# Patient Record
Sex: FEMALE | Race: WHITE | Employment: OTHER | URBAN - METROPOLITAN AREA
[De-identification: names, ages, dates, MRNs, and addresses within clinical notes are randomized per-mention and may not be internally consistent; named-entity substitution may affect disease eponyms.]

---

## 2018-09-19 ENCOUNTER — APPOINTMENT (OUTPATIENT)
Dept: CT IMAGING | Facility: CLINIC | Age: 83
DRG: 392 | End: 2018-09-19
Attending: EMERGENCY MEDICINE
Payer: MEDICARE

## 2018-09-19 ENCOUNTER — HOSPITAL ENCOUNTER (INPATIENT)
Facility: CLINIC | Age: 83
LOS: 4 days | Discharge: HOME | DRG: 392 | End: 2018-09-23
Attending: EMERGENCY MEDICINE | Admitting: INTERNAL MEDICINE
Payer: MEDICARE

## 2018-09-19 DIAGNOSIS — K57.32 DIVERTICULITIS OF SIGMOID COLON: ICD-10-CM

## 2018-09-19 DIAGNOSIS — N39.0 URINARY TRACT INFECTION WITH HEMATURIA, SITE UNSPECIFIED: ICD-10-CM

## 2018-09-19 DIAGNOSIS — K65.1 INTRA-ABDOMINAL ABSCESS (H): Primary | ICD-10-CM

## 2018-09-19 DIAGNOSIS — R31.9 URINARY TRACT INFECTION WITH HEMATURIA, SITE UNSPECIFIED: ICD-10-CM

## 2018-09-19 LAB
ALBUMIN UR-MCNC: >499 MG/DL
ANION GAP SERPL CALCULATED.3IONS-SCNC: 7 MMOL/L (ref 3–14)
APPEARANCE UR: ABNORMAL
BACTERIA #/AREA URNS HPF: ABNORMAL /HPF
BASOPHILS # BLD AUTO: 0.1 10E9/L (ref 0–0.2)
BASOPHILS NFR BLD AUTO: 0.6 %
BILIRUB UR QL STRIP: NEGATIVE
BUN SERPL-MCNC: 14 MG/DL (ref 7–30)
CALCIUM SERPL-MCNC: 8.3 MG/DL (ref 8.5–10.1)
CHLORIDE SERPL-SCNC: 101 MMOL/L (ref 94–109)
CO2 SERPL-SCNC: 26 MMOL/L (ref 20–32)
COLOR UR AUTO: YELLOW
CREAT BLD-MCNC: 0.7 MG/DL (ref 0.52–1.04)
CREAT SERPL-MCNC: 0.77 MG/DL (ref 0.52–1.04)
DIFFERENTIAL METHOD BLD: ABNORMAL
EOSINOPHIL # BLD AUTO: 0.2 10E9/L (ref 0–0.7)
EOSINOPHIL NFR BLD AUTO: 1.9 %
ERYTHROCYTE [DISTWIDTH] IN BLOOD BY AUTOMATED COUNT: 13.2 % (ref 10–15)
GFR SERPL CREATININE-BSD FRML MDRD: 72 ML/MIN/1.7M2
GFR SERPL CREATININE-BSD FRML MDRD: 81 ML/MIN/1.7M2
GLUCOSE SERPL-MCNC: 101 MG/DL (ref 70–99)
GLUCOSE UR STRIP-MCNC: NEGATIVE MG/DL
HCT VFR BLD AUTO: 37.1 % (ref 35–47)
HGB BLD-MCNC: 12.4 G/DL (ref 11.7–15.7)
HGB UR QL STRIP: ABNORMAL
IMM GRANULOCYTES # BLD: 0 10E9/L (ref 0–0.4)
IMM GRANULOCYTES NFR BLD: 0.5 %
KETONES UR STRIP-MCNC: NEGATIVE MG/DL
LACTATE BLD-SCNC: 1.1 MMOL/L (ref 0.7–2)
LEUKOCYTE ESTERASE UR QL STRIP: ABNORMAL
LYMPHOCYTES # BLD AUTO: 1 10E9/L (ref 0.8–5.3)
LYMPHOCYTES NFR BLD AUTO: 11.9 %
MCH RBC QN AUTO: 33.6 PG (ref 26.5–33)
MCHC RBC AUTO-ENTMCNC: 33.4 G/DL (ref 31.5–36.5)
MCV RBC AUTO: 101 FL (ref 78–100)
MONOCYTES # BLD AUTO: 1 10E9/L (ref 0–1.3)
MONOCYTES NFR BLD AUTO: 12.6 %
MUCOUS THREADS #/AREA URNS LPF: PRESENT /LPF
NEUTROPHILS # BLD AUTO: 5.8 10E9/L (ref 1.6–8.3)
NEUTROPHILS NFR BLD AUTO: 72.5 %
NITRATE UR QL: NEGATIVE
NRBC # BLD AUTO: 0 10*3/UL
NRBC BLD AUTO-RTO: 0 /100
PH UR STRIP: 5 PH (ref 5–7)
PLATELET # BLD AUTO: 334 10E9/L (ref 150–450)
POTASSIUM SERPL-SCNC: 4.2 MMOL/L (ref 3.4–5.3)
RBC # BLD AUTO: 3.69 10E12/L (ref 3.8–5.2)
RBC #/AREA URNS AUTO: >182 /HPF (ref 0–2)
SODIUM SERPL-SCNC: 134 MMOL/L (ref 133–144)
SOURCE: ABNORMAL
SP GR UR STRIP: 1.02 (ref 1–1.03)
SQUAMOUS #/AREA URNS AUTO: 4 /HPF (ref 0–1)
UROBILINOGEN UR STRIP-MCNC: 4 MG/DL (ref 0–2)
WBC # BLD AUTO: 8 10E9/L (ref 4–11)
WBC #/AREA URNS AUTO: >182 /HPF (ref 0–5)
WBC CLUMPS #/AREA URNS HPF: PRESENT /HPF

## 2018-09-19 PROCEDURE — 36415 COLL VENOUS BLD VENIPUNCTURE: CPT | Performed by: EMERGENCY MEDICINE

## 2018-09-19 PROCEDURE — 83605 ASSAY OF LACTIC ACID: CPT | Performed by: EMERGENCY MEDICINE

## 2018-09-19 PROCEDURE — 99207 ZZC CDG-MDM COMPONENT: MEETS MODERATE - UP CODED: CPT | Performed by: INTERNAL MEDICINE

## 2018-09-19 PROCEDURE — 74177 CT ABD & PELVIS W/CONTRAST: CPT

## 2018-09-19 PROCEDURE — 99285 EMERGENCY DEPT VISIT HI MDM: CPT | Mod: 25

## 2018-09-19 PROCEDURE — A9270 NON-COVERED ITEM OR SERVICE: HCPCS | Mod: GY | Performed by: PHYSICIAN ASSISTANT

## 2018-09-19 PROCEDURE — 82565 ASSAY OF CREATININE: CPT

## 2018-09-19 PROCEDURE — 96375 TX/PRO/DX INJ NEW DRUG ADDON: CPT

## 2018-09-19 PROCEDURE — 12000000 ZZH R&B MED SURG/OB

## 2018-09-19 PROCEDURE — 99223 1ST HOSP IP/OBS HIGH 75: CPT | Mod: AI | Performed by: INTERNAL MEDICINE

## 2018-09-19 PROCEDURE — 85025 COMPLETE CBC W/AUTO DIFF WBC: CPT | Performed by: EMERGENCY MEDICINE

## 2018-09-19 PROCEDURE — 96365 THER/PROPH/DIAG IV INF INIT: CPT | Mod: 59

## 2018-09-19 PROCEDURE — 80048 BASIC METABOLIC PNL TOTAL CA: CPT | Performed by: EMERGENCY MEDICINE

## 2018-09-19 PROCEDURE — 25000128 H RX IP 250 OP 636: Performed by: EMERGENCY MEDICINE

## 2018-09-19 PROCEDURE — 87040 BLOOD CULTURE FOR BACTERIA: CPT | Performed by: EMERGENCY MEDICINE

## 2018-09-19 PROCEDURE — 87086 URINE CULTURE/COLONY COUNT: CPT | Performed by: EMERGENCY MEDICINE

## 2018-09-19 PROCEDURE — 25000132 ZZH RX MED GY IP 250 OP 250 PS 637: Mod: GY | Performed by: PHYSICIAN ASSISTANT

## 2018-09-19 PROCEDURE — 99207 ZZC APP CREDIT; MD BILLING SHARED VISIT: CPT | Performed by: PHYSICIAN ASSISTANT

## 2018-09-19 PROCEDURE — 25000128 H RX IP 250 OP 636: Performed by: PHYSICIAN ASSISTANT

## 2018-09-19 PROCEDURE — 87186 SC STD MICRODIL/AGAR DIL: CPT | Performed by: EMERGENCY MEDICINE

## 2018-09-19 PROCEDURE — 87088 URINE BACTERIA CULTURE: CPT | Performed by: EMERGENCY MEDICINE

## 2018-09-19 PROCEDURE — 81001 URINALYSIS AUTO W/SCOPE: CPT | Performed by: EMERGENCY MEDICINE

## 2018-09-19 RX ORDER — CIPROFLOXACIN 2 MG/ML
400 INJECTION, SOLUTION INTRAVENOUS EVERY 12 HOURS
Status: DISCONTINUED | OUTPATIENT
Start: 2018-09-20 | End: 2018-09-22

## 2018-09-19 RX ORDER — OXYCODONE HYDROCHLORIDE 5 MG/1
5-10 TABLET ORAL
Status: DISCONTINUED | OUTPATIENT
Start: 2018-09-19 | End: 2018-09-23 | Stop reason: HOSPADM

## 2018-09-19 RX ORDER — SODIUM CHLORIDE 9 MG/ML
INJECTION, SOLUTION INTRAVENOUS CONTINUOUS
Status: DISCONTINUED | OUTPATIENT
Start: 2018-09-19 | End: 2018-09-20

## 2018-09-19 RX ORDER — ONDANSETRON 2 MG/ML
4 INJECTION INTRAMUSCULAR; INTRAVENOUS EVERY 6 HOURS PRN
Status: DISCONTINUED | OUTPATIENT
Start: 2018-09-19 | End: 2018-09-23 | Stop reason: HOSPADM

## 2018-09-19 RX ORDER — NALOXONE HYDROCHLORIDE 0.4 MG/ML
.1-.4 INJECTION, SOLUTION INTRAMUSCULAR; INTRAVENOUS; SUBCUTANEOUS
Status: DISCONTINUED | OUTPATIENT
Start: 2018-09-19 | End: 2018-09-23 | Stop reason: HOSPADM

## 2018-09-19 RX ORDER — METOPROLOL TARTRATE 25 MG/1
25 TABLET, FILM COATED ORAL 2 TIMES DAILY
Status: DISCONTINUED | OUTPATIENT
Start: 2018-09-19 | End: 2018-09-23 | Stop reason: HOSPADM

## 2018-09-19 RX ORDER — ONDANSETRON 4 MG/1
4 TABLET, ORALLY DISINTEGRATING ORAL EVERY 6 HOURS PRN
Status: DISCONTINUED | OUTPATIENT
Start: 2018-09-19 | End: 2018-09-23 | Stop reason: HOSPADM

## 2018-09-19 RX ORDER — ACETAMINOPHEN 325 MG/1
650 TABLET ORAL EVERY 4 HOURS PRN
Status: DISCONTINUED | OUTPATIENT
Start: 2018-09-19 | End: 2018-09-23 | Stop reason: HOSPADM

## 2018-09-19 RX ORDER — FOLIC ACID 1 MG/1
1 TABLET ORAL DAILY
COMMUNITY

## 2018-09-19 RX ORDER — LIDOCAINE 40 MG/G
CREAM TOPICAL
Status: DISCONTINUED | OUTPATIENT
Start: 2018-09-19 | End: 2018-09-23 | Stop reason: HOSPADM

## 2018-09-19 RX ORDER — FAMOTIDINE 20 MG
1000 TABLET ORAL DAILY
COMMUNITY

## 2018-09-19 RX ORDER — ASPIRIN 81 MG/1
81 TABLET ORAL AT BEDTIME
COMMUNITY
End: 2022-09-12

## 2018-09-19 RX ORDER — CIPROFLOXACIN 2 MG/ML
400 INJECTION, SOLUTION INTRAVENOUS ONCE
Status: COMPLETED | OUTPATIENT
Start: 2018-09-19 | End: 2018-09-19

## 2018-09-19 RX ORDER — HYDROMORPHONE HYDROCHLORIDE 1 MG/ML
.3-.5 INJECTION, SOLUTION INTRAMUSCULAR; INTRAVENOUS; SUBCUTANEOUS
Status: DISCONTINUED | OUTPATIENT
Start: 2018-09-19 | End: 2018-09-23 | Stop reason: HOSPADM

## 2018-09-19 RX ORDER — LOSARTAN POTASSIUM 100 MG/1
100 TABLET ORAL DAILY
Status: DISCONTINUED | OUTPATIENT
Start: 2018-09-19 | End: 2018-09-23 | Stop reason: HOSPADM

## 2018-09-19 RX ORDER — IOPAMIDOL 755 MG/ML
500 INJECTION, SOLUTION INTRAVASCULAR ONCE
Status: COMPLETED | OUTPATIENT
Start: 2018-09-19 | End: 2018-09-19

## 2018-09-19 RX ADMIN — SODIUM CHLORIDE: 9 INJECTION, SOLUTION INTRAVENOUS at 16:06

## 2018-09-19 RX ADMIN — SODIUM CHLORIDE 54 ML: 9 INJECTION, SOLUTION INTRAVENOUS at 10:28

## 2018-09-19 RX ADMIN — METRONIDAZOLE 500 MG: 500 INJECTION, SOLUTION INTRAVENOUS at 14:18

## 2018-09-19 RX ADMIN — LOSARTAN POTASSIUM 100 MG: 100 TABLET ORAL at 16:03

## 2018-09-19 RX ADMIN — IOPAMIDOL 57 ML: 755 INJECTION, SOLUTION INTRAVENOUS at 10:28

## 2018-09-19 RX ADMIN — CIPROFLOXACIN 400 MG: 2 INJECTION, SOLUTION INTRAVENOUS at 13:07

## 2018-09-19 RX ADMIN — METOPROLOL TARTRATE 25 MG: 25 TABLET ORAL at 20:22

## 2018-09-19 RX ADMIN — METRONIDAZOLE 500 MG: 500 INJECTION, SOLUTION INTRAVENOUS at 19:50

## 2018-09-19 ASSESSMENT — ACTIVITIES OF DAILY LIVING (ADL)
AMBULATION: 0-->INDEPENDENT
COGNITION: 0 - NO COGNITION ISSUES REPORTED
SWALLOWING: 0-->SWALLOWS FOODS/LIQUIDS WITHOUT DIFFICULTY
TOILETING: 0-->INDEPENDENT
DRESS: 0-->INDEPENDENT
RETIRED_COMMUNICATION: 0-->UNDERSTANDS/COMMUNICATES WITHOUT DIFFICULTY
ADLS_ACUITY_SCORE: 11
BATHING: 0-->INDEPENDENT
RETIRED_EATING: 0-->INDEPENDENT
ADLS_ACUITY_SCORE: 9
TRANSFERRING: 0-->INDEPENDENT
FALL_HISTORY_WITHIN_LAST_SIX_MONTHS: NO

## 2018-09-19 ASSESSMENT — ENCOUNTER SYMPTOMS
BLOOD IN STOOL: 1
APPETITE CHANGE: 1
FEVER: 0
NAUSEA: 0
SHORTNESS OF BREATH: 0
FREQUENCY: 1
FLANK PAIN: 0
ABDOMINAL PAIN: 1
CHILLS: 0
DIARRHEA: 1
DYSURIA: 1
BACK PAIN: 0

## 2018-09-19 NOTE — IP AVS SNAPSHOT
[place] [address] [facility]    [address]    [facility] [address]    Phone:  [phone]    Fax:  [phone]                                       After Visit Summary   9/19/2018    Brea Kerr    MRN: [id number]           After Visit Summary Signature Page     I have received my discharge instructions, and my questions have been answered. I have discussed any challenges I see with this plan with the nurse or doctor.    ..........................................................................................................................................  Patient/Patient Representative Signature      ..........................................................................................................................................  Patient Representative Print Name and Relationship to Patient    ..................................................               ................................................  Date                                   Time    ..........................................................................................................................................  Reviewed by Signature/Title    ...................................................              ..............................................  Date                                               Time          22EPIC Rev 08/18

## 2018-09-19 NOTE — H&P
History and Physical     Brea Kerr MRN# [id number]   YOB: 1935 Age: 82 year old      Date of Admission:  9/19/2018    Primary care provider: [facility], [place]          Assessment and Plan:   Brea Kerr is a 82 year old female with a PMH significant for HTN, pulmonary HTN, inflammatory polyarthropathy, immunosuppressed on methotrexate, and HLP, who presents with dysuria and abdominal pain.    1. Acute sigmoid diverticulitis with possible abscess - 4 days of abd pain and dysuria, 1 week of decreased appetite. Noted blood in stool yesterday, which is not unusual for her due to rectal polyp. Hx of diverticulitis in 2015 in [place] with abscess, requiring drainage. ED work up is remarkable for grossly abnormal UA and CT abd/pelvis showing acute sigmoid diverticulitis and findings concerning for possible abscess vs fistula. ED spoke with IR, did not recommend drainage at this time but repeat CT in 24-48 hrs with PO contrast. Started on Cipro/Flagyl in ED, will continue. Pain control and supportive cares. NPO and surgery consult.  2. UTI - UA grossly abnormal, complains of dysuria. CT concerning for abscess vs fistula. Cipro started for #1, will cover urine. Surgery consult  3. HTN - BPs stable. Resume home meds with parameters  4. Inflammatory polyarthropathy - on immunosuppression with methotrexate, hold  5. HLP - not on a statin    Prophylaxis - mehcanical  Code status - Full Code  Dispo - admit to inpatient. Anticipate >2 days in the hospital                Chief Complaint:   Abdominal pain, dysuria         History of Present Illness:   Brea Kerr is a 82 year old female with a PMH significant for HTN, pulmonary HTN, inflammatory polyarthropathy, and HLP, who presents with dysuria and abdominal pain. Pt reports onset of LLQ abdominal pain about 4 days ago with associated dysuria and urgency. She also notes diarrhea and noted blood in her stool yesterday. She does have a known rectal polyp  that will bleed after bowel movements and yesterday bled a bit more than normal. She denies fever, chills, chest pain, SOB, nausea, vomiting or hematuria. She notes that she has a hx of diverticulitis back in 2015 in [place]. She had an associated abscess that did require placement of a drain for 2 weeks, then reoccurred 4 days later requiring drainage again. She denies change in diet or medications.     In the ED, VSS. BMP and CBC are fairly unremarkable. Lactic acid is normal at 1.1. UA is grossly abnormal with >499 protein, 4.0 urobilinogen, negative nitrite, large blood, moderate LE, >182 WBCs, >182 RBCs, and many bacteria in clumps. CT of the abd/pelvis was obtained which showed sigmoid diverticulitis with possible abscess. There is also a small amount of air in the bladder raising concern for possible colovesical fistula. Urine and blood cultures are pending. She was given IV Cipro and Flagyl in the ED.     Hx obtained by speaking with ED physician, chart review and pt interview.               Past Medical History:     Past Medical History:   Diagnosis Date     Diverticula, colon      Hypertension                Past Surgical History:     Past Surgical History:   Procedure Laterality Date     SPLENECTOMY                 Social History:     Social History     Social History     Marital status:      Spouse name: N/A     Number of children: N/A     Years of education: N/A     Occupational History     Not on file.     Social History Main Topics     Smoking status: Not on file     Smokeless tobacco: Not on file     Alcohol use Not on file     Drug use: Not on file     Sexual activity: Not on file     Other Topics Concern     Not on file     Social History Narrative     No narrative on file     Denies tobacco use  Endorses 1-2 glasses of wine 2-3 days per week          Family History:   Father - CAD, HTN, CVA  Mother - CAD, HTN, CVA  Brother - DM         Allergies:      Allergies   Allergen Reactions     Sulfa  Drugs      Nausea                 Medications:     Prior to Admission medications    Medication Sig Last Dose Taking? Auth Provider   aspirin 81 MG EC tablet Take 81 mg by mouth At Bedtime  9/18/2018 at Unknown time Yes Unknown, Entered By History   folic acid (FOLVITE) 1 MG tablet Take 1 mg by mouth daily 9/18/2018 at Unknown time Yes Unknown, Entered By History   LOSARTAN POTASSIUM PO Take 100 mg by mouth daily  9/18/2018 at Unknown time Yes Reported, Patient   methotrexate 2.5 MG tablet CHEMO Take 7.5 mg by mouth every 7 days Tuesdays 9/18/2018 Yes Unknown, Entered By History   METOPROLOL TARTRATE PO Take 25 mg by mouth 2 times daily  9/18/2018 at 2000 Yes Reported, Patient   Vitamin D, Cholecalciferol, 1000 units CAPS Take 1,000 Units by mouth daily 9/18/2018 at Unknown time Yes Unknown, Entered By History              Review of Systems:   A Comprehensive greater than 10 system review of systems was carried out.  Pertinent positives and negatives are noted above.  Otherwise negative for contributory information.     Review Of Systems  Skin: negative  Eyes: negative  Ears/Nose/Throat: negative  Respiratory: No shortness of breath, dyspnea on exertion, cough, or hemoptysis  Cardiovascular: negative  Gastrointestinal: negative  Genitourinary: negative  Musculoskeletal: negative  Neurologic: negative  Psychiatric: negative  Hematologic/Lymphatic/Immunologic: negative  Endocrine: negative             Physical Exam:   Blood pressure 132/72, pulse 69, temperature 98.4  F (36.9  C), temperature source Oral, weight 50.8 kg (112 lb), SpO2 96 %.  Exam:  GENERAL:  Comfortable.  PSYCH: pleasant, oriented, No acute distress.  HEENT:  Atraumatic, normocephalic. Normal conjunctiva, normal hearing, and oropharynx is normal.  NECK:  Supple, no neck vein distention  HEART:  Normal S1, S2 with no murmur, no pericardial rub, gallops or S3 or S4.  LUNGS:  Clear to auscultation, normal Respiratory effort. No wheezing, rales or  loli.  GI:  Soft, normal bowel sounds. LLQ tenderness, non distended.   EXTREMITIES:  No pedal edema, +2 pulses bilateral and equal.  SKIN:  Dry to touch, No rash, wound or ulcerations.  NEUROLOGIC:  CN 2-12 intact, BL 5/5 symmetric upper and lower extremity strength, sensation is intact with no focal deficits.               Data:       Recent Labs  Lab 09/19/18  0956   WBC 8.0   HGB 12.4   HCT 37.1   *          Recent Labs  Lab 09/19/18  1005 09/19/18  0956   NA  --  134   POTASSIUM  --  4.2   CHLORIDE  --  101   CO2  --  26   ANIONGAP  --  7   GLC  --  101*   BUN  --  14   CR  --  0.77   GFRESTIMATED 81 72   GFRESTBLACK >90 87   PAULINA  --  8.3*     Lactic acid - 1.1    Recent Labs  Lab 09/19/18  0956   COLOR Yellow   APPEARANCE Cloudy   URINEGLC Negative   URINEBILI Negative   URINEKETONE Negative   SG 1.016   UBLD Large*   URINEPH 5.0   PROTEIN >499*   NITRITE Negative   LEUKEST Moderate*   RBCU >182*   WBCU >182*       Recent Results (from the past 48 hour(s))   CT Abdomen Pelvis w Contrast    Narrative    CT ABDOMEN/PELVIS WITH CONTRAST September 19, 2018 10:32 AM     HISTORY: Left lower quadrant tenderness, diarrhea.    TECHNIQUE: 57mL Isovue-370. Radiation dose for this scan was reduced  using automated exposure control, adjustment of the mA and/or kV  according to patient size, or iterative reconstruction technique.    COMPARISON: None.    FINDINGS: Evaluation is slightly limited by motion artifact. There is  atelectasis in the right lung base. There are calcified stones in the  gallbladder. No acute abnormality is seen in the liver, spleen,  pancreas, adrenal glands, or kidneys. There is no evidence of small  bowel obstruction. Appendix is normal.    There is moderate wall thickening involving the sigmoid colon. There  are colonic diverticula in this region. There is a collection of gas  and stool just superior to the bladder. This measures approximately 4  cm in diameter. It is difficult to  determine whether this is  contiguous with the colon. This may represent an unusual appearance of  the diverticulum but an intramural abscess could also have this  appearance. There are a few foci of air within the bladder lumen.  There is no free intraperitoneal air.    There is multilevel degenerative change and scoliosis in the spine.  There are degenerative changes in both hips.      Impression    IMPRESSION:   1. Findings consistent with acute sigmoid diverticulitis. There is a  gas and stool collection in the pelvis adjacent to the sigmoid colon.  This could represent a diverticulum or intramural abscess.  2. Small amount of air in the bladder lumen. This could be related to  catheterization. However, given the adjacent inflammatory process in  the pelvis a colovesical fistula cannot be excluded. Clinical  correlation requested.  3. Cholelithiasis.    MD Kacy CABRERA PA-C    This patient was seen and discussed with Dr. Rutledge who agrees with the current plans as outlined above.

## 2018-09-19 NOTE — CONSULTS
[facility]  Colon and Rectal Surgery Consult Note  Name: Brea Kerr    MRN: [id number]  YOB: 1935    Age: 82 year old  Date of admission: 9/19/2018  Primary care provider: [facility], [place]     Requesting Physician:  Doyle Mooney  Reason for consult:  Likely colovesical fistula           History of Present Illness:   Brea Kerr is a 82 year old female, seen at the request of Cresencio, presents with 2 weeks of frequent urination and a urinary tract infection.     Per the patient she has a prior history of diverticulitis treated in [facility].  She is had an episode in 2015 when she was in [place] and was in the hospital for about a week and required a percutaneous drain.  She had a colonoscopy at some point after that and is unclear whether she had polyps.  We are working to obtain these records.    She has been doing well in between having a bowel movement about every third day.  About 2 weeks ago she began having increasing frequency in urination and over the past 24 hours has been passing fecal material.  She denies pneumaturia.  She denies fever or chills no nausea or vomiting her weight is stable.  She does have a poor appetite in general but this has not changed.  She denies abdominal pain.  She does note slight fullness in the suprapubic region.    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    She lives with her  in [place] in a town home.  They do their own shopping and cooking.  She is able to take several flights of stairs with some discomfort in the knee otherwise no shortness of breath chest pain or other concerns.    Prior exlap for bleeding with a splenectomy in 1973    Colonoscopy History:  2015 in [facility] after her diverticultiis            Past Medical History:     Past Medical History:   Diagnosis Date     Diverticula, colon      Hypertension              Past Surgical  History:     Past Surgical History:   Procedure Laterality Date     SPLENECTOMY     open exlap in 1973         Social History:     Social History   Substance Use Topics     Smoking status: Not on file     Smokeless tobacco: Not on file     Alcohol use Not on file             Family History:   No family history on file.          Allergies:     Allergies   Allergen Reactions     Sulfa Drugs      Nausea               Medications:       [START ON 9/20/2018] ciprofloxacin  400 mg Intravenous Q12H     losartan (COZAAR) tablet 100 mg  100 mg Oral Daily     metoprolol tartrate (LOPRESSOR) tablet 25 mg  25 mg Oral BID     metroNIDAZOLE  500 mg Intravenous Q6H     sodium chloride (PF)  3 mL Intracatheter Q8H             Review of Systems:   A comprehensive greater than 10 system review of systems was carried out.  Pertinent positives and negatives are noted above.  Otherwise negative for contributory info.            Physical Exam:     Blood pressure 125/68, pulse 69, temperature 97.1  F (36.2  C), temperature source Oral, resp. rate 14, height 1.524 m (5'), weight 50.7 kg (111 lb 12.4 oz), SpO2 95 %.  No intake or output data in the 24 hours ending 09/19/18 1604  EXAM:  GEN: Awake alert and oriented, appears her stated age  PULM: Non-labored breathing with normal respiratory effort  CVS: reg rate and rhythm, no peripheral edema  ABD: Soft, non- tender, non- distended. no rebound or guarding there is full ness in the left lower quadrant and suprapubic region  RECTAL: Rectal exam was normal other than small non-inflamed mixed hemorrhoids, no stenosis or masses. No tenderness  NEURO: CN II-XII grossly intact  MSK: extremeties with no clubbing, cyanosis or edema; able to ambulate without difficulty per the patient  PSYCH: responsive, alert, cooperative; oriented x3; appropriate mood and affect  EXT/SKIN: inspection reveals no rashes, lesions or ulcers, normal coloring         Data Reviewed:     Recent Results (from the past 24  hour(s))   CT Abdomen Pelvis w Contrast    Narrative    CT ABDOMEN/PELVIS WITH CONTRAST September 19, 2018 10:32 AM     HISTORY: Left lower quadrant tenderness, diarrhea.    TECHNIQUE: 57mL Isovue-370. Radiation dose for this scan was reduced  using automated exposure control, adjustment of the mA and/or kV  according to patient size, or iterative reconstruction technique.    COMPARISON: None.    FINDINGS: Evaluation is slightly limited by motion artifact. There is  atelectasis in the right lung base. There are calcified stones in the  gallbladder. No acute abnormality is seen in the liver, spleen,  pancreas, adrenal glands, or kidneys. There is no evidence of small  bowel obstruction. Appendix is normal.    There is moderate wall thickening involving the sigmoid colon. There  are colonic diverticula in this region. There is a collection of gas  and stool just superior to the bladder. This measures approximately 4  cm in diameter. It is difficult to determine whether this is  contiguous with the colon. This may represent an unusual appearance of  the diverticulum but an intramural abscess could also have this  appearance. There are a few foci of air within the bladder lumen.  There is no free intraperitoneal air.    There is multilevel degenerative change and scoliosis in the spine.  There are degenerative changes in both hips.      Impression    IMPRESSION:   1. Findings consistent with acute sigmoid diverticulitis. There is a  gas and stool collection in the pelvis adjacent to the sigmoid colon.  This could represent a diverticulum or intramural abscess.  2. Small amount of air in the bladder lumen. This could be related to  catheterization. However, given the adjacent inflammatory process in  the pelvis a colovesical fistula cannot be excluded. Clinical  correlation requested.  3. Cholelithiasis.    HOUSTON HUTCHISON MD         Recent Labs  Lab 09/19/18  0956   WBC 8.0   HGB 12.4   HCT 37.1   *              Lab Results   Component Value Date     09/19/2018    Lab Results   Component Value Date    CHLORIDE 101 09/19/2018    Lab Results   Component Value Date    BUN 14 09/19/2018      Lab Results   Component Value Date    POTASSIUM 4.2 09/19/2018    Lab Results   Component Value Date    CO2 26 09/19/2018    Lab Results   Component Value Date    CR 0.77 09/19/2018        No results for input(s): INR in the last 168 hours.    Recent Labs  Lab 09/19/18  1225   LACT 1.1         Assessment and Plan:   Brea is an 81yo woman with an abscess between the sigmoid colon and bladder with small air in the bladder and symptoms of a colovesical fistula. She is having symptoms of a UTI but no pain or fevers. She has had what sounds like an abscess in the past about 3 years ago.    Plan:  1. Admit to hospitalist  2. Surgery: no plan for urgent surgery but likely will need elective sigmoid resection and take down of colovesical fistula. [facility] wishes to avoid surgery if possible. Will plan of IR drainage tomorrow and possible sinogram several days later. This can often be done as an outpatient. If there is a fistula will plan for surgery electively  3. Diet: clears for now, NPO at 8 am for IR drainage.   4. IV Fluids: NS at 100 per hospitalist team  5. Antibiotics:  Cipro/flagyl  6. Medications:  Okay for home meds.   7. I&O s:  strict I&O s  8. Labs:   - Reviewed: by me  9. Imaging:   - I have personally viewed: CT abd/pelvis with the radiologist and interventional radiology. Will   - Ordered:  CT guided drainage tomorrow mid day.  10. Activity:as tolerated  11. DVT prophylaxis: SCD s     12. Obtain outside records from [facility] re: her admission in [facility] in 2015.     Patient specific identified risk factors considered as part of today s evaluation include: prior exlaparotomy and splenectomy, colovesical fistula    Additional history obtained from chart and patient.  Time spent on consultation: 65 min    MD Sedrick Cordero  [facility]  Office Phone # [phone]

## 2018-09-19 NOTE — IP AVS SNAPSHOT
MRN:[id number]                      After Visit Summary   9/19/2018    Brea Kerr    MRN: [id number]           Thank you!     Thank you for choosing [facility] for your care. Our goal is always to provide you with excellent care. Hearing back from our patients is one way we can continue to improve our services. Please take a few minutes to complete the written survey that you may receive in the mail after you visit. If you would like to speak to someone directly about your visit please contact Patient Relations at [phone]. Thank you!          Patient Information     Date Of Birth          1935        Designated Caregiver       Most Recent Value    Caregiver    Will someone help with your care after discharge? no      About your hospital stay     You were admitted on:  September 19, 2018 You last received care in the:  [place] [address] [facility]    You were discharged on:  September 23, 2018        Reason for your hospital stay       You were hospitalized for diverticulitis and abscess formation.  Your drain will remain in place until follow up with Dr. Reddy.  You will be on antibiotics until that appointment as well.                  Who to Call     For medical emergencies, please call 911.  For non-urgent questions about your medical care, please call your primary care provider or clinic, [phone]          Attending Provider     Provider Specialty    Jesica, Nicholas Mi MD Emergency Medicine    AamirAdalberto MD Internal Medicine       Primary Care Provider Office Phone # Fax #    [facility] [phone]      After Care Instructions     Activity       Your activity upon discharge: activity as tolerated            Diet       Follow this diet upon discharge: Orders Placed This Encounter     Low Fiber            Tubes and drains       You are going home with the following tubes or drains: NARAYAN.  Tube cares per hospital or home  "care instructions.  Flush with 5 mL normal saline every 8 hours                  Follow-up Appointments     Follow-up and recommended labs and tests        Follow up with Dr. Reddy in the clinic to discuss surgery (tentatively held for 10/23) on Wednesday 10/3 at 11:30am in the [facility]. Please arrive 20 minutes early for paperwork. The office is located at [address] [facility]. [address] [address]. Call the office if you have questions. [phone]            Follow-up and recommended labs and tests        Follow up with Dr. Reddy on 10/3    Follow up with your arthritis physician regarding your methotrexate is on hold for your infection.                  Pending Results     Date and Time Order Name Status Description    9/20/2018 1227 Abscess Culture Aerobic Bacterial Preliminary     9/20/2018 1227 Anaerobic bacterial culture Preliminary     9/19/2018 1223 Blood culture Preliminary     9/19/2018 1203 Blood culture Preliminary             Statement of Approval     Ordered          09/23/18 0906  I have reviewed and agree with all the recommendations and orders detailed in this document.  EFFECTIVE NOW     Approved and electronically signed by:  Jose M Carrero MD             Admission Information     Date & Time Provider Department Dept. Phone    9/19/2018 Adalberto Rutledge MD [place] [address] [facility] [phone]      Your Vitals Were     Blood Pressure Pulse Temperature Respirations Height Weight    130/64 (BP Location: Left arm) 84 97.7  F (36.5  C) (Oral) 16 1.524 m (5') 50.7 kg (111 lb 12.4 oz)    Pulse Oximetry BMI (Body Mass Index)                94% 21.83 kg/m2          MyCSvbtle Information     Dr. TATTOFF lets you send messages to your doctor, view your test results, renew your prescriptions, schedule appointments and more. To sign up, go to [email or web address] . Click on \"Log in\" on the left side of the screen, which will take you to the Welcome page. Then click on \"Sign " "up Now\" on the right side of the page.     You will be asked to enter the access code listed below, as well as some personal information. Please follow the directions to create your username and password.     Your access code is: [email or web address]  Expires: 2018 10:36 PM     Your access code will  in 90 days. If you need help or a new code, please call your [place] clinic or [phone].        Care EveryWhere ID     This is your Care EveryWhere ID. This could be used by other organizations to access your [place] medical records  [id number]        Equal Access to Services     [facility]: Yoko Eid, rene hall, deena omalley, tabitha lancaster . So [facility] [phone].    ATENCIÓN: Si habla español, tiene a penaloza disposición servicios gratuitos de asistencia lingüística. Llame al [phone].    We comply with applicable federal civil rights laws and [place] laws. We do not discriminate on the basis of race, color, national origin, age, disability, sex, sexual orientation, or gender identity.               Review of your medicines      START taking        Dose / Directions    ciprofloxacin 500 MG tablet   Commonly known as:  CIPRO   Indication:  Infection Within the Abdomen        Dose:  500 mg   Take 1 tablet (500 mg) by mouth every 12 hours for 10 days   Quantity:  20 tablet   Refills:  0       metroNIDAZOLE 500 MG tablet   Commonly known as:  FLAGYL   Indication:  Infection Within the Abdomen        Dose:  500 mg   Take 1 tablet (500 mg) by mouth every 8 hours for 10 days   Quantity:  30 tablet   Refills:  0       sodium chloride (PF) 0.9% PF flush   Used for:  Intra-abdominal abscess (H)        Dose:  5 mL   5 mLs by Intracatheter route every 8 hours   Quantity:  450 mL   Refills:  0         CONTINUE these medicines which have NOT CHANGED        Dose / Directions    aspirin 81 MG EC tablet        Dose:  81 mg   Take 81 mg by mouth At Bedtime "   Refills:  0       folic acid 1 MG tablet   Commonly known as:  FOLVITE        Dose:  1 mg   Take 1 mg by mouth daily   Refills:  0       LOSARTAN POTASSIUM PO        Dose:  100 mg   Take 100 mg by mouth daily   Refills:  0       METOPROLOL TARTRATE PO        Dose:  25 mg   Take 25 mg by mouth 2 times daily   Refills:  0       Vitamin D (Cholecalciferol) 1000 units Caps        Dose:  1000 Units   Take 1,000 Units by mouth daily   Refills:  0         STOP taking     methotrexate 2.5 MG tablet CHEMO                Where to get your medicines      These medications were sent to [facility] [address] [facility]  [address] [facility] [address]     Phone:  [phone]     ciprofloxacin 500 MG tablet    metroNIDAZOLE 500 MG tablet    sodium chloride (PF) 0.9% PF flush                Protect others around you: Learn how to safely use, store and throw away your medicines at www.disposemymeds.org.        ANTIBIOTIC INSTRUCTION     You've Been Prescribed an Antibiotic - Now What?  Your healthcare team thinks that you or your loved one might have an infection. Some infections can be treated with antibiotics, which are powerful, life-saving drugs. Like all medications, antibiotics have side effects and should only be used when necessary. There are some important things you should know about your antibiotic treatment.      Your healthcare team may run tests before you start taking an antibiotic.    Your team may take samples (e.g., from your blood, urine or other areas) to run tests to look for bacteria. These test can be important to determine if you need an antibiotic at all and, if you do, which antibiotic will work best.      Within a few days, your healthcare team might change or even stop your antibiotic.    Your team may start you on an antibiotic while they are working to find out what is making you sick.    Your team might change your antibiotic because test results  show that a different antibiotic would be better to treat your infection.    In some cases, once your team has more information, they learn that you do not need an antibiotic at all. They may find out that you don't have an infection, or that the antibiotic you're taking won't work against your infection. For example, an infection caused by a virus can't be treated with antibiotics. Staying on an antibiotic when you don't need it is more likely to be harmful than helpful.      You may experience side effects from your antibiotic.    Like all medications, antibiotics have side effects. Some of these can be serious.    Let you healthcare team know if you have any known allergies when you are admitted to the hospital.    One significant side effect of nearly all antibiotics is the risk of severe and sometimes deadly diarrhea caused by Clostridium difficile (C. Difficile). This occurs when a person takes antibiotics because some good germs are destroyed. Antibiotic use allows C. diificile to take over, putting patients at high risk for this serious infection.    As a patient or caregiver, it is important to understand your or your loved one's antibiotic treatment. It is especially important for caregivers to speak up when patients can't speak for themselves. Here are some important questions to ask your healthcare team.    What infection is this antibiotic treating and how do you know I have that infection?    What side effects might occur from this antibiotic?    How long will I need to take this antibiotic?    Is it safe to take this antibiotic with other medications or supplements (e.g., vitamins) that I am taking?     Are there any special directions I need to know about taking this antibiotic? For example, should I take it with food?    How will I be monitored to know whether my infection is responding to the antibiotic?    What tests may help to make sure the right antibiotic is prescribed for me?      Information  provided by:  www.cdc.gov/getsmart  U.S. Department of Health and Human Services  Centers for disease Control and Prevention  National Center for Emerging and Zoonotic Infectious Diseases  Division of Healthcare Quality Promotion             Medication List: This is a list of all your medications and when to take them. Check marks below indicate your daily home schedule. Keep this list as a reference.      Medications           Morning Afternoon Evening Bedtime As Needed    aspirin 81 MG EC tablet   Take 81 mg by mouth At Bedtime   Next Dose Due:  Not given during this hospitalization.                                ciprofloxacin 500 MG tablet   Commonly known as:  CIPRO   Take 1 tablet (500 mg) by mouth every 12 hours for 10 days   Last time this was given:  500 mg on 9/23/2018  7:59 AM   Next Dose Due:  This evening at 8:00pm                                       folic acid 1 MG tablet   Commonly known as:  FOLVITE   Take 1 mg by mouth daily   Next Dose Due:  Not given during this hospitalization.                                LOSARTAN POTASSIUM PO   Take 100 mg by mouth daily   Last time this was given:  100 mg on 9/23/2018  7:59 AM   Next Dose Due:  September 24, 2018 Monday, in the AM                                      METOPROLOL TARTRATE PO   Take 25 mg by mouth 2 times daily   Last time this was given:  25 mg on 9/23/2018  7:59 AM   Next Dose Due:  This evening at 9:00pm                                 metroNIDAZOLE 500 MG tablet   Commonly known as:  FLAGYL   Take 1 tablet (500 mg) by mouth every 8 hours for 10 days   Last time this was given:  500 mg on 9/23/2018  1:07 PM   Next Dose Due:  Next dose at 10:00pm tonight                                          sodium chloride (PF) 0.9% PF flush   5 mLs by Intracatheter route every 8 hours   Last time this was given:  3 mLs on 9/23/2018  8:01 AM   Next Dose Due:  Today at 4:00pm                                             Vitamin D (Cholecalciferol) 1000  "units Caps   Take 1,000 Units by mouth daily   Next Dose Due:  Not given during this hospitalization.                                          More Information        Caring for a Closed Suction Drainage Tube  A drainage tube removes fluid from around an incision. This helps prevent infection and promotes healing. The collection bulb at the end of the tube is squeezed and plugged to create suction. The bulb should be emptied and reset when half full to maintain adequate suction. You need to empty the bulb and clean the skin around the drain as often as your healthcare provider tells you to. Follow the steps below.     What you ll need  Have the following items ready:    Disposable gloves    Measuring cup    Record sheet    Gauze or paper towel    Sterile cotton swabs or 4\" x 4\" gauze pads    Sterile saline or soap and water       Step 1. Empty the bulb    Wash your hands and put on a new pair of disposable gloves.    Point the top of the bulb away from you and remove the stopper.    Turn the bulb upside down over a measuring cup. Squeeze the fluid into the cup. Make sure the bulb is totally empty.    Put the cup to one side. You can record the volume of liquid in the cup after you clean and reconnect the bulb in step 2.    Step 2. Clean and reconnect the bulb    Clean the top of the bulb with clean gauze or a paper towel, if needed.    Squeeze the bulb tight, and put the stopper back on the top.    Record the amount of fluid in the cup. Then, empty the cup as directed.    Step 3. Clean the site    Remove your disposable gloves and wash your hands before cleaning the site.    Put on a new pair of disposable gloves.    Wet a sterile cotton swab or 4\" x 4\" gauze pad with sterile saline or soap and water.    Gently clean the skin around the drain. Always wipe away from the incision.    Apply an antibacterial ointment if directed.   When to call your healthcare provider  Call your healthcare provider if you notice any of " "these changes:    The amount of fluid increases or decreases suddenly    Large amount of blood or a clot in drainage    Color, odor, or thickness of the fluid changes    Tube falls out or the incision opens    Skin around the drain is red, swollen, painful, or seeping pus    You have a fever of 100.4 F (38 C) or higher, or as directed by your healthcare provider     If the tube isn't draining  Here are tips to drain the tube:    Uncurl any kinks in the tube.    With one hand, firmly hold the base of the tube between your thumb and index finger. Do not touch the incision.    Put the thumb and index finger of your other hand on the tube, next to the first hand. Pinch your fingers together. Then pull them along the tube toward the bag. This will help push any clogged fluid through the tube. This is called \"stripping the tube.\" You may find it helpful to hold an alcohol swab between your fingers and the tube to lubricate the tubing.    If the tube still does not drain, call your healthcare provider.   Date Last Reviewed: 12/1/2016 2000-2017 [employer]. [address] [place] [address]. All rights reserved. This information is not intended as a substitute for professional medical care. Always follow your healthcare professional's instructions.                Discharge Instructions: Caring for Your Nigel-Chua Drainage Tube  Your doctor discharges you with a Nigel-Chua drainage tube. Doctors commonly leave this drain within the abdominal cavity after surgery. It helps drain and collect blood and body fluid after surgery. This can prevent swelling and reduces the risk for infection. The tube is held in place by a few stitches. It is covered with a bandage. Your doctor will remove the drain when he or she determines you no longer need it.  Home care    Don t sleep on the same side as the tube.    Secure the tube and bag inside your clothing with a safety pin. This helps keep the tube from " being pulled out.    Empty your drain at least twice a day. Empty it more often if the drain is full. Wash  and dry your hands before emptying the drain.  ? Lift the opening on the drain.  ? Drain the fluid into a measuring cup.  ? Record the amount of fluid each time you empty the drain. Include the date and time it was emptied. Share this information with your doctor on your next visit.  ? Squeeze the bulb with your hands until you hear air coming out of the bulb if your doctor has instructed you to do so (sometimes the bulb is used as a reservoir without suction). Check with your doctor about specific drain instructions.  ? Close the opening.    Change the dressing around the tube every day.  ? Wash your hands.  ? Remove the old bandage.  ? Wash your hands again.  ? Wet a cotton swab and clean the skin around the incision and tube site. Use normal saline solution (salt and water). Or, you can use warm, soapy water.  ? Put a new bandage on the incision and tube site. Make the bandage large enough to cover the whole incision area.  ? Tape the bandage in place.    Keep the bandage and tube site dry when you shower. Ask your healthcare provider about the best way to do this.     Stripping  the tube helps keep blood clots from blocking the tube. Ask your nurse how often you should strip the tube. Stripping may not be needed, depending on where and why your doctor placed the tube. It may even be dangerous in some cases.   ? Hold the tubing where it leaves the skin, with one hand. This keeps it from pulling on the skin.  ? Pinch the tubing with the thumb and first finger of your other hand.  ? Slowly and firmly pull your thumb and first finger down the tubing. You may find it helpful to hold an alcohol swab between your fingers and the tube to lubricate the tubing.  ? If the pulling hurts or feels like it s coming out of the skin, stop. Begin again more gently.  Follow-up care  Make a follow-up appointment as directed  "by our staff.     When to seek medical care  Call your healthcare provider right away if you have any of the following:    New or increased pain around the tube    Redness, swelling, or warmth around the incision or tube    Drainage that is foul-smelling    Vomiting    Fever of 100.4 F (38 C)    Fluid leaking around the tube    Incision seems not to be healing    Stitches become loose    Tube falls out or breaks    Drainage that changes from light pink to dark red    Blood clots in the drainage bulb    A sudden increase or decrease in the amount of drainage (over 30 mL)     Date Last Reviewed: 2/1/2017 2000-2017 [employer]. [address] [place], [place] [address]. All rights reserved. This information is not intended as a substitute for professional medical care. Always follow your healthcare professional's instructions.                Flushing Your Drain with Saline  Please flush with ___5_____ ml normal saline.   Do this every  _____8_____ hours.   Getting Ready    Clean your work area with a household  and water (or cleaning alcohol) and a paper towel.    Wash your hands with soap and water for 15 seconds. Then, gather these items:  ? Syringe (pre-filled with .09% saline)  ? Alcohol pads  Prepare the saline.   1. Remove the syringe from its package.  2. Check the saline's expiration date (sometimes called \"use by\" date). If the date has passed, throw it away. Call your clinic or home care nurse if you have any questions.  3. To break the air seal on the syringe: Keep your finger on the cap and push the plunger forward slightly.  4. Remove the cap from the syringe.  5. Place the cap face-up on your work surface. Do not touch the tip of the syringe or the inside of the cap.  6. Hold the syringe so the tip points upward. Tap the side of the syringe to move any air bubbles to the top. (It is okay to have tiny bubbles inside the syringe.)  7. Gently push the plunger until the air comes out " "and you have the right amount of saline.  8. Replace the syringe cap until ready for use.  Flush the drain.  1. Make sure the \"off\" arm of the stopcock is pointing toward the flush port, as shown below.     2. Wipe the end cap on the port with an alcohol wipe for 15 seconds.  3. Twist the tip of the syringe into the end cap.  4. Turn the \"off\" arm of the stopcock toward the drainage bag.     5. Slowly push in the plunger to inject the saline. If you feel pain or resistance (if it is hard to inject the saline), stop and call your care team.  6. Turn the \"off\" arm of the stopcock back toward the flush port.     7. Hold onto the end cap as you remove the syringe. Make sure the end cap is tight. Throw the syringe away. Wash your hands.  8. Put a new end cap on your flush port each week.    For informational purposes only. Not to replace the advice of your health care provider. Copyright   2008 [facility]. All rights reserved. [employer] [id number] - REV 01/17.         "

## 2018-09-19 NOTE — PHARMACY-ADMISSION MEDICATION HISTORY
Admission medication history interview status for this patient is complete. See [facility] admission navigator for allergy information, prior to admission medications and immunization status.     Medication history interview source(s):Patient  Medication history resources (including written lists, pill bottles, clinic record):None  [facility]    Changes made to PTA medication list:  Added: Folic acid, Vitamin D, Methotrexate  Deleted: none  Changed: freq and doses to Aspirin, Losartan, Metoprolol    Actions taken by pharmacist (provider contacted, etc):None     Additional medication history information:[facility]ed [facility] to verify Losartan, metoprolol, Folic acid, Methotrexate    Medication reconciliation/reorder completed by provider prior to medication history? No    Do you take OTC medications (eg tylenol, ibuprofen, fish oil, eye/ear drops, etc)? See list    For patients on insulin therapy: No    Prior to Admission medications    Medication Sig Last Dose Taking? Auth Provider   aspirin 81 MG EC tablet Take 81 mg by mouth At Bedtime  9/18/2018 at Unknown time Yes Unknown, Entered By History   folic acid (FOLVITE) 1 MG tablet Take 1 mg by mouth daily 9/18/2018 at Unknown time Yes Unknown, Entered By History   LOSARTAN POTASSIUM PO Take 100 mg by mouth daily  9/18/2018 at Unknown time Yes Reported, Patient   methotrexate 2.5 MG tablet CHEMO Take 7.5 mg by mouth every 7 days Tuesdays 9/18/2018 Yes Unknown, Entered By History   METOPROLOL TARTRATE PO Take 25 mg by mouth 2 times daily  9/18/2018 at 2000 Yes Reported, Patient   Vitamin D, Cholecalciferol, 1000 units CAPS Take 1,000 Units by mouth daily 9/18/2018 at Unknown time Yes Unknown, Entered By History

## 2018-09-19 NOTE — ED TRIAGE NOTES
Low abd pressure and urinary frequency and dysuria.  ABCs intact.  Patient is alert and oriented x3.

## 2018-09-19 NOTE — ED NOTES
.  [facility]  ED Nurse Handoff Report    Brea Kerr is a 82 year old female   ED Chief complaint: Abdominal Pain and Urinary Problem  . ED Diagnosis:   Final diagnoses:   Diverticulitis of sigmoid colon   Urinary tract infection with hematuria, site unspecified     Allergies:   Allergies   Allergen Reactions     Sulfa Drugs      Nausea         Code Status: Full Code  Activity level - Baseline/Home:  Independent. Activity Level - Current:   Stand with Assist. Lift room needed: No. Bariatric: No   Needed: No   Isolation: No. Infection: Not Applicable.     Vital Signs:   Vitals:    09/19/18 1059 09/19/18 1120 09/19/18 1140 09/19/18 1248   BP: 128/74 120/73 111/71 125/71   Pulse: 82  70 69   Temp:   98.4  F (36.9  C) 98.4  F (36.9  C)   TempSrc:   Oral Oral   SpO2: 91% 93% 94% 95%   Weight:           Cardiac Rhythm:  ,      Pain level: 0-10 Pain Scale: 8  Patient confused: No. Patient Falls Risk: Yes.   Elimination Status: Has voided -- frequent trips to the restroom to void  Patient Report - Initial Complaint: Urinary frequency/abd pain. Focused Assessment: Low abd pain and urinary frequency for 5 days. No fever.  History of abdominal abscess in the past.   Tests Performed: CT, UA, labs. Abnormal Results:   Labs Ordered and Resulted from Time of ED Arrival Up to the Time of Departure from the ED   CBC WITH PLATELETS DIFFERENTIAL - Abnormal; Notable for the following:        Result Value    RBC Count 3.69 (*)      (*)     MCH 33.6 (*)     All other components within normal limits   BASIC METABOLIC PANEL - Abnormal; Notable for the following:     Glucose 101 (*)     Calcium 8.3 (*)     All other components within normal limits   UA MACROSCOPIC WITH REFLEX TO MICRO AND CULTURE - Abnormal; Notable for the following:     Blood Urine Large (*)     Protein Albumin Urine >499 (*)     Urobilinogen mg/dL 4.0 (*)     Leukocyte Esterase Urine Moderate (*)     RBC Urine >182 (*)     WBC Urine  >182 (*)     WBC Clumps Present (*)     Bacteria Urine Many (*)     Squamous Epithelial /HPF Urine 4 (*)     Mucous Urine Present (*)     All other components within normal limits   CREATININE POCT   LACTIC ACID WHOLE BLOOD   VITAL SIGNS   URINE CULTURE AEROBIC BACTERIAL   BLOOD CULTURE   BLOOD CULTURE     CT Abdomen Pelvis w Contrast   Final Result   IMPRESSION:    1. Findings consistent with acute sigmoid diverticulitis. There is a   gas and stool collection in the pelvis adjacent to the sigmoid colon.   This could represent a diverticulum or intramural abscess.   2. Small amount of air in the bladder lumen. This could be related to   catheterization. However, given the adjacent inflammatory process in   the pelvis a colovesical fistula cannot be excluded. Clinical   correlation requested.   3. Cholelithiasis.      HOUSTON HUTCHISON MD        .   Treatments provided: Cipro and Flagyl ordered in the ER.  Cipro is infusing now.  Family Comments:  has been here, might go home.  OBS brochure/video discussed/provided to patient:  N/A  ED Medications:   Medications   ciprofloxacin (CIPRO) infusion 400 mg (400 mg Intravenous New Bag 9/19/18 1307)   metroNIDAZOLE (FLAGYL) infusion 500 mg (not administered)   0.9% sodium chloride BOLUS (0 mLs Intravenous Stopped 9/19/18 1031)   iopamidol (ISOVUE-370) solution 500 mL (57 mLs Intravenous Given 9/19/18 1028)     Drips infusing:  Yes-- antibiotic  For the majority of the shift, the patient's behavior Green. Interventions performed were rounding and restroom.     Severe Sepsis OR Septic Shock Diagnosis Present: No      ED Nurse Name/Phone Number: Marietta Livingston,   1:23 PM    RECEIVING UNIT ED HANDOFF REVIEW    Above ED Nurse Handoff Report was reviewed: Yes  Reviewed by: Tricia Drake on September 19, 2018 at 1:49 PM

## 2018-09-20 ENCOUNTER — APPOINTMENT (OUTPATIENT)
Dept: CT IMAGING | Facility: CLINIC | Age: 83
DRG: 392 | End: 2018-09-20
Attending: COLON & RECTAL SURGERY
Payer: MEDICARE

## 2018-09-20 LAB
ALBUMIN SERPL-MCNC: 2.5 G/DL (ref 3.4–5)
ALP SERPL-CCNC: 74 U/L (ref 40–150)
ALT SERPL W P-5'-P-CCNC: 12 U/L (ref 0–50)
ANION GAP SERPL CALCULATED.3IONS-SCNC: 7 MMOL/L (ref 3–14)
AST SERPL W P-5'-P-CCNC: 14 U/L (ref 0–45)
BASOPHILS # BLD AUTO: 0.1 10E9/L (ref 0–0.2)
BASOPHILS NFR BLD AUTO: 1.4 %
BILIRUB SERPL-MCNC: 0.6 MG/DL (ref 0.2–1.3)
BUN SERPL-MCNC: 7 MG/DL (ref 7–30)
CALCIUM SERPL-MCNC: 8 MG/DL (ref 8.5–10.1)
CHLORIDE SERPL-SCNC: 105 MMOL/L (ref 94–109)
CO2 SERPL-SCNC: 26 MMOL/L (ref 20–32)
CREAT SERPL-MCNC: 0.67 MG/DL (ref 0.52–1.04)
DIFFERENTIAL METHOD BLD: ABNORMAL
EOSINOPHIL # BLD AUTO: 0.8 10E9/L (ref 0–0.7)
EOSINOPHIL NFR BLD AUTO: 13.2 %
ERYTHROCYTE [DISTWIDTH] IN BLOOD BY AUTOMATED COUNT: 13.4 % (ref 10–15)
GFR SERPL CREATININE-BSD FRML MDRD: 84 ML/MIN/1.7M2
GLUCOSE SERPL-MCNC: 92 MG/DL (ref 70–99)
GRAM STN SPEC: ABNORMAL
HCT VFR BLD AUTO: 35.1 % (ref 35–47)
HGB BLD-MCNC: 11.6 G/DL (ref 11.7–15.7)
IMM GRANULOCYTES # BLD: 0 10E9/L (ref 0–0.4)
IMM GRANULOCYTES NFR BLD: 0.3 %
INR PPP: 1.09 (ref 0.86–1.14)
LYMPHOCYTES # BLD AUTO: 1.6 10E9/L (ref 0.8–5.3)
LYMPHOCYTES NFR BLD AUTO: 28.5 %
MCH RBC QN AUTO: 33.9 PG (ref 26.5–33)
MCHC RBC AUTO-ENTMCNC: 33 G/DL (ref 31.5–36.5)
MCV RBC AUTO: 103 FL (ref 78–100)
MONOCYTES # BLD AUTO: 0.7 10E9/L (ref 0–1.3)
MONOCYTES NFR BLD AUTO: 12.8 %
NEUTROPHILS # BLD AUTO: 2.5 10E9/L (ref 1.6–8.3)
NEUTROPHILS NFR BLD AUTO: 43.8 %
NRBC # BLD AUTO: 0 10*3/UL
NRBC BLD AUTO-RTO: 0 /100
PLATELET # BLD AUTO: 336 10E9/L (ref 150–450)
POTASSIUM SERPL-SCNC: 3.9 MMOL/L (ref 3.4–5.3)
PROT SERPL-MCNC: 6.4 G/DL (ref 6.8–8.8)
RBC # BLD AUTO: 3.42 10E12/L (ref 3.8–5.2)
SODIUM SERPL-SCNC: 138 MMOL/L (ref 133–144)
SPECIMEN SOURCE: ABNORMAL
WBC # BLD AUTO: 5.8 10E9/L (ref 4–11)

## 2018-09-20 PROCEDURE — 25000128 H RX IP 250 OP 636: Performed by: INTERNAL MEDICINE

## 2018-09-20 PROCEDURE — 85610 PROTHROMBIN TIME: CPT | Performed by: PHYSICIAN ASSISTANT

## 2018-09-20 PROCEDURE — 87076 CULTURE ANAEROBE IDENT EACH: CPT | Performed by: RADIOLOGY

## 2018-09-20 PROCEDURE — A9270 NON-COVERED ITEM OR SERVICE: HCPCS | Mod: GY | Performed by: PHYSICIAN ASSISTANT

## 2018-09-20 PROCEDURE — 25000125 ZZHC RX 250: Performed by: PHYSICIAN ASSISTANT

## 2018-09-20 PROCEDURE — 25000128 H RX IP 250 OP 636

## 2018-09-20 PROCEDURE — 0W9G30Z DRAINAGE OF PERITONEAL CAVITY WITH DRAINAGE DEVICE, PERCUTANEOUS APPROACH: ICD-10-PCS | Performed by: RADIOLOGY

## 2018-09-20 PROCEDURE — 87077 CULTURE AEROBIC IDENTIFY: CPT | Performed by: RADIOLOGY

## 2018-09-20 PROCEDURE — 87075 CULTR BACTERIA EXCEPT BLOOD: CPT | Performed by: RADIOLOGY

## 2018-09-20 PROCEDURE — 25000132 ZZH RX MED GY IP 250 OP 250 PS 637: Mod: GY | Performed by: PHYSICIAN ASSISTANT

## 2018-09-20 PROCEDURE — 25000125 ZZHC RX 250

## 2018-09-20 PROCEDURE — 77012 CT SCAN FOR NEEDLE BIOPSY: CPT

## 2018-09-20 PROCEDURE — 87186 SC STD MICRODIL/AGAR DIL: CPT | Performed by: RADIOLOGY

## 2018-09-20 PROCEDURE — 12000000 ZZH R&B MED SURG/OB

## 2018-09-20 PROCEDURE — 85025 COMPLETE CBC W/AUTO DIFF WBC: CPT | Performed by: PHYSICIAN ASSISTANT

## 2018-09-20 PROCEDURE — 36415 COLL VENOUS BLD VENIPUNCTURE: CPT | Performed by: PHYSICIAN ASSISTANT

## 2018-09-20 PROCEDURE — 80053 COMPREHEN METABOLIC PANEL: CPT | Performed by: PHYSICIAN ASSISTANT

## 2018-09-20 PROCEDURE — 87070 CULTURE OTHR SPECIMN AEROBIC: CPT | Performed by: RADIOLOGY

## 2018-09-20 PROCEDURE — 25000128 H RX IP 250 OP 636: Performed by: RADIOLOGY

## 2018-09-20 PROCEDURE — 25000128 H RX IP 250 OP 636: Performed by: PHYSICIAN ASSISTANT

## 2018-09-20 PROCEDURE — 25500064 ZZH RX 255 OP 636: Performed by: INTERNAL MEDICINE

## 2018-09-20 PROCEDURE — 99232 SBSQ HOSP IP/OBS MODERATE 35: CPT | Performed by: INTERNAL MEDICINE

## 2018-09-20 PROCEDURE — C9113 INJ PANTOPRAZOLE SODIUM, VIA: HCPCS | Performed by: INTERNAL MEDICINE

## 2018-09-20 PROCEDURE — 87205 SMEAR GRAM STAIN: CPT | Performed by: RADIOLOGY

## 2018-09-20 RX ORDER — FENTANYL CITRATE 50 UG/ML
25-50 INJECTION, SOLUTION INTRAMUSCULAR; INTRAVENOUS EVERY 5 MIN PRN
Status: DISCONTINUED | OUTPATIENT
Start: 2018-09-20 | End: 2018-09-23 | Stop reason: HOSPADM

## 2018-09-20 RX ORDER — IOPAMIDOL 612 MG/ML
4 INJECTION, SOLUTION INTRAVASCULAR ONCE
Status: COMPLETED | OUTPATIENT
Start: 2018-09-20 | End: 2018-09-20

## 2018-09-20 RX ORDER — NICOTINE POLACRILEX 4 MG
15-30 LOZENGE BUCCAL
Status: DISCONTINUED | OUTPATIENT
Start: 2018-09-20 | End: 2018-09-23 | Stop reason: HOSPADM

## 2018-09-20 RX ORDER — FENTANYL CITRATE 50 UG/ML
INJECTION, SOLUTION INTRAMUSCULAR; INTRAVENOUS
Status: COMPLETED
Start: 2018-09-20 | End: 2018-09-20

## 2018-09-20 RX ORDER — DEXTROSE MONOHYDRATE 25 G/50ML
25-50 INJECTION, SOLUTION INTRAVENOUS
Status: DISCONTINUED | OUTPATIENT
Start: 2018-09-20 | End: 2018-09-20

## 2018-09-20 RX ORDER — NALOXONE HYDROCHLORIDE 0.4 MG/ML
.1-.4 INJECTION, SOLUTION INTRAMUSCULAR; INTRAVENOUS; SUBCUTANEOUS
Status: DISCONTINUED | OUTPATIENT
Start: 2018-09-20 | End: 2018-09-23 | Stop reason: HOSPADM

## 2018-09-20 RX ORDER — LIDOCAINE HYDROCHLORIDE 10 MG/ML
INJECTION, SOLUTION INFILTRATION; PERINEURAL
Status: COMPLETED
Start: 2018-09-20 | End: 2018-09-20

## 2018-09-20 RX ORDER — DEXTROSE, SODIUM CHLORIDE, SODIUM LACTATE, POTASSIUM CHLORIDE, AND CALCIUM CHLORIDE 5; .6; .31; .03; .02 G/100ML; G/100ML; G/100ML; G/100ML; G/100ML
INJECTION, SOLUTION INTRAVENOUS CONTINUOUS
Status: DISCONTINUED | OUTPATIENT
Start: 2018-09-20 | End: 2018-09-22

## 2018-09-20 RX ORDER — LIDOCAINE HYDROCHLORIDE 10 MG/ML
1-30 INJECTION, SOLUTION EPIDURAL; INFILTRATION; INTRACAUDAL; PERINEURAL
Status: COMPLETED | OUTPATIENT
Start: 2018-09-20 | End: 2018-09-20

## 2018-09-20 RX ORDER — PROCHLORPERAZINE MALEATE 5 MG
5 TABLET ORAL EVERY 6 HOURS PRN
Status: DISCONTINUED | OUTPATIENT
Start: 2018-09-20 | End: 2018-09-23 | Stop reason: HOSPADM

## 2018-09-20 RX ORDER — DEXTROSE MONOHYDRATE 25 G/50ML
25-50 INJECTION, SOLUTION INTRAVENOUS
Status: DISCONTINUED | OUTPATIENT
Start: 2018-09-20 | End: 2018-09-23 | Stop reason: HOSPADM

## 2018-09-20 RX ORDER — PROCHLORPERAZINE 25 MG
12.5 SUPPOSITORY, RECTAL RECTAL EVERY 12 HOURS PRN
Status: DISCONTINUED | OUTPATIENT
Start: 2018-09-20 | End: 2018-09-23 | Stop reason: HOSPADM

## 2018-09-20 RX ORDER — NICOTINE POLACRILEX 4 MG
15-30 LOZENGE BUCCAL
Status: DISCONTINUED | OUTPATIENT
Start: 2018-09-20 | End: 2018-09-20

## 2018-09-20 RX ORDER — FLUMAZENIL 0.1 MG/ML
0.2 INJECTION, SOLUTION INTRAVENOUS
Status: DISCONTINUED | OUTPATIENT
Start: 2018-09-20 | End: 2018-09-23 | Stop reason: HOSPADM

## 2018-09-20 RX ADMIN — MIDAZOLAM 1 MG: 1 INJECTION INTRAMUSCULAR; INTRAVENOUS at 11:39

## 2018-09-20 RX ADMIN — METRONIDAZOLE 500 MG: 500 INJECTION, SOLUTION INTRAVENOUS at 02:04

## 2018-09-20 RX ADMIN — LIDOCAINE HYDROCHLORIDE 20 ML: 10 INJECTION, SOLUTION INFILTRATION; PERINEURAL at 11:38

## 2018-09-20 RX ADMIN — FENTANYL CITRATE 50 MCG: 50 INJECTION INTRAMUSCULAR; INTRAVENOUS at 11:38

## 2018-09-20 RX ADMIN — SODIUM CHLORIDE: 9 INJECTION, SOLUTION INTRAVENOUS at 02:06

## 2018-09-20 RX ADMIN — METRONIDAZOLE 500 MG: 500 INJECTION, SOLUTION INTRAVENOUS at 20:01

## 2018-09-20 RX ADMIN — CIPROFLOXACIN 400 MG: 2 INJECTION, SOLUTION INTRAVENOUS at 01:00

## 2018-09-20 RX ADMIN — IOPAMIDOL 4 ML: 612 INJECTION, SOLUTION INTRAVENOUS at 12:35

## 2018-09-20 RX ADMIN — Medication 0.3 MG: at 15:03

## 2018-09-20 RX ADMIN — SODIUM CHLORIDE, SODIUM LACTATE, POTASSIUM CHLORIDE, CALCIUM CHLORIDE AND DEXTROSE MONOHYDRATE: 5; 600; 310; 30; 20 INJECTION, SOLUTION INTRAVENOUS at 12:32

## 2018-09-20 RX ADMIN — METRONIDAZOLE 500 MG: 500 INJECTION, SOLUTION INTRAVENOUS at 13:46

## 2018-09-20 RX ADMIN — PROCHLORPERAZINE EDISYLATE 5 MG: 5 INJECTION INTRAMUSCULAR; INTRAVENOUS at 21:18

## 2018-09-20 RX ADMIN — METRONIDAZOLE 500 MG: 500 INJECTION, SOLUTION INTRAVENOUS at 07:38

## 2018-09-20 RX ADMIN — LOSARTAN POTASSIUM 100 MG: 100 TABLET ORAL at 07:36

## 2018-09-20 RX ADMIN — PANTOPRAZOLE SODIUM 40 MG: 40 INJECTION, POWDER, FOR SOLUTION INTRAVENOUS at 09:20

## 2018-09-20 RX ADMIN — METOPROLOL TARTRATE 25 MG: 25 TABLET ORAL at 07:36

## 2018-09-20 RX ADMIN — CIPROFLOXACIN 400 MG: 2 INJECTION, SOLUTION INTRAVENOUS at 12:32

## 2018-09-20 RX ADMIN — LIDOCAINE HYDROCHLORIDE 20 ML: 10 INJECTION, SOLUTION EPIDURAL; INFILTRATION; INTRACAUDAL; PERINEURAL at 11:38

## 2018-09-20 RX ADMIN — METOPROLOL TARTRATE 25 MG: 25 TABLET ORAL at 20:00

## 2018-09-20 RX ADMIN — FENTANYL CITRATE 50 MCG: 50 INJECTION INTRAMUSCULAR; INTRAVENOUS at 11:58

## 2018-09-20 RX ADMIN — ONDANSETRON 4 MG: 4 TABLET, ORALLY DISINTEGRATING ORAL at 15:42

## 2018-09-20 RX ADMIN — Medication 0.3 MG: at 20:00

## 2018-09-20 ASSESSMENT — ACTIVITIES OF DAILY LIVING (ADL)
ADLS_ACUITY_SCORE: 9

## 2018-09-20 NOTE — PROGRESS NOTES
LLQ abscess drain placed per Dr. Hernandez without difficulty, patient tolerated well. Fentanyl 100 mcg and versed 2 mg total given for procedure. 10 ml pus aspirated and sent to lab for diagnostics. See also post procedure orders placed for flushing protocol, report called to bedside RN.

## 2018-09-20 NOTE — PLAN OF CARE
Patient SBA with transfers and ambulation.  Denies pain at this time.  Denies nausea.  On clear liquid diet, tolerating.  CRS following. Plan to have drain placed tomorrow.  Last BM yesterday, BS active. Nursing will continue to monitor.  Vital signs:  Temp: 97.1  F (36.2  C) Temp src: Oral BP: 122/61 Pulse: 69 Heart Rate: 92 Resp: 14 SpO2: 95 % O2 Device: None (Room air)

## 2018-09-20 NOTE — PLAN OF CARE
Problem: Patient Care Overview  Goal: Plan of Care/Patient Progress Review  Outcome: No Change  RN - VSS/Afebrile. Pt denying pain. Made NPO after midnight for IR drain placement - Pt arrived to floor post placement with NARAYAN drain in LLQ abdomen - Drain to be irrigaged every shift with 5ml of saline - last done at 1200. Denies SOB/Nausea. Continues to pass cloudy urine and urethral flatulence when voiding. Pt up with SBA. IVF infusing. Expressing no appetite - awaiting hospitalist rounding for diet advancement.

## 2018-09-20 NOTE — PROGRESS NOTES
COLON & RECTAL SURGERY  PROGRESS NOTE  September 20, 2018    SUBJECTIVE:  No overnight issues. Not complaining of pain, but irritation with voiding. No nausea.    OBJECTIVE:  Temp:  [97.1  F (36.2  C)-98.4  F (36.9  C)] 98.1  F (36.7  C)  Pulse:  [69-95] 79  Heart Rate:  [64-95] 94  Resp:  [14-16] 16  BP: (108-150)/(53-79) 131/56  SpO2:  [91 %-98 %] 95 %    Intake/Output Summary (Last 24 hours) at 09/20/18 0859  Last data filed at 09/20/18 0539   Gross per 24 hour   Intake             1145 ml   Output              450 ml   Net              695 ml       GENERAL:  Awake, alert, no acute distress  HEAD: Normocephalic atraumatic  SCLERA: Anicteric  EXTREMITIES: Warm and well perfused  ABDOMEN:  Soft, non tender, non-distended. No guarding, rigidity, or peritoneal signs.    LABS:  Lab Results   Component Value Date    WBC 8.0 09/19/2018     Lab Results   Component Value Date    HGB 12.4 09/19/2018     Lab Results   Component Value Date    HCT 37.1 09/19/2018     Lab Results   Component Value Date     09/19/2018     Last Basic Metabolic Panel:  Lab Results   Component Value Date     09/19/2018      Lab Results   Component Value Date    POTASSIUM 4.2 09/19/2018     Lab Results   Component Value Date    CHLORIDE 101 09/19/2018     Lab Results   Component Value Date    PAULINA 8.3 09/19/2018     Lab Results   Component Value Date    CO2 26 09/19/2018     Lab Results   Component Value Date    BUN 14 09/19/2018     Lab Results   Component Value Date    CR 0.77 09/19/2018     Lab Results   Component Value Date     09/19/2018       ASSESSMENT/PLAN: 82F with diverticulitis with pelvic abscess, 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    - IR drain today if able  - Cont abx  - Diet as tolerated after drain  - Will need further workup for fistula after acute diverticulitis resolves, can be done as outpatient    For questions/paging, please contact the CRS office at [phone].    Scout Reed MD  Colorectal Surgery  Fellow  [facility]  Phone: [phone]      CRS Staff  Patient down in IR when I came by to round.  Discussed with Dr. Reed.  Reviewed imaging.  Abscess successfully drained.  Will follow.    Robert Lama MD  Colorectal Surgery  [phone] (office)  [phone] (pager)  [email or web address]

## 2018-09-20 NOTE — PROGRESS NOTES
SPIRITUAL HEALTH SERVICES  SPIRITUAL ASSESSMENT Progress Note  [facility]    Visited pt per request on admission.  Bera indicated that she just appreciates speaking with a person of johnnie.  She briefly reviewed the successful careers of each of her children.      She identified [employer] in [place] as her home [place].  Reviewed the schedule of [facility] ministers. Introduced the availability of [facility] on request.    Per her anticipation of discharge within a day or two, no further visits are planned but are available on request.    Nash Allred M.Div.  Staff   Pager [phone]

## 2018-09-20 NOTE — PROGRESS NOTES
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    1) Acute sigmoid diverticulitis:  - c/b clinical and radiographic evidence of CV fistula  - History of diverticular abscess requiring drainage in 2015  - Clinical scenario confounded by immunosuppression   - Continue IV cipro/flagyl for now, NPO/IVF  - IR abscess drainage ordered by surgery  - Plan for non-urgent sinogram per CRS, t/c cystoscopy    2) UTI: Abnormal UA noted in setting of possible CV fistula    3) HTN: Stable, continue losartan/metoprolol    4) Inflammatory polyarthropathy: Holding MTX    5) Gastritis?: Start IV PPI since NPO      DVT Prophylaxis: Pneumatic Compression Devices  Code Status: Full Code  Disposition Plan   Expected discharge: 2 - 3 days, recommended to prior living arrangement once clinically improved.     Entered: Adalberto Rutledge MD 09/20/2018, 11:48 AM   Information in the above section will display in the discharge planner report.      Adalberto Rutledge MD    Text Page (7am to 6pm, M-F)    Interval History   Patient has minimal abdominal pain, has not had any BM since yesterday afternoon, denies any hematochezia since admission, reports passing air during urination and stomach burning from being NPO.    -Data reviewed today: I reviewed all new labs and imaging results over the last 24 hours. I personally reviewed the abdominal CT image(s) showing diverticulitis.    Physical Exam   Temp: 98.1  F (36.7  C) Temp src: Oral BP: 111/62 Pulse: 64 Heart Rate: 94 Resp: 16 SpO2: 96 % O2 Device: Nasal cannula    Vitals:    09/19/18 0937 09/19/18 1441   Weight: 50.8 kg (112 lb) 50.7 kg (111 lb 12.4 oz)     Vital Signs with Ranges  Temp:  [97.1  F  (36.2  C)-98.4  F (36.9  C)] 98.1  F (36.7  C)  Pulse:  [64-79] 64  Heart Rate:  [64-94] 94  Resp:  [14-16] 16  BP: (108-133)/(53-77) 111/62  SpO2:  [93 %-98 %] 96 %  I/O last 3 completed shifts:  In: 1145 [P.O.:120; I.V.:1025]  Out: 450 [Urine:450]    Constitutional: NAD, AAox3  Respiratory: CTABL,  Non-labored, no wheezing  Cardiovascular: Regular, S1S2 appreciated, no MRG  GI: Soft, mild TTP upon deep palpation of lower quads, no guarding  Skin/Integumen: normal  Neuro: CN grossly intact    Medications     sodium chloride 100 mL/hr at 09/20/18 0730       ciprofloxacin  400 mg Intravenous Q12H     [START ON 9/24/2018] influenza Vac Split High-Dose  0.5 mL Intramuscular Prior to discharge     losartan (COZAAR) tablet 100 mg  100 mg Oral Daily     metoprolol tartrate (LOPRESSOR) tablet 25 mg  25 mg Oral BID     metroNIDAZOLE  500 mg Intravenous Q6H     pantoprazole (PROTONIX) IV  40 mg Intravenous Daily with breakfast     sodium chloride (PF)  3 mL Intracatheter Q8H       Data     Recent Labs  Lab 09/20/18  0825 09/19/18  0956   WBC 5.8 8.0   HGB 11.6* 12.4   * 101*    334   INR 1.09  --     134   POTASSIUM 3.9 4.2   CHLORIDE 105 101   CO2 26 26   BUN 7 14   CR 0.67 0.77   ANIONGAP 7 7   PAULINA 8.0* 8.3*   GLC 92 101*   ALBUMIN 2.5*  --    PROTTOTAL 6.4*  --    BILITOTAL 0.6  --    ALKPHOS 74  --    ALT 12  --    AST 14  --        No results found for this or any previous visit (from the past 24 hour(s)).

## 2018-09-21 LAB
ANION GAP SERPL CALCULATED.3IONS-SCNC: 6 MMOL/L (ref 3–14)
BASOPHILS # BLD AUTO: 0 10E9/L (ref 0–0.2)
BASOPHILS NFR BLD AUTO: 0.5 %
BUN SERPL-MCNC: 5 MG/DL (ref 7–30)
CALCIUM SERPL-MCNC: 8 MG/DL (ref 8.5–10.1)
CHLORIDE SERPL-SCNC: 103 MMOL/L (ref 94–109)
CO2 SERPL-SCNC: 27 MMOL/L (ref 20–32)
CREAT SERPL-MCNC: 0.61 MG/DL (ref 0.52–1.04)
DIFFERENTIAL METHOD BLD: ABNORMAL
EOSINOPHIL # BLD AUTO: 0.6 10E9/L (ref 0–0.7)
EOSINOPHIL NFR BLD AUTO: 6.5 %
ERYTHROCYTE [DISTWIDTH] IN BLOOD BY AUTOMATED COUNT: 13.2 % (ref 10–15)
GFR SERPL CREATININE-BSD FRML MDRD: >90 ML/MIN/1.7M2
GLUCOSE SERPL-MCNC: 119 MG/DL (ref 70–99)
HCT VFR BLD AUTO: 33.8 % (ref 35–47)
HGB BLD-MCNC: 11.1 G/DL (ref 11.7–15.7)
IMM GRANULOCYTES # BLD: 0 10E9/L (ref 0–0.4)
IMM GRANULOCYTES NFR BLD: 0.3 %
LYMPHOCYTES # BLD AUTO: 1.7 10E9/L (ref 0.8–5.3)
LYMPHOCYTES NFR BLD AUTO: 19.3 %
MAGNESIUM SERPL-MCNC: 1.8 MG/DL (ref 1.6–2.3)
MCH RBC QN AUTO: 33.3 PG (ref 26.5–33)
MCHC RBC AUTO-ENTMCNC: 32.8 G/DL (ref 31.5–36.5)
MCV RBC AUTO: 102 FL (ref 78–100)
MONOCYTES # BLD AUTO: 1 10E9/L (ref 0–1.3)
MONOCYTES NFR BLD AUTO: 11 %
NEUTROPHILS # BLD AUTO: 5.4 10E9/L (ref 1.6–8.3)
NEUTROPHILS NFR BLD AUTO: 62.4 %
NRBC # BLD AUTO: 0 10*3/UL
NRBC BLD AUTO-RTO: 0 /100
PLATELET # BLD AUTO: 311 10E9/L (ref 150–450)
POTASSIUM SERPL-SCNC: 4 MMOL/L (ref 3.4–5.3)
RBC # BLD AUTO: 3.33 10E12/L (ref 3.8–5.2)
SODIUM SERPL-SCNC: 136 MMOL/L (ref 133–144)
WBC # BLD AUTO: 8.6 10E9/L (ref 4–11)

## 2018-09-21 PROCEDURE — 25000125 ZZHC RX 250: Performed by: PHYSICIAN ASSISTANT

## 2018-09-21 PROCEDURE — 25000128 H RX IP 250 OP 636: Performed by: PHYSICIAN ASSISTANT

## 2018-09-21 PROCEDURE — A9270 NON-COVERED ITEM OR SERVICE: HCPCS | Mod: GY | Performed by: PHYSICIAN ASSISTANT

## 2018-09-21 PROCEDURE — 36415 COLL VENOUS BLD VENIPUNCTURE: CPT | Performed by: INTERNAL MEDICINE

## 2018-09-21 PROCEDURE — 80048 BASIC METABOLIC PNL TOTAL CA: CPT | Performed by: INTERNAL MEDICINE

## 2018-09-21 PROCEDURE — 85025 COMPLETE CBC W/AUTO DIFF WBC: CPT | Performed by: INTERNAL MEDICINE

## 2018-09-21 PROCEDURE — C9113 INJ PANTOPRAZOLE SODIUM, VIA: HCPCS | Performed by: INTERNAL MEDICINE

## 2018-09-21 PROCEDURE — 99232 SBSQ HOSP IP/OBS MODERATE 35: CPT | Performed by: INTERNAL MEDICINE

## 2018-09-21 PROCEDURE — 25000132 ZZH RX MED GY IP 250 OP 250 PS 637: Mod: GY | Performed by: PHYSICIAN ASSISTANT

## 2018-09-21 PROCEDURE — 83735 ASSAY OF MAGNESIUM: CPT | Performed by: INTERNAL MEDICINE

## 2018-09-21 PROCEDURE — 12000000 ZZH R&B MED SURG/OB

## 2018-09-21 PROCEDURE — 25000132 ZZH RX MED GY IP 250 OP 250 PS 637: Mod: GY | Performed by: INTERNAL MEDICINE

## 2018-09-21 PROCEDURE — 25000128 H RX IP 250 OP 636: Performed by: INTERNAL MEDICINE

## 2018-09-21 RX ADMIN — METRONIDAZOLE 500 MG: 500 INJECTION, SOLUTION INTRAVENOUS at 14:16

## 2018-09-21 RX ADMIN — ONDANSETRON 4 MG: 2 INJECTION INTRAMUSCULAR; INTRAVENOUS at 09:26

## 2018-09-21 RX ADMIN — METRONIDAZOLE 500 MG: 500 INJECTION, SOLUTION INTRAVENOUS at 19:37

## 2018-09-21 RX ADMIN — PROCHLORPERAZINE MALEATE 5 MG: 5 TABLET, FILM COATED ORAL at 11:50

## 2018-09-21 RX ADMIN — Medication 0.5 MG: at 01:34

## 2018-09-21 RX ADMIN — ENOXAPARIN SODIUM 30 MG: 30 INJECTION SUBCUTANEOUS at 08:15

## 2018-09-21 RX ADMIN — ONDANSETRON 4 MG: 4 TABLET, ORALLY DISINTEGRATING ORAL at 16:09

## 2018-09-21 RX ADMIN — SODIUM CHLORIDE, SODIUM LACTATE, POTASSIUM CHLORIDE, CALCIUM CHLORIDE AND DEXTROSE MONOHYDRATE: 5; 600; 310; 30; 20 INJECTION, SOLUTION INTRAVENOUS at 22:23

## 2018-09-21 RX ADMIN — LOSARTAN POTASSIUM 100 MG: 100 TABLET ORAL at 08:14

## 2018-09-21 RX ADMIN — ONDANSETRON 4 MG: 4 TABLET, ORALLY DISINTEGRATING ORAL at 01:34

## 2018-09-21 RX ADMIN — METOPROLOL TARTRATE 25 MG: 25 TABLET ORAL at 08:14

## 2018-09-21 RX ADMIN — METRONIDAZOLE 500 MG: 500 INJECTION, SOLUTION INTRAVENOUS at 02:26

## 2018-09-21 RX ADMIN — PANTOPRAZOLE SODIUM 40 MG: 40 INJECTION, POWDER, FOR SOLUTION INTRAVENOUS at 08:14

## 2018-09-21 RX ADMIN — CIPROFLOXACIN 400 MG: 2 INJECTION, SOLUTION INTRAVENOUS at 12:29

## 2018-09-21 RX ADMIN — CIPROFLOXACIN 400 MG: 2 INJECTION, SOLUTION INTRAVENOUS at 01:21

## 2018-09-21 RX ADMIN — SODIUM CHLORIDE, SODIUM LACTATE, POTASSIUM CHLORIDE, CALCIUM CHLORIDE AND DEXTROSE MONOHYDRATE: 5; 600; 310; 30; 20 INJECTION, SOLUTION INTRAVENOUS at 06:55

## 2018-09-21 RX ADMIN — METRONIDAZOLE 500 MG: 500 INJECTION, SOLUTION INTRAVENOUS at 08:15

## 2018-09-21 ASSESSMENT — ACTIVITIES OF DAILY LIVING (ADL)
ADLS_ACUITY_SCORE: 9

## 2018-09-21 ASSESSMENT — PAIN DESCRIPTION - DESCRIPTORS: DESCRIPTORS: SORE

## 2018-09-21 NOTE — PLAN OF CARE
Problem: Patient Care Overview  Goal: Plan of Care/Patient Progress Review  Outcome: No Change  Patient SBA with ambulation.  C/o pain at NARAYAN site, gave 0.3mg IV Dilaudid, helpful. Did make pt nauseated.  Received Zofran x 1 and Compazine x 1.  LS clear. On clear liquid diet, poor appetite.  BS active.  Last BM 9/18/18. NARAYAN had 10ml of dark red drainage this shift.  Receiving Flagyl and Cipro IV.  Has D5 LR runnng at 75ml/hr.  CRD following.  Discharge TBD.  Nursing will continue to monitor.  Vital signs:  Temp: 97.9  F (36.6  C) Temp src: Oral BP: 137/64 Pulse: 78 Heart Rate: 80 Resp: 18 SpO2: 96 % O2 Device: None (Room air)

## 2018-09-21 NOTE — PLAN OF CARE
Problem: Patient Care Overview  Goal: Plan of Care/Patient Progress Review  Outcome: No Change  RN - VSS. Pt with minimal c/o pain. Abdominal NARAYAN drain remains in place. Irrigated as ordered. Pt diet adv to full liquids - Pt expressing poor appetite - tolerating jello and water but endorsing mild lingering nausea throughout the day. Pt receiving zofran and compazine with minimal relief. Attempting aroma interventions now. IV abx continue.  IVF continue to infuse. Pt up with SBA. Voiding well. Passing flatus. No bowel movement since prior to admission. Plan for pt to attempt increasing PO intake and further outpatient plans to evaluate drain and setup surgery.

## 2018-09-21 NOTE — PROGRESS NOTES
[facility]  Hospitalist Progress Note  Jose M Carrero MD 09/21/18    Reason for Stay (Diagnosis): Diverticulitis with colovesicular fistula         Assessment and Plan:      Summary of Stay: Brea Kerr is a 82 year old female with history of HTN, DVT/PE, inflammatory polyarthropathy on chronic immunosuppression, and diverticular disease who was admitted on 9/19/2018 with abdominal pain and found to have acute diverticulitis with concern for abscess and colovesicular fistula on CT scan.  She was started on IV ciprofloxacin and Flagyl.  Colorectal surgery was consulted.  IR placed a NARAYAN drain on 9/20 after abscess drainage.  Abscess culture growing GNR's.  Attempting to advance diet, so far slow going.    Problem List/Assessment and Plan:   Acute sigmoid diverticulitis with intra-abdominal abscess and colovesicular fistula: Presenting with abdominal pain.  CT with evidence for sigmoid diverticulitis and abscess.  Also concern for colovesicular fistula with air in the bladder.  S/P NARAYAN drain placement in the intra-abdominal abscess by IR on 9/20.  Culture growing GNR's.  -Colorectal surgery consulted, appreciate recommendations  -Continue IV ciprofloxacin and Flagyl, can transition to p.o. when able to tolerate pills  -Clear liquid diet as of this morning, can advance to full liquids later today if tolerating  -Continue IV fluids while decreased p.o.  -Ultimately will need outpatient follow-up for laparoscopic sigmoid resection and further evaluation/management of the colovesicular fistula    UTI: Abnormal UA with pyuria which may be secondary to UTI given the likely colovesicular fistula seen on CT.  Currently on ciprofloxacin and Flagyl for her diverticulitis and clinically improving.  Urine culture growing E. coli and another GNR.  -Continue current antibiotics    HTN: Tinea PTA losartan 100 mg daily and metoprolol 25 mg twice daily.    Inflammatory polyarthropathy: Chronically on methotrexate 7.5  mg weekly that it has been on hold given the above infection.  We will continue to hold on discharge and have her follow-up with this provider.    History DVT/PE: On prophylactic Lovenox while here.    DVT Prophylaxis: Enoxaparin (Lovenox) SQ  Code Status: Full Code  FEN: Clear liquid diet.  If tolerates can move to full liquid diet.  D5-LR at 75 ml/hr  Discharge Dispo: Home  Estimated Disch Date / # of Days until Disch: Pending ability to advance diet and colorectal surgery recommendations. 1-2 days?        Interval History (Subjective):      Assumed care today.  Patient a NARAYAN drain placed yesterday with not much output overnight.  Flushing every 8 hours.  No fevers chills overnight.  Has had some intermittent nausea here and has not been able to advance past clear liquid diet.  Mild suprapubic pain intermittently.                  Physical Exam:      Last Vital Signs:  /68  Pulse 78  Temp 96.7  F (35.9  C) (Oral)  Resp 16  Ht 1.524 m (5')  Wt 50.7 kg (111 lb 12.4 oz)  SpO2 95%  BMI 21.83 kg/m2      Intake/Output Summary (Last 24 hours) at 09/21/18 1130  Last data filed at 09/21/18 0451   Gross per 24 hour   Intake             1741 ml   Output              410 ml   Net             1331 ml       Constitutional: Awake, NAD  Eyes: sclera white   HEENT:   MMM  Respiratory:   lungs cta bilaterally, no crackles or wheeze  Cardiovascular: RRR.  No murmur   GI: Mild suprapubic tenderness to palpation, bowel sounds present, NARAYAN drain in place with small amount red drainage  Skin: no rash    Musculoskeletal/extremities:  No edema  Neurologic: A&O   Psychiatric: calm, cooperative, normal affect         Medications:      All current medications were reviewed with changes reflected in problem list.         Data:      All new lab and imaging data was reviewed.   Labs:    Recent Labs  Lab 09/20/18  1159 09/19/18  1227 09/19/18  1224 09/19/18  0956   CULT Culture negative monitoring continues  Moderate growthLactose  fermenting gram negative rods*  Light growthStrain 2Lactose fermenting gram negative rods*  Culture in progress No growth after 2 days No growth after 2 days >100,000 colonies/mLEscherichia coliSusceptibility testing in progress*  50,000 to 100,000 colonies/mLLactose fermenting gram negative rodsSusceptibility testing in progress*  10,000 to 50,000 colonies/mLurogenital jes       Recent Labs  Lab 09/21/18  0825 09/20/18  0825 09/19/18  1005 09/19/18  0956    138  --  134   POTASSIUM 4.0 3.9  --  4.2   CHLORIDE 103 105  --  101   CO2 27 26  --  26   ANIONGAP 6 7  --  7   * 92  --  101*   BUN 5* 7  --  14   CR 0.61 0.67  --  0.77   GFRESTIMATED >90 84 81 72   GFRESTBLACK >90 >90 >90 87   PAULINA 8.0* 8.0*  --  8.3*       Recent Labs  Lab 09/21/18  0825 09/20/18  0825 09/19/18  0956   WBC 8.6 5.8 8.0   HGB 11.1* 11.6* 12.4   HCT 33.8* 35.1 37.1   * 103* 101*    336 334      Imaging:   None today      Jose M Carrero MD

## 2018-09-21 NOTE — PLAN OF CARE
Problem: Patient Care Overview  Goal: Plan of Care/Patient Progress Review  Outcome: No Change  Pt VSS, reports pain 6/10, dilaudid given x1. Reports feeling nauseated, Zofran given. NARAYAN patent, flushed, dark red 50 mls drainage, dressing c/d/i. Up w/SBA. Adequate UOP.  IVF @ 758/hr. Tx: Cipro & Flagyl. Consults: CRS.

## 2018-09-21 NOTE — PROGRESS NOTES
Colon & Rectal Surgery Progress Note             Interval History:   HD #3  Pain at the drain site, poor appetite. No nausea              Medications:   I have reviewed this patient's current medications               Physical Exam:   Blood pressure 117/53, pulse 78, temperature 98.7  F (37.1  C), temperature source Oral, resp. rate 12, height 1.524 m (5'), weight 50.7 kg (111 lb 12.4 oz), SpO2 91 %.    Intake/Output Summary (Last 24 hours) at 09/21/18 0809  Last data filed at 09/21/18 0451   Gross per 24 hour   Intake             1741 ml   Output              560 ml   Net             1181 ml     GEN:  alert  ABD:  Soft, drain with bloody pus         Data:        Lab Results   Component Value Date     09/20/2018    Lab Results   Component Value Date    CHLORIDE 105 09/20/2018    Lab Results   Component Value Date    BUN 7 09/20/2018      Lab Results   Component Value Date    POTASSIUM 3.9 09/20/2018    Lab Results   Component Value Date    CO2 26 09/20/2018    Lab Results   Component Value Date    CR 0.67 09/20/2018    CR 0.77 09/19/2018        Lab Results   Component Value Date    HGB 11.6 (L) 09/20/2018    HGB 12.4 09/19/2018     Lab Results   Component Value Date     09/20/2018     09/19/2018     Lab Results   Component Value Date    WBC 5.8 09/20/2018    WBC 8.0 09/19/2018            Assessment and Plan:   Drain placed yesterday within a diverticular abscess, likely with a colovesical fistula.  Will likely need elective surgery to correct this.  Keep IR drain for now.   Transition to oral abx and advance to full then to low residue diet as tolerated.  We will set her up with an outpatient visit to discuss laparoscopic sigmoid resection, take down of colovesical fistula with cysto and stents.     Emma Reddy MD  [facility]  Office Phone # [phone]

## 2018-09-22 LAB
ANION GAP SERPL CALCULATED.3IONS-SCNC: 4 MMOL/L (ref 3–14)
BACTERIA SPEC CULT: ABNORMAL
BUN SERPL-MCNC: 2 MG/DL (ref 7–30)
CALCIUM SERPL-MCNC: 7.8 MG/DL (ref 8.5–10.1)
CHLORIDE SERPL-SCNC: 104 MMOL/L (ref 94–109)
CO2 SERPL-SCNC: 29 MMOL/L (ref 20–32)
CREAT SERPL-MCNC: 0.65 MG/DL (ref 0.52–1.04)
ERYTHROCYTE [DISTWIDTH] IN BLOOD BY AUTOMATED COUNT: 13.2 % (ref 10–15)
GFR SERPL CREATININE-BSD FRML MDRD: 87 ML/MIN/1.7M2
GLUCOSE SERPL-MCNC: 111 MG/DL (ref 70–99)
HCT VFR BLD AUTO: 34.3 % (ref 35–47)
HGB BLD-MCNC: 11.5 G/DL (ref 11.7–15.7)
Lab: ABNORMAL
MCH RBC QN AUTO: 33.8 PG (ref 26.5–33)
MCHC RBC AUTO-ENTMCNC: 33.5 G/DL (ref 31.5–36.5)
MCV RBC AUTO: 101 FL (ref 78–100)
PLATELET # BLD AUTO: 307 10E9/L (ref 150–450)
POTASSIUM SERPL-SCNC: 3.6 MMOL/L (ref 3.4–5.3)
RBC # BLD AUTO: 3.4 10E12/L (ref 3.8–5.2)
SODIUM SERPL-SCNC: 137 MMOL/L (ref 133–144)
SPECIMEN SOURCE: ABNORMAL
WBC # BLD AUTO: 8.8 10E9/L (ref 4–11)

## 2018-09-22 PROCEDURE — 25000132 ZZH RX MED GY IP 250 OP 250 PS 637: Mod: GY | Performed by: INTERNAL MEDICINE

## 2018-09-22 PROCEDURE — 99232 SBSQ HOSP IP/OBS MODERATE 35: CPT | Performed by: INTERNAL MEDICINE

## 2018-09-22 PROCEDURE — 80048 BASIC METABOLIC PNL TOTAL CA: CPT | Performed by: INTERNAL MEDICINE

## 2018-09-22 PROCEDURE — A9270 NON-COVERED ITEM OR SERVICE: HCPCS | Mod: GY | Performed by: INTERNAL MEDICINE

## 2018-09-22 PROCEDURE — 25000128 H RX IP 250 OP 636: Performed by: PHYSICIAN ASSISTANT

## 2018-09-22 PROCEDURE — 25000132 ZZH RX MED GY IP 250 OP 250 PS 637: Mod: GY | Performed by: PHYSICIAN ASSISTANT

## 2018-09-22 PROCEDURE — A9270 NON-COVERED ITEM OR SERVICE: HCPCS | Mod: GY | Performed by: PHYSICIAN ASSISTANT

## 2018-09-22 PROCEDURE — 25000128 H RX IP 250 OP 636: Performed by: INTERNAL MEDICINE

## 2018-09-22 PROCEDURE — 85027 COMPLETE CBC AUTOMATED: CPT | Performed by: INTERNAL MEDICINE

## 2018-09-22 PROCEDURE — 36415 COLL VENOUS BLD VENIPUNCTURE: CPT | Performed by: INTERNAL MEDICINE

## 2018-09-22 PROCEDURE — C9113 INJ PANTOPRAZOLE SODIUM, VIA: HCPCS | Performed by: INTERNAL MEDICINE

## 2018-09-22 PROCEDURE — 12000000 ZZH R&B MED SURG/OB

## 2018-09-22 RX ORDER — CIPROFLOXACIN 500 MG/1
500 TABLET, FILM COATED ORAL EVERY 12 HOURS SCHEDULED
Status: DISCONTINUED | OUTPATIENT
Start: 2018-09-22 | End: 2018-09-23 | Stop reason: HOSPADM

## 2018-09-22 RX ORDER — PANTOPRAZOLE SODIUM 40 MG/1
40 TABLET, DELAYED RELEASE ORAL
Status: DISCONTINUED | OUTPATIENT
Start: 2018-09-23 | End: 2018-09-23 | Stop reason: HOSPADM

## 2018-09-22 RX ORDER — METRONIDAZOLE 500 MG/1
500 TABLET ORAL EVERY 8 HOURS SCHEDULED
Status: DISCONTINUED | OUTPATIENT
Start: 2018-09-22 | End: 2018-09-23 | Stop reason: HOSPADM

## 2018-09-22 RX ADMIN — CIPROFLOXACIN 400 MG: 2 INJECTION, SOLUTION INTRAVENOUS at 00:35

## 2018-09-22 RX ADMIN — METRONIDAZOLE 500 MG: 500 TABLET ORAL at 08:30

## 2018-09-22 RX ADMIN — METRONIDAZOLE 500 MG: 500 TABLET ORAL at 13:33

## 2018-09-22 RX ADMIN — PANTOPRAZOLE SODIUM 40 MG: 40 INJECTION, POWDER, FOR SOLUTION INTRAVENOUS at 08:29

## 2018-09-22 RX ADMIN — METOPROLOL TARTRATE 25 MG: 25 TABLET ORAL at 08:29

## 2018-09-22 RX ADMIN — ENOXAPARIN SODIUM 30 MG: 30 INJECTION SUBCUTANEOUS at 08:29

## 2018-09-22 RX ADMIN — CIPROFLOXACIN HYDROCHLORIDE 500 MG: 500 TABLET, FILM COATED ORAL at 18:47

## 2018-09-22 RX ADMIN — METRONIDAZOLE 500 MG: 500 INJECTION, SOLUTION INTRAVENOUS at 02:16

## 2018-09-22 RX ADMIN — METRONIDAZOLE 500 MG: 500 TABLET ORAL at 22:05

## 2018-09-22 ASSESSMENT — ACTIVITIES OF DAILY LIVING (ADL)
ADLS_ACUITY_SCORE: 9
ADLS_ACUITY_SCORE: 13
ADLS_ACUITY_SCORE: 13
ADLS_ACUITY_SCORE: 9
ADLS_ACUITY_SCORE: 9
ADLS_ACUITY_SCORE: 13

## 2018-09-22 ASSESSMENT — PAIN DESCRIPTION - DESCRIPTORS: DESCRIPTORS: SORE

## 2018-09-22 NOTE — PLAN OF CARE
Problem: Patient Care Overview  Goal: Plan of Care/Patient Progress Review  Ambulatory Status:  Pt up ind.  Orientation: a/o  VS:  VSS  Pain:  denies  Resp: LS clear.   GI:  denies nausea.  Papo. Low fiber diet. +BS.  Passing flatus.  Last BM 9/18-(per CRS, no BM ordered).  :  Incontinent at times.  Skin:  NARAYAN dressing intact  Tx:  PO Flagyl and  Cipro  Consults:  CRS  Disposition:  Possible discharge tomorrow.

## 2018-09-22 NOTE — PROGRESS NOTES
Advance to low fiber diet if tolerated.  abx changed to po.  Stop iv fluids.  NARAYAN drain output looks brown, suspect may be stool.  Anticipate will need drain on discharge so RN to teach her how to flush it.  If tolerating po and po abx likely discharge tomorrow with follow up in colorectal surgery clinic.

## 2018-09-22 NOTE — PROGRESS NOTES
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  She was started on IV ciprofloxacin and Flagyl.  Colorectal surgery was consulted.  IR placed a NARAYAN drain on 9/20 after abscess drainage.  Abscess culture growing GNR's.  Attempting to advance diet, so far slow going.  Change IV antibiotics to oral.    Problem List/Assessment and Plan:   Acute sigmoid diverticulitis with intra-abdominal abscess and colovesicular fistula: Presenting with abdominal pain.  CT with evidence for sigmoid diverticulitis and abscess.  Also concern for colovesicular fistula with air in the bladder.  S/P NARAYAN drain placement in the intra-abdominal abscess by IR on 9/20.  Culture growing GNR's.  -Colorectal surgery consulted, appreciate recommendations  -Change to oral ciprofloxacin and Flagyl  -Advanced to low fiber diet as tolerated  -NARAYAN drain has brown output which would be concerning for stool, question if NARAYAN drain to remain in place until follow-up with colorectal surgery.  Will have patient learn how to do flushes every 8 hours in case.  -Ultimately will need outpatient follow-up for laparoscopic sigmoid resection and further evaluation/management of the colovesicular fistula    UTI: Abnormal UA with pyuria which may be secondary to UTI given the likely colovesicular fistula seen on CT.  Currently on ciprofloxacin and Flagyl for her diverticulitis and clinically improving.  Urine culture growing E. coli and Klebsiella both sensitive to Cipro.  -Continue Cipro    HTN: Continue PTA losartan  100 mg daily and metoprolol 25 mg twice daily.    Inflammatory polyarthropathy: Chronically on methotrexate 7.5 mg weekly that it has been on hold given the above infection.  We will continue to hold on discharge and have her follow-up with this provider.    History DVT/PE: On prophylactic Lovenox while here.    DVT Prophylaxis: Enoxaparin (Lovenox) SQ  Code Status: Full Code  FEN: Advance to low fiber diet as tolerated.  Stop IV fluids  Discharge Dispo: Home  Estimated Disch Date / # of Days until Disch: If able to advance diet and tolerate oral antibiotics should be able to discharge tomorrow.  Question if NARAYAN drain to remain in place until follow-up with colorectal surgery?        Interval History (Subjective):      Able to tolerate Jell-O and some cream of wheat this morning without nausea or abdominal pain.  No bowel movement in multiple days.  Minimal pain at NARAYAN insertion site.  Drainage appears to be brown which may be stool.  No fevers or chills.                  Physical Exam:      Last Vital Signs:  /59 (BP Location: Left arm)  Pulse 78  Temp 98.1  F (36.7  C) (Oral)  Resp 16  Ht 1.524 m (5')  Wt 50.7 kg (111 lb 12.4 oz)  SpO2 93%  BMI 21.83 kg/m2      Intake/Output Summary (Last 24 hours) at 09/22/18 1214  Last data filed at 09/22/18 0924   Gross per 24 hour   Intake             2809 ml   Output             1270 ml   Net             1539 ml       Constitutional: Awake, NAD  Eyes: sclera white   HEENT:   MMM  Respiratory:   lungs cta bilaterally, no crackles or wheeze  Cardiovascular: RRR.  No murmur   GI: Mild left lower quadrant tenderness to palpation, bowel sounds present, NARAYAN drain in place with small brown drainage that looks like stool  Skin: no rash    Musculoskeletal/extremities:  No edema  Neurologic: A&O   Psychiatric: calm, cooperative, normal affect         Medications:      All current medications were reviewed with changes reflected in problem list.         Data:      All new lab  and imaging data was reviewed.   Labs:    Recent Labs  Lab 09/20/18  1159 09/19/18  1227 09/19/18  1224 09/19/18  0956   CULT Culture negative monitoring continues  Moderate growthLactose fermenting gram negative rods*  Light growthStrain 2Lactose fermenting gram negative rods*  Culture in progress No growth after 3 days No growth after 3 days >100,000 colonies/mLEscherichia coli*  50,000 to 100,000 colonies/mLKlebsiella pneumoniae*  10,000 to 50,000 colonies/mLurogenital floraSusceptibility testing not routinely done       Recent Labs  Lab 09/22/18  0640 09/21/18  0825 09/20/18  0825    136 138   POTASSIUM 3.6 4.0 3.9   CHLORIDE 104 103 105   CO2 29 27 26   ANIONGAP 4 6 7   * 119* 92   BUN 2* 5* 7   CR 0.65 0.61 0.67   GFRESTIMATED 87 >90 84   GFRESTBLACK >90 >90 >90   PAULINA 7.8* 8.0* 8.0*       Recent Labs  Lab 09/22/18  0640 09/21/18  0825 09/20/18  0825   WBC 8.8 8.6 5.8   HGB 11.5* 11.1* 11.6*   HCT 34.3* 33.8* 35.1   * 102* 103*    311 336      Imaging:   None today      Jose M Carrero MD

## 2018-09-22 NOTE — PROGRESS NOTES
Colon and Rectal Surgery Note         Assessment and Plan:   82 year old female with a diverticular abscess, likely with a colovesical fistula s/p drain placement.  Ok to adv diet as tolerated to low fiber  Po abx  Cont drain - patient will discharge to home with drain.  Will need drain teaching.  Dispo: pending tolerating diet and po abx.  Patient set up to see Dr. Emma Reddy in clinic on 10/3.    Padma Xie MD  [facility]  Phone: [phone]  Fax: [phone]          Interval History:   Doing ok.  No events overnight.  Tolerating full liquids.  Denies n/v/f/c.  Pain at drain site.         Physical Exam:     Temp:  [97.4  F (36.3  C)-98.1  F (36.7  C)] 98.1  F (36.7  C)  Heart Rate:  [78-85] 78  Resp:  [16] 16  BP: (105-148)/(49-77) 110/59  SpO2:  [93 %-96 %] 93 %    General:  Pleasant, alert.  In no acute distress.  Abdomen:  Soft, non distended, tender at drain site. Drain with dark liquid output.           Data:     I/O last 3 completed shifts:  In: 2689 [P.O.:840; I.V.:1849]  Out: 1085 [Urine:1050; Drains:35]    Lab Results   Component Value Date    WBC 8.8 09/22/2018    WBC 8.6 09/21/2018      Lab Results   Component Value Date    HGB 11.5 09/22/2018    HGB 11.1 09/21/2018       Lab Results   Component Value Date     09/22/2018      Lab Results   Component Value Date    POTASSIUM 3.6 09/22/2018     Lab Results   Component Value Date    CHLORIDE 104 09/22/2018     Lab Results   Component Value Date    CO2 29 09/22/2018     Lab Results   Component Value Date     09/22/2018       Lab Results   Component Value Date    BUN 2 09/22/2018     Lab Results   Component Value Date    CR 0.65 09/22/2018     Lab Results   Component Value Date    PAULINA 7.8 09/22/2018          Michelle Xie MD

## 2018-09-22 NOTE — PLAN OF CARE
Problem: Patient Care Overview  Goal: Plan of Care/Patient Progress Review  Ambulatory Status:  Pt up SBA  VS:  vss  Pain:  Declined pain medication this shift  Resp: LS clear  GI:  denies nausea.  Full liquid diet.  BS hypoactive.  Passing flatus.  Last BM 9/18.  Skin:  NARAYAN in place  Tx:  Flagyl and cipro  Consults:  colorectal  Disposition:  tbd

## 2018-09-23 VITALS
WEIGHT: 111.77 LBS | BODY MASS INDEX: 21.94 KG/M2 | HEART RATE: 84 BPM | RESPIRATION RATE: 16 BRPM | HEIGHT: 60 IN | SYSTOLIC BLOOD PRESSURE: 130 MMHG | OXYGEN SATURATION: 94 % | TEMPERATURE: 97.7 F | DIASTOLIC BLOOD PRESSURE: 64 MMHG

## 2018-09-23 PROCEDURE — 25000132 ZZH RX MED GY IP 250 OP 250 PS 637: Mod: GY | Performed by: PHYSICIAN ASSISTANT

## 2018-09-23 PROCEDURE — 25000128 H RX IP 250 OP 636: Performed by: INTERNAL MEDICINE

## 2018-09-23 PROCEDURE — A9270 NON-COVERED ITEM OR SERVICE: HCPCS | Mod: GY | Performed by: INTERNAL MEDICINE

## 2018-09-23 PROCEDURE — 25000132 ZZH RX MED GY IP 250 OP 250 PS 637: Mod: GY | Performed by: INTERNAL MEDICINE

## 2018-09-23 PROCEDURE — 99239 HOSP IP/OBS DSCHRG MGMT >30: CPT | Performed by: INTERNAL MEDICINE

## 2018-09-23 PROCEDURE — 90662 IIV NO PRSV INCREASED AG IM: CPT | Performed by: INTERNAL MEDICINE

## 2018-09-23 PROCEDURE — A9270 NON-COVERED ITEM OR SERVICE: HCPCS | Mod: GY | Performed by: PHYSICIAN ASSISTANT

## 2018-09-23 RX ORDER — CIPROFLOXACIN 500 MG/1
500 TABLET, FILM COATED ORAL EVERY 12 HOURS
Qty: 20 TABLET | Refills: 0 | Status: ON HOLD | OUTPATIENT
Start: 2018-09-23 | End: 2018-10-16

## 2018-09-23 RX ORDER — METRONIDAZOLE 500 MG/1
500 TABLET ORAL EVERY 8 HOURS
Qty: 30 TABLET | Refills: 0 | Status: ON HOLD | OUTPATIENT
Start: 2018-09-23 | End: 2018-10-16

## 2018-09-23 RX ADMIN — ENOXAPARIN SODIUM 30 MG: 30 INJECTION SUBCUTANEOUS at 08:00

## 2018-09-23 RX ADMIN — METOPROLOL TARTRATE 25 MG: 25 TABLET ORAL at 07:59

## 2018-09-23 RX ADMIN — METRONIDAZOLE 500 MG: 500 TABLET ORAL at 13:07

## 2018-09-23 RX ADMIN — LOSARTAN POTASSIUM 100 MG: 100 TABLET ORAL at 07:59

## 2018-09-23 RX ADMIN — CIPROFLOXACIN HYDROCHLORIDE 500 MG: 500 TABLET, FILM COATED ORAL at 07:59

## 2018-09-23 RX ADMIN — INFLUENZA A VIRUS A/MICHIGAN/45/2015 X-275 (H1N1) ANTIGEN (FORMALDEHYDE INACTIVATED), INFLUENZA A VIRUS A/SINGAPORE/INFIMH-16-0019/2016 IVR-186 (H3N2) ANTIGEN (FORMALDEHYDE INACTIVATED), AND INFLUENZA B VIRUS B/MARYLAND/15/2016 BX-69A (A B/COLORADO/6/2017-LIKE VIRUS) ANTIGEN (FORMALDEHYDE INACTIVATED) 0.5 ML: 60; 60; 60 INJECTION, SUSPENSION INTRAMUSCULAR at 13:04

## 2018-09-23 RX ADMIN — PANTOPRAZOLE SODIUM 40 MG: 40 TABLET, DELAYED RELEASE ORAL at 06:33

## 2018-09-23 RX ADMIN — METRONIDAZOLE 500 MG: 500 TABLET ORAL at 06:33

## 2018-09-23 ASSESSMENT — ACTIVITIES OF DAILY LIVING (ADL)
ADLS_ACUITY_SCORE: 9
ADLS_ACUITY_SCORE: 13
ADLS_ACUITY_SCORE: 9
ADLS_ACUITY_SCORE: 9

## 2018-09-23 ASSESSMENT — PAIN DESCRIPTION - DESCRIPTORS: DESCRIPTORS: SORE

## 2018-09-23 NOTE — PROGRESS NOTES
Pt to D/C to home.  Pt provided with d/c instructions, including new medications, when medications were last given, and when to take them again.  Pt also informed to f/u with Dr. Reddy on October 3 and arthritis physician regarding methotrexate, which is on hold for infection. Pt verbalized understanding of all d/c and f/u instructions.  All questions were answered at this time.  Copy of paperwork sent with pt.  Medication (Cipro, Flagyl and Sodium chloride flush) ent with pt.   to provide transport.  All personal belongings sent with pt.     Flu Vaccine: Given

## 2018-09-23 NOTE — PROGRESS NOTES
Colon and Rectal Surgery Note         Assessment and Plan:   82 year old female with a diverticular abscess, likely with a colovesical fistula s/p drain placement.  Doing well with LRD.  + flatus.    Cont drain - patient will discharge to home with drain.  Will need drain teaching.  Dispo: discharge to home today with po ABx.  Drain cares.  Patient set up to see Dr. Emma Reddy in clinic on 10/3.     Padma Xie MD  [facility]  Phone: [phone]  Fax: [phone]        Interval History:   Tolerating LRD, + flatus.  Denies n/v/f/c.  Patient ready to discharge to home.           Physical Exam:     Temp:  [97.7  F (36.5  C)-99.3  F (37.4  C)] 97.7  F (36.5  C)  Pulse:  [80-84] 84  Heart Rate:  [72] 72  Resp:  [16-18] 16  BP: (100-130)/(62-72) 130/64  SpO2:  [93 %-99 %] 94 %    General:  Pleasant, alert.  In no acute distress.  Abdomen:  Soft, non distended, tender at drain insertion site.  Wound clean/dry/intact.          Data:     I/O last 3 completed shifts:  In: 500 [P.O.:500]  Out: 1050 [Urine:1000; Drains:50]    Lab Results   Component Value Date    WBC 8.8 09/22/2018    WBC 8.6 09/21/2018      Lab Results   Component Value Date    HGB 11.5 09/22/2018    HGB 11.1 09/21/2018       Lab Results   Component Value Date     09/22/2018      Lab Results   Component Value Date    POTASSIUM 3.6 09/22/2018     Lab Results   Component Value Date    CHLORIDE 104 09/22/2018     Lab Results   Component Value Date    CO2 29 09/22/2018     Lab Results   Component Value Date     09/22/2018       Lab Results   Component Value Date    BUN 2 09/22/2018     Lab Results   Component Value Date    CR 0.65 09/22/2018     Lab Results   Component Value Date    PAULINA 7.8 09/22/2018          Michelle Xie MD

## 2018-09-23 NOTE — PLAN OF CARE
Problem: Patient Care Overview  Goal: Plan of Care/Patient Progress Review  Ambulatory Status:  Pt up Independently  VS:  vss  Pain:  Declines pain medication this shift  Resp: LS clear  GI:  denies nausea.  Low Fiber diet.  BS hypoactive.  Passing flatus.  Last BM 9/18.  Skin:  NARAYAN in place  Tx:  Cipro and flagyl  Consults:  colorectal  Disposition:  Today

## 2018-09-23 NOTE — PLAN OF CARE
Pt up ind. VSS Held metoprolol, BP soft. LS clear. BS+ No flatus. No BM. Denies urinary sx. Urine brown. Tolerating low  Fiber diet, denies nausea,appetite fair. PIV SL. NARAYAN with minimal brown output, irrigated, gave demonstration on irrigation. Only c/o pain @ NARAYAN site, no meds given.

## 2018-09-23 NOTE — DISCHARGE SUMMARY
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  She was started on IV ciprofloxacin and Flagyl.  Colorectal surgery was consulted.  IR placed a NARAYAN drain on 9/20 after abscess drainage.  Abscess culture growing GNR's.  Change IV antibiotics to oral.  Now tolerating low fiber diet.  Patient taught NARAYAN drain cares and flushes.  Will continue with oral antibiotics on discharge and follow-up with Dr. Reddy in colorectal surgery clinic on 10/3.  Drains remain in place until then.     Problem List/Assessment and Plan:   Acute sigmoid diverticulitis with intra-abdominal abscess and colovesicular fistula: Presenting with abdominal pain.  CT with evidence for sigmoid diverticulitis and abscess.  Also concern for colovesicular fistula with air in the bladder.  S/P NARAYAN drain placement in the intra-abdominal abscess by IR on 9/20.  Culture growing GNR's.  -Colorectal surgery consulted, appreciate recommendations  -Tolerating low fiber diet  -NARAYAN drain has brown output which would be concerning for stool.  -Continue oral Cipro, Flagyl for additional 10 days (approximately 14 day course ) which is when she will be following up with Dr. Reddy on 10/3 and colorectal  surgery clinic  -Continue NARAAYN drain with 3 times daily flushes at home, such as ordered  -Ultimately will need outpatient follow-up for laparoscopic sigmoid resection and further evaluation/management of the colovesicular fistula     UTI: Abnormal UA with pyuria which may be secondary to UTI given the likely colovesicular fistula seen on CT.  Currently on ciprofloxacin and Flagyl for her diverticulitis and clinically improving.  Urine culture growing E. coli and Klebsiella both sensitive to Cipro.  Will be on Cipro on discharge for diverticulitis which will cover her UTI.     HTN: Continue PTA losartan 100 mg daily and metoprolol 25 mg twice daily.     Inflammatory polyarthropathy: Chronically on methotrexate 7.5 mg weekly that it has been on hold given the above infection.  We will continue to hold on discharge and have her follow-up with this provider.     History DVT/PE: Received prophylactic Lovenox while here.     Discharge Disposition:  Discharged to home     Allergies:  Allergies   Allergen Reactions     Sulfa Drugs      Nausea          Discharge Medications:   Current Discharge Medication List      START taking these medications    Details   ciprofloxacin (CIPRO) 500 MG tablet Take 1 tablet (500 mg) by mouth every 12 hours for 10 days  Qty: 20 tablet, Refills: 0    Associated Diagnoses: Diverticulitis of sigmoid colon      metroNIDAZOLE (FLAGYL) 500 MG tablet Take 1 tablet (500 mg) by mouth every 8 hours for 10 days  Qty: 30 tablet, Refills: 0    Associated Diagnoses: Diverticulitis of sigmoid colon      sodium chloride, PF, (NORMAL SALINE FLUSH) 0.9% PF flush 5 mLs by Intracatheter route every 8 hours  Qty: 450 mL, Refills: 0    Associated Diagnoses: Intra-abdominal abscess (H)         CONTINUE these medications which have NOT CHANGED    Details   aspirin 81 MG EC tablet Take 81 mg by mouth At Bedtime       folic acid (FOLVITE) 1 MG tablet Take 1 mg by mouth daily      LOSARTAN POTASSIUM PO Take 100 mg by  mouth daily       METOPROLOL TARTRATE PO Take 25 mg by mouth 2 times daily       Vitamin D, Cholecalciferol, 1000 units CAPS Take 1,000 Units by mouth daily         STOP taking these medications       methotrexate 2.5 MG tablet CHEMO Comments:   Reason for Stopping:                Condition on Discharge:  Discharge condition: Stable   Discharge vitals: Blood pressure 130/64, pulse 84, temperature 97.7  F (36.5  C), temperature source Oral, resp. rate 16, height 1.524 m (5'), weight 50.7 kg (111 lb 12.4 oz), SpO2 94 %.   Code status on discharge: Full Code     History of Illness:  See detailed admission note for full details.    Physical Exam:  Blood pressure 130/64, pulse 84, temperature 97.7  F (36.5  C), temperature source Oral, resp. rate 16, height 1.524 m (5'), weight 50.7 kg (111 lb 12.4 oz), SpO2 94 %.  Wt Readings from Last 1 Encounters:   09/19/18 50.7 kg (111 lb 12.4 oz)     Constitutional: Awake, NAD  Eyes: sclera white   HEENT: atraumatic, MMM  Respiratory: no respiratory distress, lungs cta bilaterally, no crackles or wheeze  Cardiovascular: RRR.  No murmur   GI: Soft, slight left lower quadrant tenderness where her NARAYAN drain is inserted.  Bowel sounds present.  NARAYAN drain with some light brown drainage.     Skin: no rash or lesions, acyanotic  Musculoskeletal/extremities: atraumatic, no major deformities. No edema  Neurologic: A&O, speech clear  Psychiatric: calm, cooperative, normal affect    Procedures other than Imaging:  NARAYAN drain placement for intra-abdominal abscess     Imaging:  Results for orders placed or performed during the hospital encounter of 09/19/18   CT Abdomen Pelvis w Contrast    Narrative    CT ABDOMEN/PELVIS WITH CONTRAST September 19, 2018 10:32 AM     HISTORY: Left lower quadrant tenderness, diarrhea.    TECHNIQUE: 57mL Isovue-370. Radiation dose for this scan was reduced  using automated exposure control, adjustment of the mA and/or kV  according to patient size, or iterative  reconstruction technique.    COMPARISON: None.    FINDINGS: Evaluation is slightly limited by motion artifact. There is  atelectasis in the right lung base. There are calcified stones in the  gallbladder. No acute abnormality is seen in the liver, spleen,  pancreas, adrenal glands, or kidneys. There is no evidence of small  bowel obstruction. Appendix is normal.    There is moderate wall thickening involving the sigmoid colon. There  are colonic diverticula in this region. There is a collection of gas  and stool just superior to the bladder. This measures approximately 4  cm in diameter. It is difficult to determine whether this is  contiguous with the colon. This may represent an unusual appearance of  the diverticulum but an intramural abscess could also have this  appearance. There are a few foci of air within the bladder lumen.  There is no free intraperitoneal air.    There is multilevel degenerative change and scoliosis in the spine.  There are degenerative changes in both hips.      Impression    IMPRESSION:   1. Findings consistent with acute sigmoid diverticulitis. There is a  gas and stool collection in the pelvis adjacent to the sigmoid colon.  This could represent a diverticulum or intramural abscess.  2. Small amount of air in the bladder lumen. This could be related to  catheterization. However, given the adjacent inflammatory process in  the pelvis a colovesical fistula cannot be excluded. Clinical  correlation requested.  3. Cholelithiasis.    HOUSTON HUTCHISON MD   CT Abdomen Peritoneum Abscess Aspiration    Narrative    CT ABDOMEN PERITONEUM ABSCESS ASPIRATION  9/20/2018 12:26 PM     HISTORY: Patient has a presumed diverticular abscess located just  superior to the bladder. There is likely a fistula to the bladder.    COMPARISON: CT scan of the abdomen and pelvis dated 9/19/2018.    DESCRIPTION OF PROCEDURE: After obtaining informed consent, the  patient was placed in a supine position on the  fluoroscopy table. The  left lower abdomen was prepped and draped in the usual sterile manner.  1% lidocaine was injected for local anesthesia. Under CT guidance, a  blunt Carlos needle was passed into a collection of air and fluid  located just superior to the bladder. A wire was curled in the  collection cavity. Over the wire, a 14 [place] citizen pigtail catheter was  placed. Only a small amount of fluid and air could be aspirated out.  This was submitted for Gram stain and culture. Subsequently, a  sinogram was performed to make certain that the pigtail catheter was  adequately positioned in the abscess cavity. This showed filling of  the cavity around the pigtail catheter as well as a fistula to the  sigmoid colon. No definite contrast entered the bladder. There was a  moderate to large amount of air within the bladder presumably from a  fistula.    The catheter was sutured to the patient's skin and hooked to a bag for  external drainage.    I determined this patient to be an appropriate candidate for the  planned sedation and procedure and reassessed the patient immediately  prior to sedation and procedure. The patient tolerated the procedure  well. There were no immediate postprocedure complications. The  patient's vital signs were monitored by radiology nursing staff under  my supervision and remained stable throughout the study.     MEDICATIONS: 2 mg Versed, 100 mcg fentanyl    Sedation time: 30 minutes      Impression    IMPRESSION: CT-guided drain placed into an abscess in the pelvis as  above. The drainage catheter will be flushed with 5 cc normal saline  every 8 hours. Consider follow-up CT scan in four days.    KRYSTAL KELLY MD        Consultations:  Consultation during this admission received from colorectal surgery and IR.       Recent Lab Results:    Recent Labs  Lab 09/22/18  0640 09/21/18  0825 09/20/18  0825   WBC 8.8 8.6 5.8   HGB 11.5* 11.1* 11.6*   HCT 34.3* 33.8* 35.1   * 102* 103*   PLT  307 311 336       Recent Labs  Lab 09/20/18  1159 09/19/18  1227 09/19/18  1224 09/19/18  0956   CULT Moderate growthEscherichia coli*  Light growthKlebsiella pneumoniae*  Heavy growthStreptococcus anginosusSusceptibility testing in progress*  Single colonyPseudomonas aeruginosa*  Culture in progress  Culture negative monitoring continues No growth after 4 days No growth after 4 days >100,000 colonies/mLEscherichia coli*  50,000 to 100,000 colonies/mLKlebsiella pneumoniae*  10,000 to 50,000 colonies/mLurogenital floraSusceptibility testing not routinely done       Recent Labs  Lab 09/22/18  0640 09/21/18  0825 09/20/18  0825    136 138   POTASSIUM 3.6 4.0 3.9   CHLORIDE 104 103 105   CO2 29 27 26   ANIONGAP 4 6 7   * 119* 92   BUN 2* 5* 7   CR 0.65 0.61 0.67   GFRESTIMATED 87 >90 84   GFRESTBLACK >90 >90 >90   PAULINA 7.8* 8.0* 8.0*   MAG  --  1.8  --    PROTTOTAL  --   --  6.4*   ALBUMIN  --   --  2.5*   BILITOTAL  --   --  0.6   ALKPHOS  --   --  74   AST  --   --  14   ALT  --   --  12       Recent Labs  Lab 09/19/18  1225   LACT 1.1       Recent Labs  Lab 09/19/18  0956   COLOR Yellow   APPEARANCE Cloudy   URINEGLC Negative   URINEBILI Negative   URINEKETONE Negative   SG 1.016   UBLD Large*   URINEPH 5.0   PROTEIN >499*   NITRITE Negative   LEUKEST Moderate*   RBCU >182*   WBCU >182*          Pending Results:    Unresulted Labs Ordered in the Past 30 Days of this Admission     Date and Time Order Name Status Description    9/20/2018 1227 Abscess Culture Aerobic Bacterial Preliminary     9/20/2018 1227 Anaerobic bacterial culture Preliminary     9/19/2018 1223 Blood culture Preliminary     9/19/2018 1203 Blood culture Preliminary          These results will be followed up by patient's primary care provider.    Discharge Instructions and Follow-Up:     Discharge Procedure Orders  Follow-up and recommended labs and tests    Order Comments: Follow up with Dr. Reddy in the clinic to discuss surgery  (tentatively held for 10/23) on Wednesday 10/3 at 11:30am in the [facility]. Please arrive 20 minutes early for paperwork. The office is located at [address] [place]. [address], [place] [address]. Call the office if you have questions. [phone]     Reason for your hospital stay   Order Comments: You were hospitalized for diverticulitis and abscess formation.  Your drain will remain in place until follow up with Dr. Reddy.  You will be on antibiotics until that appointment as well.     Follow-up and recommended labs and tests    Order Comments: Follow up with Dr. Reddy on 10/3    Follow up with your arthritis physician regarding your methotrexate is on hold for your infection.     Activity   Order Comments: Your activity upon discharge: activity as tolerated   Order Specific Question Answer Comments   Is discharge order? Yes      Tubes and drains   Order Comments: You are going home with the following tubes or drains: NARAYAN.  Tube cares per hospital or home care instructions.  Flush with 5 mL normal saline every 8 hours     Full Code   Order Specific Question Answer Comments   Code status determined by: Discussion with patient/legal decision maker      Diet   Order Comments: Follow this diet upon discharge: Orders Placed This Encounter    Low Fiber   Order Specific Question Answer Comments   Is discharge order? Yes            Recommendations:  -Continue oral Cipro, Flagyl for additional 10 days which is when she will be following up with Dr. Reddy on 10/3 and colorectal surgery clinic  -Continue NARAYAN drain with 3 times daily flushes that have been ordered  -Methotrexate on hold for now due to infection, follow up with this provider after discharge    IJose M, personally saw the patient today and spent greater than 30 minutes discharging this patient.    Jose M Carrero MD

## 2018-09-24 LAB
BACTERIA SPEC CULT: ABNORMAL
SPECIMEN SOURCE: ABNORMAL

## 2018-09-25 LAB
BACTERIA SPEC CULT: NO GROWTH
BACTERIA SPEC CULT: NO GROWTH
Lab: NORMAL
Lab: NORMAL
SPECIMEN SOURCE: NORMAL
SPECIMEN SOURCE: NORMAL

## 2018-09-27 LAB
BACTERIA SPEC CULT: ABNORMAL
SPECIMEN SOURCE: ABNORMAL

## 2018-09-30 ENCOUNTER — HOSPITAL ENCOUNTER (INPATIENT)
Facility: CLINIC | Age: 83
LOS: 17 days | Discharge: HOME HEALTH | DRG: 853 | End: 2018-10-17
Attending: EMERGENCY MEDICINE | Admitting: INTERNAL MEDICINE
Payer: MEDICARE

## 2018-09-30 ENCOUNTER — APPOINTMENT (OUTPATIENT)
Dept: CT IMAGING | Facility: CLINIC | Age: 83
DRG: 853 | End: 2018-09-30
Attending: EMERGENCY MEDICINE
Payer: MEDICARE

## 2018-09-30 ENCOUNTER — APPOINTMENT (OUTPATIENT)
Dept: GENERAL RADIOLOGY | Facility: CLINIC | Age: 83
DRG: 853 | End: 2018-09-30
Attending: EMERGENCY MEDICINE
Payer: MEDICARE

## 2018-09-30 DIAGNOSIS — F51.01 PRIMARY INSOMNIA: ICD-10-CM

## 2018-09-30 DIAGNOSIS — M62.81 GENERALIZED MUSCLE WEAKNESS: ICD-10-CM

## 2018-09-30 DIAGNOSIS — I48.0 PAROXYSMAL ATRIAL FIBRILLATION WITH RVR (H): Primary | ICD-10-CM

## 2018-09-30 DIAGNOSIS — A41.9 SEPSIS, DUE TO UNSPECIFIED ORGANISM: ICD-10-CM

## 2018-09-30 DIAGNOSIS — Z93.3 S/P COLOSTOMY (H): ICD-10-CM

## 2018-09-30 DIAGNOSIS — M17.10 ARTHRITIS OF KNEE: ICD-10-CM

## 2018-09-30 LAB
ALBUMIN SERPL-MCNC: 2.8 G/DL (ref 3.4–5)
ALP SERPL-CCNC: 65 U/L (ref 40–150)
ALT SERPL W P-5'-P-CCNC: 12 U/L (ref 0–50)
ANION GAP SERPL CALCULATED.3IONS-SCNC: 7 MMOL/L (ref 3–14)
AST SERPL W P-5'-P-CCNC: 25 U/L (ref 0–45)
BASOPHILS # BLD AUTO: 0.1 10E9/L (ref 0–0.2)
BASOPHILS NFR BLD AUTO: 0.4 %
BILIRUB SERPL-MCNC: 0.8 MG/DL (ref 0.2–1.3)
BUN SERPL-MCNC: 10 MG/DL (ref 7–30)
C DIFF TOX B STL QL: NEGATIVE
CALCIUM SERPL-MCNC: 8.2 MG/DL (ref 8.5–10.1)
CHLORIDE SERPL-SCNC: 99 MMOL/L (ref 94–109)
CO2 SERPL-SCNC: 25 MMOL/L (ref 20–32)
CREAT BLD-MCNC: 0.7 MG/DL (ref 0.52–1.04)
CREAT SERPL-MCNC: 0.78 MG/DL (ref 0.52–1.04)
DIFFERENTIAL METHOD BLD: ABNORMAL
EOSINOPHIL # BLD AUTO: 0 10E9/L (ref 0–0.7)
EOSINOPHIL NFR BLD AUTO: 0.2 %
ERYTHROCYTE [DISTWIDTH] IN BLOOD BY AUTOMATED COUNT: 14 % (ref 10–15)
GFR SERPL CREATININE-BSD FRML MDRD: 70 ML/MIN/1.7M2
GFR SERPL CREATININE-BSD FRML MDRD: 81 ML/MIN/1.7M2
GLUCOSE BLDC GLUCOMTR-MCNC: 148 MG/DL (ref 70–99)
GLUCOSE SERPL-MCNC: 125 MG/DL (ref 70–99)
HCT VFR BLD AUTO: 37 % (ref 35–47)
HGB BLD-MCNC: 12.4 G/DL (ref 11.7–15.7)
IMM GRANULOCYTES # BLD: 0.1 10E9/L (ref 0–0.4)
IMM GRANULOCYTES NFR BLD: 0.4 %
INR PPP: 1.21 (ref 0.86–1.14)
LACTATE BLD-SCNC: 0.8 MMOL/L (ref 0.7–2)
LACTATE BLD-SCNC: 1.7 MMOL/L (ref 0.7–2)
LYMPHOCYTES # BLD AUTO: 0.7 10E9/L (ref 0.8–5.3)
LYMPHOCYTES NFR BLD AUTO: 3.7 %
MCH RBC QN AUTO: 33.8 PG (ref 26.5–33)
MCHC RBC AUTO-ENTMCNC: 33.5 G/DL (ref 31.5–36.5)
MCV RBC AUTO: 101 FL (ref 78–100)
MONOCYTES # BLD AUTO: 0.9 10E9/L (ref 0–1.3)
MONOCYTES NFR BLD AUTO: 4.8 %
MRSA DNA SPEC QL NAA+PROBE: NEGATIVE
NEUTROPHILS # BLD AUTO: 16.2 10E9/L (ref 1.6–8.3)
NEUTROPHILS NFR BLD AUTO: 90.5 %
NRBC # BLD AUTO: 0 10*3/UL
NRBC BLD AUTO-RTO: 0 /100
PLATELET # BLD AUTO: 335 10E9/L (ref 150–450)
POTASSIUM SERPL-SCNC: 4.1 MMOL/L (ref 3.4–5.3)
PROT SERPL-MCNC: 6.9 G/DL (ref 6.8–8.8)
RBC # BLD AUTO: 3.67 10E12/L (ref 3.8–5.2)
SODIUM SERPL-SCNC: 131 MMOL/L (ref 133–144)
SPECIMEN SOURCE: NORMAL
SPECIMEN SOURCE: NORMAL
WBC # BLD AUTO: 17.9 10E9/L (ref 4–11)

## 2018-09-30 PROCEDURE — 25000132 ZZH RX MED GY IP 250 OP 250 PS 637: Mod: GY | Performed by: INTERNAL MEDICINE

## 2018-09-30 PROCEDURE — 85025 COMPLETE CBC W/AUTO DIFF WBC: CPT | Performed by: EMERGENCY MEDICINE

## 2018-09-30 PROCEDURE — 25000128 H RX IP 250 OP 636: Performed by: INTERNAL MEDICINE

## 2018-09-30 PROCEDURE — 36415 COLL VENOUS BLD VENIPUNCTURE: CPT | Performed by: EMERGENCY MEDICINE

## 2018-09-30 PROCEDURE — 27210131 ZZH KIT ART LINE INSERTION

## 2018-09-30 PROCEDURE — 87040 BLOOD CULTURE FOR BACTERIA: CPT | Performed by: EMERGENCY MEDICINE

## 2018-09-30 PROCEDURE — 99291 CRITICAL CARE FIRST HOUR: CPT | Performed by: INTERNAL MEDICINE

## 2018-09-30 PROCEDURE — 96375 TX/PRO/DX INJ NEW DRUG ADDON: CPT

## 2018-09-30 PROCEDURE — 82565 ASSAY OF CREATININE: CPT

## 2018-09-30 PROCEDURE — 40000986 XR CHEST PORT 1 VW

## 2018-09-30 PROCEDURE — 96368 THER/DIAG CONCURRENT INF: CPT

## 2018-09-30 PROCEDURE — 20000003 ZZH R&B ICU

## 2018-09-30 PROCEDURE — 00000146 ZZHCL STATISTIC GLUCOSE BY METER IP

## 2018-09-30 PROCEDURE — 80053 COMPREHEN METABOLIC PANEL: CPT | Performed by: EMERGENCY MEDICINE

## 2018-09-30 PROCEDURE — 3E043XZ INTRODUCTION OF VASOPRESSOR INTO CENTRAL VEIN, PERCUTANEOUS APPROACH: ICD-10-PCS | Performed by: EMERGENCY MEDICINE

## 2018-09-30 PROCEDURE — 99285 EMERGENCY DEPT VISIT HI MDM: CPT | Mod: 25

## 2018-09-30 PROCEDURE — 25000125 ZZHC RX 250: Performed by: EMERGENCY MEDICINE

## 2018-09-30 PROCEDURE — 85610 PROTHROMBIN TIME: CPT | Performed by: EMERGENCY MEDICINE

## 2018-09-30 PROCEDURE — 76937 US GUIDE VASCULAR ACCESS: CPT

## 2018-09-30 PROCEDURE — A9270 NON-COVERED ITEM OR SERVICE: HCPCS | Mod: GY | Performed by: INTERNAL MEDICINE

## 2018-09-30 PROCEDURE — 25000128 H RX IP 250 OP 636: Performed by: EMERGENCY MEDICINE

## 2018-09-30 PROCEDURE — 27210179 ZZH KIT MONITOR CVP

## 2018-09-30 PROCEDURE — 93005 ELECTROCARDIOGRAM TRACING: CPT

## 2018-09-30 PROCEDURE — 87493 C DIFF AMPLIFIED PROBE: CPT | Performed by: EMERGENCY MEDICINE

## 2018-09-30 PROCEDURE — 36556 INSERT NON-TUNNEL CV CATH: CPT

## 2018-09-30 PROCEDURE — 87640 STAPH A DNA AMP PROBE: CPT | Performed by: INTERNAL MEDICINE

## 2018-09-30 PROCEDURE — 74177 CT ABD & PELVIS W/CONTRAST: CPT

## 2018-09-30 PROCEDURE — 83605 ASSAY OF LACTIC ACID: CPT | Performed by: EMERGENCY MEDICINE

## 2018-09-30 PROCEDURE — 96361 HYDRATE IV INFUSION ADD-ON: CPT

## 2018-09-30 PROCEDURE — 96365 THER/PROPH/DIAG IV INF INIT: CPT | Mod: 59

## 2018-09-30 PROCEDURE — 87641 MR-STAPH DNA AMP PROBE: CPT | Performed by: INTERNAL MEDICINE

## 2018-09-30 RX ORDER — ACETAMINOPHEN 325 MG/1
650 TABLET ORAL EVERY 4 HOURS PRN
Status: DISCONTINUED | OUTPATIENT
Start: 2018-09-30 | End: 2018-10-02

## 2018-09-30 RX ORDER — ALBUTEROL SULFATE 90 UG/1
2 AEROSOL, METERED RESPIRATORY (INHALATION) EVERY 6 HOURS PRN
COMMUNITY

## 2018-09-30 RX ORDER — ONDANSETRON 4 MG/1
4 TABLET, ORALLY DISINTEGRATING ORAL EVERY 6 HOURS PRN
Status: DISCONTINUED | OUTPATIENT
Start: 2018-09-30 | End: 2018-10-17 | Stop reason: HOSPADM

## 2018-09-30 RX ORDER — NOREPINEPHRINE BITARTRATE/D5W 16MG/250ML
0.03-0.4 PLASTIC BAG, INJECTION (ML) INTRAVENOUS CONTINUOUS
Status: DISCONTINUED | OUTPATIENT
Start: 2018-09-30 | End: 2018-10-04

## 2018-09-30 RX ORDER — METOPROLOL TARTRATE 25 MG/1
25 TABLET, FILM COATED ORAL 2 TIMES DAILY
Status: ON HOLD | COMMUNITY
End: 2018-10-17

## 2018-09-30 RX ORDER — ONDANSETRON 2 MG/ML
4 INJECTION INTRAMUSCULAR; INTRAVENOUS EVERY 6 HOURS PRN
Status: DISCONTINUED | OUTPATIENT
Start: 2018-09-30 | End: 2018-10-17 | Stop reason: HOSPADM

## 2018-09-30 RX ORDER — SODIUM CHLORIDE 9 MG/ML
1000 INJECTION, SOLUTION INTRAVENOUS CONTINUOUS
Status: DISCONTINUED | OUTPATIENT
Start: 2018-09-30 | End: 2018-09-30

## 2018-09-30 RX ORDER — NALOXONE HYDROCHLORIDE 0.4 MG/ML
.1-.4 INJECTION, SOLUTION INTRAMUSCULAR; INTRAVENOUS; SUBCUTANEOUS
Status: DISCONTINUED | OUTPATIENT
Start: 2018-09-30 | End: 2018-10-01

## 2018-09-30 RX ORDER — IOPAMIDOL 755 MG/ML
500 INJECTION, SOLUTION INTRAVASCULAR ONCE
Status: COMPLETED | OUTPATIENT
Start: 2018-09-30 | End: 2018-09-30

## 2018-09-30 RX ORDER — PROCHLORPERAZINE MALEATE 5 MG
5 TABLET ORAL EVERY 6 HOURS PRN
Status: DISCONTINUED | OUTPATIENT
Start: 2018-09-30 | End: 2018-10-17 | Stop reason: HOSPADM

## 2018-09-30 RX ORDER — LORAZEPAM 2 MG/ML
0.5 INJECTION INTRAMUSCULAR ONCE
Status: COMPLETED | OUTPATIENT
Start: 2018-09-30 | End: 2018-09-30

## 2018-09-30 RX ORDER — PROCHLORPERAZINE 25 MG
12.5 SUPPOSITORY, RECTAL RECTAL EVERY 12 HOURS PRN
Status: DISCONTINUED | OUTPATIENT
Start: 2018-09-30 | End: 2018-10-17 | Stop reason: HOSPADM

## 2018-09-30 RX ORDER — PHENAZOPYRIDINE HYDROCHLORIDE 100 MG/1
100 TABLET, FILM COATED ORAL 2 TIMES DAILY PRN
Status: CANCELLED | OUTPATIENT
Start: 2018-09-30

## 2018-09-30 RX ORDER — ZINC OXIDE 20 %
OINTMENT (GRAM) TOPICAL
Status: DISCONTINUED | OUTPATIENT
Start: 2018-09-30 | End: 2018-10-17 | Stop reason: HOSPADM

## 2018-09-30 RX ORDER — NALOXONE HYDROCHLORIDE 0.4 MG/ML
.1-.4 INJECTION, SOLUTION INTRAMUSCULAR; INTRAVENOUS; SUBCUTANEOUS
Status: DISCONTINUED | OUTPATIENT
Start: 2018-09-30 | End: 2018-10-07

## 2018-09-30 RX ORDER — LOSARTAN POTASSIUM 100 MG/1
100 TABLET ORAL DAILY
Status: ON HOLD | COMMUNITY
End: 2018-10-16

## 2018-09-30 RX ADMIN — DEXTROSE AND SODIUM CHLORIDE: 5; 900 INJECTION, SOLUTION INTRAVENOUS at 18:02

## 2018-09-30 RX ADMIN — NOREPINEPHRINE BITARTRATE 0.03 MCG/KG/MIN: 1 INJECTION INTRAVENOUS at 17:10

## 2018-09-30 RX ADMIN — IOPAMIDOL 55 ML: 755 INJECTION, SOLUTION INTRAVENOUS at 12:35

## 2018-09-30 RX ADMIN — AMIODARONE HYDROCHLORIDE 1 MG/MIN: 900 INJECTION, SOLUTION INTRAVENOUS at 17:00

## 2018-09-30 RX ADMIN — TAZOBACTAM SODIUM AND PIPERACILLIN SODIUM 3.38 G: 375; 3 INJECTION, SOLUTION INTRAVENOUS at 12:59

## 2018-09-30 RX ADMIN — Medication 1 MG: at 22:10

## 2018-09-30 RX ADMIN — SODIUM CHLORIDE 1000 ML: 9 INJECTION, SOLUTION INTRAVENOUS at 12:05

## 2018-09-30 RX ADMIN — TAZOBACTAM SODIUM AND PIPERACILLIN SODIUM 3.38 G: 375; 3 INJECTION, SOLUTION INTRAVENOUS at 18:13

## 2018-09-30 RX ADMIN — LORAZEPAM 0.5 MG: 2 INJECTION INTRAMUSCULAR; INTRAVENOUS at 15:42

## 2018-09-30 RX ADMIN — AMIODARONE HYDROCHLORIDE 0.5 MG/MIN: 50 INJECTION, SOLUTION INTRAVENOUS at 22:10

## 2018-09-30 RX ADMIN — SODIUM CHLORIDE 54 ML: 9 INJECTION, SOLUTION INTRAVENOUS at 12:35

## 2018-09-30 ASSESSMENT — ACTIVITIES OF DAILY LIVING (ADL)
DRESS: 0-->INDEPENDENT
RETIRED_COMMUNICATION: 0-->UNDERSTANDS/COMMUNICATES WITHOUT DIFFICULTY
TOILETING: 0-->INDEPENDENT
COGNITION: 0 - NO COGNITION ISSUES REPORTED
BATHING: 0-->INDEPENDENT
RETIRED_EATING: 0-->INDEPENDENT
FALL_HISTORY_WITHIN_LAST_SIX_MONTHS: NO
SWALLOWING: 0-->SWALLOWS FOODS/LIQUIDS WITHOUT DIFFICULTY
AMBULATION: 0-->INDEPENDENT
TRANSFERRING: 0-->INDEPENDENT
ADLS_ACUITY_SCORE: 13

## 2018-09-30 ASSESSMENT — ENCOUNTER SYMPTOMS
ABDOMINAL PAIN: 1
DYSURIA: 1
DIARRHEA: 0
ROS GI COMMENTS: (+) LOOSE STOOLS
FREQUENCY: 1
HEMATURIA: 0

## 2018-09-30 ASSESSMENT — PAIN DESCRIPTION - DESCRIPTORS: DESCRIPTORS: BURNING;TENDER

## 2018-09-30 NOTE — PHARMACY-ADMISSION MEDICATION HISTORY
Admission medication history interview status for this patient is complete. See [facility] admission navigator for allergy information, prior to admission medications and immunization status.     Medication history interview source(s):Patient  Medication history resources (including written lists, pill bottles, clinic record):Discharge summary 9/23/2018 in Chart Review  Primary pharmacy[facility]    Changes made to PTA medication list:  Added: None  Deleted: None  Changed: None    Actions taken by pharmacist (provider contacted, etc):None     Additional medication history information:Patient does not take losartan if her blood pressure is below a certain goal range. Patient has not taken losartan for the past 2 days.    Medication reconciliation/reorder completed by provider prior to medication history? Yes    Do you take OTC medications (eg tylenol, ibuprofen, fish oil, eye/ear drops, etc)? Y(Y/N)    For patients on insulin therapy: N (Y/N)      Prior to Admission medications    Medication Sig Last Dose Taking? Auth Provider   aspirin 81 MG EC tablet Take 81 mg by mouth At Bedtime  9/29/2018 at pm Yes Unknown, Entered By History   ciprofloxacin (CIPRO) 500 MG tablet Take 1 tablet (500 mg) by mouth every 12 hours for 10 days 9/29/2018 at pm Yes Jose M Carrero MD   folic acid (FOLVITE) 1 MG tablet Take 1 mg by mouth daily 9/29/2018 at Unknown time Yes Unknown, Entered By History   losartan (COZAAR) 100 MG tablet Take 100 mg by mouth daily 9/28/2018 at Unknown time Yes Unknown, Entered By History   metoprolol tartrate (LOPRESSOR) 25 MG tablet Take 25 mg by mouth 2 times daily 9/30/2018 at am Yes Unknown, Entered By History   metroNIDAZOLE (FLAGYL) 500 MG tablet Take 1 tablet (500 mg) by mouth every 8 hours for 10 days 9/30/2018 at am Yes Jose M Carrero MD   sodium chloride, PF, (NORMAL SALINE FLUSH) 0.9% PF flush 5 mLs by Intracatheter route every 8 hours 9/29/2018 at Unknown time Yes Jose M Carrero  MD Adelfo   Vitamin D, Cholecalciferol, 1000 units CAPS Take 1,000 Units by mouth daily 9/29/2018 at Unknown time Yes Unknown, Entered By History   albuterol (PROAIR HFA/PROVENTIL HFA/VENTOLIN HFA) 108 (90 Base) MCG/ACT inhaler Inhale 2 puffs into the lungs every 6 hours as needed for shortness of breath / dyspnea or wheezing More than a month at Unknown time  Unknown, Entered By History

## 2018-09-30 NOTE — IP AVS SNAPSHOT
[place] [id number] [facility]    [address]    [facility] [address]    Phone:  [phone]    Fax:  [phone]                                       After Visit Summary   9/30/2018    Brea Kerr    MRN: [id number]           After Visit Summary Signature Page     I have received my discharge instructions, and my questions have been answered. I have discussed any challenges I see with this plan with the nurse or doctor.    ..........................................................................................................................................  Patient/Patient Representative Signature      ..........................................................................................................................................  Patient Representative Print Name and Relationship to Patient    ..................................................               ................................................  Date                                   Time    ..........................................................................................................................................  Reviewed by Signature/Title    ...................................................              ..............................................  Date                                               Time          22EPIC Rev 08/18

## 2018-09-30 NOTE — Clinical Note
Admitting Physician: THIERRY MARTINES [[id number]]   [facility][id number][facility]   Bed Type: Adult Med/Surg [46]   Special needs: Fall Risk [8]   Special needs: Isolation [1]   Bed request comments: C-DIFF. BED REQUESTED @[id number].

## 2018-09-30 NOTE — CONSULTS
Colon and Rectal Surgery Consultation Note  [facility]    Brea Kerr MRN# [id number]   Age: 82 year old YOB: 1935     Date of Admission:  9/30/2018    Reason for consult: Diverticulitis       Requesting physician: Raquel       Level of consult: Consult and follow for daily recommendations           Assessment:   Brea Kerr is an 82 year old woman with a history of perforated diverticulitis who was recently discharged 9/24 with an IR drain and on oral cipro/flagyl and now presents with feculent drain output as well as fecaluria. She was hypotensive in the ED with rapid atrial fibrillation and is now in the ICU, still tachycardic but relatively comfortable and off vasopressors. Her CT scan does not demonstrate undrained collections, however her diverticulitis is not improving and the drain character suggests that her perforation has enlarged. We would recommend ongoing broad spectrum antibiotics and bowel rest and will discuss timing of operative intervention tomorrow.          Recommendations:   - Appreciate primary management by ICU  - Continue zosyn and bowel rest  - Flush IR drain q shift  - Colorectal surgery will follow closely     Discussed with Dr. Willi Garay  Colorectal Surgery Fellow  Pager: [phone]         History of Present Illness:   CC: abdominal pain, change in drain output, fecaluria     This is an 82 year old woman with a history of perforated diverticulitis. The first episode was in 2015 in [place] and associated with intraabdominal abscesses requiring IR drainage. 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 She had a colonoscopy in [place] but we do not have the report. She was then admitted to [facility] 9/19-9/24/18 for fecaluria and a CT scan showed a pelvic abscess into which a drain was placed. Colorectal surgery followed her in the hospital and planned for an elective resection when the abscess was fully managed.    She  initially did well after discharge however the day prior to presentation noted worsening diarrhea and leakage of stool around her drain as well as chills. Her drain had turned clear but changed to feculent material and she came to the ED. While in the ED she became hypotensive and in rapid atrial fibrillation, a central line was placed and she was put on levophed and amiodarone. Her lactate was 0.8 and white count 18. She currently has discomfort around the drain and denies nausea or vomiting. She thinks she may be passing stool from her vagina as well but this is unclear.    She lives at home with her  and is fully independent in her activities of daily living. Her daughter is a NP in a TCU and is here with her as well. She had a prior splenectomy in 1973.          Past Medical History:     Past Medical History:   Diagnosis Date     Diverticula, colon      Hypertension              Past Surgical History:     Past Surgical History:   Procedure Laterality Date     SPLENECTOMY               Social History:     Social History     Social History     Marital status:      Spouse name: N/A     Number of children: N/A     Years of education: N/A     Occupational History     Not on file.     Social History Main Topics     Smoking status: Not on file     Smokeless tobacco: Not on file     Alcohol use Not on file     Drug use: Not on file     Sexual activity: Not on file     Other Topics Concern     Not on file     Social History Narrative             Family History:     Family History   Problem Relation Age of Onset     Coronary Artery Disease Mother      Cerebrovascular Disease Mother      Coronary Artery Disease Father      Cerebrovascular Disease Father      Diabetes Brother              Allergies:      Allergies   Allergen Reactions     Sulfa Drugs      Nausea               Medications:     No current facility-administered medications on file prior to encounter.   Current Outpatient Prescriptions on File  Prior to Encounter:  aspirin 81 MG EC tablet Take 81 mg by mouth At Bedtime    ciprofloxacin (CIPRO) 500 MG tablet Take 1 tablet (500 mg) by mouth every 12 hours for 10 days   folic acid (FOLVITE) 1 MG tablet Take 1 mg by mouth daily   metroNIDAZOLE (FLAGYL) 500 MG tablet Take 1 tablet (500 mg) by mouth every 8 hours for 10 days   sodium chloride, PF, (NORMAL SALINE FLUSH) 0.9% PF flush 5 mLs by Intracatheter route every 8 hours   Vitamin D, Cholecalciferol, 1000 units CAPS Take 1,000 Units by mouth daily             Review of Systems:      All other review of systems negative, except for what is mentioned above        Physical Exam:   /62  Pulse 80  Temp 97.8  F (36.6  C) (Oral)  Resp 8  Ht 1.524 m (5')  Wt 52 kg (114 lb 10.2 oz)  SpO2 97%  BMI 22.39 kg/m2    I/O:  Awake alert no distress  Breathing comfortably  Abdomen soft tender around drain but otherwise relatively non tender minimally distended  IR drain with feculent drainage which is also leaking around drain  Well healed vertical midline incision            Data:   All laboratory data reviewed  BMP  Recent Labs  Lab 09/30/18  1141   *   POTASSIUM 4.1   CHLORIDE 99   CO2 25   BUN 10   CR 0.78   *     CBC  Recent Labs  Lab 09/30/18  1141   WBC 17.9*   HGB 12.4   HCT 37.0        CT 9/30      IMPRESSION:   1. Small amount of ill-defined fluid and air bubbles is noted about the tip of the drainage catheter in the pelvis, suspicious for a small  residual abscess in this location. Fistulous communication of this collection with the sigmoid colon lumen is suspected.  2. Thickening of the sigmoid colon is likely related to diverticulitis.  3. Small amount of nonspecific free fluid in the pelvis.  4. A 2 cm soft tissue nodule adjacent to the thickened sigmoid colon most likely represents an enlarged mesenteric lymph node. This finding could be reactive and related to inflammation, although neoplasm cannot be excluded.

## 2018-09-30 NOTE — ED TRIAGE NOTES
Pt comes in with fever, chills, burning in vaginal area - worse after urination. Pt has a J.P. drain to LLQ. J.P. insertion site appears to have stool draining around it. Pt had drain placed about 1 week ago due to abscess in abd.

## 2018-09-30 NOTE — ED TRIAGE NOTES
ABCs intact. Pt has an abscess on her colon. Pt had surgery scheduled 10/23. Pt c/o continuing diarrhea and chills.

## 2018-09-30 NOTE — ED PROVIDER NOTES
"  History     Chief Complaint:  Abdominal Pain    The history is provided by the patient and the spouse.      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  IR then placed a NARAYAN drain on 9/20 after abscess drainage. Abscess culture grew GNR's. Patient was set to follow-up with Dr. Reddy in colorectal surgery clinic on 10/3, however presents today due to urinary symptoms including frequency, incontinence, and dysuria which began last night. This morning, the patient reports onset of \"shakes\". She also notes that she has had abdominal pain diffusely on top of specifically at the site of her NARAYAN drain placement since the surgery. Patient is currently on Cipro and Flagyl. Patient has not yet taken her temperature but is found to be 100.2 F here in the emergency department. Patient does endorse having had three loose BM's today however denies hematuria, foul smelling urine, diarrhea, or other complaint.     Allergies:  Sulfa Drugs      Medications:    Aspirin 81 mg  Cipro  Folvite  Losartan potassium  Metoprolol  Cholecalciferol     Past Medical History:    Diverticulitis of sigmoid colon  UTI  Hypertension  PE/DVT  Inflammatory polyarthropathy    Past Surgical History:    Splenectomy     Family History:    History reviewed. No pertinent family history.      Social History:  Presents with spouse   Tobacco use: Not on file   Alcohol use: Not on file   PCP: [facility]    Marital Status:       Review of Systems   Gastrointestinal: Positive for abdominal pain. Negative for diarrhea.        (+) loose stools   Genitourinary: Positive for dysuria and frequency. Negative for " hematuria.        (+) incontinence   All other systems reviewed and are negative.    Physical Exam     Patient Vitals for the past 24 hrs:   BP Temp Temp src Pulse Heart Rate Resp SpO2 Height Weight   09/30/18 1600 132/76 - - - 154 - 100 % - -   09/30/18 1545 112/78 - - - 170 - 98 % - -   09/30/18 1530 (!) 124/109 - - - 147 - 100 % - -   09/30/18 1515 115/79 - - - 168 - 99 % - -   09/30/18 1500 97/73 - - - - 12 - - -   09/30/18 1455 103/78 - - - 163 (!) 0 - - -   09/30/18 1449 - - - - 147 21 97 % - -   09/30/18 1448 - - - - - - 97 % - -   09/30/18 1447 - - - - - - 96 % - -   09/30/18 1446 - - - - - - 98 % - -   09/30/18 1445 (!) 69/59 - - - - - 99 % - -   09/30/18 1444 - - - - - - 98 % - -   09/30/18 1443 - - - - - - 97 % - -   09/30/18 1442 - - - - - - 96 % - -   09/30/18 1441 (!) 76/56 - - - - - 94 % - -   09/30/18 1440 (!) 69/57 - - - - - 98 % - -   09/30/18 1439 - - - - - - 97 % - -   09/30/18 1438 - - - - - - 97 % - -   09/30/18 1437 (!) 82/60 - - - - - 98 % - -   09/30/18 1436 - - - - - - 98 % - -   09/30/18 1435 - - - - - - 98 % - -   09/30/18 1434 - - - - - - 98 % - -   09/30/18 1433 - - - - - - 97 % - -   09/30/18 1432 - - - - - - 96 % - -   09/30/18 1431 - - - - - - 99 % - -   09/30/18 1430 (!) 89/58 - - - - - 96 % - -   09/30/18 1429 - - - - - - 96 % - -   09/30/18 1428 - - - - - - 96 % - -   09/30/18 1427 - - - - - - 98 % - -   09/30/18 1424 106/73 - - - - - 98 % - -   09/30/18 1400 108/71 - - - - - 94 % - -   09/30/18 1315 98/54 - - - - - - - -   09/30/18 1215 105/75 - - 80 80 - 92 % - -   09/30/18 1200 113/65 - - 94 94 - 97 % - -   09/30/18 1145 125/84 - - - - - 98 % - -   09/30/18 1121 - - - - - - 93 % - -   09/30/18 1120 111/80 - - - - - 92 % - -   09/30/18 1119 - - - - - - 92 % - -   09/30/18 1118 - - - - - - 93 % - -   09/30/18 1117 - - - - - - 92 % - -   09/30/18 1116 - - - - - - 93 % - -   09/30/18 1115 125/70 - - 102 102 15 - - -   09/30/18 1114 125/70 - - - - - - - -   09/30/18 1110 - 100.2  F  (37.9  C) Oral - - - - 1.524 m (5') 49.9 kg (110 lb)        Physical Exam  General: Patient is alert and interactive when I enter the room  Head:  The scalp, face, and head appear normal  Eyes:  Conjunctivae are normal  ENT:    The nose is normal    Pinnae are normal    External acoustic canals are normal, dry mucous membranes   Neck:  Trachea midline  CV:  Pulses are normal.    Resp:  No respiratory distress   Abdomen:      Soft, diffusely tender, LLQ NARAYAN site with fecal material around drain, drain has cloudy feculent material, no guarding, non-distended  Musc:  Normal muscular tone    No major joint effusions    No asymmetric leg swelling  :  Stool material coming from vaginal area   Skin:  No rash or lesions noted  Neuro:  Speech is normal and fluent. Face is symmetric.     Moving all extremities well.   Psych: Awake. Alert.  Normal affect.  Appropriate interactions.    Emergency Department Course     ECG (14:53:03):  Rate 159 bpm. SC interval *. QRS duration 80. QT/QTc 290/471. P-R-T axes * -10 119. Atrial fibrillation with rapid ventricular response with premature ventricular or aberrantly conducted complexes. Nonspecific ST and T wave abnormalities. Abnormal ECG. Agree with computer interpretation. Interpreted at 1515 by Danita Hyde MD.     Imaging:  Radiographic findings were communicated with the patient and family who voiced understanding of the findings.    CT Abd/pelvis with contrast:  IMPRESSION:   1. Small amount of ill-defined fluid and air bubbles is noted about the tip of the drainage catheter in the pelvis, suspicious for a small residual abscess in this location. Fistulous communication of this collection with the sigmoid colon lumen is suspected.  2. Thickening of the sigmoid colon is likely related to diverticulitis.  3. Small amount of nonspecific free fluid in the pelvis.  4. A 2 cm soft tissue nodule adjacent to the thickened sigmoid colon most likely represents an enlarged mesenteric  lymph node. This finding could be reactive and related to inflammation, although neoplasm cannot be excluded.  5. Cholelithiasis.      Imaging independently reviewed and agree with radiologist interpretation.      Laboratory:  CBC: WBC 17.9 (H) o/w WNL (HGB 12.4, )   CMP: Na 131 (L),  (H), Ca 8.2 (L), Albumin 2.8 (L) o/w WNL (Creatinine 0.78)  Creatinine POCT (Collected 1147): 0.7   Lactic Acid: 1.7  Blood Culture (2x): Pending    UA with micro: Pending    Procedures:  [facility] Procedure Note:     PHYSICIAN: Danita Hyde MD  PROCEDURE:  Central Line Placement with Ultrasound Guidance.    INDICATIONS:  Vascular access,  Central pressure monitoring    CONSENT: Risks (including but not limited to: pneumothorax,  bleeding, infection or artery puncture), benefits and alternatives were discussed.   Verbal consent.     TIMEOUT:   Universal protocol was followed. A TIME OUT  Was conducted just prior to starting procedure confirmed patient identity, site/side, procedure, patient position, and availability of correct equipment.    MEDICATION: Local infiltration of lidocaine    DESCRIPTION OF PROCEDURE:      Right Internal Jugular approach was selected and the right anterior neck was prepped, cleansed and       draped in a sterile fashion.  Mask, gown and gloves were used per sterile  protocol.  Patient was then placed into Trendelenburg position and lidocaine was used for local anesthesia.  Vascular probe with ultrasound was used in a sterile fashion for guidence.  Introducer needle was then used to gain access to the central venous circulation.  Using Seldinger technique the  Triple lumen catheter was placed.  Catheter port(s) were aspirated and flushed.  Central line was sutured in place and sterile dressing applied.  Patient tolerated the procedure   well. There were  no complications.    Appropriate placement was confirmed by x-ray and no pneumothorax was visualized.    Interventions:   1205: NS 1L  IV Bolus  1259: Zosyn 3.375 g IV Infusion     Emergency Department Course:  Past medical records, nursing notes, and vitals reviewed.  1120: I performed an exam of the patient and obtained history, as documented above.    IV inserted and blood drawn. Above interventions provided. Blood was sent to the lab for further testing, results above.   The patient provided a urine sample here in the emergency department. This was sent for laboratory testing, findings above.  The patient was sent for a CT while in the emergency department, findings above.    1235: I rechecked the patient. Explained findings to the patient and spouse.     1335: I discussed the patient with Dr. Garay of colorectal.     Findings and plan explained to the Patient and spouse who consents to admission.     1348: Discussed the patient with Dr. García, who will admit the patient to a [facility] bed for further monitoring, evaluation, and treatment.    1505: I rechecked the patient. Explained findings to the patient. Discussed the risks and benefits of placing a central line due to recurrent hypotension and she and her spouse consented to this procedure.    1525: Central Line Placement with Ultrasound Guidance performed. See above for details.    1621: I discussed the patient with Dr. García once again regarding the condition of the patient.    Impression & Plan      Medical Decision Making:  Brea Kerr is a 82 year old female with a history of DVT and PE and hypertension who presents with dysuria, fevers, abdominal pain. On arrival, she had a low grade temp of 101 and a heart rate in the low 100s. She had some mild abdominal tenderness. Interestingly on her NARAYAN drain, she has stool around the incision and also has stool coming out of her urethra. 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 Given her fever, I am concerned for bacteremia so blood cultures were done. Her white count was elevated at 17. Her lactate was normal. She is already on  Cipro and flagyl so I will increase her to zosyn. She has had diarrhea, which could be c diff so we will send for c diff. CT also revealed a possible new abscess so colorectal was informed of this who agreed with NPO and antibiotics. They will see her in the hospital. Patient admitted to medicine for continued treatment of her sepsis and further workup of this possible new abscess.    While in the emergency department, the patient continued to have persistent hypotension and an intrajugular central line was placed. See note above for details. She then after becoming hypotensive went into atrial fibrillation with RVR. Patient was given a 2L of IVF. She was fluid responsive but then would occasionally drop her BP as well. With this, I feel that the best management for her RVR would be amiodarone. She was started on an amiodarone drip but no bolus as well as levophed to her her MAP of 65. Patient admitted to ICU in stable condition.     Diagnosis:    ICD-10-CM   1. Sepsis, due to unspecified organism (H) A41.9       Disposition:  Admitted to hospitalist service.      Scribe Disclosure:  ALEXUS, Don Weinberg, am serving as a scribe at 11:25 AM on 9/30/2018 to document services personally performed by Danita Hyde MD based on my observations and the provider's statements to me.   9/30/2018   [facility] EMERGENCY DEPARTMENT     Danita Hyde MD  09/30/18 [id number]

## 2018-09-30 NOTE — PLAN OF CARE
Problem: Patient Care Overview  Goal: Plan of Care/Patient Progress Review  Outcome: No Change  ICU End of Shift Summary.  For vital signs and complete assessments, please see documentation flowsheets.     Pertinent assessments: Family supportive.   Major Shift Events: Admit. Afib with NSVT 3-6 beats frequently fisrt 30 minutes.  3 beats NSVT occasionally since. -170.  On amiodarone.  Levophed stopped 2/2 MAP >65.  MIVF started.      Plan (Upcoming Events):   Discharge/Transfer Needs:     Bedside Shift Report Completed :   Bedside Safety Check Completed:

## 2018-09-30 NOTE — IP AVS SNAPSHOT
MRN:[id number]                      After Visit Summary   9/30/2018    Brea Kerr    MRN: [id number]           Thank you!     Thank you for choosing [facility] for your care. Our goal is always to provide you with excellent care. Hearing back from our patients is one way we can continue to improve our services. Please take a few minutes to complete the written survey that you may receive in the mail after you visit. If you would like to speak to someone directly about your visit please contact Patient Relations at [phone]. Thank you!          Patient Information     Date Of Birth          1935        Designated Caregiver       Most Recent Value    Caregiver    Name of designated caregiver Jenn    Phone number of caregiver [phone]      About your hospital stay     You were admitted on:  September 30, 2018 You last received care in the:  [place] [address] [facility]    You were discharged on:  October 17, 2018        Reason for your hospital stay       You had come in to the hospital with complications of your recent bout of diverticulitis. The infected area eroded into the bladder and created an abnormal tract that had to be taken down.                  Who to Call     For medical emergencies, please call 911.  For non-urgent questions about your medical care, please call your primary care provider or clinic, [phone]  For questions related to your surgery, please call your surgery clinic        Attending Provider     Provider Specialty    Danita Hyde MD Emergency Medicine    Concepción García MD Internal Medicine    Nash Paris MD Internal Medicine       Primary Care Provider Office Phone # Fax #    [facility] [phone]      After Care Instructions     Activity       Your activity upon discharge: activity as tolerated            Diet       Follow this diet upon discharge:       Snacks/Supplements Adult: Other; Ok to  offer smoothies with meals; With Meals      Regular Diet Adult            Discharge Instructions       Please call the clinic if:  - fever greater than 101 degrees  - any signs of infection (increasing redness, swelling, tenderness, warmth, change in appearance, increased drainage)  - blood in urine or stool  - severe pain that is not relieved by medicine, rest, or ice    Or as questions or concerns arise. Contact clinic at [phone]    Call 911 if you feel you are having a medical emergency                  Follow-up Appointments     Follow-up and recommended labs and tests        Follow up in the office in 3-4 weeks with Dr. Reddy. Call [phone] to schedule your appointment. Call the office at [phone] if you develop fever, uncontrolled pain, bleeding, nausea, vomiting, or constipation.                  Your next 10 appointments already scheduled     Oct 22, 2018  3:40 PM CDT   Office Visit with Whitley Nichole MD   [facility]    [address] [facility] [id number] [phone]           Bring a current list of meds and any records pertaining to this visit. For Physicals, please bring immunization records and any forms needing to be filled out. Please arrive 10 minutes early to complete paperwork.              Additional Services     Home Care OT Referral for Hospital Discharge       OT to eval and treat    Your provider has ordered home care - occupational therapy. If you have not been contacted within 2 days of your discharge please call the department phone number listed on the top of this document.            Home Care PT Referral for Hospital Discharge       PT to eval and treat    Your provider has ordered home care - physical therapy. If you have not been contacted within 2 days of your discharge please call the department phone number listed on the top of this document.            Home care nursing referral       RN extended hours  "visit. RN to assess hydration, nutrition and bowel status and home safety.  RN to teach application of ostomy bag.  RN to provide ostomy care and management.    Your provider has ordered home care nursing services. If you have not been contacted within 2 days of your discharge please call the inpatient department phone number at [phone] .                  Pending Results     No orders found from 2018 to 10/1/2018.            Statement of Approval     Ordered          10/17/18 0952  I have reviewed and agree with all the recommendations and orders detailed in this document.  EFFECTIVE NOW     Approved and electronically signed by:  Nash Paris MD             Admission Information     Date & Time Provider Department Dept. Phone    2018 Nash Paris MD [place] [address] [facility] [phone]      Your Vitals Were     Blood Pressure Pulse Temperature Respirations Height Weight    135/60 (BP Location: Right arm) 80 97.6  F (36.4  C) (Oral) 18 1.524 m (5') 50 kg (110 lb 3.2 oz)    Pulse Oximetry BMI (Body Mass Index)                93% 21.52 kg/m2          [facility] Information     [facility] lets you send messages to your doctor, view your test results, renew your prescriptions, schedule appointments and more. To sign up, go to [email or web address] . Click on \"Log in\" on the left side of the screen, which will take you to the Welcome page. Then click on \"Sign up Now\" on the right side of the page.     You will be asked to enter the access code listed below, as well as some personal information. Please follow the directions to create your username and password.     Your access code is: [email or web address]  Expires: 2018 10:36 PM     Your access code will  in 90 days. If you need help or a new code, please call your [facility] clinic or [phone].        Care EveryWhere ID     This is your Care EveryWhere ID. This could be used by other organizations to access your [facility] medical " records  [id number]        Equal Access to Services     REX SEGOVIA : Hadii alanis cruz melybibiana Isabellizeth, waaxda luqadaha, qaybta kareinashira omalley, tabitha shoemakerjennifergodfrey ge. So [facility] [phone].    ATENCIÓN: Si habla español, tiene a penaloza disposición servicios gratuitos de asistencia lingüística. Llame al [phone].    We comply with applicable federal civil rights laws and [place] laws. We do not discriminate on the basis of race, color, national origin, age, disability, sex, sexual orientation, or gender identity.               Review of your medicines      START taking        Dose / Directions    diclofenac 1 % Gel topical gel   Commonly known as:  VOLTAREN   Used for:  Arthritis of knee        Dose:  2 g   Place 2 g onto the skin 4 times daily   Quantity:  120 g   Refills:  0       order for DME        Equipment being ordered: Walker Wheels () and Walker () Treatment Diagnosis: impaired gait   Quantity:  1 each   Refills:  0       traZODone 50 MG tablet   Commonly known as:  DESYREL   Used for:  Primary insomnia        Dose:  50 mg   Take 1 tablet (50 mg) by mouth nightly as needed for sleep   Quantity:  60 tablet   Refills:  0         CONTINUE these medicines which may have CHANGED, or have new prescriptions. If we are uncertain of the size of tablets/capsules you have at home, strength may be listed as something that might have changed.        Dose / Directions    metoprolol tartrate 50 MG tablet   Commonly known as:  LOPRESSOR   This may have changed:    - medication strength  - how much to take  - when to take this        Dose:  50 mg   Take 1 tablet (50 mg) by mouth 2 times daily (with meals)   Quantity:  60 tablet   Refills:  0         CONTINUE these medicines which have NOT CHANGED        Dose / Directions    albuterol 108 (90 Base) MCG/ACT inhaler   Commonly known as:  PROAIR HFA/PROVENTIL HFA/VENTOLIN HFA        Dose:  2 puff   Inhale 2 puffs into the lungs every 6 hours  as needed for shortness of breath / dyspnea or wheezing   Refills:  0       aspirin 81 MG EC tablet        Dose:  81 mg   Take 81 mg by mouth At Bedtime   Refills:  0       folic acid 1 MG tablet   Commonly known as:  FOLVITE        Dose:  1 mg   Take 1 mg by mouth daily   Refills:  0       Vitamin D (Cholecalciferol) 1000 units Caps        Dose:  1000 Units   Take 1,000 Units by mouth daily   Refills:  0         STOP taking     ciprofloxacin 500 MG tablet   Commonly known as:  CIPRO           losartan 100 MG tablet   Commonly known as:  COZAAR           metroNIDAZOLE 500 MG tablet   Commonly known as:  FLAGYL           sodium chloride (PF) 0.9% PF flush                Where to get your medicines      These medications were sent to [place], [place] - [address] [facility]  [address] [facility] [address]     Phone:  [phone]     diclofenac 1 % Gel topical gel    metoprolol tartrate 50 MG tablet    traZODone 50 MG tablet         Some of these will need a paper prescription and others can be bought over the counter. Ask your nurse if you have questions.     Bring a paper prescription for each of these medications     order for DME                Protect others around you: Learn how to safely use, store and throw away your medicines at www.disposemymeds.org.             Medication List: This is a list of all your medications and when to take them. Check marks below indicate your daily home schedule. Keep this list as a reference.      Medications           Morning Afternoon Evening Bedtime As Needed    albuterol 108 (90 Base) MCG/ACT inhaler   Commonly known as:  PROAIR HFA/PROVENTIL HFA/VENTOLIN HFA   Inhale 2 puffs into the lungs every 6 hours as needed for shortness of breath / dyspnea or wheezing                                   aspirin 81 MG EC tablet   Take 81 mg by mouth At Bedtime                                   diclofenac 1 % Gel topical gel   Commonly known as:   VOLTAREN   Place 2 g onto the skin 4 times daily   Last time this was given:  2 g on 10/17/2018 10:09 AM                                            folic acid 1 MG tablet   Commonly known as:  FOLVITE   Take 1 mg by mouth daily                                   metoprolol tartrate 50 MG tablet   Commonly known as:  LOPRESSOR   Take 1 tablet (50 mg) by mouth 2 times daily (with meals)   Last time this was given:  50 mg on 10/17/2018  8:41 AM                                      order for DME   Equipment being ordered: Walker Wheels () and Walker () Treatment Diagnosis: impaired gait                                   traZODone 50 MG tablet   Commonly known as:  DESYREL   Take 1 tablet (50 mg) by mouth nightly as needed for sleep   Last time this was given:  50 mg on 10/16/2018 10:10 PM                                   Vitamin D (Cholecalciferol) 1000 units Caps   Take 1,000 Units by mouth daily                                             More Information        Discharge Instructions for Colostomy  You just underwent a procedure that required a colostomy. This is a life-saving procedure that involves removing or disconnecting part of your colon (large intestine). If your large intestine was diseased, your healthcare provider may have removed it. If it was injured, your healthcare provider may have disconnected it for a short time so that it can heal. After it heals, your healthcare provider may reconnect it. During a colostomy formation, your healthcare provider reroutes your colon through your abdominal wall. Stool and mucus can then pass out of your body through this opening, called a stoma. The following are general guidelines for home care following a colostomy. Your healthcare provider will go over any information that is specific to your condition.  Home care  Suggestions for home care include the following:    Take care of your stoma as directed. Your healthcare provider and ostomy nurse discussed  how to do this with you before you left the hospital.    Ask your healthcare provider or ostomy nurse for a patient education sheet about colostomy care before you leave the hospital. This will help remind you how to care for yourself.    If a partner or significant other will be helping you recover, ask the medical team to educate that person on ostomy care as well.     Don t lift anything heavier than 5 pounds until your healthcare provider says it is OK.    Don t drive until after your first healthcare provider s appointment after your surgery.    If you ride in a car for more than short trips, stop often to stretch your legs.    Ask your healthcare provider when you can expect to return to work. Most patients are able to return to work within 4 to 6 weeks after surgery.    Increase your activity gradually. Take short walks on a level surface.    Wash your incision site with soap and water and pat it dry.    Check your incision every day for redness, drainage, swelling, or separation of the skin.    Take your medicines exactly as directed. Don t skip doses.    Don t take any over-the-counter medicine unless your healthcare provider tells you to do so.  Call your healthcare provider  Call your healthcare provider immediately if you have any of the following:    Excessive bleeding from your stoma    Blood in your stool    Stool that is very hard    No gas or stool    Change in the color of your stoma    Bulging skin around your stoma    A stoma that looks like it s getting longer    Fever of 100.4 F (38 C) or higher, or chills, or as advised by your healthcare provider     Redness, swelling, bleeding, or drainage from your incision    Constipation    Diarrhea    Nausea or vomiting    Increased pain in the belly or around the stoma   Date Last Reviewed: 7/1/2016 2000-2017 The [employer]. [address] [place] [address]. All rights reserved. This information is not intended as a substitute for  professional medical care. Always follow your healthcare professional's instructions.                Colostomy: Adjusting to Your Body    Getting used to a colostomy may take time. Learning to care for it and the changes in your body can be tough. Keep in mind that you are still the same person you were before the colostomy. And you can still do many of the activities that you love. This sheet offers tips to help you adjust to having a colostomy.  Accepting yourself  It s normal to feel anxious about how your body has changed. But a health care provider will show you how to care for the colostomy and yourself. This may be a wound, ostomy, and continence (WOC) nurse. A WOC nurse is specially trained to care for ostomy patients. Soon, caring for your colostomy will become part of your daily routine--like bathing or brushing your teeth.  Telling others  It s your choice whether you tell others about your colostomy. No one can tell by looking at you or being near you that you have a colostomy. Your pouch won t bulge or smell if it s put on right. You may worry about how to tell potential partners that you have a colostomy. It s best to wait until you feel at ease with the person. But talk about it before you decide to have sex.  Sex and intimacy  You may have some concerns about how a colostomy will affect your sexuality. This is normal. Talk with your WOC nurse about fears you may have. He or she can offer help and advice. Also, share your feelings with your partner. People with colostomies still have sex. They also date, , and have children. Below are some tips:    Give yourself time. Wait until you feel well and relaxed. Until you are ready, you can express love in other ways, such as hugging, kissing, and caressing.    Empty your pouch before you have sex. You may want to wear a pouch cover or a shirt over the pouch. Or you might tuck the pouch under a soft belt or inside underwear with an open crotch.    Do  not put anything in the stoma during sex.  Get support  Talking with another person who has had a colostomy can help, too. Members of the [employer]OAA are glad to answer questions and talk with you about any concerns you have.    [employer] [place]  [phone]  For loved ones  The person you love has not changed because he or she has a colostomy. But it may take time for your loved one to adjust. He or she may be depressed or withdrawn at first. Do your best to support your loved one as he or she gets used to having a colostomy. Your loved one may also want help caring for the colostomy at first. A [facility] nurse or other healthcare provider can show you what to do. If you have questions or concerns, you can talk with someone from the [employer] [place] ([employer]). The UOAA is a support group for people with ostomies.   Date Last Reviewed: 6/1/2016 2000-2017 [employer]. [address] [place] [address]. All rights reserved. This information is not intended as a substitute for professional medical care. Always follow your healthcare professional's instructions.                Colostomy: Answers to Common Questions  You have been told you need a colostomy. Or you have recently been given one. Below are answers to some questions you are likely to have. Learning as much as you can about your colostomy can help you adjust.  Can I take my regular medicines when I have colostomy?  A colostomy could affect the way they act in the body. Talk to your healthcare provider about any medicines you take.  Where do I buy pouches and skin care products?  Supplies can be bought through medical supply companies, some drugstores, and special catalogs. Be sure you know the maker and product number of the supplies you use. And order new supplies well before you run out.  How can I know whether a product will irritate my skin?  If you have had a skin reaction before, you may want to  patch  test  a product. Apply a small amount (or a small piece of product) on your belly, away from the stoma. Remove it after 48 hours. If the skin isn t red or sore, the product is OK to use. Know that you can develop an allergy to a product over time. If you start having a reaction to a product, stop using it. Then, call your wound, ostomy, and continence (WOC) nurse for advice.  Why do I still sometimes feel as if I m going to have a bowel movement through the rectum?  This is called  phantom rectum.  The feeling is common. It may happen because nerves that were cut during surgery still send messages to the brain. The feeling may go away when you ve healed from the surgery.  Where can I get more information?  Your WOC nurse is there to answer your questions. So are your surgeon and other healthcare providers. Contacting the sources listed below is a good way to learn more:    [employer] [place]  [phone]   www.uoaa.org    American Cancer Society  [phone]   www.cancer.org    Wound, Ostomy, and Continence Nurses Society  www.wocn.org/Patients  Date Last Reviewed: 6/1/2016 2000-2017 The [employer]. [address] [place] [address], [place] [address]. All rights reserved. This information is not intended as a substitute for professional medical care. Always follow your healthcare professional's instructions.                Colostomy: Changing Your Pouch    Your healthcare provider gave you a stoma (new opening for stool to pass from the body) during surgery. Stool starts to pass from the stoma soon after surgery. That means you ll need to learn how to change your pouch before you go home. You usually need to change your drainable pouch every 5 to 7 days, but you will empty your pouch more often. To change your pouch, follow the steps below. Start by gathering what you ll need:    Plastic bags    Soft washcloth    Toilet paper    New pouch    Extra skin protection    Scissors (if  needed)    Clean towel  1. Remove the used pouch  Steps to removing the used pouch are as follows:    If you use a drainable pouch, empty it first. Sit on or next to the toilet. Set the clamp aside.    Start at the upper edge of the skin barrier. Carefully push the skin away from the skin barrier with one hand. Slowly peel back the skin barrier with the other hand.    Peel all the way around the skin barrier until the pouch comes off.    Seal the pouch in a plastic bag. Then put it in a second plastic bag. Throw it away in a trash bin. Some people empty the pouch into the toilet first.   2. Clean around the stoma  Steps to cleaning around the stoma are as follows:      Wipe any stool off the skin around the stoma with toilet paper.    Clean the skin with warm water and a soft washcloth. Wash right up to the edge of the stoma. Pat the skin dry with a clean towel.    If needed, put on extra skin protection, such as moisture barrier paste, cream, or powder.  3. Put on the new pouch  Steps to putting on the new pouch are as follows:    If you don t use a pouch with a precut skin barrier, size and cut the opening (no more than one-eighth inch bigger than the stoma) and peel the backing off the skin barrier. Carefully place it over the stoma.    If you use a 2-piece pouch, snap the pouch onto the barrier. Start at the bottom and work your fingers around the flange.    Press the barrier against your skin with your hand over the barrier and hold it in place for 45 seconds. This molds the barrier to your skin.      If you use a drainable pouch, clamp the tail.    Wash your hands for at least 20 seconds when you are done. (Hum Happy Birthday twice if you need a timer.)   Call your ostomy nurse or other healthcare provider  Call your healthcare provider if:    The skin around the stoma is red, weepy, bleeding, or broken.    The skin around the stoma itches, burns, stings, or has white spots.    The stoma swells, changes color,  or bleeds without stopping    The stoma changes size, becomes even with, or sinks below the skin, or sticks up more than normal.   Date Last Reviewed: 7/1/2016 2000-2017 The [employer]. [address] [place] [address], [place] [address]. All rights reserved. This information is not intended as a substitute for professional medical care. Always follow your healthcare professional's instructions.

## 2018-09-30 NOTE — LETTER
Key Recommendations:    AVS/Discharge Summary is the source of truth; this is a helpful guide for improved communication of patient story

## 2018-09-30 NOTE — H&P
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  Patient decompensated in the ER due to hypotension and developed AF with RVR.    1. Sepsis due to Recent Diverticulitis s/p NARAYAN drain placement with colovesicular Fistula: During last admission had NARAYAN drain placement and had been set up to see CRS in clinic.  NARAYAN drain output had changed to clear but overnight developed feculent urine and now again has brown NARAYAN drainage.  She was taking Cipro and Flagyl as prescribed during her last admission.  CT this admission showed small ill defined fluid and air bubbles at the tip of the drainage catheter suspicious for small abscess in this location as well as fistulous communication of the collection with the sigmoid colon lumen.  The patient has been started on Zosyn.  CRS contacted in the ER and will see the patient, requests she be NPO.  Patient became hypotensive in the ER (SBP 60s).  Line placed in case she needs pressors.  - IV Zosyn  - CRS consulted  - NPO with IVF  - Zinc oxide barrier to help reduce skin irritation    2. History of HTN now with Hypotension: Blood pressure declined in the ER to the 60s systolic.  Line placed.  Receiving IVF.  Hold antihypertensives, continue fluid resuscitation.  Levophed if needed.    3. Atrial Fibrillation with RVR: Developed atrial fibrillation with RVR in the ER.   "No prior history of this.  Likely due to sepsis.  Continue fluid resuscitation.  Started on Amiodarone in the ER, continue this for now as her pressure will not allow for rate controlling medications.  Will also check TTE and thyroid function.    4. Hyponatremia: Sodium 131, likely due to hypovolemia.  Hydrate and repeat sodium in the AM.    5. Inflammatory Polyarthropathy: Had been on Methotrexate but this has been on hold due to recent infection.  Continue to hold.  Tylenol available PRN.  Also have low dose Tramadol if pain worsens (currently well controlled) as she has not tolerated Oxycodone before as she feels \"loopy\".    6. History of DVT/PE: Not on anticoagulation currently.  Will use PCDs now as she may need surgery.  Would recommend Lovenox once surgical procedure is done.    DVT Prophylaxis: Pneumatic Compression Devices  Code Status: Full Code  Discharge Dispo: Admit to inpatient status in the ICU  Estimated Disch Date / # of Days until Disch: At least 2 midnights    Case discussed with Dr. Hoffman Intensivist.    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  History was obtained through patient interview, discussion with patient's daughter, chart review and discussion with Dr. Hyde in the ER.    The patient was hospitalized recently for diverticulitis with drain placement and colovesicular fistula.  She was discharged on Cipro/Flagyl and had been improving.  Her energy was getting better and she was eating fairly well.  She has mild pain surrounding the NARAYAN drain but it was unchanged from when she was in the hospital.  Yesterday her " appetite became very poor and she had to force herself to eat.  She had no nausea or emesis, just did not feel well.  Last night she then developed a change in the NARAYAN output (went from clear to brown) and feculent material when she urinated which was painful.    She had shaking chills this morning and her  decided that she needed to come to the hospital.  The patient's daughter is here (who is a NP) and states she had rigors.  The patient denies CP, SOB, abdominal pain (except for pain surrounding the NARAYAN drain which has been stable since it was placed).  She has had loose stool since yesterday.  She feels slightly lightheaded now but no dizziness.  She feels like she is dehydrated but denies weakness.    Patient declined in the ER.  Developed atrial fibrillation with RVR in addition to hypotension with a blood pressure in the 60s.  Line was placed.     Past Medical History:  Past Medical History:   Diagnosis Date     Diverticula, colon      Hypertension      Past Surgical History:  Past Surgical History:   Procedure Laterality Date     SPLENECTOMY       Social History:  Social History   Substance Use Topics     Smoking status: Not on file     Smokeless tobacco: Not on file     Alcohol use Not on file     Social History     Social History Narrative     No narrative on file   No tobacco or alcohol.    Family History:  Family History   Problem Relation Age of Onset     Coronary Artery Disease Mother      Cerebrovascular Disease Mother      Coronary Artery Disease Father      Cerebrovascular Disease Father      Diabetes Brother      Allergies:  Allergies   Allergen Reactions     Sulfa Drugs      Nausea       Medications:  Current Facility-Administered Medications   Medication     sodium chloride 0.9% infusion     Current Outpatient Prescriptions   Medication     aspirin 81 MG EC tablet     ciprofloxacin (CIPRO) 500 MG tablet     folic acid (FOLVITE) 1 MG tablet     losartan (COZAAR) 100 MG tablet     metoprolol  tartrate (LOPRESSOR) 25 MG tablet     metroNIDAZOLE (FLAGYL) 500 MG tablet     sodium chloride, PF, (NORMAL SALINE FLUSH) 0.9% PF flush     Vitamin D, Cholecalciferol, 1000 units CAPS     albuterol (PROAIR HFA/PROVENTIL HFA/VENTOLIN HFA) 108 (90 Base) MCG/ACT inhaler      Review of Systems:  A Comprehensive greater than 10 system review of systems was carried out.  Pertinent positives and negatives are noted above.  Otherwise negative for contributory information.     Physical Exam:  Blood pressure 98/54, pulse 80, temperature 100.2  F (37.9  C), temperature source Oral, resp. rate 15, height 1.524 m (5'), weight 49.9 kg (110 lb), SpO2 92 %.  Wt Readings from Last 1 Encounters:   09/30/18 49.9 kg (110 lb)     Exam:   General: Alert, awake, no acute distress.  HEENT: NC/AT, eyes anicteric and without injection, EOMI, face symmetric.  Dentition WNL, MMM.  Cardiac: Tachycardic with regular rhythm, normal S1, S2.  No murmurs/g/r.  No LE edema  Pulmonary: Normal chest rise, normal work of breathing.  Lungs CTAB  Abdomen: soft, non-tender, non-distended.  Normoactive BS.  No guarding or rebound tenderness. NARAYAN drain with slightly thick brown fluid  Extremities: no deformities.  Warm, well perfused.  Skin: no rashes or lesions noted.  Warm and Dry.  Neuro: No focal deficits noted.  Speech clear.  Coordination and strength grossly normal.  Psych: Appropriate affect. Alert and oriented x3    Data Reviewed Today:  EKG:  Sinus tachycardia  Imaging:  Results for orders placed or performed during the hospital encounter of 09/30/18   CT Abdomen Pelvis w Contrast    Narrative    CT ABDOMEN/PELVIS WITH CONTRAST September 30, 2018 12:43 PM     HISTORY: Fever. Fecal and drainage from surgical drain.    TECHNIQUE: 55mL Isovue-370 IV were administered. After contrast  administration, volumetric helical sections were acquired from the  lung bases to the ischial tuberosities. Coronal images were also  reconstructed. Radiation dose for  this scan was reduced using  automated exposure control, adjustment of the mA and/or kV according  to patient size, or iterative reconstruction technique.    COMPARISON: None.     FINDINGS: There is a surgical drain noted in the lower abdomen just  left of midline. Colonic diverticulosis. Thickening of the sigmoid  colon suggests diverticulitis. There is a small amount of fluid and  air bubbles noted about the tip of the drain, not well defined. Small  amount of nonspecific free fluid in the pelvis. No bowel obstruction.  A soft tissue density nodule adjacent to the thickened sigmoid colon  (series 2 image 51) is of uncertain etiology, but may represent an  enlarged mesenteric lymph node.    Multiple calcified gallstones are noted within the gallbladder. A  small irregular spleen is noted in the left upper quadrant. The liver,  adrenal glands, pancreas, and kidneys are unremarkable. No  hydronephrosis. Moderate atherosclerotic aortoiliac calcification.   Coronary artery calcification. Mild scarring or atelectasis at the  right lung base posteriorly. Degenerative changes are noted throughout  the thoracolumbar spine and both hips. Thoracolumbar scoliosis.       Impression    IMPRESSION:   1. Small amount of ill-defined fluid and air bubbles is noted about  the tip of the drainage catheter in the pelvis, suspicious for a small  residual abscess in this location. Fistulous communication of this  collection with the sigmoid colon lumen is suspected.  2. Thickening of the sigmoid colon is likely related to  diverticulitis.  3. Small amount of nonspecific free fluid in the pelvis.  4. A 2 cm soft tissue nodule adjacent to the thickened sigmoid colon  most likely represents an enlarged mesenteric lymph node. This finding  could be reactive and related to inflammation, although neoplasm  cannot be excluded.  5. Cholelithiasis.      CHA SLOAN MD     Labs:    Recent Labs  Lab 09/30/18  1141   WBC 17.9*   HGB 12.4   HCT  37.0   *          Recent Labs  Lab 09/30/18  1147 09/30/18  1141   NA  --  131*   POTASSIUM  --  4.1   CHLORIDE  --  99   CO2  --  25   ANIONGAP  --  7   GLC  --  125*   BUN  --  10   CR  --  0.78   GFRESTIMATED 81 70   GFRESTBLACK >90 85   PAULINA  --  8.2*       Concepción García MD  Hospitalist  [facility]    Evaluation and management time exclusive of procedures was 35 minutes critical care time including: urgent examination and evaluation of the patient, discussion of the patient's condition with other physicians and members of the care team, reviewing data and chart related to the patient, discussion of patient's condition with the family and time utilizing the EMR for documentation of this patient's care.

## 2018-09-30 NOTE — ED NOTES
[facility]  ED Nurse Handoff Report    Brea Kerr is a 82 year old female   ED Chief complaint: Abdominal Pain  . ED Diagnosis:   Final diagnoses:   None     Allergies:   Allergies   Allergen Reactions     Sulfa Drugs      Nausea         Code Status: Full Code  Activity level - Baseline/Home:  Independent. Activity Level - Current:   Stand with Assist. Lift room needed: No. Bariatric: No   Needed: No   Isolation: Yes. Infection: C-Diff Pending.     Vital Signs:   Vitals:    09/30/18 1145 09/30/18 1200 09/30/18 1215 09/30/18 1315   BP: 125/84 113/65 105/75 98/54   Pulse:  94 80    Resp:   15    Temp:   100.2    TempSrc:       SpO2: 98% 97% 92%    Weight:   49.9 kg    Height:           Cardiac Rhythm:  ,      Pain level:  PAIN AT NARAYAN INSERTION SITE. NOTHING GIVEN FOR PAIN IN ER.  Patient confused: No. Patient Falls Risk: Yes.   Elimination Status: Has voided   Patient Report / Focused Assessment:    Genitourinary Genitourinary - Genitourinary Comment: Pt comes in with fever, chills, burning in vaginal area - worse after urination. Pt has a J.P. drain to LLQ. J.P. insertion site appears to have stool draining around it. Pt had drain placed about 1 week ago due to abscess in abd.      Neurological Jose Manuel Coma Scale - Best Eye Response: 4-->(E4) spontaneous Best Motor Response: 6-->(M6) obeys commands Best Verbal Response: 5-->(V5) oriented Jose Manuel Coma Scale Score: 15         Tests Performed / Abnormal Results:    CT Abdomen Pelvis w Contrast   Preliminary Result   IMPRESSION:    1. Small amount of ill-defined fluid and air bubbles is noted about   the tip of the drainage catheter in the pelvis, suspicious for a small   residual abscess in this location. Fistulous communication of this   collection with the sigmoid colon lumen is suspected.   2. Thickening of the sigmoid colon is likely related to   diverticulitis.   3. Small amount of nonspecific free fluid in the pelvis.   4. A 2 cm soft  tissue nodule adjacent to the thickened sigmoid colon   most likely represents an enlarged mesenteric lymph node. This finding   could be reactive and related to inflammation, although neoplasm   cannot be excluded.   5. Cholelithiasis.             Labs Ordered and Resulted from Time of ED Arrival Up to the Time of Departure from the ED   CBC WITH PLATELETS DIFFERENTIAL - Abnormal; Notable for the following:        Result Value    WBC 17.9 (*)     RBC Count 3.67 (*)      (*)     MCH 33.8 (*)     Absolute Neutrophil 16.2 (*)     Absolute Lymphocytes 0.7 (*)     All other components within normal limits   COMPREHENSIVE METABOLIC PANEL - Abnormal; Notable for the following:     Sodium 131 (*)     Glucose 125 (*)     Calcium 8.2 (*)     Albumin 2.8 (*)     All other components within normal limits   LACTIC ACID WHOLE BLOOD   CREATININE POCT   BLOOD CULTURE   BLOOD CULTURE   CLOSTRIDIUM DIFFICILE TOXIN B   Treatments provided: PIV, LABS, BLOOD CULTURE X2, CT SCAN, STOOL SAMPLE, NS BOLUS, ZOSYN, BP AND PULSE OX MONITORING. URINE SAMPLE STILL NEEDED.  Family Comments: at bedside  OBS brochure/video discussed/provided to patient:  No  ED Medications:   Medications   0.9% sodium chloride BOLUS (1,000 mLs Intravenous New Bag 9/30/18 1205)     Followed by   sodium chloride 0.9% infusion (not administered)   piperacillin-tazobactam (ZOSYN) infusion 3.375 g (3.375 g Intravenous New Bag 9/30/18 1259)   0.9% sodium chloride BOLUS (0 mLs Intravenous Stopped 9/30/18 1242)   iopamidol (ISOVUE-370) solution 500 mL (55 mLs Intravenous Given 9/30/18 1235)     Drips infusing:  Yes  For the majority of the shift, the patient's behavior Green. Interventions performed were none.  Severe Sepsis OR Septic Shock Diagnosis Present: No    ED Nurse Name/Phone Number: Maria G Rodriguez RN [phone]  1:12 PM

## 2018-09-30 NOTE — LETTER
Transition Communication Hand-off for Care Transitions to Next Level of Care Provider    Hand-off for Care Transitions to Next Level of Care Provider  Name: rBea Kerr  : 1935  MRN #: [id number]  Reason for Hospitalization:  Sepsis, due to unspecified organism (H) [A41.9]  Admit Date/Time: 2018 11:07 AM  Discharge Date: 10/17/18    Reason for Communication Hand-off Referral: Other Open sigmoid colectomy for sigmoid diverticulitis w/colovesical fistula.     Discharge Plan:  Discharged to: Home with homecare                   Patient agreeable to post-hospital support suggestions:  Yes    Patient is on new medications:   Yes    MTM follow up recommended: No    Tel-Assurance program:  Ineligible    Patient will receive  HOME CARE  at:  Discharge through [facility].     Follow-up specialty is recommended: Yes. Follow up with Colorectal in 3-4 weeks with Dr. Reddy as recommended. Also, follow up with [facility] for OT, PT & RN services.     Follow-up plan:  Future Appointments  Date Time Provider Department Center   10/22/2018 3:40 PM Whitley Nichole MD [facility]     Any outstanding tests or procedures:        Referrals     Future Labs/Procedures    Home care nursing referral     Comments:    RN extended hours visit. RN to assess hydration, nutrition and bowel status and home safety.  RN to teach application of ostomy bag.  RN to provide ostomy care and management.    Your provider has ordered home care nursing services. If you have not been contacted within 2 days of your discharge please call the inpatient department phone number at [phone] .    Home Care OT Referral for Hospital Discharge     Comments:    OT to eval and treat    Your provider has ordered home care - occupational therapy. If you have not been contacted within 2 days of your discharge please call the department phone number listed on the top of this document.    Home Care PT Referral for Hospital Discharge      Comments:    PT to eval and treat    Your provider has ordered home care - physical therapy. If you have not been contacted within 2 days of your discharge please call the department phone number listed on the top of this document.          Key Recommendations:  Pt is readmitted with Sepsis. Pt was just here 6 days ago with h/o perforated divertic with IR Drain.  Pt had surgery on 10/2/2018 - exploratory laparoscopy, open sigmoid colectomy with end colostomy, extensive lysis of adhesions, take down of colovesical fistula.     Yi Hamilton    Communicated handoff via Recorded Future Mgt to [facility] [place]'s CC at [phone].     Sent by Varsha Omalley RN, BSN, CTS  [facility]  [phone]    AVS/Discharge Summary is the source of truth; this is a helpful guide for improved communication of patient story

## 2018-10-01 ENCOUNTER — APPOINTMENT (OUTPATIENT)
Dept: CARDIOLOGY | Facility: CLINIC | Age: 83
DRG: 853 | End: 2018-10-01
Attending: INTERNAL MEDICINE
Payer: MEDICARE

## 2018-10-01 LAB
ANION GAP SERPL CALCULATED.3IONS-SCNC: 4 MMOL/L (ref 3–14)
BUN SERPL-MCNC: 5 MG/DL (ref 7–30)
CALCIUM SERPL-MCNC: 7 MG/DL (ref 8.5–10.1)
CHLORIDE SERPL-SCNC: 108 MMOL/L (ref 94–109)
CO2 SERPL-SCNC: 25 MMOL/L (ref 20–32)
CREAT SERPL-MCNC: 0.62 MG/DL (ref 0.52–1.04)
ERYTHROCYTE [DISTWIDTH] IN BLOOD BY AUTOMATED COUNT: 14.4 % (ref 10–15)
GFR SERPL CREATININE-BSD FRML MDRD: >90 ML/MIN/1.7M2
GLUCOSE BLDC GLUCOMTR-MCNC: 115 MG/DL (ref 70–99)
GLUCOSE BLDC GLUCOMTR-MCNC: 116 MG/DL (ref 70–99)
GLUCOSE BLDC GLUCOMTR-MCNC: 129 MG/DL (ref 70–99)
GLUCOSE BLDC GLUCOMTR-MCNC: 152 MG/DL (ref 70–99)
GLUCOSE BLDC GLUCOMTR-MCNC: 172 MG/DL (ref 70–99)
GLUCOSE BLDC GLUCOMTR-MCNC: 283 MG/DL (ref 70–99)
GLUCOSE SERPL-MCNC: 127 MG/DL (ref 70–99)
HCT VFR BLD AUTO: 34 % (ref 35–47)
HGB BLD-MCNC: 11.2 G/DL (ref 11.7–15.7)
INTERPRETATION ECG - MUSE: NORMAL
MAGNESIUM SERPL-MCNC: 1.7 MG/DL (ref 1.6–2.3)
MAGNESIUM SERPL-MCNC: 1.7 MG/DL (ref 1.6–2.3)
MCH RBC QN AUTO: 33.7 PG (ref 26.5–33)
MCHC RBC AUTO-ENTMCNC: 32.9 G/DL (ref 31.5–36.5)
MCV RBC AUTO: 102 FL (ref 78–100)
PHOSPHATE SERPL-MCNC: 1.9 MG/DL (ref 2.5–4.5)
PHOSPHATE SERPL-MCNC: 2.5 MG/DL (ref 2.5–4.5)
PLATELET # BLD AUTO: 251 10E9/L (ref 150–450)
POTASSIUM SERPL-SCNC: 3.4 MMOL/L (ref 3.4–5.3)
RBC # BLD AUTO: 3.32 10E12/L (ref 3.8–5.2)
SODIUM SERPL-SCNC: 137 MMOL/L (ref 133–144)
WBC # BLD AUTO: 6.2 10E9/L (ref 4–11)

## 2018-10-01 PROCEDURE — 25000132 ZZH RX MED GY IP 250 OP 250 PS 637: Mod: GY | Performed by: COLON & RECTAL SURGERY

## 2018-10-01 PROCEDURE — 00000146 ZZHCL STATISTIC GLUCOSE BY METER IP

## 2018-10-01 PROCEDURE — 25000125 ZZHC RX 250: Performed by: INTERNAL MEDICINE

## 2018-10-01 PROCEDURE — 25000128 H RX IP 250 OP 636: Performed by: INTERNAL MEDICINE

## 2018-10-01 PROCEDURE — 84100 ASSAY OF PHOSPHORUS: CPT | Performed by: INTERNAL MEDICINE

## 2018-10-01 PROCEDURE — 99232 SBSQ HOSP IP/OBS MODERATE 35: CPT | Performed by: INTERNAL MEDICINE

## 2018-10-01 PROCEDURE — 40000902 ZZH STATISTIC WOC PT EDUCATION, 16-30 MIN

## 2018-10-01 PROCEDURE — 93005 ELECTROCARDIOGRAM TRACING: CPT

## 2018-10-01 PROCEDURE — 93306 TTE W/DOPPLER COMPLETE: CPT

## 2018-10-01 PROCEDURE — 93306 TTE W/DOPPLER COMPLETE: CPT | Mod: 26 | Performed by: INTERNAL MEDICINE

## 2018-10-01 PROCEDURE — 99207 ZZC CDG-MDM COMPONENT: MEETS LOW - DOWN CODED: CPT | Performed by: INTERNAL MEDICINE

## 2018-10-01 PROCEDURE — 85027 COMPLETE CBC AUTOMATED: CPT | Performed by: INTERNAL MEDICINE

## 2018-10-01 PROCEDURE — A9270 NON-COVERED ITEM OR SERVICE: HCPCS | Mod: GY | Performed by: COLON & RECTAL SURGERY

## 2018-10-01 PROCEDURE — 83735 ASSAY OF MAGNESIUM: CPT | Performed by: INTERNAL MEDICINE

## 2018-10-01 PROCEDURE — 25000128 H RX IP 250 OP 636: Performed by: EMERGENCY MEDICINE

## 2018-10-01 PROCEDURE — 20000003 ZZH R&B ICU

## 2018-10-01 PROCEDURE — G0463 HOSPITAL OUTPT CLINIC VISIT: HCPCS

## 2018-10-01 PROCEDURE — 93010 ELECTROCARDIOGRAM REPORT: CPT | Performed by: INTERNAL MEDICINE

## 2018-10-01 PROCEDURE — 80048 BASIC METABOLIC PNL TOTAL CA: CPT | Performed by: INTERNAL MEDICINE

## 2018-10-01 RX ORDER — POTASSIUM CHLORIDE 1.5 G/1.58G
20-40 POWDER, FOR SOLUTION ORAL
Status: DISCONTINUED | OUTPATIENT
Start: 2018-10-01 | End: 2018-10-15

## 2018-10-01 RX ORDER — POTASSIUM CL/LIDO/0.9 % NACL 10MEQ/0.1L
10 INTRAVENOUS SOLUTION, PIGGYBACK (ML) INTRAVENOUS
Status: DISCONTINUED | OUTPATIENT
Start: 2018-10-01 | End: 2018-10-15

## 2018-10-01 RX ORDER — ACETAMINOPHEN 325 MG/1
975 TABLET ORAL ONCE
Status: DISCONTINUED | OUTPATIENT
Start: 2018-10-01 | End: 2018-10-02 | Stop reason: CLARIF

## 2018-10-01 RX ORDER — POTASSIUM CHLORIDE 29.8 MG/ML
20 INJECTION INTRAVENOUS
Status: DISCONTINUED | OUTPATIENT
Start: 2018-10-01 | End: 2018-10-15

## 2018-10-01 RX ORDER — HEPARIN SODIUM 5000 [USP'U]/.5ML
5000 INJECTION, SOLUTION INTRAVENOUS; SUBCUTANEOUS
Status: COMPLETED | OUTPATIENT
Start: 2018-10-01 | End: 2018-10-02

## 2018-10-01 RX ORDER — POTASSIUM CHLORIDE 7.45 MG/ML
10 INJECTION INTRAVENOUS
Status: DISCONTINUED | OUTPATIENT
Start: 2018-10-01 | End: 2018-10-15

## 2018-10-01 RX ORDER — MAGNESIUM SULFATE HEPTAHYDRATE 40 MG/ML
4 INJECTION, SOLUTION INTRAVENOUS EVERY 4 HOURS PRN
Status: DISCONTINUED | OUTPATIENT
Start: 2018-10-01 | End: 2018-10-15

## 2018-10-01 RX ORDER — POTASSIUM CHLORIDE 1500 MG/1
20-40 TABLET, EXTENDED RELEASE ORAL
Status: DISCONTINUED | OUTPATIENT
Start: 2018-10-01 | End: 2018-10-15

## 2018-10-01 RX ADMIN — AMIODARONE HYDROCHLORIDE 0.5 MG/MIN: 50 INJECTION, SOLUTION INTRAVENOUS at 05:59

## 2018-10-01 RX ADMIN — AMIODARONE HYDROCHLORIDE 0.5 MG/MIN: 50 INJECTION, SOLUTION INTRAVENOUS at 16:48

## 2018-10-01 RX ADMIN — DEXTROSE AND SODIUM CHLORIDE: 5; 900 INJECTION, SOLUTION INTRAVENOUS at 22:20

## 2018-10-01 RX ADMIN — TAZOBACTAM SODIUM AND PIPERACILLIN SODIUM 3.38 G: 375; 3 INJECTION, SOLUTION INTRAVENOUS at 00:48

## 2018-10-01 RX ADMIN — AMIODARONE HYDROCHLORIDE 150 MG: 1.5 INJECTION, SOLUTION INTRAVENOUS at 00:19

## 2018-10-01 RX ADMIN — AMIODARONE HYDROCHLORIDE 0.5 MG/MIN: 50 INJECTION, SOLUTION INTRAVENOUS at 21:02

## 2018-10-01 RX ADMIN — POTASSIUM CHLORIDE 20 MEQ: 400 INJECTION, SOLUTION INTRAVENOUS at 09:16

## 2018-10-01 RX ADMIN — TAZOBACTAM SODIUM AND PIPERACILLIN SODIUM 3.38 G: 375; 3 INJECTION, SOLUTION INTRAVENOUS at 05:59

## 2018-10-01 RX ADMIN — POLYETHYLENE GLYCOL-3350 AND ELECTROLYTES 4000 ML: 236; 6.74; 5.86; 2.97; 22.74 POWDER, FOR SOLUTION ORAL at 16:51

## 2018-10-01 RX ADMIN — DEXTROSE AND SODIUM CHLORIDE: 5; 900 INJECTION, SOLUTION INTRAVENOUS at 02:23

## 2018-10-01 RX ADMIN — SODIUM PHOSPHATE, MONOBASIC, MONOHYDRATE 20 MMOL: 276; 142 INJECTION, SOLUTION INTRAVENOUS at 08:22

## 2018-10-01 RX ADMIN — TAZOBACTAM SODIUM AND PIPERACILLIN SODIUM 3.38 G: 375; 3 INJECTION, SOLUTION INTRAVENOUS at 23:57

## 2018-10-01 RX ADMIN — AMIODARONE HYDROCHLORIDE 0.5 MG/MIN: 50 INJECTION, SOLUTION INTRAVENOUS at 13:39

## 2018-10-01 RX ADMIN — DEXTROSE AND SODIUM CHLORIDE: 5; 900 INJECTION, SOLUTION INTRAVENOUS at 13:39

## 2018-10-01 RX ADMIN — TAZOBACTAM SODIUM AND PIPERACILLIN SODIUM 3.38 G: 375; 3 INJECTION, SOLUTION INTRAVENOUS at 18:02

## 2018-10-01 RX ADMIN — TAZOBACTAM SODIUM AND PIPERACILLIN SODIUM 3.38 G: 375; 3 INJECTION, SOLUTION INTRAVENOUS at 12:14

## 2018-10-01 RX ADMIN — Medication 2 G: at 08:17

## 2018-10-01 ASSESSMENT — PAIN DESCRIPTION - DESCRIPTORS: DESCRIPTORS: BURNING;TENDER

## 2018-10-01 ASSESSMENT — ACTIVITIES OF DAILY LIVING (ADL)
ADLS_ACUITY_SCORE: 13

## 2018-10-01 NOTE — PROGRESS NOTES
SPIRITUAL HEALTH SERVICES  SPIRITUAL ASSESSMENT Progress Note      PRIMARY FOCUS:     Emotional/spiritual/Roman Catholic distress - Patient processed some feelings about being in the hospital. Patient is receptive to spiritual and Roman Catholic support; she welcomes receiving Denominational communion and prayer.      ILLNESS CIRCUMSTANCES:   Reviewed documentation. Reflective conversation shared with patient which integrated elements of illness and family narratives.     Resources for Support - Patient appreciates receiving Denominational communion and prayer.      SPIRITUAL/Jain COPING:     Alevism/Kim - Patient is Denominational and an active member of her parish.     Spiritual Practice(s) - Patient appreciates receiving Denominational communion and prayer.     GOALS OF CARE:    Goals of Care - Patient will feel spiritual and emotional support.      PLAN: Patient is on the communion distribution list for Denominational communion and will be offered daily Denominational communion throughout her hospitalization, unless patient is in isolation.   Spiritual Health Services is available for spiritual and emotional support per patient and staff request.       Pema Gutierrez  Staff   Pager [phone]

## 2018-10-01 NOTE — PROGRESS NOTES
COLON [facility] RECTAL SURGERY  PROGRESS NOTE    October 1, 2018    SUBJECTIVE:  Patient doing ok.  Reports some pain around the drain that is unchanged since drain placement, but otherwise minimal abdominal pain.  Denies N/V.    OBJECTIVE:  Temp:  [97.7  F (36.5  C)-100.2  F (37.9  C)] 98.4  F (36.9  C)  Pulse:  [] 80  Heart Rate:  [] 105  Resp:  [0-21] 14  BP: ()/() 104/69  SpO2:  [92 %-100 %] 100 %    Intake/Output Summary (Last 24 hours) at 10/01/18 0819  Last data filed at 10/01/18 0754   Gross per 24 hour   Intake          2527.25 ml   Output               32 ml   Net          2495.25 ml       GENERAL:  Awake, alert, no acute distress, resting in bed  HEAD: Normocephalic atraumatic  SCLERA: Anicteric  EXTREMITIES: Warm and well perfused  ABDOMEN:  Soft, tender primarily around drain and in suprapubic region, non-distended. No guarding, rigidity, or peritoneal signs. Drain with feculent appearing drainage.    LABS:  Lab Results   Component Value Date    WBC 6.2 10/01/2018     Lab Results   Component Value Date    HGB 11.2 10/01/2018     Lab Results   Component Value Date    HCT 34.0 10/01/2018     Lab Results   Component Value Date     10/01/2018     Last Basic Metabolic Panel:  Lab Results   Component Value Date     10/01/2018      Lab Results   Component Value Date    POTASSIUM 3.4 10/01/2018     Lab Results   Component Value Date    CHLORIDE 108 10/01/2018     Lab Results   Component Value Date    PAULINA 7.0 10/01/2018     Lab Results   Component Value Date    CO2 25 10/01/2018     Lab Results   Component Value Date    BUN 5 10/01/2018     Lab Results   Component Value Date    CR 0.62 10/01/2018     Lab Results   Component Value Date     10/01/2018       ASSESSMENT/PLAN: 81 yo woman with h/o perforated diverticulitis s/p IR drain placement. Admitted with feculent drain output, rapid a-fib, hypotension. Afebrile with tachycardia to low 100s, stable on room air and now off  pressors.    1. NPO  2. PRN pain meds  3. Continue IVF per primary  4. Continue IV abx  5. Briefly discussed surgery with patient today and answered her questions.  Dr. Reddy to discuss in further detail and answer any additional questions patient has.    For questions/paging, please contact the CRS office at [phone].    Haley Smith PA-C  [facility]  Phone: [phone]      [facility] Attending Note    Patient seen and examined independently.  Agree with above assessment and plan.  Feeling better, no chest pain. + pneumaturia and fecal urea.  Still with loose stools. Minimal abdominal pain.   NARAYAN feculent  abd soft, green liquid in the drain  Tender around the drain site  Plan  In discussion with the patient, her daughter, and Dr. Paris, we felt that proceeding with sigmoid resection to get control of the infection, fistula to the skin through the drain and to the bladder.   We discussed the risks including but not limited to bleeding, infection, anastomotic leak if one where created, possible stoma, cardiopulmonary events, damage to surrounding structure and clots.  We talked about alternative including further abx and possible risks of further sepsis, cardiac issues and other infections such as Cdif. She wished to proceed with surgery which has been scheduled for tomorrow afternoon.     She will need a full bowel prep and stoma marking, both sides.     NPO at 6 AM    Emma Reddy MD  [facility]  Office Phone # [phone]

## 2018-10-01 NOTE — PROGRESS NOTES
"[facility] Nurse Ostomy Marking and Education         Data:   History:     Per MD note: sigmoid resection to get control of the infection, fistula to the skin through the drain and to the bladder.     Pt referred by Dr. Reddy  Who is present for marking and education: patient  Type of ostomy surgery:  Sigmoid resection with possible colostomy or ileostomy   Abdomen:  Soft with marked creases when sitting upright.           Intervention:     Patient's chart evaluated.      Assessments done today:  Pt observed lying flat and sitting.     Education: Reviewed upcoming surgery, stoma construction,post-op expectations, general ostomy cares/concerns including: differenced between colostomy/ileostomy, diet, bathing, odor, output,  activity, appliances and emptying.     Patient marked with \"x\" using surgical marker. Pt marked both right and left lower quadrants and within the rectus muscle.          Assessment:       Learning needs/ comprehension: review educational materials based on type of stoma.  Patient hoping for temporary stoma.         Plan:     Plan:   ? Surgery scheduled for:  10/2/2018    Will plan to follow patient post operatively.  daily        "

## 2018-10-01 NOTE — CONSULTS
YOHAN AVERAGE.  Readmitted after 6 days h/o perf divertic with IR drain. Will follow along for needs.  Bridgette BRADLEY CTS [id number]

## 2018-10-01 NOTE — PROGRESS NOTES
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 She also still had abdominal pain, but which she thought was not much changed from when she had the NARAYAN drain placed on 9/20/18.      Her recent PMH is notable for hospitalization at [facility] from 9/19 - 9/23/18 for perforated diverticulitis with abscess formation.  She underwent placement of a NARAYAN drain into the abscess on 9/20 under CT guidance and was discharged home on oral Cipro and Metronidazole.     More remote PMH notable for prior episodes of sigmoid diverticulitis and associated paroxysmal atrial fibrillation. And after arriving in the ED, the patient developed A fib with RVR (rate > 140) and her blood pressure fell to the 60's for which reason she was admitted to the ICU and was started on Atrial fibrillation and very briefly on Norepi.  However with the Amio and conversion to NSR, her BP came up.      Problem List:   1. Recent dx perforated diverticulitis. Now there is evidence (by CT scan and confirmed by feculent drainage into the NARAYAN drain) that the abscess represents a fistulous tract with the colon.   2. 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   3. A fib with RVR and associated hypotension. Resolved with amiodarone. The A fib is a recurrent event, but has only happened in her life when she is acutely ill. No indication at this time for anticoagulation.   4. Cardiac function is normal, though when it was evaluated this am, she was in rapid A fib and had mild-mod 1-2+ tricuspid regurg.  No clinical significance to this finding at this time.   5. Pre-op eval does not suggest any impediment to pursuing GETA tomorrow.     PLAN:  1.  Anticipate need for diversion of  the stool away from the areas of fistulae formation in order to permit healing. At this time, plan to OR tomorrow afternoon.   2.  NPO after 6 am and clears until then.   3.  Colon prep as per CRS.   4.  Appreciate CRS management of this patient's major issue.       DVT Prophylaxis: Pneumatic Compression Devices  Code Status: Full Code  Discharge Dispo: home expected.  Estimated Disch Date / # of Days until Disch: likely 2-5 days post-op         Interval History (Subjective):      Chart reviewed, pt interviewed.    Pt is alert and coherent. Denies remarkable pain, noting that she felt as though she had UTI and then noticed passing stool in her urine. Denies passing gas through her urethra.  Denies problems with SOB, cough or severe, limiting dyspnea.                   Physical Exam:      Last Vital Signs:  /69  Pulse 80  Temp 97.7  F (36.5  C) (Oral)  Resp 14  Ht 1.524 m (5')  Wt 53.9 kg (118 lb 13.3 oz)  SpO2 100%  BMI 23.21 kg/m2    I/O last 3 completed shifts:  In: 2527.25 [I.V.:2527.25]  Out: 32 [Drains:32]    Constitutional: Awake, alert, cooperative, no apparent distress   Respiratory: Clear to auscultation bilaterally, no crackles or wheezing   Cardiovascular: Regular rate and rhythm, normal S1 and S2, and no murmur noted   Abdomen: Normal bowel sounds, soft, non-distended, tender in the suprapubic area   Skin: No rashes, no cyanosis, dry to touch   Neuro: Alert and oriented x3, no weakness, numbness, memory loss   Extremities: No edema, normal range of motion   Other(s):        All other systems: Negative          Medications:      All current medications were reviewed with changes reflected in problem list.         Data:      All new lab and imaging data was reviewed.   Labs/Imaging:  Results for orders placed or performed during the hospital encounter of 09/30/18 (from the past 24 hour(s))   Glucose by meter   Result Value Ref Range    Glucose 172 (H) 70 - 99 mg/dL   EKG 12-lead, tracing only    Result Value Ref Range    Interpretation ECG Click View Image link to view waveform and result    Magnesium (1200)   Result Value Ref Range    Magnesium 1.7 1.6 - 2.3 mg/dL   Glucose by meter   Result Value Ref Range    Glucose 152 (H) 70 - 99 mg/dL   Basic metabolic panel   Result Value Ref Range    Sodium 137 133 - 144 mmol/L    Potassium 3.4 3.4 - 5.3 mmol/L    Chloride 108 94 - 109 mmol/L    Carbon Dioxide 25 20 - 32 mmol/L    Anion Gap 4 3 - 14 mmol/L    Glucose 127 (H) 70 - 99 mg/dL    Urea Nitrogen 5 (L) 7 - 30 mg/dL    Creatinine 0.62 0.52 - 1.04 mg/dL    GFR Estimate >90 >60 mL/min/1.7m2    GFR Estimate If Black >90 >60 mL/min/1.7m2    Calcium 7.0 (L) 8.5 - 10.1 mg/dL   CBC with platelets   Result Value Ref Range    WBC 6.2 4.0 - 11.0 10e9/L    RBC Count 3.32 (L) 3.8 - 5.2 10e12/L    Hemoglobin 11.2 (L) 11.7 - 15.7 g/dL    Hematocrit 34.0 (L) 35.0 - 47.0 %     (H) 78 - 100 fl    MCH 33.7 (H) 26.5 - 33.0 pg    MCHC 32.9 31.5 - 36.5 g/dL    RDW 14.4 10.0 - 15.0 %    Platelet Count 251 150 - 450 10e9/L   Magnesium (1200)   Result Value Ref Range    Magnesium 1.7 1.6 - 2.3 mg/dL   Phosphorus   Result Value Ref Range    Phosphorus 1.9 (L) 2.5 - 4.5 mg/dL   Echocardiogram Complete    Narrative    [id number]  [id number]  [id number]  [id number]^CONRAD^THIERRY^PAM           [facility]  Echocardiography Laboratory  [address] [place] [address]        Name: TABITHA ALMANZAR  MRN: [id number]  : 1935  Study Date: 10/01/2018 08:14 AM  Age: 82 yrs  Gender: Female  Patient Location: [facility]  Reason For Study: Afib  Ordering Physician: THIERRY MARTINES  Referring Physician: PARK [place]OL[place]  Performed By: Jagjit Aakre, RDCS     BSA: 1.4 m2  Height: 60 in  Weight: 110 lb  HR: 116  BP: 113/81 mmHg  _____________________________________________________________________________  __        Procedure  Complete Portable Echo  Adult.  _____________________________________________________________________________  __        Interpretation Summary     The visual ejection fraction is estimated at 60-65%.  Left ventricular systolic function is normal.  There is mild to moderate (1-2+) tricuspid regurgitation.  The ascending aorta is Mildly dilated.  The rhythm was rapid atrial fibrillation.  There is no comparison study available.  _____________________________________________________________________________  __        Left Ventricle  The left ventricle is normal in size. There is normal left ventricular wall  thickness. Diastolic Doppler findings (E/E' ratio and/or other parameters)  suggest left ventricular filling pressures are indeterminate. The visual  ejection fraction is estimated at 60-65%. Left ventricular systolic function  is normal. Diastolic function not assessed due to significant mitral annular  calcification.     Right Ventricle  The right ventricle is normal in size and function.     Atria  The left atrium is severely dilated. The right atrium is moderately dilated.  There is no color Doppler evidence of an atrial shunt.     Mitral Valve  There is moderate mitral annular calcification. There is mild (1+) mitral  regurgitation.        Tricuspid Valve  There is mild to moderate (1-2+) tricuspid regurgitation. The right  ventricular systolic pressure is approximated at 26.9 mmHg plus the right  atrial pressure.     Aortic Valve  The aortic valve is trileaflet. There is mild aortic sclerosis of the non-  coronary cusp. There is trace to mild aortic regurgitation. No hemodynamically  significant valvular aortic stenosis.     Pulmonic Valve  There is mild (1+) pulmonic valvular regurgitation.     Vessels  The aortic root is normal size. The ascending aorta is Mildly dilated. The  inferior vena cava is not dilated.     Pericardium  There is no pericardial effusion.        Rhythm  The rhythm was rapid atrial  fibrillation.  _____________________________________________________________________________  __  MMode/2D Measurements & Calculations  IVSd: 1.0 cm     LVIDd: 3.9 cm  LVIDs: 2.6 cm  LVPWd: 1.0 cm  FS: 33.4 %  LV mass(C)d: 125.4 grams  LV mass(C)dI: 86.6 grams/m2  Ao root diam: 3.0 cm  LA dimension: 4.4 cm  asc Aorta Diam: 3.6 cm  LA/Ao: 1.5  LA Volume (BP): 93.5 ml  LA Volume Index (BP): 64.5 ml/m2  RWT: 0.53           Doppler Measurements & Calculations  MV E max kwame: 86.8 cm/sec  MV dec time: 0.20 sec  TR max kwame: 259.4 cm/sec  TR max P.9 mmHg  E/E' av.3  Lateral E/e': 10.0  Medial E/e': 12.7           _____________________________________________________________________________  __           Report approved by: Reza Amaya 10/01/2018 01:12 PM      Glucose by meter   Result Value Ref Range    Glucose 129 (H) 70 - 99 mg/dL   Care Coordinator IP Consult    Narrative    Yi Hamilton RN     10/1/2018  2:27 PM  YOHAN AVERAGE.  Readmitted after 6 days h/o perf divertic with IR   drain. Will follow along for needs.  Bridgette RN CTS [id number]    Phosphorus   Result Value Ref Range    Phosphorus 2.5 2.5 - 4.5 mg/dL   Glucose by meter   Result Value Ref Range    Glucose 283 (H) 70 - 99 mg/dL   Glucose by meter   Result Value Ref Range    Glucose 115 (H) 70 - 99 mg/dL   Glucose by meter   Result Value Ref Range    Glucose 116 (H) 70 - 99 mg/dL

## 2018-10-01 NOTE — PROGRESS NOTES
Patient's heart rate in 180's. with patient complaining of chest pain. Tele-ICU notified. Additional amiodarone bolus given, EKG obtained after amiodarone bolus complete. Patient reports chest pain has subsided. HR now in low 100's. Mag recheck 1.7. Will continue to monitor and update team.

## 2018-10-01 NOTE — PLAN OF CARE
Problem: Patient Care Overview  Goal: Plan of Care/Patient Progress Review  ICU End of Shift Summary.  For vital signs and complete assessments, please see documentation flowsheets.       Major Shift Events: Patient remains on amiodarone gtt overnight with heart rate sitting in low 100's. Approx 0100 am patient's heart rate increased to 180's, with chest pain. Amio gtt given , EKG obtained. CP resolved, HR now back in low 100's. Small output from NARAYAN drain, continues to have incontinent stool and urine, with stool coming from urethra also.   Plan (Upcoming Events): Continue to monitor heart rate, amio gtt for afib RVR. Colorectal following.   Discharge/Transfer Needs: TBD    Bedside Shift Report Completed : yes  Bedside Safety Check Completed: yes

## 2018-10-01 NOTE — PLAN OF CARE
Problem: Sepsis/Septic Shock (Adult)  Goal: Signs and Symptoms of Listed Potential Problems Will be Absent, Minimized or Managed (Sepsis/Septic Shock)  Signs and symptoms of listed potential problems will be absent, minimized or managed by discharge/transition of care (reference Sepsis/Septic Shock (Adult) CPG).   Outcome: No Change  Checked with dr Paris who said to go ahead and start bowel prep. Called surgeon to see if any other prep could be ordered as pt feels unable to drink all of prep. Orders were to do the best she can. Sat out in chair. Incontinent what looks like urine.

## 2018-10-01 NOTE — PROGRESS NOTES
TELE:  82F with acute diverticulitis and associated abscess now s/p drainage.  Presented earlier today with hypotension and afib with rvr.  Initially controlled with amio, but now back in afib with rvr and apparently some chest pain.  Will re-bolus amio for a fib.  Strongly suspect cp is rate/demand-dependent.  Will obtain 12-lead after amio bolus.    ADDENDUM:  Back down to 120s with amio bolus.  EKG a fib at 120, non-ischemic.  Checking mag.  Ordering repletion protocol.

## 2018-10-01 NOTE — PLAN OF CARE
Problem: Patient Care Overview  Goal: Plan of Care/Patient Progress Review  Outcome: No Change  ICU End of Shift Summary.  For vital signs and complete assessments, please see documentation flowsheets.     Pertinent assessments: VSS, Afib converted to SR at 0835 and nor SR rates in 70's. Denies pain. Some greenish fluid in NARAYAN and irrigated x 1. Alert and oriented.  Major Shift Events: Turned and repositioned q 2 hours and incontinent of urine/stool mixed tan color. Katrin and bottom red and cleaned with wipes and criticaid paste placed. WOC consulted. Surgeon offered surgery tomorrow and pt in agreement. Signed and held orders and will need a bowel prep. Amiodarone continues. Tolerating small amounts of clears this afternoon.  Plan (Upcoming Events): OR tomorrow for sigmoid resection.  Discharge/Transfer Needs: tbd    Bedside Shift Report Completed : y  Bedside Safety Check Completed: y

## 2018-10-02 ENCOUNTER — ANESTHESIA EVENT (OUTPATIENT)
Dept: SURGERY | Facility: CLINIC | Age: 83
DRG: 853 | End: 2018-10-02
Payer: MEDICARE

## 2018-10-02 ENCOUNTER — ANESTHESIA (OUTPATIENT)
Dept: SURGERY | Facility: CLINIC | Age: 83
DRG: 853 | End: 2018-10-02
Payer: MEDICARE

## 2018-10-02 PROBLEM — N32.1 COLOVESICAL FISTULA: Status: ACTIVE | Noted: 2018-10-02

## 2018-10-02 LAB
ABO + RH BLD: NORMAL
ABO + RH BLD: NORMAL
ANION GAP SERPL CALCULATED.3IONS-SCNC: 8 MMOL/L (ref 3–14)
BLD GP AB SCN SERPL QL: NORMAL
BLOOD BANK CMNT PATIENT-IMP: NORMAL
BUN SERPL-MCNC: 1 MG/DL (ref 7–30)
CALCIUM SERPL-MCNC: 7.3 MG/DL (ref 8.5–10.1)
CHLORIDE SERPL-SCNC: 109 MMOL/L (ref 94–109)
CO2 SERPL-SCNC: 23 MMOL/L (ref 20–32)
CREAT SERPL-MCNC: 0.64 MG/DL (ref 0.52–1.04)
ERYTHROCYTE [DISTWIDTH] IN BLOOD BY AUTOMATED COUNT: 14.4 % (ref 10–15)
GFR SERPL CREATININE-BSD FRML MDRD: 89 ML/MIN/1.7M2
GLUCOSE BLDC GLUCOMTR-MCNC: 115 MG/DL (ref 70–99)
GLUCOSE BLDC GLUCOMTR-MCNC: 121 MG/DL (ref 70–99)
GLUCOSE BLDC GLUCOMTR-MCNC: 96 MG/DL (ref 70–99)
GLUCOSE BLDC GLUCOMTR-MCNC: 99 MG/DL (ref 70–99)
GLUCOSE SERPL-MCNC: 107 MG/DL (ref 70–99)
HCT VFR BLD AUTO: 30.9 % (ref 35–47)
HGB BLD-MCNC: 10.2 G/DL (ref 11.7–15.7)
INR PPP: 1.21 (ref 0.86–1.14)
INTERPRETATION ECG - MUSE: NORMAL
MAGNESIUM SERPL-MCNC: 1.9 MG/DL (ref 1.6–2.3)
MAGNESIUM SERPL-MCNC: 2.4 MG/DL (ref 1.6–2.3)
MCH RBC QN AUTO: 33.6 PG (ref 26.5–33)
MCHC RBC AUTO-ENTMCNC: 33 G/DL (ref 31.5–36.5)
MCV RBC AUTO: 102 FL (ref 78–100)
PHOSPHATE SERPL-MCNC: 1.7 MG/DL (ref 2.5–4.5)
PHOSPHATE SERPL-MCNC: 2.6 MG/DL (ref 2.5–4.5)
PLATELET # BLD AUTO: 288 10E9/L (ref 150–450)
POTASSIUM SERPL-SCNC: 3.4 MMOL/L (ref 3.4–5.3)
POTASSIUM SERPL-SCNC: 3.5 MMOL/L (ref 3.4–5.3)
RBC # BLD AUTO: 3.04 10E12/L (ref 3.8–5.2)
SODIUM SERPL-SCNC: 140 MMOL/L (ref 133–144)
SPECIMEN EXP DATE BLD: NORMAL
WBC # BLD AUTO: 7.9 10E9/L (ref 4–11)

## 2018-10-02 PROCEDURE — 71000014 ZZH RECOVERY PHASE 1 LEVEL 2 FIRST HR: Performed by: COLON & RECTAL SURGERY

## 2018-10-02 PROCEDURE — 84100 ASSAY OF PHOSPHORUS: CPT | Performed by: INTERNAL MEDICINE

## 2018-10-02 PROCEDURE — 25000566 ZZH SEVOFLURANE, EA 15 MIN: Performed by: COLON & RECTAL SURGERY

## 2018-10-02 PROCEDURE — 25000128 H RX IP 250 OP 636: Performed by: INTERNAL MEDICINE

## 2018-10-02 PROCEDURE — 84100 ASSAY OF PHOSPHORUS: CPT | Performed by: ANESTHESIOLOGY

## 2018-10-02 PROCEDURE — 88307 TISSUE EXAM BY PATHOLOGIST: CPT | Mod: 26 | Performed by: COLON & RECTAL SURGERY

## 2018-10-02 PROCEDURE — A9270 NON-COVERED ITEM OR SERVICE: HCPCS | Mod: GY | Performed by: COLON & RECTAL SURGERY

## 2018-10-02 PROCEDURE — 37000009 ZZH ANESTHESIA TECHNICAL FEE, EACH ADDTL 15 MIN: Performed by: COLON & RECTAL SURGERY

## 2018-10-02 PROCEDURE — 40000306 ZZH STATISTIC PRE PROC ASSESS II: Performed by: COLON & RECTAL SURGERY

## 2018-10-02 PROCEDURE — 84132 ASSAY OF SERUM POTASSIUM: CPT | Performed by: ANESTHESIOLOGY

## 2018-10-02 PROCEDURE — 27210794 ZZH OR GENERAL SUPPLY STERILE: Performed by: COLON & RECTAL SURGERY

## 2018-10-02 PROCEDURE — C1726 CATH, BAL DIL, NON-VASCULAR: HCPCS | Performed by: COLON & RECTAL SURGERY

## 2018-10-02 PROCEDURE — 25000128 H RX IP 250 OP 636

## 2018-10-02 PROCEDURE — G0463 HOSPITAL OUTPT CLINIC VISIT: HCPCS

## 2018-10-02 PROCEDURE — 0DTN0ZZ RESECTION OF SIGMOID COLON, OPEN APPROACH: ICD-10-PCS | Performed by: COLON & RECTAL SURGERY

## 2018-10-02 PROCEDURE — 0WJG4ZZ INSPECTION OF PERITONEAL CAVITY, PERCUTANEOUS ENDOSCOPIC APPROACH: ICD-10-PCS | Performed by: COLON & RECTAL SURGERY

## 2018-10-02 PROCEDURE — 99232 SBSQ HOSP IP/OBS MODERATE 35: CPT | Performed by: INTERNAL MEDICINE

## 2018-10-02 PROCEDURE — 36000069 ZZH SURGERY LEVEL 5 EA 15 ADDTL MIN: Performed by: COLON & RECTAL SURGERY

## 2018-10-02 PROCEDURE — 86901 BLOOD TYPING SEROLOGIC RH(D): CPT | Performed by: ANESTHESIOLOGY

## 2018-10-02 PROCEDURE — 25000125 ZZHC RX 250: Performed by: NURSE ANESTHETIST, CERTIFIED REGISTERED

## 2018-10-02 PROCEDURE — 25000128 H RX IP 250 OP 636: Performed by: ANESTHESIOLOGY

## 2018-10-02 PROCEDURE — 25000128 H RX IP 250 OP 636: Performed by: COLON & RECTAL SURGERY

## 2018-10-02 PROCEDURE — 83735 ASSAY OF MAGNESIUM: CPT | Performed by: ANESTHESIOLOGY

## 2018-10-02 PROCEDURE — 86850 RBC ANTIBODY SCREEN: CPT | Performed by: ANESTHESIOLOGY

## 2018-10-02 PROCEDURE — 25000125 ZZHC RX 250: Performed by: COLON & RECTAL SURGERY

## 2018-10-02 PROCEDURE — 25000128 H RX IP 250 OP 636: Performed by: NURSE ANESTHETIST, CERTIFIED REGISTERED

## 2018-10-02 PROCEDURE — 71000015 ZZH RECOVERY PHASE 1 LEVEL 2 EA ADDTL HR: Performed by: COLON & RECTAL SURGERY

## 2018-10-02 PROCEDURE — 88307 TISSUE EXAM BY PATHOLOGIST: CPT | Performed by: COLON & RECTAL SURGERY

## 2018-10-02 PROCEDURE — 25000128 H RX IP 250 OP 636: Performed by: EMERGENCY MEDICINE

## 2018-10-02 PROCEDURE — 25000125 ZZHC RX 250: Performed by: INTERNAL MEDICINE

## 2018-10-02 PROCEDURE — 0D1L0Z4 BYPASS TRANSVERSE COLON TO CUTANEOUS, OPEN APPROACH: ICD-10-PCS | Performed by: COLON & RECTAL SURGERY

## 2018-10-02 PROCEDURE — 85027 COMPLETE CBC AUTOMATED: CPT | Performed by: INTERNAL MEDICINE

## 2018-10-02 PROCEDURE — 83735 ASSAY OF MAGNESIUM: CPT | Performed by: INTERNAL MEDICINE

## 2018-10-02 PROCEDURE — 37000008 ZZH ANESTHESIA TECHNICAL FEE, 1ST 30 MIN: Performed by: COLON & RECTAL SURGERY

## 2018-10-02 PROCEDURE — 93010 ELECTROCARDIOGRAM REPORT: CPT | Performed by: INTERNAL MEDICINE

## 2018-10-02 PROCEDURE — 86900 BLOOD TYPING SEROLOGIC ABO: CPT | Performed by: ANESTHESIOLOGY

## 2018-10-02 PROCEDURE — 0DNW0ZZ RELEASE PERITONEUM, OPEN APPROACH: ICD-10-PCS | Performed by: COLON & RECTAL SURGERY

## 2018-10-02 PROCEDURE — 85610 PROTHROMBIN TIME: CPT | Performed by: INTERNAL MEDICINE

## 2018-10-02 PROCEDURE — 20000003 ZZH R&B ICU

## 2018-10-02 PROCEDURE — 00000146 ZZHCL STATISTIC GLUCOSE BY METER IP

## 2018-10-02 PROCEDURE — 25000132 ZZH RX MED GY IP 250 OP 250 PS 637: Mod: GY | Performed by: COLON & RECTAL SURGERY

## 2018-10-02 PROCEDURE — 80048 BASIC METABOLIC PNL TOTAL CA: CPT | Performed by: INTERNAL MEDICINE

## 2018-10-02 PROCEDURE — 36000067 ZZH SURGERY LEVEL 5 1ST 30 MIN: Performed by: COLON & RECTAL SURGERY

## 2018-10-02 RX ORDER — NALOXONE HYDROCHLORIDE 0.4 MG/ML
.1-.4 INJECTION, SOLUTION INTRAMUSCULAR; INTRAVENOUS; SUBCUTANEOUS
Status: DISCONTINUED | OUTPATIENT
Start: 2018-10-02 | End: 2018-10-17 | Stop reason: HOSPADM

## 2018-10-02 RX ORDER — ONDANSETRON 2 MG/ML
4 INJECTION INTRAMUSCULAR; INTRAVENOUS EVERY 30 MIN PRN
Status: DISCONTINUED | OUTPATIENT
Start: 2018-10-02 | End: 2018-10-02

## 2018-10-02 RX ORDER — LIDOCAINE 40 MG/G
CREAM TOPICAL
Status: DISCONTINUED | OUTPATIENT
Start: 2018-10-02 | End: 2018-10-02 | Stop reason: HOSPADM

## 2018-10-02 RX ORDER — HYDROMORPHONE HYDROCHLORIDE 1 MG/ML
.3-.5 INJECTION, SOLUTION INTRAMUSCULAR; INTRAVENOUS; SUBCUTANEOUS EVERY 10 MIN PRN
Status: DISCONTINUED | OUTPATIENT
Start: 2018-10-02 | End: 2018-10-02

## 2018-10-02 RX ORDER — SODIUM CHLORIDE, SODIUM LACTATE, POTASSIUM CHLORIDE, CALCIUM CHLORIDE 600; 310; 30; 20 MG/100ML; MG/100ML; MG/100ML; MG/100ML
INJECTION, SOLUTION INTRAVENOUS CONTINUOUS
Status: DISCONTINUED | OUTPATIENT
Start: 2018-10-02 | End: 2018-10-13

## 2018-10-02 RX ORDER — PIPERACILLIN SODIUM, TAZOBACTAM SODIUM 3; .375 G/15ML; G/15ML
INJECTION, POWDER, LYOPHILIZED, FOR SOLUTION INTRAVENOUS PRN
Status: DISCONTINUED | OUTPATIENT
Start: 2018-10-02 | End: 2018-10-02

## 2018-10-02 RX ORDER — SODIUM CHLORIDE, SODIUM LACTATE, POTASSIUM CHLORIDE, CALCIUM CHLORIDE 600; 310; 30; 20 MG/100ML; MG/100ML; MG/100ML; MG/100ML
INJECTION, SOLUTION INTRAVENOUS CONTINUOUS
Status: DISCONTINUED | OUTPATIENT
Start: 2018-10-02 | End: 2018-10-02

## 2018-10-02 RX ORDER — ONDANSETRON 4 MG/1
4 TABLET, ORALLY DISINTEGRATING ORAL EVERY 30 MIN PRN
Status: DISCONTINUED | OUTPATIENT
Start: 2018-10-02 | End: 2018-10-02

## 2018-10-02 RX ORDER — DIMENHYDRINATE 50 MG/ML
25 INJECTION, SOLUTION INTRAMUSCULAR; INTRAVENOUS
Status: DISCONTINUED | OUTPATIENT
Start: 2018-10-02 | End: 2018-10-02

## 2018-10-02 RX ORDER — MEPERIDINE HYDROCHLORIDE 25 MG/ML
12.5 INJECTION INTRAMUSCULAR; INTRAVENOUS; SUBCUTANEOUS
Status: DISCONTINUED | OUTPATIENT
Start: 2018-10-02 | End: 2018-10-02

## 2018-10-02 RX ORDER — NEOSTIGMINE METHYLSULFATE 1 MG/ML
VIAL (ML) INJECTION PRN
Status: DISCONTINUED | OUTPATIENT
Start: 2018-10-02 | End: 2018-10-02

## 2018-10-02 RX ORDER — PROPOFOL 10 MG/ML
INJECTION, EMULSION INTRAVENOUS PRN
Status: DISCONTINUED | OUTPATIENT
Start: 2018-10-02 | End: 2018-10-02

## 2018-10-02 RX ORDER — BUPIVACAINE HYDROCHLORIDE AND EPINEPHRINE 2.5; 5 MG/ML; UG/ML
INJECTION, SOLUTION EPIDURAL; INFILTRATION; INTRACAUDAL; PERINEURAL PRN
Status: DISCONTINUED | OUTPATIENT
Start: 2018-10-02 | End: 2018-10-02

## 2018-10-02 RX ORDER — NALOXONE HYDROCHLORIDE 0.4 MG/ML
.1-.4 INJECTION, SOLUTION INTRAMUSCULAR; INTRAVENOUS; SUBCUTANEOUS
Status: DISCONTINUED | OUTPATIENT
Start: 2018-10-02 | End: 2018-10-02

## 2018-10-02 RX ORDER — ACETAMINOPHEN 325 MG/1
975 TABLET ORAL EVERY 8 HOURS
Status: DISCONTINUED | OUTPATIENT
Start: 2018-10-02 | End: 2018-10-04

## 2018-10-02 RX ORDER — SODIUM CHLORIDE, SODIUM LACTATE, POTASSIUM CHLORIDE, CALCIUM CHLORIDE 600; 310; 30; 20 MG/100ML; MG/100ML; MG/100ML; MG/100ML
INJECTION, SOLUTION INTRAVENOUS CONTINUOUS
Status: DISCONTINUED | OUTPATIENT
Start: 2018-10-02 | End: 2018-10-02 | Stop reason: HOSPADM

## 2018-10-02 RX ORDER — LIDOCAINE 40 MG/G
CREAM TOPICAL
Status: DISCONTINUED | OUTPATIENT
Start: 2018-10-02 | End: 2018-10-11 | Stop reason: CLARIF

## 2018-10-02 RX ORDER — HYDRALAZINE HYDROCHLORIDE 20 MG/ML
2.5-5 INJECTION INTRAMUSCULAR; INTRAVENOUS EVERY 10 MIN PRN
Status: DISCONTINUED | OUTPATIENT
Start: 2018-10-02 | End: 2018-10-02

## 2018-10-02 RX ORDER — DEXAMETHASONE SODIUM PHOSPHATE 4 MG/ML
INJECTION, SOLUTION INTRA-ARTICULAR; INTRALESIONAL; INTRAMUSCULAR; INTRAVENOUS; SOFT TISSUE PRN
Status: DISCONTINUED | OUTPATIENT
Start: 2018-10-02 | End: 2018-10-02

## 2018-10-02 RX ORDER — LIDOCAINE HYDROCHLORIDE 10 MG/ML
INJECTION, SOLUTION INFILTRATION; PERINEURAL PRN
Status: DISCONTINUED | OUTPATIENT
Start: 2018-10-02 | End: 2018-10-02

## 2018-10-02 RX ORDER — GLYCOPYRROLATE 0.2 MG/ML
INJECTION, SOLUTION INTRAMUSCULAR; INTRAVENOUS PRN
Status: DISCONTINUED | OUTPATIENT
Start: 2018-10-02 | End: 2018-10-02

## 2018-10-02 RX ORDER — LABETALOL HYDROCHLORIDE 5 MG/ML
10 INJECTION, SOLUTION INTRAVENOUS
Status: DISCONTINUED | OUTPATIENT
Start: 2018-10-02 | End: 2018-10-02

## 2018-10-02 RX ORDER — METOCLOPRAMIDE 5 MG/1
10 TABLET ORAL EVERY 6 HOURS PRN
Status: DISCONTINUED | OUTPATIENT
Start: 2018-10-02 | End: 2018-10-02

## 2018-10-02 RX ORDER — FENTANYL CITRATE 50 UG/ML
25-50 INJECTION, SOLUTION INTRAMUSCULAR; INTRAVENOUS
Status: DISCONTINUED | OUTPATIENT
Start: 2018-10-02 | End: 2018-10-02

## 2018-10-02 RX ORDER — DEXAMETHASONE SODIUM PHOSPHATE 4 MG/ML
4 INJECTION, SOLUTION INTRA-ARTICULAR; INTRALESIONAL; INTRAMUSCULAR; INTRAVENOUS; SOFT TISSUE EVERY 10 MIN PRN
Status: DISCONTINUED | OUTPATIENT
Start: 2018-10-02 | End: 2018-10-02

## 2018-10-02 RX ORDER — FENTANYL CITRATE 50 UG/ML
INJECTION, SOLUTION INTRAMUSCULAR; INTRAVENOUS PRN
Status: DISCONTINUED | OUTPATIENT
Start: 2018-10-02 | End: 2018-10-02

## 2018-10-02 RX ORDER — MAGNESIUM HYDROXIDE 1200 MG/15ML
LIQUID ORAL PRN
Status: DISCONTINUED | OUTPATIENT
Start: 2018-10-02 | End: 2018-10-02

## 2018-10-02 RX ORDER — METOCLOPRAMIDE HYDROCHLORIDE 5 MG/ML
10 INJECTION INTRAMUSCULAR; INTRAVENOUS EVERY 6 HOURS PRN
Status: DISCONTINUED | OUTPATIENT
Start: 2018-10-02 | End: 2018-10-02

## 2018-10-02 RX ADMIN — AMIODARONE HYDROCHLORIDE 0.5 MG/MIN: 50 INJECTION, SOLUTION INTRAVENOUS at 04:19

## 2018-10-02 RX ADMIN — Medication 2 G: at 07:49

## 2018-10-02 RX ADMIN — ROCURONIUM BROMIDE 50 MG: 10 INJECTION INTRAVENOUS at 16:02

## 2018-10-02 RX ADMIN — SODIUM CHLORIDE, POTASSIUM CHLORIDE, SODIUM LACTATE AND CALCIUM CHLORIDE: 600; 310; 30; 20 INJECTION, SOLUTION INTRAVENOUS at 19:30

## 2018-10-02 RX ADMIN — POTASSIUM CHLORIDE 20 MEQ: 400 INJECTION, SOLUTION INTRAVENOUS at 06:45

## 2018-10-02 RX ADMIN — PHENYLEPHRINE HYDROCHLORIDE 25 MCG: 10 INJECTION, SOLUTION INTRAMUSCULAR; INTRAVENOUS; SUBCUTANEOUS at 18:27

## 2018-10-02 RX ADMIN — AMIODARONE HYDROCHLORIDE 0.5 MG/MIN: 50 INJECTION, SOLUTION INTRAVENOUS at 21:26

## 2018-10-02 RX ADMIN — ROCURONIUM BROMIDE 10 MG: 10 INJECTION INTRAVENOUS at 19:35

## 2018-10-02 RX ADMIN — PROPOFOL 120 MG: 10 INJECTION, EMULSION INTRAVENOUS at 16:02

## 2018-10-02 RX ADMIN — HEPARIN SODIUM 5000 UNITS: 5000 INJECTION, SOLUTION INTRAVENOUS; SUBCUTANEOUS at 15:01

## 2018-10-02 RX ADMIN — PHENYLEPHRINE HYDROCHLORIDE 50 MCG: 10 INJECTION, SOLUTION INTRAMUSCULAR; INTRAVENOUS; SUBCUTANEOUS at 17:42

## 2018-10-02 RX ADMIN — VASOPRESSIN 1 UNITS: 20 INJECTION INTRAMUSCULAR; SUBCUTANEOUS at 19:45

## 2018-10-02 RX ADMIN — ROCURONIUM BROMIDE 10 MG: 10 INJECTION INTRAVENOUS at 18:04

## 2018-10-02 RX ADMIN — HYDROMORPHONE HYDROCHLORIDE 0.5 MG: 1 INJECTION, SOLUTION INTRAMUSCULAR; INTRAVENOUS; SUBCUTANEOUS at 18:23

## 2018-10-02 RX ADMIN — LIDOCAINE HYDROCHLORIDE 50 MG: 10 INJECTION, SOLUTION INFILTRATION; PERINEURAL at 16:02

## 2018-10-02 RX ADMIN — FENTANYL CITRATE 25 MCG: 50 INJECTION INTRAMUSCULAR; INTRAVENOUS at 10:15

## 2018-10-02 RX ADMIN — AMIODARONE HYDROCHLORIDE 0.5 MG/MIN: 50 INJECTION, SOLUTION INTRAVENOUS at 12:31

## 2018-10-02 RX ADMIN — FENTANYL CITRATE 50 MCG: 50 INJECTION, SOLUTION INTRAMUSCULAR; INTRAVENOUS at 16:39

## 2018-10-02 RX ADMIN — PHENYLEPHRINE HYDROCHLORIDE 100 MCG: 10 INJECTION, SOLUTION INTRAMUSCULAR; INTRAVENOUS; SUBCUTANEOUS at 19:44

## 2018-10-02 RX ADMIN — TAZOBACTAM SODIUM AND PIPERACILLIN SODIUM 3.38 G: 375; 3 INJECTION, SOLUTION INTRAVENOUS at 23:26

## 2018-10-02 RX ADMIN — GLYCOPYRROLATE 0.2 MG: 0.2 INJECTION, SOLUTION INTRAMUSCULAR; INTRAVENOUS at 16:02

## 2018-10-02 RX ADMIN — SODIUM CHLORIDE, POTASSIUM CHLORIDE, SODIUM LACTATE AND CALCIUM CHLORIDE: 600; 310; 30; 20 INJECTION, SOLUTION INTRAVENOUS at 22:52

## 2018-10-02 RX ADMIN — SODIUM CHLORIDE, POTASSIUM CHLORIDE, SODIUM LACTATE AND CALCIUM CHLORIDE: 600; 310; 30; 20 INJECTION, SOLUTION INTRAVENOUS at 15:48

## 2018-10-02 RX ADMIN — MIDAZOLAM 2 MG: 1 INJECTION INTRAMUSCULAR; INTRAVENOUS at 15:48

## 2018-10-02 RX ADMIN — PIPERACILLIN SODIUM AND TAZOBACTAM SODIUM 3.38 G: 3; .375 INJECTION, POWDER, LYOPHILIZED, FOR SOLUTION INTRAVENOUS at 16:32

## 2018-10-02 RX ADMIN — FENTANYL CITRATE 100 MCG: 50 INJECTION, SOLUTION INTRAMUSCULAR; INTRAVENOUS at 16:02

## 2018-10-02 RX ADMIN — TAZOBACTAM SODIUM AND PIPERACILLIN SODIUM 3.38 G: 375; 3 INJECTION, SOLUTION INTRAVENOUS at 05:53

## 2018-10-02 RX ADMIN — ONDANSETRON 4 MG: 2 INJECTION INTRAMUSCULAR; INTRAVENOUS at 02:55

## 2018-10-02 RX ADMIN — Medication: at 21:56

## 2018-10-02 RX ADMIN — Medication 4 MG: at 20:36

## 2018-10-02 RX ADMIN — VASOPRESSIN 1 UNITS: 20 INJECTION INTRAMUSCULAR; SUBCUTANEOUS at 20:02

## 2018-10-02 RX ADMIN — FENTANYL CITRATE 50 MCG: 50 INJECTION, SOLUTION INTRAMUSCULAR; INTRAVENOUS at 16:15

## 2018-10-02 RX ADMIN — SODIUM CHLORIDE, POTASSIUM CHLORIDE, SODIUM LACTATE AND CALCIUM CHLORIDE: 600; 310; 30; 20 INJECTION, SOLUTION INTRAVENOUS at 17:45

## 2018-10-02 RX ADMIN — PHENYLEPHRINE HYDROCHLORIDE 50 MCG: 10 INJECTION, SOLUTION INTRAMUSCULAR; INTRAVENOUS; SUBCUTANEOUS at 18:59

## 2018-10-02 RX ADMIN — ONDANSETRON 4 MG: 2 INJECTION INTRAMUSCULAR; INTRAVENOUS at 21:44

## 2018-10-02 RX ADMIN — FENTANYL CITRATE 50 MCG: 50 INJECTION INTRAMUSCULAR; INTRAVENOUS at 21:49

## 2018-10-02 RX ADMIN — DEXAMETHASONE SODIUM PHOSPHATE 4 MG: 4 INJECTION, SOLUTION INTRA-ARTICULAR; INTRALESIONAL; INTRAMUSCULAR; INTRAVENOUS; SOFT TISSUE at 16:02

## 2018-10-02 RX ADMIN — SODIUM PHOSPHATE, MONOBASIC, MONOHYDRATE 20 MMOL: 276; 142 INJECTION, SOLUTION INTRAVENOUS at 07:28

## 2018-10-02 RX ADMIN — PHENYLEPHRINE HYDROCHLORIDE 50 MCG: 10 INJECTION, SOLUTION INTRAMUSCULAR; INTRAVENOUS; SUBCUTANEOUS at 16:05

## 2018-10-02 RX ADMIN — TAZOBACTAM SODIUM AND PIPERACILLIN SODIUM 3.38 G: 375; 3 INJECTION, SOLUTION INTRAVENOUS at 11:41

## 2018-10-02 RX ADMIN — GLYCOPYRROLATE 0.5 MG: 0.2 INJECTION, SOLUTION INTRAMUSCULAR; INTRAVENOUS at 20:36

## 2018-10-02 RX ADMIN — ACETAMINOPHEN 975 MG: 325 TABLET, FILM COATED ORAL at 23:01

## 2018-10-02 RX ADMIN — VASOPRESSIN 1 UNITS: 20 INJECTION INTRAMUSCULAR; SUBCUTANEOUS at 20:30

## 2018-10-02 RX ADMIN — AMIODARONE HYDROCHLORIDE 0.5 MG/MIN: 50 INJECTION, SOLUTION INTRAVENOUS at 15:34

## 2018-10-02 RX ADMIN — DEXTROSE AND SODIUM CHLORIDE: 5; 900 INJECTION, SOLUTION INTRAVENOUS at 09:49

## 2018-10-02 RX ADMIN — HYDROMORPHONE HYDROCHLORIDE 0.5 MG: 1 INJECTION, SOLUTION INTRAMUSCULAR; INTRAVENOUS; SUBCUTANEOUS at 17:14

## 2018-10-02 RX ADMIN — PHENYLEPHRINE HYDROCHLORIDE 100 MCG: 10 INJECTION, SOLUTION INTRAMUSCULAR; INTRAVENOUS; SUBCUTANEOUS at 16:08

## 2018-10-02 ASSESSMENT — PAIN DESCRIPTION - DESCRIPTORS: DESCRIPTORS: BURNING;CONSTANT

## 2018-10-02 ASSESSMENT — ENCOUNTER SYMPTOMS: DYSRHYTHMIAS: 1

## 2018-10-02 ASSESSMENT — ACTIVITIES OF DAILY LIVING (ADL)
ADLS_ACUITY_SCORE: 13
ADLS_ACUITY_SCORE: 15
ADLS_ACUITY_SCORE: 13
ADLS_ACUITY_SCORE: 13

## 2018-10-02 NOTE — PROGRESS NOTES
[facility] nurse revisit pre-op for ileostomy or colostomy.  S.  Patient has no additional questions but does state that her bottom is quite sore with frequent stools following bowel prep.  Nurse reports applying Criticaid frequently.  A/P: Recommended painting perianal and perineum with Cavilon No Sting Barrier Film due to frequent loose stools following prep.

## 2018-10-02 NOTE — PLAN OF CARE
Problem: Patient Care Overview  Goal: Plan of Care/Patient Progress Review  Outcome: No Change  ICU End of Shift Summary.  For vital signs and complete assessments, please see documentation flowsheets.     Pertinent assessments: Frequent stools and urine-incontinent and often mixed.  C/O stool coming from urethra and vaginally.  NARAYAN with no drainage even post 10ml flush.  NPO.  CHG bath provided prior to surgery.  One run of 5 beat NSVT x1 and was asymptomatic.  Amiodarone gtt infusing.    Major Shift Events: Went to surgury  Plan (Upcoming Events):   Discharge/Transfer Needs:     Bedside Shift Report Completed :   Bedside Safety Check Completed:

## 2018-10-02 NOTE — CONSULTS
CLINICAL NUTRITION SERVICES  -  ASSESSMENT NOTE      Future/Additional Recommendations:    Pending post op clinical course -    Malnutrition:   % Weight Loss: Up to 5% in 1 month  % Intake:</= 50% for >/= 5 days (severe malnutrition), <75% of needs for >/=1 month  Subcutaneous Fat Loss:Orbital region moderate depletion (hollowed and dark circles/loose skin) and Upper arm region moderate depletion (lack of ample pinch depth, loose skin)  Muscle Loss:Temporal region moderate depletion, Acromion bone region mild to moderate depletion, Scapular bone region moderate depletion and Dorsal hand region moderate depletion (lower extremities not observed)    Malnutrition Diagnosis: Severe malnutrition  In Context of:  Acute on Chronic illness or disease     REASON FOR ASSESSMENT  Brea NAVEED Kerr is a 82 year old female seen by the dietitian for Provider Order - pt ill x 2-3 weeks, now planning colorectal surgery. ?TPN post-op    NUTRITION HISTORY  - Information obtained from patient  - Food allergies/intolerances: NKFA, lactose intolerance (milk, ice cream)  - Patient is on a low fiber, lowe residue diet at home.  - Typical food/fluid intake PTA has been chronically low amounts with further decline in the past ~2 weeks    Breakfast: Mini scone or cinnamon roll with coffee    Snacks throughout remainder of day: Peaches and cottage cheese; Rice chex with bananas; yogurt, jello also; small portions of meat    Fluids: cran raspberry juice, grapefruit juice    Supplements at home: none    Living situation: with , who cooks and grocery shops    Previous education and adherence:  Followed a low fiber/lw residue diet since 2015 - avoids popcorns, seeds;     Lost 5# in 2-3 weeks    Issues chewing or swallowing: denies    Dehydration on admit    No previous nutrition support (EN or PN)    CURRENT NUTRITION ORDERS  - Diet: NPO  - Factors affecting nutrition intake include: altered GI function    PHYSICAL FINDINGS  Observed  See  "malnutrition section below. Patient denies changes to strength but later endorse decreased functional capacity - weaker when walking, requires holding onto rails  Pant size has decreased  Obtained from Chart/Interdisciplinary Team  Edgard nutrition score: 1; total score: 12  BM: incontinent, watery diarrhea  NARAYAN drain; fistula  Emesis 10/2 am    ANTHROPOMETRICS  Height: 5' 0\"  Weight: 54 kg   Body mass index is 23.51 kg/(m^2).  Weight Status:  Normal BMI  Ideal body weight: 45 kg +/- 10%, 120% of IBW   Weight History:  EMR indicates a weight of 54 kg on 6/4/2018. Patient notes a 5# weight loss in <1 month (4%)  Wt Readings from Last 10 Encounters:   10/02/18 54.6 kg (120 lb 5.9 oz)   09/19/18 50.7 kg (111 lb 12.4 oz)       ASSESSED NUTRITION NEEDS (PER APPROVED PRACTICE GUIDELINES, Dosing weight: 54 kg):  Estimated Energy Needs: 1008-1160 kcals (25-30 Kcal/Kg)  Justification: maintenance  Estimated Protein Needs: 65-96 grams protein (1.2-1.5 g pro/Kg)  Justification: post-op and preservation of lean body mass  Estimated Fluid Needs: >1 mL/Kcal  Justification: maintenance    LABS  Labs reviewed    Recent Labs   Lab Test  10/02/18   0517  10/01/18   0500  09/30/18   1141  09/22/18   0640  09/21/18   0825   POTASSIUM  3.4  3.4  4.1  3.6  4.0     Recent Labs   Lab Test  10/02/18   0517  10/01/18   1446  10/01/18   0500   PHOS  1.7*  2.5  1.9*     Recent Labs   Lab Test  10/02/18   0517  10/01/18   0500  10/01/18   0056  09/21/18   0825   MAG  1.9  1.7  1.7  1.8     Recent Labs   Lab Test  10/02/18   0517  10/01/18   0500  09/30/18   1141  09/22/18   0640  09/21/18   0825   NA  140  137  131*  137  136     Recent Labs   Lab Test  10/02/18   0517  10/01/18   0500  09/30/18   1141  09/22/18   0640  09/21/18   0825   CR  0.64  0.62  0.78  0.65  0.61       Recent Labs  Lab 10/02/18  0517 10/01/18  0500 09/30/18  1141   * 127* 125*     No results found for: A1C    MEDICATIONS  Medications reviewed  D5 IVF at 125 mL per " hour provides 600 kcal, 150 gm CHO  Zofran prn    PROCEDURES WITH NUTRITIONAL IMPLICATIONS  10/2: surgery planned, ?ileostomy vs colostomy    MALNUTRITION:  % Weight Loss: Up to 5% in 1 month  % Intake:</= 50% for >/= 5 days (severe malnutrition), <75% of needs for >/=1 month  Subcutaneous Fat Loss:Orbital region moderate depletion (hollowed and dark circles/loose skin) and Upper arm region moderate depletion (lack of ample pinch depth, loose skin)  Muscle Loss:Temporal region moderate depletion, Acromion bone region mild to moderate depletion, Scapular bone region moderate depletion and Dorsal hand region moderate depletion (lower extremities not observed)  Fluid Retention:None noted    Malnutrition Diagnosis: Severe malnutrition  In Context of:  Acute on Chronic illness or disease    NUTRITION DIAGNOSIS:  Inadequate protein-energy intake related to altered GI function as evidenced by PO intake likely meeting <50% of needs for >1 week, patient report of 4% weight loss in <1 month, fat and muscle loss and sigmoid resection planned 10/2    INTERVENTIONS  Recommendations / Nutrition Prescription  If unable to advance diet within 24-48 hours, recommend nutrition support initiation (TPN vs EN initiation)    Implementation  Nutrition education: reviewed post op diet advancement per MD, POC pending clinical course  Collaboration and Referral of care: Discussed patient during interdisciplinary care rounds this morning and with Dr. Raina Perez  Nutrition support initiation post op vs diet within 48 hours versus       MONITORING AND EVALUATION:  Progress towards goals will be monitored and evaluated per protocol and Practice Guidelines      Blanka Cota RDN, LD, CNSC  Pager - 3rd floor/ICU: [phone]  Pager - All other floors: [phone]  Pager - Weekend/holiday: [phone]  Office: [phone]

## 2018-10-02 NOTE — PROGRESS NOTES
Patient state that she does not want to drink the bowel prep anymore.  She states that she wants to go to sleep. Patient drank about half of the container of prep.  Encouraged patient to take more in but she said she will throw up if she takes in anymore.  She said she will work on it again later after she gets some sleep; informed patient that she is NPO at 0600.

## 2018-10-02 NOTE — ANESTHESIA PREPROCEDURE EVALUATION
PAC NOTE:       ANESTHESIA PRE EVALUATION:  Anesthesia Evaluation     . Pt has had prior anesthetic. Type: General           ROS/MED HX    ENT/Pulmonary:  - neg pulmonary ROS     Neurologic:  - neg neurologic ROS     Cardiovascular:     (+) hypertension----. : . . . :. dysrhythmias a-fib, .       METS/Exercise Tolerance:     Hematologic: Comments: Lab Test        10/02/18     10/01/18     09/30/18      --          09/20/18                       0517          0500          1141           --           0825          WBC          7.9          6.2          17.9*          < >        5.8           HGB          10.2*        11.2*        12.4           < >        11.6*         MCV          102*         102*         101*           < >        103*          PLT          288          251          335            < >        336           INR          1.21*         --          1.21*         --          1.09           < > = values in this interval not displayed.                  Lab Test        10/02/18     10/02/18     10/01/18     09/30/18                       1400          0517          0500          1141          NA            --          140          137          131*          POTASSIUM    3.5          3.4          3.4          4.1           CHLORIDE      --          109          108          99            CO2           --          23           25           25            BUN           --          1*           5*           10            CR            --          0.64         0.62         0.78          ANIONGAP      --          8            4            7             PAULINA           --          7.3*         7.0*         8.2*          GLC           --          107*         127*         125*                  Musculoskeletal:  - neg musculoskeletal ROS       GI/Hepatic:     (+) Other GI/Hepatic       Renal/Genitourinary:  - ROS Renal section negative       Endo:  - neg endo ROS       Psychiatric:  - neg psychiatric ROS        Infectious Disease:  - neg infectious disease ROS       Malignancy:      - no malignancy   Other: Comment: 82 year old woman with a history of perforated diverticulitis who was recently discharged 9/24 with an IR drain and on oral cipro/flagyl and now presents with feculent drain output as well as fecaluria   (+) No chance of pregnancy C-spine cleared: N/A, no H/O Chronic Pain,no other significant disability                    Physical Exam  Normal systems: cardiovascular, pulmonary and dental    Airway   Mallampati: II  TM distance: >3 FB  Neck ROM: full    Dental     Cardiovascular       Pulmonary              Anesthesia Plan      History & Physical Review  History and physical reviewed and following examination; no interval change.    ASA Status:  3 .    NPO Status:  > 8 hours    Plan for General and ETT with Intravenous induction. Maintenance will be Balanced.    PONV prophylaxis:  Ondansetron (or other 5HT-3) and Dexamethasone or Solumedrol       Postoperative Care  Postoperative pain management:  IV analgesics.      Consents  Anesthetic plan, risks, benefits and alternatives discussed with:  Patient.  Use of blood products discussed: Yes.   Use of blood products discussed with Patient.  Consented to blood products.  .                            .

## 2018-10-02 NOTE — PROGRESS NOTES
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 She also still had abdominal pain, but which she thought was not much changed from when she had the NARAYAN drain placed on 9/20/18.      Her recent PMH is notable for hospitalization at [facility] from 9/19 - 9/23/18 for perforated diverticulitis with abscess formation.  She underwent placement of a NARAYAN drain into the abscess on 9/20 under CT guidance and was discharged home on oral Cipro and Metronidazole.     More remote PMH notable for prior episodes of sigmoid diverticulitis and associated paroxysmal atrial fibrillation. And after arriving in the ED, the patient developed A fib with RVR (rate > 140) and her blood pressure fell to the 60's for which reason she was admitted to the ICU and was started on Atrial fibrillation and very briefly on Norepi.  However with the Amio and conversion to NSR, her BP came up.      Problem List:   1. Recent dx perforated diverticulitis. Now there is evidence (by CT scan and confirmed by feculent drainage into the NARAYAN drain) that the abscess represents a fistulous tract with the colon.   2. 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   3. A fib with RVR and associated hypotension. Resolved with amiodarone. The A fib is a recurrent event, but has only happened in her life when she is acutely ill. No indication at this time for anticoagulation.   4. Cardiac function is normal, though when it was evaluated this am, she was in rapid A fib and had mild-mod 1-2+ tricuspid regurg.  No clinical significance to this finding at this time.   5. Pre-op eval does not suggest any impediment to pursuing GETA.     PLAN:  1.  Anticipate need for diversion of the stool  away from the areas of fistulae formation in order to permit healing. At this time, plan to OR this afternoon.   2.  Anticipate surgical intervention this afternoon.   3.  I anticipate the patient will return to the ICU after surgery tonight.  4.  Possible resumption of clear liquids orally versus TPN.  Will await direction from colorectal surgery.  5.  Continue amiodarone drip at this time.  Anticipate change to oral after surgery.  Reasonable to discontinue altogether at discharge.      DVT Prophylaxis: Pneumatic Compression Devices.  Anticipate initiation of Lovenox (40 mg every 24 hours) as soon as reasonable after surgery.  Code Status: Full Code  Discharge Dispo: home expected.  Estimated Disch Date / # of Days until Disch: likely 2-5 days post-op         Interval History (Subjective):      Overall patient had a rough night with copious amount of stool and frequent urinating.  As noted she is incontinent and having feculent output from her urethra.  Also had problems with nausea and vomiting, she believes triggered by the unpleasant taste of the prep.    No fevers.  Denies significant pain at this time.                  Physical Exam:      Last Vital Signs:  /67  Pulse 80  Temp 99.6  F (37.6  C) (Temporal)  Resp 24  Ht 1.524 m (5')  Wt 54.6 kg (120 lb 5.9 oz)  SpO2 96%  BMI 23.51 kg/m2    I/O last 3 completed shifts:  In: 4965.42 [I.V.:4965.42]  Out: 15 [Drains:15]    Constitutional: Awake, alert, cooperative, no apparent distress   Respiratory: Clear to auscultation bilaterally, no crackles or wheezing   Cardiovascular: Regular rate and rhythm, normal S1 and S2, and no murmur noted   Abdomen: Normal bowel sounds, soft, non-distended, tender in the suprapubic area   Skin: No rashes, no cyanosis, dry to touch   Neuro: Alert and oriented x3, no weakness, numbness, memory loss   Extremities: No edema.   Other(s):        All other systems: Negative          Medications:      All current medications  were reviewed with changes reflected in problem list.         Data:      All new lab and imaging data was reviewed.   Labs/Imaging:  Results for orders placed or performed during the hospital encounter of 09/30/18 (from the past 24 hour(s))   Glucose by meter   Result Value Ref Range    Glucose 283 (H) 70 - 99 mg/dL   Glucose by meter   Result Value Ref Range    Glucose 115 (H) 70 - 99 mg/dL   Glucose by meter   Result Value Ref Range    Glucose 116 (H) 70 - 99 mg/dL   Glucose by meter   Result Value Ref Range    Glucose 96 70 - 99 mg/dL   Basic metabolic panel   Result Value Ref Range    Sodium 140 133 - 144 mmol/L    Potassium 3.4 3.4 - 5.3 mmol/L    Chloride 109 94 - 109 mmol/L    Carbon Dioxide 23 20 - 32 mmol/L    Anion Gap 8 3 - 14 mmol/L    Glucose 107 (H) 70 - 99 mg/dL    Urea Nitrogen 1 (L) 7 - 30 mg/dL    Creatinine 0.64 0.52 - 1.04 mg/dL    GFR Estimate 89 >60 mL/min/1.7m2    GFR Estimate If Black >90 >60 mL/min/1.7m2    Calcium 7.3 (L) 8.5 - 10.1 mg/dL   CBC with platelets   Result Value Ref Range    WBC 7.9 4.0 - 11.0 10e9/L    RBC Count 3.04 (L) 3.8 - 5.2 10e12/L    Hemoglobin 10.2 (L) 11.7 - 15.7 g/dL    Hematocrit 30.9 (L) 35.0 - 47.0 %     (H) 78 - 100 fl    MCH 33.6 (H) 26.5 - 33.0 pg    MCHC 33.0 31.5 - 36.5 g/dL    RDW 14.4 10.0 - 15.0 %    Platelet Count 288 150 - 450 10e9/L   INR   Result Value Ref Range    INR 1.21 (H) 0.86 - 1.14   Magnesium   Result Value Ref Range    Magnesium 1.9 1.6 - 2.3 mg/dL   Phosphorus   Result Value Ref Range    Phosphorus 1.7 (L) 2.5 - 4.5 mg/dL   Glucose by meter   Result Value Ref Range    Glucose 121 (H) 70 - 99 mg/dL   Glucose by meter   Result Value Ref Range    Glucose 99 70 - 99 mg/dL   Nutrition Services Adult IP Consult    Narrative    Blanka Cota, RD, LD     10/2/2018  3:10 PM  CLINICAL NUTRITION SERVICES  -  ASSESSMENT NOTE      Future/Additional Recommendations:    Pending post op clinical course -    Malnutrition:   % Weight Loss: Up to 5%  in 1 month  % Intake:</= 50% for >/= 5 days (severe malnutrition), <75% of   needs for >/=1 month  Subcutaneous Fat Loss:Orbital region moderate depletion (hollowed   and dark circles/loose skin) and Upper arm region moderate   depletion (lack of ample pinch depth, loose skin)  Muscle Loss:Temporal region moderate depletion, Acromion bone   region mild to moderate depletion, Scapular bone region moderate   depletion and Dorsal hand region moderate depletion (lower   extremities not observed)    Malnutrition Diagnosis: Severe malnutrition  In Context of:  Acute on Chronic illness or disease     REASON FOR ASSESSMENT  Brea NAVEED Kerr is a 82 year old female seen by the dietitian for   Provider Order - pt ill x 2-3 weeks, now planning colorectal   surgery. ?TPN post-op    NUTRITION HISTORY  - Information obtained from patient  - Food allergies/intolerances: NKFA, lactose intolerance (milk,   ice cream)  - Patient is on a low fiber, lowe residue diet at home.  - Typical food/fluid intake PTA has been chronically low amounts   with further decline in the past ~2 weeks    Breakfast: Mini scone or cinnamon roll with coffee    Snacks throughout remainder of day: Peaches and cottage cheese;   Rice chex with bananas; yogurt, jello also; small portions of   meat    Fluids: cran raspberry juice, grapefruit juice    Supplements at home: none    Living situation: with , who cooks and grocery shops    Previous education and adherence:  Followed a low fiber/lw   residue diet since 2015 - avoids popcorns, seeds;     Lost 5# in 2-3 weeks    Issues chewing or swallowing: denies    Dehydration on admit    No previous nutrition support (EN or PN)    CURRENT NUTRITION ORDERS  - Diet: NPO  - Factors affecting nutrition intake include: altered GI function    PHYSICAL FINDINGS  Observed  See malnutrition section below. Patient denies changes to   strength but later endorse decreased functional capacity - weaker   when walking, requires  "holding onto rails  Pant size has decreased  Obtained from Chart/Interdisciplinary Team  Edgard nutrition score: 1; total score: 12  BM: incontinent, watery diarrhea  NARAYAN drain; fistula  Emesis 10/2 am    ANTHROPOMETRICS  Height: 5' 0\"  Weight: 54 kg   Body mass index is 23.51 kg/(m^2).  Weight Status:  Normal BMI  Ideal body weight: 45 kg +/- 10%, 120% of IBW   Weight History:  EMR indicates a weight of 54 kg on 6/4/2018. Patient notes a 5#   weight loss in <1 month (4%)  Wt Readings from Last 10 Encounters:   10/02/18 54.6 kg (120 lb 5.9 oz)   09/19/18 50.7 kg (111 lb 12.4 oz)       ASSESSED NUTRITION NEEDS (PER APPROVED PRACTICE GUIDELINES,   Dosing weight: 54 kg):  Estimated Energy Needs: 9704-0815 kcals (25-30 Kcal/Kg)  Justification: maintenance  Estimated Protein Needs: 65-96 grams protein (1.2-1.5 g pro/Kg)  Justification: post-op and preservation of lean body mass  Estimated Fluid Needs: >1 mL/Kcal  Justification: maintenance    LABS  Labs reviewed    Recent Labs   Lab Test  10/02/18   0517  10/01/18   0500  09/30/18   1141  09/22/18   0640  09/21/18   0825   POTASSIUM  3.4  3.4  4.1  3.6  4.0     Recent Labs   Lab Test  10/02/18   0517  10/01/18   1446  10/01/18   0500   PHOS  1.7*  2.5  1.9*     Recent Labs   Lab Test  10/02/18   0517  10/01/18   0500  10/01/18   0056  09/21/18   0825   MAG  1.9  1.7  1.7  1.8     Recent Labs   Lab Test  10/02/18   0517  10/01/18   0500  09/30/18   1141  09/22/18   0640  09/21/18   0825   NA  140  137  131*  137  136     Recent Labs   Lab Test  10/02/18   0517  10/01/18   0500  09/30/18   1141  09/22/18   0640  09/21/18   0825   CR  0.64  0.62  0.78  0.65  0.61       Recent Labs  Lab 10/02/18  0517 10/01/18  0500 09/30/18  1141   * 127* 125*     No results found for: A1C    MEDICATIONS  Medications reviewed  D5 IVF at 125 mL per hour provides 600 kcal, 150 gm CHO  Zofran prn    PROCEDURES WITH NUTRITIONAL IMPLICATIONS  10/2: surgery planned, ?ileostomy vs " colostomy    MALNUTRITION:  % Weight Loss: Up to 5% in 1 month  % Intake:</= 50% for >/= 5 days (severe malnutrition), <75% of   needs for >/=1 month  Subcutaneous Fat Loss:Orbital region moderate depletion (hollowed   and dark circles/loose skin) and Upper arm region moderate   depletion (lack of ample pinch depth, loose skin)  Muscle Loss:Temporal region moderate depletion, Acromion bone   region mild to moderate depletion, Scapular bone region moderate   depletion and Dorsal hand region moderate depletion (lower   extremities not observed)  Fluid Retention:None noted    Malnutrition Diagnosis: Severe malnutrition  In Context of:  Acute on Chronic illness or disease    NUTRITION DIAGNOSIS:  Inadequate protein-energy intake related to altered GI function   as evidenced by PO intake likely meeting <50% of needs for >1   week, patient report of 4% weight loss in <1 month, fat and   muscle loss and sigmoid resection planned 10/2    INTERVENTIONS  Recommendations / Nutrition Prescription  If unable to advance diet within 24-48 hours, recommend nutrition   support initiation (TPN vs EN initiation)    Implementation  Nutrition education: reviewed post op diet advancement per MD,   POC pending clinical course  Collaboration and Referral of care: Discussed patient during   interdisciplinary care rounds this morning and with Dr. Raina Perez  Nutrition support initiation post op vs diet within 48 hours   versus       MONITORING AND EVALUATION:  Progress towards goals will be monitored and evaluated per   protocol and Practice Guidelines      Blanka Cota RDN, LD, [facility]  Pager - 3rd floor/ICU: [phone]  Pager - All other floors: [phone]  Pager - Weekend/holiday: [phone]  Office: [phone]   Glucose by meter   Result Value Ref Range    Glucose 115 (H) 70 - 99 mg/dL   Magnesium   Result Value Ref Range    Magnesium 2.4 (H) 1.6 - 2.3 mg/dL   Potassium   Result Value Ref Range    Potassium 3.5 3.4 - 5.3  mmol/L   Phosphorus   Result Value Ref Range    Phosphorus 2.6 2.5 - 4.5 mg/dL   ABO/Rh type and screen   Result Value Ref Range    ABO O     RH(D) Pos     Antibody Screen Neg     Test Valid Only At [facility]        Specimen Expires 10/05/2018

## 2018-10-02 NOTE — PLAN OF CARE
Problem: Patient Care Overview  Goal: Plan of Care/Patient Progress Review  Outcome: No Change  .ICU End of Shift Summary.  For vital signs and complete assessments, please see documentation flowsheets.     Pertinent assessments: A&O.  Up with A2 from bed to chair.  VSS on RA.  Tele SR.  Continue on the Amiodarone gtt.  D5NS @ 125 ml/hr.  Complained of some discomfort at NARAYAN insertion site.  Greenish drainage coming from NARAYAN drain.  Incontinent of bowel and bladder.  Patient did not drink all of her bowel prep; she says she gets too nauseated and patient just wants to sleep. She drank about half of the jar.  Patient had emesis after attempting to drink the prep this morning.  Zofran given with relief.  Patient had multiple bowel movements overnight.  Changed almost every 1 hour.    Major Shift Events: bowel prep; incontinent.  Awake for most of the shift.    Plan (Upcoming Events): Plan for surgery later today.    Discharge/Transfer Needs: TBD    Bedside Shift Report Completed :   Bedside Safety Check Completed:

## 2018-10-02 NOTE — PROGRESS NOTES
COLON & RECTAL SURGERY  PROGRESS NOTE    October 2, 2018    SUBJECTIVE:  Patient doing ok.  Only able tolerate half of prep overnight then had emesis.  Is now NPO for surgery this afternoon.  Passing loose brown BMs per RN.    OBJECTIVE:  Temp:  [97.7  F (36.5  C)-98.7  F (37.1  C)] 97.7  F (36.5  C)  Heart Rate:  [60-93] 85  Resp:  [9-24] 12  BP: (109-165)/(59-92) 149/76  SpO2:  [92 %-98 %] 97 %    Intake/Output Summary (Last 24 hours) at 10/02/18 0930  Last data filed at 10/02/18 0800   Gross per 24 hour   Intake             3725 ml   Output               15 ml   Net             3710 ml       GENERAL:  Awake, alert, no acute distress, resting in bed  HEAD: Normocephalic atraumatic  SCLERA: Anicteric  EXTREMITIES: Warm and well perfused  ABDOMEN:  Soft, tender near drain, non-distended. No guarding, rigidity, or peritoneal signs. NARAYAN with feculent output    LABS:  Lab Results   Component Value Date    WBC 7.9 10/02/2018     Lab Results   Component Value Date    HGB 10.2 10/02/2018     Lab Results   Component Value Date    HCT 30.9 10/02/2018     Lab Results   Component Value Date     10/02/2018     Last Basic Metabolic Panel:  Lab Results   Component Value Date     10/02/2018      Lab Results   Component Value Date    POTASSIUM 3.4 10/02/2018     Lab Results   Component Value Date    CHLORIDE 109 10/02/2018     Lab Results   Component Value Date    PAULINA 7.3 10/02/2018     Lab Results   Component Value Date    CO2 23 10/02/2018     Lab Results   Component Value Date    BUN 1 10/02/2018     Lab Results   Component Value Date    CR 0.64 10/02/2018     Lab Results   Component Value Date     10/02/2018       ASSESSMENT/PLAN: 81 yo woman with h/o perforated diverticulitis s/p IR drain placement. Admitted with feculent drain output, rapid a-fib, hypotension. Only able to tolerate 1/2 of bowel prep before having emesis overnight.     1. NPO for surgery  2. PRN pain meds  3. Continue IVF  4. IV abx  5.  Planning for surgery around 1600 today.      For questions/paging, please contact the CRS office at [phone].    Haley Smith PA-C  [facility]  Phone: [phone]

## 2018-10-03 LAB
ALBUMIN SERPL-MCNC: 1.9 G/DL (ref 3.4–5)
ANION GAP SERPL CALCULATED.3IONS-SCNC: 8 MMOL/L (ref 3–14)
BUN SERPL-MCNC: 3 MG/DL (ref 7–30)
CALCIUM SERPL-MCNC: 6.8 MG/DL (ref 8.5–10.1)
CHLORIDE SERPL-SCNC: 105 MMOL/L (ref 94–109)
CO2 SERPL-SCNC: 23 MMOL/L (ref 20–32)
CREAT SERPL-MCNC: 0.65 MG/DL (ref 0.52–1.04)
ERYTHROCYTE [DISTWIDTH] IN BLOOD BY AUTOMATED COUNT: 14.4 % (ref 10–15)
GFR SERPL CREATININE-BSD FRML MDRD: 87 ML/MIN/1.7M2
GLUCOSE BLDC GLUCOMTR-MCNC: 115 MG/DL (ref 70–99)
GLUCOSE BLDC GLUCOMTR-MCNC: 125 MG/DL (ref 70–99)
GLUCOSE BLDC GLUCOMTR-MCNC: 153 MG/DL (ref 70–99)
GLUCOSE SERPL-MCNC: 121 MG/DL (ref 70–99)
HCT VFR BLD AUTO: 30.1 % (ref 35–47)
HGB BLD-MCNC: 9.8 G/DL (ref 11.7–15.7)
MAGNESIUM SERPL-MCNC: 2.1 MG/DL (ref 1.6–2.3)
MCH RBC QN AUTO: 33.8 PG (ref 26.5–33)
MCHC RBC AUTO-ENTMCNC: 32.6 G/DL (ref 31.5–36.5)
MCV RBC AUTO: 104 FL (ref 78–100)
PHOSPHATE SERPL-MCNC: 3 MG/DL (ref 2.5–4.5)
PLATELET # BLD AUTO: 259 10E9/L (ref 150–450)
POTASSIUM SERPL-SCNC: 4.2 MMOL/L (ref 3.4–5.3)
RBC # BLD AUTO: 2.9 10E12/L (ref 3.8–5.2)
SODIUM SERPL-SCNC: 136 MMOL/L (ref 133–144)
WBC # BLD AUTO: 13.6 10E9/L (ref 4–11)

## 2018-10-03 PROCEDURE — 25000128 H RX IP 250 OP 636: Performed by: INTERNAL MEDICINE

## 2018-10-03 PROCEDURE — A9270 NON-COVERED ITEM OR SERVICE: HCPCS | Mod: GY | Performed by: COLON & RECTAL SURGERY

## 2018-10-03 PROCEDURE — P9047 ALBUMIN (HUMAN), 25%, 50ML: HCPCS | Performed by: INTERNAL MEDICINE

## 2018-10-03 PROCEDURE — 25000128 H RX IP 250 OP 636: Performed by: COLON & RECTAL SURGERY

## 2018-10-03 PROCEDURE — 83735 ASSAY OF MAGNESIUM: CPT | Performed by: COLON & RECTAL SURGERY

## 2018-10-03 PROCEDURE — 00000146 ZZHCL STATISTIC GLUCOSE BY METER IP

## 2018-10-03 PROCEDURE — 99291 CRITICAL CARE FIRST HOUR: CPT | Performed by: INTERNAL MEDICINE

## 2018-10-03 PROCEDURE — 80069 RENAL FUNCTION PANEL: CPT | Performed by: COLON & RECTAL SURGERY

## 2018-10-03 PROCEDURE — 25000128 H RX IP 250 OP 636: Performed by: EMERGENCY MEDICINE

## 2018-10-03 PROCEDURE — 25000125 ZZHC RX 250: Performed by: INTERNAL MEDICINE

## 2018-10-03 PROCEDURE — 40000902 ZZH STATISTIC WOC PT EDUCATION, 16-30 MIN

## 2018-10-03 PROCEDURE — 85027 COMPLETE CBC AUTOMATED: CPT | Performed by: COLON & RECTAL SURGERY

## 2018-10-03 PROCEDURE — 20000003 ZZH R&B ICU

## 2018-10-03 PROCEDURE — 25000132 ZZH RX MED GY IP 250 OP 250 PS 637: Mod: GY | Performed by: COLON & RECTAL SURGERY

## 2018-10-03 RX ORDER — ALBUMIN (HUMAN) 12.5 G/50ML
12.5 SOLUTION INTRAVENOUS ONCE
Status: COMPLETED | OUTPATIENT
Start: 2018-10-03 | End: 2018-10-03

## 2018-10-03 RX ORDER — AMIODARONE HYDROCHLORIDE 200 MG/1
200 TABLET ORAL DAILY
Status: DISCONTINUED | OUTPATIENT
Start: 2018-10-04 | End: 2018-10-04

## 2018-10-03 RX ORDER — DIGOXIN 0.25 MG/ML
250 INJECTION INTRAMUSCULAR; INTRAVENOUS EVERY 6 HOURS
Status: COMPLETED | OUTPATIENT
Start: 2018-10-03 | End: 2018-10-03

## 2018-10-03 RX ADMIN — TAZOBACTAM SODIUM AND PIPERACILLIN SODIUM 3.38 G: 375; 3 INJECTION, SOLUTION INTRAVENOUS at 11:19

## 2018-10-03 RX ADMIN — SODIUM CHLORIDE, POTASSIUM CHLORIDE, SODIUM LACTATE AND CALCIUM CHLORIDE 500 ML: 600; 310; 30; 20 INJECTION, SOLUTION INTRAVENOUS at 12:25

## 2018-10-03 RX ADMIN — AMIODARONE HYDROCHLORIDE 0.5 MG/MIN: 50 INJECTION, SOLUTION INTRAVENOUS at 06:09

## 2018-10-03 RX ADMIN — ACETAMINOPHEN 975 MG: 325 TABLET, FILM COATED ORAL at 14:31

## 2018-10-03 RX ADMIN — ALBUMIN HUMAN 12.5 G: 0.25 SOLUTION INTRAVENOUS at 12:23

## 2018-10-03 RX ADMIN — ACETAMINOPHEN 975 MG: 325 TABLET, FILM COATED ORAL at 05:46

## 2018-10-03 RX ADMIN — DIGOXIN 250 MCG: 0.25 INJECTION INTRAMUSCULAR; INTRAVENOUS at 16:45

## 2018-10-03 RX ADMIN — DIGOXIN 250 MCG: 0.25 INJECTION INTRAMUSCULAR; INTRAVENOUS at 22:37

## 2018-10-03 RX ADMIN — SODIUM CHLORIDE, POTASSIUM CHLORIDE, SODIUM LACTATE AND CALCIUM CHLORIDE 500 ML: 600; 310; 30; 20 INJECTION, SOLUTION INTRAVENOUS at 16:03

## 2018-10-03 RX ADMIN — DILTIAZEM HYDROCHLORIDE 5 MG/HR: 5 INJECTION INTRAVENOUS at 14:02

## 2018-10-03 RX ADMIN — ONDANSETRON 4 MG: 2 INJECTION INTRAMUSCULAR; INTRAVENOUS at 19:50

## 2018-10-03 RX ADMIN — TAZOBACTAM SODIUM AND PIPERACILLIN SODIUM 3.38 G: 375; 3 INJECTION, SOLUTION INTRAVENOUS at 05:29

## 2018-10-03 RX ADMIN — SODIUM CHLORIDE, POTASSIUM CHLORIDE, SODIUM LACTATE AND CALCIUM CHLORIDE: 600; 310; 30; 20 INJECTION, SOLUTION INTRAVENOUS at 02:53

## 2018-10-03 RX ADMIN — DILTIAZEM HYDROCHLORIDE 15 MG/HR: 5 INJECTION INTRAVENOUS at 22:07

## 2018-10-03 RX ADMIN — ENOXAPARIN SODIUM 40 MG: 40 INJECTION SUBCUTANEOUS at 14:31

## 2018-10-03 RX ADMIN — SODIUM CHLORIDE, POTASSIUM CHLORIDE, SODIUM LACTATE AND CALCIUM CHLORIDE: 600; 310; 30; 20 INJECTION, SOLUTION INTRAVENOUS at 21:37

## 2018-10-03 RX ADMIN — TAZOBACTAM SODIUM AND PIPERACILLIN SODIUM 3.38 G: 375; 3 INJECTION, SOLUTION INTRAVENOUS at 23:51

## 2018-10-03 RX ADMIN — AMIODARONE HYDROCHLORIDE 150 MG: 1.5 INJECTION, SOLUTION INTRAVENOUS at 09:54

## 2018-10-03 RX ADMIN — TAZOBACTAM SODIUM AND PIPERACILLIN SODIUM 3.38 G: 375; 3 INJECTION, SOLUTION INTRAVENOUS at 18:13

## 2018-10-03 ASSESSMENT — PAIN DESCRIPTION - DESCRIPTORS
DESCRIPTORS: ACHING
DESCRIPTORS: ACHING

## 2018-10-03 ASSESSMENT — ACTIVITIES OF DAILY LIVING (ADL)
ADLS_ACUITY_SCORE: 11
ADLS_ACUITY_SCORE: 11
ADLS_ACUITY_SCORE: 13
ADLS_ACUITY_SCORE: 13
ADLS_ACUITY_SCORE: 11
ADLS_ACUITY_SCORE: 11

## 2018-10-03 NOTE — BRIEF OP NOTE
[facility] Brief Operative Note    Pre-operative diagnosis: Colovesical fistula   Post-operative diagnosis sigmoid diverticulitis with colovesical fistula     Procedure: Procedure(s):  Exploratory Laparoscopy, open sigmoid colectomy with end colostomy, extensive lysis of adhesions. take down of colovesical fistula.  - Wound Class: II-Clean Contaminated   - Wound Class: II-Clean Contaminated   Surgeon(s): Surgeon(s) and Role:     * Emma Reddy MD - Primary     * Michelle Lamar MD - Assisting   Estimated blood loss: 75 mL    Specimens:   ID Type Source Tests Collected by Time Destination   A : Sigmoid colon Tissue Large Intestine, Sigmoid SURGICAL PATHOLOGY EXAM Emma Reddy MD 10/2/2018  6:45 PM       Findings: 10 cm segment of sigmoid colon with inflammation and fistula to the bladder, extensive adhesions of the omentum to the small bowel and abdominal walll       IVF: 2L  UOP: 650  Condition on discharge from OR: Satisfactory    Emma Reddy MD, MD   [facility]   [phone].        ADDENDUM:    PATIENT DATA  Indicate Y or N:  Home O2 No  Hemodialysis  No  Transplant patient  No  Cirrhosis  No  Steroids in last 30 days  No  Immunomodulators in last 30 days  No  Anticoagulation at time of surgery  No   List medication n    Prior abdominal surgery  Yes  Pelvic irradiation  No    Albumin within 30 days if known 2.8   Hgb within 30 days if known      Hemoglobin   Date Value Ref Range Status   10/02/2018 10.2 (L) 11.7 - 15.7 g/dL Final   ]  Cr within 30 days if known      Creatinine   Date Value Ref Range Status   10/02/2018 0.64 0.52 - 1.04 mg/dL Final   ]  Body mass index is 23.51 kg/(m^2).      OR DATA  Emergent  Yes   <24 hours  No   <1 week  Yes  Bowel Prep Yes  Antibiotics  Yes  DVT prophylaxis    Heparin  Yes   SCD  Yes  Drain  Yes  ASA (1,2,3,4) 3    OR time (min) 268  Stents  No  Transfuse >/= 2U  No  Anastomosis   Stapled  na   Marta   na  Leak Test NA

## 2018-10-03 NOTE — PLAN OF CARE
Patient upset with capnography alarming, readjusted cannula multiple times, however no improvement in monitoring, patient refused the monitor, continues to be on all ICU monitors

## 2018-10-03 NOTE — PROGRESS NOTES
Colon & Rectal Surgery Progress Note             Interval History:   Postop Day #: 1 open sigmoid resection and colostomy with takedown of colovesical fistula  Back in afib this AM at 120-130.  Pain well controlled with tylenol and  PCA, 1mg over night.  No nausea, good UOP                Medications:   I have reviewed this patient's current medications               Physical Exam:   Blood pressure 117/84, pulse 80, temperature 99.3  F (37.4  C), temperature source Axillary, resp. rate 12, height 1.524 m (5'), weight 54.6 kg (120 lb 5.9 oz), SpO2 99 %.    Intake/Output Summary (Last 24 hours) at 10/03/18 0853  Last data filed at 10/03/18 0545   Gross per 24 hour   Intake          3803.75 ml   Output              990 ml   Net          2813.75 ml     GEN:  Alert  ABD:  Soft, stoma pink with RRC in place, small  Green liquid output         Data:        Lab Results   Component Value Date     10/03/2018    Lab Results   Component Value Date    CHLORIDE 105 10/03/2018    Lab Results   Component Value Date    BUN 3 10/03/2018      Lab Results   Component Value Date    POTASSIUM 4.2 10/03/2018    Lab Results   Component Value Date    CO2 23 10/03/2018    Lab Results   Component Value Date    CR 0.65 10/03/2018    CR 0.64 10/02/2018        Lab Results   Component Value Date    HGB 9.8 (L) 10/03/2018    HGB 10.2 (L) 10/02/2018     Lab Results   Component Value Date     10/03/2018     10/02/2018     Lab Results   Component Value Date    WBC 13.6 (H) 10/03/2018    WBC 7.9 10/02/2018            Assessment and Plan:   Clears  PCA  Zosyn for 5 days given abscess  lovenox  Appreciate hospitalist and ICU care for medical issues.       Emma Reddy MD  [facility]  Office Phone # [phone]

## 2018-10-03 NOTE — ANESTHESIA POSTPROCEDURE EVALUATION
Patient: Brea Kerr    Procedure(s):  Exploratory Laparoscopy, open sigmoid colectomy with end colostomy, extensive lysis of adhesions. take down of colovesical fistula.  - Wound Class: II-Clean Contaminated   - Wound Class: II-Clean Contaminated    Diagnosis:Perforated bowel  Diagnosis Additional Information: Pre-operative diagnosis: Colovesical fistula  Post-operative diagnosis sigmoid diverticulitis with colovesical fistula    Procedure: Procedure(s):  Exploratory Laparoscopy, open sigmoid colectomy with end colostomy, extensive lysis of adhesions. take,  down of colovesical fistula.  - Wound Class: II-Clean Contaminated   - Wound Class: II-Clean Contaminated        Anesthesia Type:  General, ETT    Note:  Anesthesia Post Evaluation    Patient location during evaluation: PACU  Patient participation: Able to fully participate in evaluation  Level of consciousness: awake  Pain management: adequate  Airway patency: patent  Cardiovascular status: acceptable  Respiratory status: acceptable  Hydration status: balanced  PONV: none     Anesthetic complications: None          Last vitals:  Vitals:    10/02/18 2145 10/02/18 2200 10/02/18 2215   BP: 109/55 95/48    Pulse:      Resp: 11 9 8   Temp:      SpO2: 95% 97% 98%         Electronically Signed By: Mannie Leong MD  October 2, 2018  10:24 PM

## 2018-10-03 NOTE — PROGRESS NOTES
"[facility]  WO Nurse Inpatient Ostomy Assessment      Assessment of ostomy and needs for:   Colostomy      Data:   History:      Per MD note(s):  S/P 10/2:  Open sigmoid resection and colostomy with takedown of colovesical fistula    Type of ostomy:  Colostomy  Stoma assessment:   ? Size of stoma:  ~ 1 3/4\" seen thru pouch,   viable, pink, moist, edematous and protuberant and RRC in Os  Mucocutaneous Junction (MCJ):  not visualized (barrier in place)  Peristomal skin:  not visualized (barrier in place)  Abdominal assessment: tender, midline intact incision  Output:  small, serosanguinous,      I/O last 3 completed shifts:  In: 3913.75 [I.V.:3913.75]  Out: 990 [Urine:825; Drains:60; Stool:30; Blood:75]  Current pouching system:  Ranjan,  one piece, flat and placed in OR   Pain:  Cramping     Diet:             Active Diet Order      Clear Liquid Diet  Labs:   Recent Labs   Lab Test  10/03/18   0530  10/02/18   0517   09/30/18   1141   ALBUMIN   --    --    --   2.8*   HGB  9.8*  10.2*   < >  12.4   INR   --   1.21*   --   1.21*   WBC  13.6*  7.9   < >  17.9*    < > = values in this interval not displayed.           Intervention:     Patient's chart evaluated.      Assessments done today:  Stoma and pouch    Education: begun, introduction to [facility] role, stoma and informational handouts    Prepared for discharge by: Supplies ordered, Discussed home care and when to follow up with a [facility] Nurse in the future and Discussed how/ where to order supplies    Pt registered for ostomy supply samples? On discharge         Assessment:     Stoma assessment: viable, pink, moist and edematous with intact pouch, non functioning    Learning needs/ comprehension: no prior experience, prepared to take care of herself, spouse and family are supportive    Effectiveness of current pouching/ supply plan: TBD 1 day         Plan:     Plan:   ? Current pouching system: Ranjan 1pc    Education:  ? Education continued daily thru " stay:  Pouch Emptying and Pouch Replacement, How to cuff and clean pouch, How to empty pouch, Removal of pouch, Preparation of new pouch, Cutting out or evaluating a pattern, Applying paste or rings, Applying appliance to abdomen, Peristomal skin care, Diet and hydration / fluid balance, Odor / flatus management and Lifestyle Adjustments   o Supply company: TBD- pt instructed to contact insurance company  o Do W[facility] Nurse recommend home care ? Suspect pt will need assistance, has had home health in the past.

## 2018-10-03 NOTE — PLAN OF CARE
Problem: Patient Care Overview  Goal: Plan of Care/Patient Progress Review  Outcome: No Change  Neuro: Alert and oriented.  Cardiac: Afib RVR (rates up into 140-150's). Pt was on an amiodarone drip this am; gave a 150 Mg bolus of amiodarone and d/c'd drip. Afib RVR continued and pressure soft(see flowsheets), gave 500cc bolus LR and albumin. Started on Diltiazem drip; titrated to max dose of 15 mg/hr. RVR continued; pt received another 500cc LR bolus. New orders for Digoxin IV [id number]. At this time, HR slowly trending down 115-120's). BP WNL. Pt remains asymptomatic.    Tele: Afib RVR (rates 113-130's)  Respiratory: LS clear; denies dyspnea. Pt using IS.   02: 2 lpm/NC  GI: Rates abd pain 5/10. Using dilaudid PCA minimally. Colostomy pouch intact. Minimal bloody drainage in pouch. NARAYAN drain intact in Right LQ; serosangeous drainage. Abd soft, non-distended.  Diet: Clear liquids; tolerating ice chips and sips of water.  : Babb patent; UOP improving throughout the shift as pt has had a total of 1 liter bolus.   Skin: Midline abd incision with surgical glue intact; colostomy and NARAYAN drain in abd  Musculoskeletal/CMS: CMS intact. Generalized weakness. Unable to mobilize today due to HR  Pain: rates 5/10 in abd; Dilaudid PCA  Consults: Colorectal  Plan of Care: Monitor heart rhythm/rate. Mobilize when able. Monitor bowel function and colostomy output.

## 2018-10-03 NOTE — PLAN OF CARE
ICU End of Shift Summary.  For vital signs and complete assessments, please see documentation flowsheets.     Pertinent assessments: patient is alert and orientated following surgery, using PCA appropriately with good pain control, patient tolerating water and ice chips without difficulty  Major Shift Events: brought back from surgery around 2200  Plan (Upcoming Events): post op plan of care  Discharge/Transfer Needs: to be determined    Bedside Shift Report Completed : yes  Bedside Safety Check Completed:yes

## 2018-10-03 NOTE — OP NOTE
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       PROCEDURES:  Exploratory laparoscopy, open sigmoid colectomy with end colostomy, extensive lysis of adhesions, and takedown of colovesical fistula with drain removal and drain placement.      SURGEON:  Emma Reddy MD      CO-SURGEON:  Michelle Xie MD       INDICATIONS:  Brea is an 82-year-old woman who presented to the hospital in September with severe abdominal pain and some pneumaturia.  CT scan demonstrated a cavity between the bladder and the sigmoid colon.  An IR drain was placed and she was discharged home with plan for elective resection.  She was doing well, but the drainage turned feculent and she began feeling quite unwell, presenting to the Emergency Room on the 30th of September with sepsis.  At that point, she was started on intravenous antibiotics and was transferred to the Intensive Care Unit as she was having rapid atrial fibrillation.  She converted to sinus with amiodarone and it was felt that she was having a component of sepsis related to her diverticulitis and that proceeding with surgery at this point would be in her best interest.  Preoperative evaluation with the ICU team as well as with the primary hospitalist felt that this was the best course of action to proceed.  The patient herself as well as her daughter who is a physician's assistant were briefed on the plan with risks of bleeding, infection, both superficial and deep, possible anastomotic leak, likely need for stoma at this initial operation, and other risks associated with major abdominal surgery and general anesthesia including but not limited to DVT, PE, heart attack, stroke, bleeding, other cardiopulmonary events, damage to surrounding structures. and even  death.  She understood and wished to proceed.      FINDINGS:  There was a very thickened sigmoid colon with an abscess cavity between the uterus, colon and bladder.  There were numerous loops of small bowel stuck here, but they did not appear to be directly involved.  There is feculent material coming from the stoma as well as feculent material in her Abbb catheter.      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  A healthy descending colostomy was created in the previously marked spot in the left abdomen.      DESCRIPTION OF PROCEDURE:  After informed consent was obtained, the patient was taken to the operating room.  She was positioned on the operating room table in a supine position and was intubated.  A Babb catheter was placed and there was noted to be some green feculent material in there.  An orogastric tube was placed.  She was positioned in a modified lithotomy position and the abdomen was prepped and draped in the usual fashion.  After appropriate timeout, I began by making a 1-cm incision below the umbilicus and carried the dissection down through the fascia.  The peritoneal cavity was entered without difficulty.  An 0 Vicryl suture was placed at the level of the fascia, Alice port was inserted, and the abdomen was insufflated.  There were extensive adhesions of the omentum and loops of small bowel adherent to the anterior abdominal wall.  I could see the right side of the abdomen pretty well and a TAP block was performed; however, there were dense adhesions on the left.  At this point, I did not feel like proceeding laparoscopically was going to be beneficial, particularly given the extensive inflammation down in the pelvis.  The Alice port was removed and the abdomen was desufflated and the lower midline incision was opened to just above the pubic symphysis.   Careful takedown of the small bowel and omentum off of the anterior abdominal wall was begun using primarily Metzenbaum scissors but also electrocautery.  Once we freed this up, we could see the omentum was densely stuck down towards the pelvis and there was a very thickened loop of sigmoid colon that was adherent to the bladder as expected based on her preoperative CT scan.  I began by lysing the adhesions of the omentum to the small bowel and the sigmoid colon.      Once this was done, I was able to pack the omentum up into the upper abdomen and began taking down the small bowel off of the bladder and abscess cavity.  During this time, I did get into the abscess cavity and there was significant granulation tissue, but it was nicely decompressed by the drain which I had removed at the beginning of the case.  Loops of small bowel were freed up and I could see that there was abscess rind on the small bowel, but there was no evidence of a fistula or serosal injury.  These loops were carefully inspected and felt to be healthy and packed up into the upper abdomen.  This left us access to the pelvis and we began the tedious task of incising the line of Toldt laterally to the descending and sigmoid colon.  Of note, we were able to identify the ureter just above the inflamed area and then were able to carry our dissection down towards the pelvis superficial to this and connect to the abscess cavity, which ended up being directly adherent to the bladder wall, and taking down the fistula.      Once we were able to do that, we were able to straighten out the sigmoid colon, revealing soft healthy rectum.  We turned our attention more cephalad to mobilize the descending colon.  This actually ended up being quite a bit of work as there were dense adhesions of the mesentery to the retroperitoneum and the ureter was followed cephalad, leaving this in the retroperitoneum and elevating the mesentery.  We had to extend the incision  here a bit to allow it to see.  Given that this was difficult, we divided the bowel proximally to where the inflammation was and then divided the mesentery, freeing this up circumferentially at the soft healthy upper rectum, and this was divided and the specimen was passed off the field.  The staple line of the upper rectum was oversewn with 2-0 Prolene with a single suture on the left and 3 on the right.  We then turned our attention back up to the splenic flexure and switched around our Pierre so that we had better visualization and we took the omentum off of the transverse colon, but the flexure was very high given her prior splenectomy.      At this point, we just mobilized enough to have adequate length of the descending colon to come out through the skin.  The ureter was easily identified up here and  off of the mesentery and we then divided a bit of the mesentery preserving the marginal.  This looked nicely pink and healthy.  The Pierre and Bookwalter were then removed and a disk of skin in the previously marked stoma site was excised, fascia was incised in a cruciate fashion, and the posterior sheath was incised as well.  This allowed for comfortable 2 fingerbreadths.  The orientation of the mesentery was identified and was situated inferiorly, and this was brought through the abdominal wall without difficulty.  This was secured to ensure proper orientation and we irrigated with 2 liters of warm saline.  There was a slight amount of erythema where we had dissected near the ureter, and this was treated with Rajesh and was nicely hemostatic.  Similarly down the pelvis, there was some rundown.  This was suctioned free with the 2 liters and was nicely hemostatic without needing further intervention.  Sponge count was performed, which was correct.  We placed a #19 round drain through a separate stab wound in the right lower quadrant and this was placed between the bladder and the uterus where the abscess  cavity was.  The fascia was reapproximated with a running #1 PDS.  The wounds were irrigated and the skin edges were reapproximated with 4-0 Monocryl and Dermabond.  The stoma was then matured in a standard Rosalva fashion using 3-0 Vicryl.  A stoma appliance was placed as was a gauze sponge.  She was returned to the [facility] in stable condition.  She was recovered in the PACU and then returned to her room up in the Intensive Care Unit given her prior rapid atrial fibrillation.  Estimated blood loss was 75 mL.  She made 600 mL of urine and received 2 liters of crystalloid.      SPECIMEN:  Sigmoid colon.         EMMA RIOS MD             D: 10/03/2018   T: 10/03/2018   MT: [id number]      Name:     TABITHA ALMANZAR   MRN:      [id number]        Account:        [id number]   :      1935           Procedure Date: 10/02/2018      Document: [id number]       cc: Emma Rios MD

## 2018-10-03 NOTE — PROGRESS NOTES
[facility]  Hospitalist Progress Note  Nash Paris MD 10/03/2018    Reason for Stay (Diagnosis): diverticular abscess and diverticulitis complicated by colovesicle fistula         Assessment and Plan:      Summary of Stay: Brea Kerr is a 82 year old female came to attention on 9/30/2018 with stool passing in her urine. She also still had abdominal pain, but which she thought was not much changed from when she had the NARAYAN drain placed on 9/20/18.  Now there is evidence (by CT scan and confirmed by feculent drainage into the NARAYAN drain) that the abscess represents a fistulous tract with the colon. 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     Her recent PMH is notable for hospitalization at [facility] from 9/19 - 9/23/18 for perforated diverticulitis with abscess formation.  She underwent placement of a NARAYAN drain into the abscess on 9/20 under CT guidance and was discharged home on oral Cipro and Metronidazole.     More remote PMH notable for prior episodes of sigmoid diverticulitis and associated paroxysmal atrial fibrillation. And after arriving in the ED, the patient developed A fib with RVR (rate > 140) and her blood pressure fell to the 60's for which reason she was admitted to the ICU and was started on Atrial fibrillation and very briefly on Norepi.  However with the Amio and conversion to NSR, her BP came up.    On 10/2/2018 the patient was taken to the operating room by Dr. Reddy and underwent sigmoid colectomy with end colostomy.  She did very well with the procedure but postoperatively has developed recurrent atrial fibrillation with rapid ventricular response.  Again her blood pressure dropped some.    Problem List:   1. POD #1 s/p open sigmoid colectomy with end colostomy, takedown of colovesical fistula.    2. A fib with RVR and associated hypotension. Resolved with amiodarone when the patient first presented.  However, today the patient is less responsive to a dose of amiodarone. The  A fib is a recurrent issue, but has only happened in her life when she was acutely ill. No indication at this time for anticoagulation.   -The patient was re-bolused with amiodarone 150 mg once.  I discussed the case briefly with Dr. Moncada from cardiology who recommended that we stop the load and start with enteral amiodarone 200 mg daily tomorrow.  -Because fluid status is difficult to  in the setting of yesterday's complex abdominal surgery and presumed third spacing, the patient was gently fluid resuscitated using 50 cc of 25% albumin in addition to 500 cc LR bolus.    -The patient continues to have norepinephrine available (but it has not been started) from several days ago and this will be resumed if needed.  -Start diltiazem drip  3. Cardiac function is normal, though when it was evaluated this am, she was in rapid A fib and had mild-mod 1-2+ tricuspid regurg.  No clinical significance to this finding at this time.   4. Malnutrition.    PLAN:  1.  Discontinue amiodarone drip.  Give amiodarone bolus 150 mg once now.  Start amiodarone 200 mg daily enterally for the remainder of hospitalization.  2.  Initiate diltiazem drip for rate control.  (This was titrated up to 15 mg without adequate rate control.)  3.  I then spoke again with Dr. Moncada who agreed with a trial of adding digoxin 250 mg twice monitoring for AV block.  4.  Start clear liquids.  As per nutrition recommendations, TPN is recommended to begin tomorrow if the patient is unable to take in adequate amount of orally.  5.  Formal cardiology consultation tomorrow.  It would be reasonable to cancel this if the patient again converts to normal sinus rhythm.    DVT Prophylaxis: Lovenox  Code Status: Full Code  Discharge Dispo: home expected.  Estimated Disch Date / # of Days until Disch: likely 2-5 days post-op     Greater than 30 minutes spent critical care time        Interval History (Subjective):      Generally feeling fairly well but with  abdominal pain as expected after surgery.  Patient denies significant dizziness and weakness though she has not gotten up yet.  No apparent shortness of breath.                  Physical Exam:      Last Vital Signs:  /70  Pulse 80  Temp 98.1  F (36.7  C) (Axillary)  Resp 12  Ht 1.524 m (5')  Wt 54.6 kg (120 lb 5.9 oz)  SpO2 99%  BMI 23.51 kg/m2    I/O last 3 completed shifts:  In: 3913.75 [I.V.:3913.75]  Out: 990 [Urine:825; Drains:60; Stool:30; Blood:75]     Constitutional: Awake, alert, cooperative, no apparent distress   Respiratory: Clear to auscultation bilaterally, no crackles or wheezing   Cardiovascular:  Irregular and markedly tachycardic with heart rate greater than 140.   Abdomen: Normal bowel sounds, soft, non-distended, tender in the suprapubic area   Skin: No rashes, no cyanosis, dry to touch   Neuro: Alert and oriented x3, no weakness, numbness, memory loss   Extremities: No edema.   Other(s):        All other systems: Negative          Medications:      All current medications were reviewed with changes reflected in problem list.         Data:      All new lab and imaging data was reviewed.   Note that I carefully and specifically reviewed the telemetry strips which unequivocally show an irregularly irregular narrow complex tachycardia without any suggestion of atrial flutter.  Labs/Imaging:  Results for orders placed or performed during the hospital encounter of 09/30/18 (from the past 24 hour(s))   Glucose by meter   Result Value Ref Range    Glucose 153 (H) 70 - 99 mg/dL   Glucose by meter   Result Value Ref Range    Glucose 125 (H) 70 - 99 mg/dL   Basic metabolic panel   Result Value Ref Range    Sodium 136 133 - 144 mmol/L    Potassium 4.2 3.4 - 5.3 mmol/L    Chloride 105 94 - 109 mmol/L    Carbon Dioxide 23 20 - 32 mmol/L    Anion Gap 8 3 - 14 mmol/L    Glucose 121 (H) 70 - 99 mg/dL    Urea Nitrogen 3 (L) 7 - 30 mg/dL    Creatinine 0.65 0.52 - 1.04 mg/dL    GFR Estimate 87 >60  mL/min/1.7m2    GFR Estimate If Black >90 >60 mL/min/1.7m2    Calcium 6.8 (L) 8.5 - 10.1 mg/dL   CBC with platelets   Result Value Ref Range    WBC 13.6 (H) 4.0 - 11.0 10e9/L    RBC Count 2.90 (L) 3.8 - 5.2 10e12/L    Hemoglobin 9.8 (L) 11.7 - 15.7 g/dL    Hematocrit 30.1 (L) 35.0 - 47.0 %     (H) 78 - 100 fl    MCH 33.8 (H) 26.5 - 33.0 pg    MCHC 32.6 31.5 - 36.5 g/dL    RDW 14.4 10.0 - 15.0 %    Platelet Count 259 150 - 450 10e9/L   Magnesium   Result Value Ref Range    Magnesium 2.1 1.6 - 2.3 mg/dL   Phosphorus   Result Value Ref Range    Phosphorus 3.0 2.5 - 4.5 mg/dL   Albumin level   Result Value Ref Range    Albumin 1.9 (L) 3.4 - 5.0 g/dL   Glucose by meter   Result Value Ref Range    Glucose 115 (H) 70 - 99 mg/dL

## 2018-10-04 ENCOUNTER — APPOINTMENT (OUTPATIENT)
Dept: PHYSICAL THERAPY | Facility: CLINIC | Age: 83
DRG: 853 | End: 2018-10-04
Payer: MEDICARE

## 2018-10-04 ENCOUNTER — APPOINTMENT (OUTPATIENT)
Dept: GENERAL RADIOLOGY | Facility: CLINIC | Age: 83
DRG: 853 | End: 2018-10-04
Attending: INTERNAL MEDICINE
Payer: MEDICARE

## 2018-10-04 ENCOUNTER — APPOINTMENT (OUTPATIENT)
Dept: GENERAL RADIOLOGY | Facility: CLINIC | Age: 83
DRG: 853 | End: 2018-10-04
Attending: PHYSICIAN ASSISTANT
Payer: MEDICARE

## 2018-10-04 LAB
ALBUMIN SERPL-MCNC: 1.9 G/DL (ref 3.4–5)
ALP SERPL-CCNC: 42 U/L (ref 40–150)
ALT SERPL W P-5'-P-CCNC: 11 U/L (ref 0–50)
ANION GAP SERPL CALCULATED.3IONS-SCNC: 5 MMOL/L (ref 3–14)
AST SERPL W P-5'-P-CCNC: 23 U/L (ref 0–45)
BILIRUB SERPL-MCNC: 0.7 MG/DL (ref 0.2–1.3)
BUN SERPL-MCNC: 5 MG/DL (ref 7–30)
CALCIUM SERPL-MCNC: 7.2 MG/DL (ref 8.5–10.1)
CHLORIDE SERPL-SCNC: 100 MMOL/L (ref 94–109)
CO2 SERPL-SCNC: 27 MMOL/L (ref 20–32)
COPATH REPORT: NORMAL
CREAT SERPL-MCNC: 0.56 MG/DL (ref 0.52–1.04)
ERYTHROCYTE [DISTWIDTH] IN BLOOD BY AUTOMATED COUNT: 14.1 % (ref 10–15)
GFR SERPL CREATININE-BSD FRML MDRD: >90 ML/MIN/1.7M2
GLUCOSE BLDC GLUCOMTR-MCNC: 107 MG/DL (ref 70–99)
GLUCOSE SERPL-MCNC: 90 MG/DL (ref 70–99)
HCT VFR BLD AUTO: 27 % (ref 35–47)
HGB BLD-MCNC: 9 G/DL (ref 11.7–15.7)
INTERPRETATION ECG - MUSE: NORMAL
MAGNESIUM SERPL-MCNC: 1.7 MG/DL (ref 1.6–2.3)
MCH RBC QN AUTO: 34.1 PG (ref 26.5–33)
MCHC RBC AUTO-ENTMCNC: 33.3 G/DL (ref 31.5–36.5)
MCV RBC AUTO: 102 FL (ref 78–100)
PHOSPHATE SERPL-MCNC: 2.2 MG/DL (ref 2.5–4.5)
PLATELET # BLD AUTO: 243 10E9/L (ref 150–450)
POTASSIUM SERPL-SCNC: 4.2 MMOL/L (ref 3.4–5.3)
PROT SERPL-MCNC: 5.1 G/DL (ref 6.8–8.8)
RBC # BLD AUTO: 2.64 10E12/L (ref 3.8–5.2)
SODIUM SERPL-SCNC: 132 MMOL/L (ref 133–144)
WBC # BLD AUTO: 10.9 10E9/L (ref 4–11)

## 2018-10-04 PROCEDURE — 25000128 H RX IP 250 OP 636: Performed by: INTERNAL MEDICINE

## 2018-10-04 PROCEDURE — 99232 SBSQ HOSP IP/OBS MODERATE 35: CPT | Performed by: INTERNAL MEDICINE

## 2018-10-04 PROCEDURE — 99207 ZZC CDG-MDM COMPONENT: MEETS LOW - DOWN CODED: CPT | Performed by: INTERNAL MEDICINE

## 2018-10-04 PROCEDURE — 40000193 ZZH STATISTIC PT WARD VISIT: Performed by: PHYSICAL THERAPIST

## 2018-10-04 PROCEDURE — 83735 ASSAY OF MAGNESIUM: CPT | Performed by: INTERNAL MEDICINE

## 2018-10-04 PROCEDURE — 25000132 ZZH RX MED GY IP 250 OP 250 PS 637: Mod: GY | Performed by: INTERNAL MEDICINE

## 2018-10-04 PROCEDURE — 93010 ELECTROCARDIOGRAM REPORT: CPT | Performed by: INTERNAL MEDICINE

## 2018-10-04 PROCEDURE — 12000007 ZZH R&B INTERMEDIATE

## 2018-10-04 PROCEDURE — 0DHA7UZ INSERTION OF FEEDING DEVICE INTO JEJUNUM, VIA NATURAL OR ARTIFICIAL OPENING: ICD-10-PCS | Performed by: PHYSICIAN ASSISTANT

## 2018-10-04 PROCEDURE — 80053 COMPREHEN METABOLIC PANEL: CPT | Performed by: INTERNAL MEDICINE

## 2018-10-04 PROCEDURE — 27211246 XR FEEDING TUBE PLACEMENT

## 2018-10-04 PROCEDURE — 25000128 H RX IP 250 OP 636: Performed by: COLON & RECTAL SURGERY

## 2018-10-04 PROCEDURE — 40000275 ZZH STATISTIC RCP TIME EA 10 MIN

## 2018-10-04 PROCEDURE — A9270 NON-COVERED ITEM OR SERVICE: HCPCS | Mod: GY | Performed by: INTERNAL MEDICINE

## 2018-10-04 PROCEDURE — 84100 ASSAY OF PHOSPHORUS: CPT | Performed by: INTERNAL MEDICINE

## 2018-10-04 PROCEDURE — 25000125 ZZHC RX 250: Performed by: INTERNAL MEDICINE

## 2018-10-04 PROCEDURE — 40000986 XR ABDOMEN PORT 1 VW

## 2018-10-04 PROCEDURE — 97530 THERAPEUTIC ACTIVITIES: CPT | Mod: GP | Performed by: PHYSICAL THERAPIST

## 2018-10-04 PROCEDURE — 85027 COMPLETE CBC AUTOMATED: CPT | Performed by: INTERNAL MEDICINE

## 2018-10-04 PROCEDURE — 40000901 ZZH STATISTIC WOC PT EDUCATION, 0-15 MIN

## 2018-10-04 PROCEDURE — 97162 PT EVAL MOD COMPLEX 30 MIN: CPT | Mod: GP | Performed by: PHYSICAL THERAPIST

## 2018-10-04 PROCEDURE — 00000146 ZZHCL STATISTIC GLUCOSE BY METER IP

## 2018-10-04 PROCEDURE — 97110 THERAPEUTIC EXERCISES: CPT | Mod: GP | Performed by: PHYSICAL THERAPIST

## 2018-10-04 PROCEDURE — 93005 ELECTROCARDIOGRAM TRACING: CPT

## 2018-10-04 RX ORDER — AMIODARONE HYDROCHLORIDE 200 MG/1
200 TABLET ORAL DAILY
Status: DISCONTINUED | OUTPATIENT
Start: 2018-10-05 | End: 2018-10-06

## 2018-10-04 RX ORDER — ACETAMINOPHEN 325 MG/1
975 TABLET ORAL EVERY 8 HOURS
Status: ACTIVE | OUTPATIENT
Start: 2018-10-04 | End: 2018-10-05

## 2018-10-04 RX ADMIN — PROCHLORPERAZINE EDISYLATE 5 MG: 5 INJECTION INTRAMUSCULAR; INTRAVENOUS at 18:41

## 2018-10-04 RX ADMIN — SODIUM PHOSPHATE, MONOBASIC, MONOHYDRATE 15 MMOL: 276; 142 INJECTION, SOLUTION INTRAVENOUS at 07:49

## 2018-10-04 RX ADMIN — ENOXAPARIN SODIUM 40 MG: 40 INJECTION SUBCUTANEOUS at 14:51

## 2018-10-04 RX ADMIN — ONDANSETRON 4 MG: 2 INJECTION INTRAMUSCULAR; INTRAVENOUS at 08:59

## 2018-10-04 RX ADMIN — TAZOBACTAM SODIUM AND PIPERACILLIN SODIUM 3.38 G: 375; 3 INJECTION, SOLUTION INTRAVENOUS at 12:08

## 2018-10-04 RX ADMIN — ONDANSETRON 4 MG: 2 INJECTION INTRAMUSCULAR; INTRAVENOUS at 15:13

## 2018-10-04 RX ADMIN — DIATRIZOATE MEGLUMINE AND DIATRIZOATE SODIUM 40 ML: 660; 100 SOLUTION ORAL; RECTAL at 17:14

## 2018-10-04 RX ADMIN — AMIODARONE HYDROCHLORIDE 200 MG: 200 TABLET ORAL at 09:16

## 2018-10-04 RX ADMIN — SODIUM CHLORIDE, POTASSIUM CHLORIDE, SODIUM LACTATE AND CALCIUM CHLORIDE: 600; 310; 30; 20 INJECTION, SOLUTION INTRAVENOUS at 06:49

## 2018-10-04 RX ADMIN — Medication 2 G: at 07:45

## 2018-10-04 RX ADMIN — TAZOBACTAM SODIUM AND PIPERACILLIN SODIUM 3.38 G: 375; 3 INJECTION, SOLUTION INTRAVENOUS at 05:48

## 2018-10-04 RX ADMIN — ONDANSETRON 4 MG: 2 INJECTION INTRAMUSCULAR; INTRAVENOUS at 21:50

## 2018-10-04 RX ADMIN — SODIUM CHLORIDE, POTASSIUM CHLORIDE, SODIUM LACTATE AND CALCIUM CHLORIDE: 600; 310; 30; 20 INJECTION, SOLUTION INTRAVENOUS at 14:47

## 2018-10-04 RX ADMIN — TAZOBACTAM SODIUM AND PIPERACILLIN SODIUM 3.38 G: 375; 3 INJECTION, SOLUTION INTRAVENOUS at 17:17

## 2018-10-04 RX ADMIN — PROCHLORPERAZINE EDISYLATE 5 MG: 5 INJECTION INTRAMUSCULAR; INTRAVENOUS at 00:07

## 2018-10-04 ASSESSMENT — ACTIVITIES OF DAILY LIVING (ADL)
ADLS_ACUITY_SCORE: 11

## 2018-10-04 NOTE — PROGRESS NOTES
"COLON & RECTAL SURGERY  PROGRESS NOTE    October 4, 2018  Post-op Day # 2 open sigmoid colectomy and colostomy    SUBJECTIVE:  Patient reports \"things aren't going well, mostly the heart.\"  States has not had much liquids \"because I just throw up every time.\"  States has not been up much because it increases her heart rate.    OBJECTIVE:  Temp:  [97.9  F (36.6  C)-99.3  F (37.4  C)] 98.4  F (36.9  C)  Heart Rate:  [] 94  Resp:  [8-30] 11  BP: ()/(50-91) 110/66  SpO2:  [91 %-100 %] 94 %    Intake/Output Summary (Last 24 hours) at 10/04/18 0833  Last data filed at 10/04/18 0600   Gross per 24 hour   Intake          4233.71 ml   Output             1690 ml   Net          2543.71 ml       GENERAL:  Awake, alert, no acute distress, resting in bed  HEAD: Normocephalic atraumatic  SCLERA: Anicteric  EXTREMITIES: Warm and well perfused  ABDOMEN:  Soft, appropriately tender, non-distended. No guarding, rigidity, or peritoneal signs. Stoma pink with RRC in place and green output in bag. NARAYAN drain with serosanguinous output  INCISION:  C/d/i, dermabond    LABS:  Lab Results   Component Value Date    WBC 10.9 10/04/2018     Lab Results   Component Value Date    HGB 9.0 10/04/2018     Lab Results   Component Value Date    HCT 27.0 10/04/2018     Lab Results   Component Value Date     10/04/2018     Last Basic Metabolic Panel:  Lab Results   Component Value Date     10/04/2018      Lab Results   Component Value Date    POTASSIUM 4.2 10/04/2018     Lab Results   Component Value Date    CHLORIDE 100 10/04/2018     Lab Results   Component Value Date    PAULINA 7.2 10/04/2018     Lab Results   Component Value Date    CO2 27 10/04/2018     Lab Results   Component Value Date    BUN 5 10/04/2018     Lab Results   Component Value Date    CR 0.56 10/04/2018     Lab Results   Component Value Date    GLC 90 10/04/2018       ASSESSMENT/PLAN: POD#2 open sigmoid colectomy with colostomy.  HR in 70s at present, though 130s " overnight.  Starting to have stoma output.  Minimal oral intake due to N/V.    1. Clear liquid diet as tolerated.  Recommend starting TPN today - will defer to intensivist team on using one of the current lines or placing a new line for this.  2. Continue PCA  3. Continue IVF per hospitalist team  4. Continue Babb  5. Continue Zosyn, day 2/5  6. Lovenox for ppx  7. Appreciate hospitalist and intensivist assistance with management      For questions/paging, please contact the CRS office at [phone].    Haley Smith PA-C  [facility]  Phone: [phone]      Addendum 3:15 pm: Dr. Reddy recommends cystogram on POD#5. As this falls on Sunday, will likely need to be done POD#6 on Monday morning to evaluate for leak. Babb should remain in place until cysto has been completed and reviewed by CRS.    Haley Smith PA-C  [facility]  [phone]

## 2018-10-04 NOTE — PROGRESS NOTES
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 She also still had abdominal pain, but which she thought was not much changed from when she had the NARAYAN drain placed on 9/20/18.  Now there is evidence (by CT scan and confirmed by feculent drainage into the NARAYAN drain) that the abscess represents a fistulous tract with the colon. 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     Her recent PMH is notable for hospitalization at [facility] from 9/19 - 9/23/18 for perforated diverticulitis with abscess formation.  She underwent placement of a NARAYAN drain into the abscess on 9/20 under CT guidance and was discharged home on oral Cipro and Metronidazole.     More remote PMH notable for prior episodes of sigmoid diverticulitis and associated paroxysmal atrial fibrillation. And after arriving in the ED, the patient developed A fib with RVR (rate > 140) and her blood pressure fell to the 60's for which reason she was admitted to the ICU and was started on Atrial fibrillation and very briefly on Norepi.  However with the Amio and conversion to NSR, her BP came up.    On 10/2/2018 the patient was taken to the operating room by Dr. Reddy and underwent sigmoid colectomy with end colostomy.  She did very well with the procedure but postoperatively has developed recurrent atrial fibrillation with rapid ventricular response.  Again her blood pressure dropped some.    She was admitted to the ICU following this for management of a-fib w/ RVR and hypotension.  Following amiodarone load, diltiazem drip and digoxin she has converted to NSR.  Now stable to transfer out of the ICU.    Problem List:   1. POD #2 s/p open sigmoid colectomy with end  colostomy, takedown of colovesical fistula.  Diet per primary surgical team.  On clears.    2. A fib with RVR and associated hypotension. Resolved.  The A fib is a recurrent issue, but has only happened in her life when she was acutely ill. Holding anticoagulation for now.   -on PO amiodarone, suspect this won't need to be long term.  Consider stopping this admission if she remains in NSR.  -cardiology consult    3. Cardiac function is normal on TTE, she was in rapid A fib and had mild-mod 1-2+ tricuspid regurg.  No clinical significance to this finding at this time.     4. Malnutrition.  Defer diet to CRS.  Considering TPN if not able to advance diet soon.  Dietician following.      DVT Prophylaxis: Lovenox  Code Status: Full Code  Discharge Dispo: home expected.  Estimated Disch Date / # of Days until Disch: likely 2-5 days post-op     Greater than 30 minutes spent critical care time        Interval History (Subjective):      I assumed care  p-wave seen on monitor, checking EKG to be sure but appears to be in NSR  bp stable  Off dilt drip  On clears per CRS  Otherwise feels ok  Ok to transfer out of the ICU                  Physical Exam:      Last Vital Signs:  /66  Pulse 80  Temp 98.4  F (36.9  C) (Axillary)  Resp 11  Ht 1.524 m (5')  Wt 54.6 kg (120 lb 5.9 oz)  SpO2 94%  BMI 23.51 kg/m2    I/O last 3 completed shifts:  In: 4233.71 [P.O.:435; I.V.:3298.71; IV Piggyback:500]  Out: 1690 [Urine:1550; Drains:40; Stool:100]     Constitutional: Awake, alert, cooperative, no apparent distress   Respiratory: Clear to auscultation bilaterally, no crackles or wheezing   Cardiovascular:  RRR.   Abdomen: hypoactive bowel sounds, soft, non-distended, tender in the suprapubic area   Skin: No rashes, no cyanosis, dry to touch   Neuro: Alert and oriented x3, no weakness, numbness, memory loss   Extremities: No edema.   Other(s):        All other systems: Negative          Medications:      All current medications  were reviewed with changes reflected in problem list.         Data:        Labs/Imaging:  Results for orders placed or performed during the hospital encounter of 09/30/18 (from the past 24 hour(s))   Glucose by meter   Result Value Ref Range    Glucose 115 (H) 70 - 99 mg/dL   Magnesium (AM Draw)   Result Value Ref Range    Magnesium 1.7 1.6 - 2.3 mg/dL   Phosphorus   Result Value Ref Range    Phosphorus 2.2 (L) 2.5 - 4.5 mg/dL   CBC with platelets   Result Value Ref Range    WBC 10.9 4.0 - 11.0 10e9/L    RBC Count 2.64 (L) 3.8 - 5.2 10e12/L    Hemoglobin 9.0 (L) 11.7 - 15.7 g/dL    Hematocrit 27.0 (L) 35.0 - 47.0 %     (H) 78 - 100 fl    MCH 34.1 (H) 26.5 - 33.0 pg    MCHC 33.3 31.5 - 36.5 g/dL    RDW 14.1 10.0 - 15.0 %    Platelet Count 243 150 - 450 10e9/L   Comprehensive metabolic panel   Result Value Ref Range    Sodium 132 (L) 133 - 144 mmol/L    Potassium 4.2 3.4 - 5.3 mmol/L    Chloride 100 94 - 109 mmol/L    Carbon Dioxide 27 20 - 32 mmol/L    Anion Gap 5 3 - 14 mmol/L    Glucose 90 70 - 99 mg/dL    Urea Nitrogen 5 (L) 7 - 30 mg/dL    Creatinine 0.56 0.52 - 1.04 mg/dL    GFR Estimate >90 >60 mL/min/1.7m2    GFR Estimate If Black >90 >60 mL/min/1.7m2    Calcium 7.2 (L) 8.5 - 10.1 mg/dL    Bilirubin Total 0.7 0.2 - 1.3 mg/dL    Albumin 1.9 (L) 3.4 - 5.0 g/dL    Protein Total 5.1 (L) 6.8 - 8.8 g/dL    Alkaline Phosphatase 42 40 - 150 U/L    ALT 11 0 - 50 U/L    AST 23 0 - 45 U/L

## 2018-10-04 NOTE — PLAN OF CARE
Problem: Patient Care Overview  Goal: Plan of Care/Patient Progress Review  Outcome: Therapy, progress toward functional goals as expected  ICU End of Shift Summary.  For vital signs and complete assessments, please see documentation flowsheets.     Pertinent assessments: VSS,  Heart rate is controlled and pt converted to SR this shift. Pt denies pain while at rest but does report some abdominal discomfort with activity. Pt is utilizing PCA dilaudid. A febrile. NARAYAN draining serosanguinous fluid and colostomy is draining green/brown watery stool. Pt denies passing gas per rectum but pt is belching.   Major Shift Events: Cardizem gtt turned off this morning. Got pt into the chair with 2 assist. Heart rate remained controlled throughout activity.  Noted 02 sats dropped mid 80% while on RA. Encouraged IS use this shift.   Plan (Upcoming Events): Per nutrition the plan is to place a feeding tube via fluoroscopy. Plan is to start feeds.   Discharge/Transfer Needs: TBD, Pt may be appropriate to transfer out of ICU today or tomorrow.     Bedside Shift Report Completed :   Bedside Safety Check Completed:

## 2018-10-04 NOTE — PLAN OF CARE
Problem: Patient Care Overview  Goal: Plan of Care/Patient Progress Review  Discharge Planner PT   PT orders received and eval completed.   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  Pt underwent sigmoid colectomy with end colostomy on 10/2 with post op a fib and RVR.  Pt lives in a twin home with her  with 1 step to enter.  All living area on main level.   able to assist with housework and ADLs as needed.     **Patient plan for discharge:  Return home with assist of spouse and daughter in law from [place] (DIL plans to fly in and stay as long as needed - per pt)  Current status: Pt sitting up in chair on 2L O2 with multiple lines, IV, hannah.  Pt reports 6/10 abdominal  Pain.  Pt transferred sit <> stand with Min A x 2. Pt able to take ~ 6 small sidesteps with B hand held assist to move 3' from recliner to bed.  Seated scooting up bed with SBA.  Sit > sup via L sidely with Mod A at LEs and trunk and VCs for log roll technique.  Pt unsteady on feet and reports feeling dizzy and nauseated.  Pt desaturated to 84% when O2 removed for transfer.    Barriers to return to prior living situation: 1 step to enter, current level of mobility, poor activity tolerance and pain  Recommendations for discharge: Home with Home PT and assist of family  Rationale for recommendations: Continued skilled PT necessary to progress independence with all mobility, increase activity tolerance, strength and balance to enable pt to navigate her home and community environments safely.       Entered by: Paola Rolon 10/04/2018 11:03 AM

## 2018-10-04 NOTE — PLAN OF CARE
Problem: Patient Care Overview  Goal: Plan of Care/Patient Progress Review  Outcome: No Change  .ICU End of Shift Summary.  For vital signs and complete assessments, please see documentation flowsheets.     Pertinent assessments: A&O.  Not out of bed this shift.  Afebrile.  HR tachycardic-tele Afib RVR.  On Cardizem gtt; titrated down to 2.5 mg/hr.  LS clear; using IS appropriately. Patient is on 2 L NC.  Denies any shortness of breath.  Abdominal pain; Dilaudid PCA per patient. Abdomen soft/tender.  BS active.  NARAYAN drain to RLQ with red output.  Colostomy to LLQ with water brown output.  Midline abdominal incision c/d/i.  Complained of nausea; Zofran and Compazine with some improvement.  Tolerating sips of clears and ice chips.  Babb patent with adequate urine output.    Major Shift Events: Titrated Cardizem gtt down; unable to turn off yet..HR up to low 100's at times.    Plan (Upcoming Events): continue to monitor HR and BP.  Continue current plan of care.    Discharge/Transfer Needs: TBD    Bedside Shift Report Completed :   Bedside Safety Check Completed:

## 2018-10-04 NOTE — PROGRESS NOTES
" 10/04/18 0900   Quick Adds   Type of Visit Initial PT Evaluation   Living Environment   Lives With spouse   Living Arrangements house  (twin house)   Home Accessibility stairs to enter home;stairs within home   Number of Stairs to Enter Home 1   Number of Stairs Within Home 12   Transportation Available car;family or friend will provide   Living Environment Comment Pt lives with her  in a twin home with all living area on main level.  Pt and  drive; however,  has macular degenertion and not able to drive at night.   Self-Care   Dominant Hand right   Usual Activity Tolerance good   Current Activity Tolerance fair   Regular Exercise no   Equipment Currently Used at Home cane, straight   Activity/Exercise/Self-Care Comment Pt reports being I with all ADLs.  Pt has a walk in shower.  Pt and  share cooking and housework.   Functional Level Prior   Ambulation 0-->independent   Transferring 0-->independent   Toileting 0-->independent   Bathing 0-->independent   Dressing 0-->independent   Eating 0-->independent   Communication 0-->understands/communicates without difficulty   Swallowing 0-->swallows foods/liquids without difficulty   Cognition 0 - no cognition issues reported   Fall history within last six months no   Which of the above functional risks had a recent onset or change? ambulation   Prior Functional Level Comment Pt reports spending time in her office paying bills and using her ipad.   General Information   Onset of Illness/Injury or Date of Surgery - Date 09/30/18   Referring Physician Dr. 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  Pt underwent sigmoid colectomy with end colostomy on 10/2 with post op a fib and RVR.     Precautions/Limitations fall precautions   Weight-Bearing Status - LLE full weight-bearing   Weight-Bearing Status - RLE full weight-bearing   Heart Disease Risk Factors High blood pressure;Medical history;Age   General Observations Pt sitting up in recliner.  Pt reports    General Info Comments Vitals in supine: O2 sats 95% on 2L, HR 77-80 bpm at rest.  Pt desaturated to 84% when NC removed for stand pivot transfer   Cognitive Status Examination   Orientation orientation to person, place and time   Level of Consciousness alert   Follows Commands and Answers Questions 100% of the time;able to follow multistep instructions   Personal Safety and Judgment intact   Memory intact   Pain Assessment   Patient Currently in Pain Yes, see Vital Sign flowsheet  (6/10 abdominal pain)   Integumentary/Edema   Integumentary/Edema Comments Central line, abdominal incision   Posture    Posture Forward head position;Protracted shoulders   Range of Motion (ROM)   ROM Comment B hip flex to 90 deg, limited by pain in abdomen.  Tight hamstrings B with SLR to ~ 45 deg.     Strength   Strength Comments No MMT performed due to nausea and pt wanting to return to bed ASAP   Bed Mobility   Bed Mobility Comments Sit > L sidely > sup with Mod A at LEs and Min A at trunk, without bedrail.  Step by step VCs for sequencing.   Transfer Skills   Transfer Comments Sit <> stand from recliner with Min A x 2     Gait   Gait Comments Pt took ~ 6 small sidesteps from recliner to bed with B HHA for balance and to monitor lines   Balance   Balance Comments Pt unsteady on her feet and reports feeling dizzy requiring B UE support.  Pt able to sit on EOB with SBA and maintain balance.     Sensory  "Examination   Sensory Perception no deficits were identified   Coordination   Coordination no deficits were identified   Muscle Tone   Muscle Tone no deficits were identified   General Therapy Interventions   Planned Therapy Interventions balance training;bed mobility training;gait training;neuromuscular re-education;ROM;strengthening;stretching;transfer training;risk factor education;home program guidelines;progressive activity/exercise   Clinical Impression   Criteria for Skilled Therapeutic Intervention yes, treatment indicated   PT Diagnosis Impaired gait, decreased functional mobility   Influenced by the following impairments Decreased activity tolerance, impaired gait, pt desaturated on room air, decreased LE strength and ROM, impaired balance, PO abdominal pain   Functional limitations due to impairments assissted mobility, unable to amb functional household or community distances, increased falls risk   Clinical Presentation Evolving/Changing   Clinical Presentation Rationale multiple comorbidities and changing status   Clinical Decision Making (Complexity) Moderate complexity   Therapy Frequency` daily   Predicted Duration of Therapy Intervention (days/wks) 7 days   Anticipated Equipment Needs at Discharge front wheeled walker   Anticipated Discharge Disposition Home with Assist;Home with Home Therapy   Risk & Benefits of therapy have been explained Yes   Patient, Family & other staff in agreement with plan of care Yes   [facility] TM \"6 Clicks\"   2016, Trustees of [facility], under license to [employer].  All rights reserved.   6 Clicks Short Forms Basic Mobility Inpatient Short Form   [facility] AM-PAC  \"6 Clicks\" V.2 Basic Mobility Inpatient Short Form   1. Turning from your back to your side while in a flat bed without using bedrails? 3 - A Little   2. Moving from lying on your back to sitting on the side of a flat bed without using bedrails? 2 - A Lot   3. Moving to and " from a bed to a chair (including a wheelchair)? 3 - A Little   4. Standing up from a chair using your arms (e.g., wheelchair, or bedside chair)? 3 - A Little   5. To walk in hospital room? 3 - A Little   6. Climbing 3-5 steps with a railing? 2 - A Lot   Basic Mobility Raw Score (Score out of 24.Lower scores equate to lower levels of function) 16   Total Evaluation Time   Total Evaluation Time (Minutes) 10

## 2018-10-04 NOTE — PROGRESS NOTES
Focus: Ostomy  D; POD #2 attempted to meet with pt to continue education but she stated she did not feel well and wanted to sleep. She currently had the hiccups.   I: discussion with pt and RN  A/P: viable stoma, non functioning, RRC in place and intact pouch.  Will return tomorrow for first pouch change.

## 2018-10-04 NOTE — PROGRESS NOTES
"CLINICAL NUTRITION SERVICES - BRIEF NOTE    - Refer to RD assessment completed 10/02/2018.  Meeting severe malnutrition criteria.  - Exploratory lap with open sigmoid colectomy, end colostomy, extensive lysis of adhesions and takedown of colovesical fistula 10/02/2018.  - Diet advanced to clears per CRS post-operatively.  - Remains on clears, no advancement given nausea and negligible ostomy outputs thus far.    - Per CRS note this AM \"recommend starting TPN today - will defer to intensivist team on using one of the current lines of placing a new line for this\".  Discussed with team during rounds, Hospitalist deferring TPN start/overall nutrition POC to CRS.  - Paged CRS to discuss.  Awaiting call-back.  Question ability to trial trophic feeds.    Dolores Enriquez RD, LD  Clinical Dietitian  3rd floor/ICU: [phone]  All other floors: [phone]  Weekend/holiday: [phone]      Addendum:   - Discussed POC with CRS PA and received TF assess and order consult.  - OK for initiation of trophic feeds.  Per CRS would like FT to be post-pyloric and placed in IR.     - Medications reviewed.  - Labs reviewed.  - Will aim for fiber-free formula.    - Goal regimen pending overall clinical course and tolerance.  - Peptamen 1.5 at 10 ml/hr x 24 hrs.    - Water flushes of 60 ml q 4 hrs for tube patency.  - Updated orders and discussed with RN.  Will continue following.      Dolores Enriquez RD, LD  Clinical Dietitian  3rd floor/ICU: [phone]  All other floors: [phone]  Weekend/holiday: [phone]      "

## 2018-10-05 LAB
ANION GAP SERPL CALCULATED.3IONS-SCNC: 8 MMOL/L (ref 3–14)
BUN SERPL-MCNC: 6 MG/DL (ref 7–30)
CALCIUM SERPL-MCNC: 7.2 MG/DL (ref 8.5–10.1)
CHLORIDE SERPL-SCNC: 95 MMOL/L (ref 94–109)
CO2 SERPL-SCNC: 27 MMOL/L (ref 20–32)
CREAT SERPL-MCNC: 0.54 MG/DL (ref 0.52–1.04)
DIFFERENTIAL METHOD BLD: NORMAL
ERYTHROCYTE [DISTWIDTH] IN BLOOD BY AUTOMATED COUNT: NORMAL % (ref 10–15)
GFR SERPL CREATININE-BSD FRML MDRD: >90 ML/MIN/1.7M2
GLUCOSE BLDC GLUCOMTR-MCNC: 102 MG/DL (ref 70–99)
GLUCOSE BLDC GLUCOMTR-MCNC: 98 MG/DL (ref 70–99)
GLUCOSE SERPL-MCNC: 93 MG/DL (ref 70–99)
HCT VFR BLD AUTO: NORMAL % (ref 35–47)
HGB BLD-MCNC: NORMAL G/DL (ref 11.7–15.7)
MAGNESIUM SERPL-MCNC: 1.9 MG/DL (ref 1.6–2.3)
MCH RBC QN AUTO: NORMAL PG (ref 26.5–33)
MCHC RBC AUTO-ENTMCNC: NORMAL G/DL (ref 31.5–36.5)
MCV RBC AUTO: NORMAL FL (ref 78–100)
PHOSPHATE SERPL-MCNC: 2.2 MG/DL (ref 2.5–4.5)
PLATELET # BLD AUTO: NORMAL 10E9/L (ref 150–450)
POTASSIUM SERPL-SCNC: 3.4 MMOL/L (ref 3.4–5.3)
RBC # BLD AUTO: NORMAL 10E12/L (ref 3.8–5.2)
SODIUM SERPL-SCNC: 130 MMOL/L (ref 133–144)
TRIGL SERPL-MCNC: 86 MG/DL
WBC # BLD AUTO: NORMAL 10E9/L (ref 4–11)

## 2018-10-05 PROCEDURE — 25000128 H RX IP 250 OP 636: Performed by: INTERNAL MEDICINE

## 2018-10-05 PROCEDURE — 25000128 H RX IP 250 OP 636: Performed by: COLON & RECTAL SURGERY

## 2018-10-05 PROCEDURE — 25000125 ZZHC RX 250: Performed by: INTERNAL MEDICINE

## 2018-10-05 PROCEDURE — 80048 BASIC METABOLIC PNL TOTAL CA: CPT | Performed by: INTERNAL MEDICINE

## 2018-10-05 PROCEDURE — 12000007 ZZH R&B INTERMEDIATE

## 2018-10-05 PROCEDURE — 3E0436Z INTRODUCTION OF NUTRITIONAL SUBSTANCE INTO CENTRAL VEIN, PERCUTANEOUS APPROACH: ICD-10-PCS | Performed by: INTERNAL MEDICINE

## 2018-10-05 PROCEDURE — 00000146 ZZHCL STATISTIC GLUCOSE BY METER IP

## 2018-10-05 PROCEDURE — G0463 HOSPITAL OUTPT CLINIC VISIT: HCPCS

## 2018-10-05 PROCEDURE — 84100 ASSAY OF PHOSPHORUS: CPT | Performed by: INTERNAL MEDICINE

## 2018-10-05 PROCEDURE — 83735 ASSAY OF MAGNESIUM: CPT | Performed by: INTERNAL MEDICINE

## 2018-10-05 PROCEDURE — 99233 SBSQ HOSP IP/OBS HIGH 50: CPT | Performed by: INTERNAL MEDICINE

## 2018-10-05 PROCEDURE — 84478 ASSAY OF TRIGLYCERIDES: CPT | Performed by: INTERNAL MEDICINE

## 2018-10-05 RX ORDER — LIDOCAINE 40 MG/G
CREAM TOPICAL
Status: DISCONTINUED | OUTPATIENT
Start: 2018-10-05 | End: 2018-10-11 | Stop reason: CLARIF

## 2018-10-05 RX ORDER — HEPARIN SODIUM,PORCINE 10 UNIT/ML
2-5 VIAL (ML) INTRAVENOUS
Status: COMPLETED | OUTPATIENT
Start: 2018-10-05 | End: 2018-10-10

## 2018-10-05 RX ADMIN — Medication 2 G: at 10:31

## 2018-10-05 RX ADMIN — TAZOBACTAM SODIUM AND PIPERACILLIN SODIUM 3.38 G: 375; 3 INJECTION, SOLUTION INTRAVENOUS at 00:18

## 2018-10-05 RX ADMIN — ONDANSETRON 4 MG: 2 INJECTION INTRAMUSCULAR; INTRAVENOUS at 04:22

## 2018-10-05 RX ADMIN — TAZOBACTAM SODIUM AND PIPERACILLIN SODIUM 3.38 G: 375; 3 INJECTION, SOLUTION INTRAVENOUS at 06:26

## 2018-10-05 RX ADMIN — PROCHLORPERAZINE EDISYLATE 5 MG: 5 INJECTION INTRAMUSCULAR; INTRAVENOUS at 06:54

## 2018-10-05 RX ADMIN — SODIUM CHLORIDE, POTASSIUM CHLORIDE, SODIUM LACTATE AND CALCIUM CHLORIDE: 600; 310; 30; 20 INJECTION, SOLUTION INTRAVENOUS at 05:09

## 2018-10-05 RX ADMIN — POTASSIUM CHLORIDE 20 MEQ: 400 INJECTION, SOLUTION INTRAVENOUS at 14:52

## 2018-10-05 RX ADMIN — ENOXAPARIN SODIUM 40 MG: 40 INJECTION SUBCUTANEOUS at 15:04

## 2018-10-05 RX ADMIN — ASCORBIC ACID, VITAMIN A PALMITATE, CHOLECALCIFEROL, THIAMINE HYDROCHLORIDE, RIBOFLAVIN-5 PHOSPHATE SODIUM, PYRIDOXINE HYDROCHLORIDE, NIACINAMIDE, DEXPANTHENOL, ALPHA-TOCOPHEROL ACETATE, VITAMIN K1, FOLIC ACID, BIOTIN, CYANOCOBALAMIN: 200; 3300; 200; 6; 3.6; 6; 40; 15; 10; 150; 600; 60; 5 INJECTION, SOLUTION INTRAVENOUS at 20:02

## 2018-10-05 RX ADMIN — TAZOBACTAM SODIUM AND PIPERACILLIN SODIUM 3.38 G: 375; 3 INJECTION, SOLUTION INTRAVENOUS at 13:59

## 2018-10-05 RX ADMIN — TAZOBACTAM SODIUM AND PIPERACILLIN SODIUM 3.38 G: 375; 3 INJECTION, SOLUTION INTRAVENOUS at 17:07

## 2018-10-05 RX ADMIN — SODIUM PHOSPHATE, MONOBASIC, MONOHYDRATE 15 MMOL: 276; 142 INJECTION, SOLUTION INTRAVENOUS at 15:56

## 2018-10-05 RX ADMIN — PROCHLORPERAZINE EDISYLATE 5 MG: 5 INJECTION INTRAMUSCULAR; INTRAVENOUS at 04:58

## 2018-10-05 ASSESSMENT — ACTIVITIES OF DAILY LIVING (ADL)
ADLS_ACUITY_SCORE: 11
ADLS_ACUITY_SCORE: 14
ADLS_ACUITY_SCORE: 14
ADLS_ACUITY_SCORE: 13
ADLS_ACUITY_SCORE: 14
ADLS_ACUITY_SCORE: 14

## 2018-10-05 NOTE — PROGRESS NOTES
[facility]ON [facility]  PROGRESS NOTE    October 5, 2018  Post-op Day # 3 open sigmoid colectomy with colostomy    SUBJECTIVE:  Patient slept throughout rounds this morning.  NJ feeding tube placed yesterday, but had several bouts of emesis.  NGT placed to LIS.  Scant stoma output.    OBJECTIVE:  Temp:  [97.5  F (36.4  C)-99.3  F (37.4  C)] 97.6  F (36.4  C)  Heart Rate:  [] 100  Resp:  [9-16] 16  BP: (131-172)/(56-85) 172/85  SpO2:  [91 %-100 %] 94 %    Intake/Output Summary (Last 24 hours) at 10/05/18 0825  Last data filed at 10/05/18 0426   Gross per 24 hour   Intake          1104.41 ml   Output             1880 ml   Net          -775.59 ml       GENERAL:  Patient slept through most of morning round visit today  HEAD: Normocephalic atraumatic  SCLERA: Anicteric  EXTREMITIES: Warm and well perfused  ABDOMEN:  Soft, non-distended. No guarding, rigidity, or peritoneal signs. Stoma pink with RRC. RRC flushed by Dr. Reddy.  NGT in place with dark bilious output. NGT also flushed by dr. Reddy  INCISION:  C/d/i, dermabond    LABS:  Lab Results   Component Value Date    WBC Canceled, Test credited 10/05/2018     Lab Results   Component Value Date    HGB Canceled, Test credited 10/05/2018     Lab Results   Component Value Date    HCT Canceled, Test credited 10/05/2018     Lab Results   Component Value Date    PLT Canceled, Test credited 10/05/2018     Last Basic Metabolic Panel:  Lab Results   Component Value Date     10/05/2018      Lab Results   Component Value Date    POTASSIUM 3.4 10/05/2018     Lab Results   Component Value Date    CHLORIDE 95 10/05/2018     Lab Results   Component Value Date    PAULINA 7.2 10/05/2018     Lab Results   Component Value Date    CO2 27 10/05/2018     Lab Results   Component Value Date    BUN 6 10/05/2018     Lab Results   Component Value Date    CR 0.54 10/05/2018     Lab Results   Component Value Date    GLC 93 10/05/2018       ASSESSMENT/PLAN: POD#3 open sigmoid colectomy with  colostomy. Feeding tube placed yesterday but feeds not started as patient ad several episodes of emesis.  NGT placed to LIS this morning.    1. NPO. Continue NGT. Start TPN.  2. Continue PCA  3. Continue IVF  4. Continue Babb. Plan for cystogram POD#5 (may need to be deferred to POD#6 if cysto not offered over weekend).  5. Continue Zosyn day 3/5  6. Lovenox for ppx  7. Appreciate ongoing assistance from hospitalist team      For questions/paging, please contact the CRS office at [phone].    Haley Smith PA-C  [facility]  Phone: [phone]

## 2018-10-05 NOTE — PLAN OF CARE
Problem: Patient Care Overview  Goal: Plan of Care/Patient Progress Review    PT: Attempted to see patient, patient sleeping heavily.  Per RN, patient with difficult night last night (awake, nausea/vomiting for most of night).  Patient with limited tolerance to transfer between bed and chair with PT yesterday (increased nausea, decreased saO2).  Discussed with RN, RN plans to allow patient to sleep, then will trial transfer to chair.  Therapy will hold today, will reschedule for tomorrow.

## 2018-10-05 NOTE — PROGRESS NOTES
"[facility] Nurse Inpatient Ostomy Assessment      Assessment of ostomy and needs for:   Colostomy      Data:   History:      Per MD note(s):  S/P 10/2:  Open sigmoid resection and colostomy with takedown of colovesical fistula    Type of ostomy:  Colostomy  Stoma assessment:   ? Size of stoma:  1 5/8\" round,  viable, pink, moist, edematous and protuberant and RRC in Os  Mucocutaneous Junction (MCJ):  Intact with sutures  Peristomal skin:   intact  Abdominal assessment: tender, midline intact incision  Output:  small, liquid brown effluent    Intake/Output Summary (Last 24 hours) at 10/05/18 1244  Last data filed at 10/05/18 1046   Gross per 24 hour   Intake              490 ml   Output             4340 ml   Net            -3850 ml     Current pouching system:  Ranjan,  one piece, flat and placed in OR   Pain:  Cramping     Diet:         Active Diet Order      NPO for Medical/Clinical Reasons Except for: Meds  Labs:   Recent Labs   Lab Test  10/05/18   0710  10/04/18   0522   10/02/18   0517   ALBUMIN   --   1.9*   < >   --    HGB  Canceled, Test credited  9.0*   < >  10.2*   INR   --    --    --   1.21*   WBC  Canceled, Test credited  10.9   < >  7.9    < > = values in this interval not displayed.           Intervention:     Patient's chart evaluated.      Assessments done today:  Stoma and pouch change    Education: first pouch change, continued stoma with minimal informational handouts discussion    Prepared for discharge by: continue to order and review supplies; Discussed home care and when to follow up with a [facility] Nurse in the future and Discussed how/ where to order supplies    Pt registered for ostomy supply samples? On discharge         Assessment:     Stoma assessment: viable, pink, moist and edematous with intact pouch, effluent is brown fluid from RRC, pt continues with NGT and occasional c/o nausea.    Learning needs/ comprehension: pt was very sleepy today, did not look or observe " pouch change today, no questions or interaction at all with [facility].    Effectiveness of current pouching/ supply plan: TBD         Plan:     Plan:   ? Current pouching system: [facility]    Education:  ? Education continued daily thru stay:  Pouch Emptying and Pouch Replacement, How to cuff and clean pouch, How to empty pouch, Removal of pouch, Preparation of new pouch, Cutting out or evaluating a pattern, Applying paste or rings, Applying appliance to abdomen, Peristomal skin care, Diet and hydration / fluid balance, Odor / flatus management and Lifestyle Adjustments   o Supply company: TBD-   o Do WOC Nurse recommend home care ? Suspect pt will need assistance, has had home health in the past.

## 2018-10-05 NOTE — CONSULTS
CLINICAL NUTRITION SERVICES - REASSESSMENT NOTE  Received TPN consult    MALNUTRITION:  % Weight Loss: Up to 5% in 1 month  % Intake:</= 50% for >/= 5 days (severe malnutrition), <75% of needs for >/=1 month  Subcutaneous Fat Loss:Orbital region moderate depletion (hollowed and dark circles/loose skin) and Upper arm region moderate depletion (lack of ample pinch depth, loose skin)  Muscle Loss:Temporal region moderate depletion, Acromion bone region mild to moderate depletion, Scapular bone region moderate depletion and Dorsal hand region moderate depletion (lower extremities not observed)  Fluid Retention:None noted     Malnutrition Diagnosis: Severe malnutrition  In Context of:  Acute on Chronic illness or disease       EVALUATION OF PROGRESS TOWARD GOALS   Diet: NPO  PO Intake: Was on clears yesterday though minimal amounts orally 2/2 nausea.      Nutrition Support:  NJT placed in IR yesterday with initial plans to begin trophic trial.  Unfortunately began projectile vomiting (multiple episodes) prior to any enteral initiation.  As such, plans for parenteral start per CRS.    Total Intake:  Meeting <50% needs orally and via nutrition support x 5 days since admission, meeting malnutrition criteria upon assessment.         ASSESSED NUTRITION NEEDS (PER APPROVED PRACTICE GUIDELINES, Dosing weight: 54 kg):  Estimated Energy Needs: 4519-6650 kcals (25-30 Kcal/Kg)  Justification: maintenance  Estimated Protein Needs: 65-96 grams protein (1.2-1.5 g pro/Kg)  Justification: post-op and preservation of lean body mass  Estimated Fluid Needs: >1 mL/Kcal  Justification: maintenance      NEW FINDINGS:   - Exploratory lap, open colectomy with end colostomy, lysis of adhesions, and colovesical fistula takedown 10/02/2018.    - Now with large-bore NGT to suction.    - Medications reviewed:    NaCl @ 75 ml/hr    Phos and K replacement protocols in place  - Labs reviewed:   BG <180   K this AM WNL at 3.4   Phos yesterday slightly  low at 2.2, none this AM    Na decreasing, currently 130 --> per MD ok to continue with total fluids as above and monitor   No baseline TG  - Wt trending: likely fluid up.  Vitals:    09/30/18 1110 09/30/18 1730 10/01/18 0500 10/02/18 0300   Weight: 49.9 kg (110 lb) 52 kg (114 lb 10.2 oz) 53.9 kg (118 lb 13.3 oz) 54.6 kg (120 lb 5.9 oz)   - Minimal colostomy outputs.        Previous Goals:   Nutrition support initiation post op vs diet within 48 hours versus   Evaluation: Not met    Previous Nutrition Diagnosis:   Inadequate protein-energy intake related to altered GI function as evidenced by PO intake likely meeting <50% of needs for >1 week, patient report of 4% weight loss in <1 month, fat and muscle loss and sigmoid resection planned 10/2  Evaluation: No change, continued below       CURRENT NUTRITION DIAGNOSIS  Inadequate protein-energy intake related to altered GI function as evidenced by PO intake likely meeting <50% of needs for >1 week, patient report of 4% weight loss in <1 month, fat and muscle loss and sigmoid resection planned 10/2    INTERVENTIONS  Recommendations / Nutrition Prescription  Given meeting malnutrition criteria upon assessment, failed enteral trial with continued altered GI function, will start TPN today per CRS.  Goal regimen outlined below:    - Clinimix (D15, AA5) at goal rate of 65 ml/hr x 24 hrs (1560 ml) + 250 ml 20% lipids 5x/week  - This provides 1465 kcal (27 kcal/kg), 78 g protein (1.4 g/kg), 234 g CHO, GIR: 3, 24% kcal from fat.  - Total fluids (TPN + IVFs) = 75 ml/hr or per MD  - Initiate at 30 ml/hr x 24 hrs if phos add-on >/=1.9  - 10/06: Ok to advance to goal rate if phos >/=1.9 and K >/=2.9    Implementation  Collaboration and Referral of Nutrition care: Discussed POC with team during rounds and with CRS/Dr. Carrero.    Goals  TPN to reach goal rate w/in 48 hrs.  TPN to meet % needs while remains NPO.      MONITORING AND EVALUATION:  Progress towards goals will be  monitored and evaluated per protocol and Practice Guidelines      Dolores Enriquez RD, LD  Clinical Dietitian  3rd floor/ICU: [phone]  All other floors: [phone]  Weekend/holiday: [phone]

## 2018-10-05 NOTE — PROGRESS NOTES
[facility]  Hospitalist Progress Note  Jose M Carrero MD 10/05/18    Reason for Stay (Diagnosis): Sepsis, diverticulitis, colovesicular fistula         Assessment and Plan:      Summary of Stay:   Brea Kerr is a 82 year old female came to attention on 9/30/2018 with stool passing in her urine. She also still had abdominal pain, but which she thought was not much changed from when she had the NARAYAN drain placed on 9/20/18.  Now there is evidence (by CT scan and confirmed by feculent drainage into the NARAYAN drain) that the abscess represents a fistulous tract with the colon. 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  Presenting with sepsis.     Her recent PMH is notable for hospitalization at [facility] from 9/19 - 9/23/18 for perforated diverticulitis with abscess formation.  She underwent placement of a NARAYAN drain into the abscess on 9/20 under CT guidance and was discharged home on oral Cipro and Metronidazole.      More remote PMH notable for prior episodes of sigmoid diverticulitis and associated paroxysmal atrial fibrillation. And after arriving in the ED, the patient developed A fib with RVR (rate > 140) and her blood pressure fell to the 60's for which reason she was admitted to the ICU and was started on Atrial fibrillation and very briefly on Norepi.  However with the Amio and conversion to NSR, her BP came up.     On 10/2/2018 the patient was taken to the operating room by Dr. Reddy and underwent sigmoid colectomy with end colostomy.  She did very well with the procedure but postoperatively has developed recurrent atrial fibrillation with rapid ventricular response.  Again her blood pressure dropped some.     She was admitted to the ICU following this for management of a-fib w/ RVR and hypotension.  Following amiodarone load, diltiazem drip and digoxin she has converted to NSR.  Now stable to transfer out of the ICU.    NG placed with plans for trophic feeds, but persistent frequent vomiting  so is currently on suction.  TPN recommended by surgery team and this will be started today.       Problem List/Assessment and Plan:   Sepsis due to recurrent diverticulitis: Recent hospitalization with NARAYAN drain placement for diverticulitis and abscess.  Colovesicular fistula present.  Now re-presenting with stool drainage and NARAYAN drain and sepsis.  Was on Cipro and Flagyl.  -CRS consulted  -S/P open sigmoid colectomy with end colostomy and takedown of colovesicular fistula.  NARAYAN drain in place  -Continue Zosyn per CRS  -Ostomy nurse consult  -NARAYAN drain in place  -diet per CRS, persistent vomiting so npo with NG low intermittent suction.  Would like to do clamping trial to provide some medication such as amiodarone if tolerated.  -Planning cystogram on Sunday postop day 5 or if cannot be obtained then on Monday.  Only needs to remain in place until then  -On a PCA for pain control    A. fib with RVR: With associated hypotension briefly requiring norepinephrine.  Started on amiodarone.  Intermittently in A. fib and NSR.  Anticoagulation on hold for now  -Cardiology consulted  -Currently on oral amiodarone daily.  If unable to take p.o. due to vomiting and question IV dosing as she is intermittently in A. fib.  Defer to cardiology expertise    Malnutrition: Feeding tube placed, but significant vomiting and have not been able to start tube feeds.  Surgery recommended TPN and dietitian and pharmacy consulted to start this today.  Will use right IJ CVC.  Follow labs.    Acute hyponatremia: Sodium down to 130.  Possible GI losses versus SIADH from vomiting.  Repeat BMP tomorrow after starting TPN.    Anemia: Suspect combination of phlebotomy, postsurgical blood loss, and inflammatory process.  -CBC tomorrow    HTN: Actually hypertensive here when in A. fib with RVR so antihypertensives including metoprolol 25 mg twice daily and losartan 100 mg daily have been on hold.    Inflammatory polyarthropathy: Had been on Methotrexate  but this has been on hold due to recent infection.  Continue to hold.      History of DVT, PE: Not currently on anticoagulant with surgery.  On prophylactic Lovenox dosing.    DVT Prophylaxis: Enoxaparin (Lovenox) SQ  Code Status: Full Code  FEN: N.p.o. while having significant vomiting.  Start TPN per surgery recommendations.  If nausea vomiting improves could consider starting tube feeds  Lines: Continue right IJ CVC for now while starting TPN.  If not able to start trophic feeds in the next couple of days would consider swabbing for PICC line.  Continue Babb until cystogram which will likely happen Sunday or Monday  Discharge Dispo: TBD  Estimated Disch Date / # of Days until Disch: Multiple more day hospitalization.  Planning cystogram on Sunday or Monday        Interval History (Subjective):      Assumed care today.  Patient known to me from last admission.  Transferred from ICU due to improved condition.  Was in A. fib this morning now normal sinus rhythm.  Significant vomiting all night so NG was placed to low intermittent suction.  Not having much abdominal pain on Dilaudid PCA.  Starting TPN today.  No fevers or chills.  Denies shortness of breath.                  Physical Exam:      Last Vital Signs:  /58  Pulse 80  Temp 98.3  F (36.8  C) (Oral)  Resp 18  Ht 1.524 m (5')  Wt 54.6 kg (120 lb 5.9 oz)  SpO2 97%  BMI 23.51 kg/m2      Intake/Output Summary (Last 24 hours) at 10/05/18 1601  Last data filed at 10/05/18 1417   Gross per 24 hour   Intake              793 ml   Output             4725 ml   Net            -3932 ml       Constitutional: Awake, NAD  Eyes: sclera white   HEENT: NG in place draining green bile.  Right IJ C/D/I  Respiratory:   lungs cta bilaterally, no crackles or wheeze  Cardiovascular: RRR.  No murmur   GI: Mild diffuse tenderness.  NARAYAN drain in place.  Ostomy site red  Genitourinary: Babb in place  Skin: no rash    Musculoskeletal/extremities:   Trace bilateral lower  extremity  Neurologic: A&O   Psychiatric: calm, cooperative, flat affect         Medications:      All current medications were reviewed with changes reflected in problem list.         Data:      All new lab and imaging data was reviewed.   Labs:    Recent Labs  Lab 09/30/18  1217 09/30/18  1140   CULT No growth after 5 days No growth after 5 days       Recent Labs  Lab 10/05/18  0710 10/04/18  0522 10/03/18  0530   * 132* 136   POTASSIUM 3.4 4.2 4.2   CHLORIDE 95 100 105   CO2 27 27 23   ANIONGAP 8 5 8   GLC 93 90 121*   BUN 6* 5* 3*   CR 0.54 0.56 0.65   GFRESTIMATED >90 >90 87   GFRESTBLACK >90 >90 >90   PAULINA 7.2* 7.2* 6.8*       Recent Labs  Lab 10/05/18  0710 10/04/18  0522 10/03/18  0530   WBC Canceled, Test credited 10.9 13.6*   HGB Canceled, Test credited 9.0* 9.8*   HCT Canceled, Test credited 27.0* 30.1*   MCV Canceled, Test credited 102* 104*   PLT Canceled, Test credited [id number]      Imaging:   Recent Results (from the past 24 hour(s))   XR Feeding Tube Placement    Narrative    FEEDING TUBE PLACEMENT   10/4/2018 5:10 PM    HISTORY: Nutritional needs.    COMPARISON: None    FINDINGS: 5.5 minutes of fluoroscopy were utilized for placement of a  feeding tube.  At the final position, the feeding tube tip was  postpyloric but remains in proximal duodenum.  40 mL of contrast was  injected to confirm placement.  There were no complications of the  procedure.    Medications used: 40 mL Gastroview    Number of Images: 1    Impression    IMPRESSION: Successful feeding tube placement. Feeding tube tip is  just postpyloric, but a length of tube is coiled in stomach. Follow-up  abdominal film ordered to ensure no retraction of tube tip prior to  feeding.    CHERYL SALAZAR PA-C   XR Abdomen Port 1 View    Narrative    XR ABDOMEN PORT 1 VW 10/4/2018 8:23 PM    COMPARISON: None.    HISTORY: Check feeding tube.      Impression    IMPRESSION: Metallic-tip feeding tube tip at the junction of the third  and fourth  portions of the duodenum. Nonobstructive bowel gas pattern.    MD Jose M LIVE MD

## 2018-10-05 NOTE — PROGRESS NOTES
Persistent vomiting.  Start TPN per CRS recs.  Will use R internal jugular central line for now, does not need PICC.  Trial of NG clamping later today to see if can given any essential meds such as amiodarone.  If does not go well then would ask cards regarding stopping vs IV dosing.  Probably continuing a good idea given she was in afib earlier today before converting to NSR.

## 2018-10-05 NOTE — PLAN OF CARE
Problem: Patient Care Overview  Goal: Plan of Care/Patient Progress Review  Outcome: No Change  ICU End of Shift Summary.  For vital signs and complete assessments, please see documentation flowsheets.     Pertinent assessments: Care [id number]  Major Shift Events: Feeding tube placed in IR.  Persistent nausea not relived with Zofran, frequent belching, x1 emesis 300 ml bile color watery constancy.  MD Vogal aware and instructed not to start tube feeding and ok to place NG if vomiting continues.  TOF and transferred to floor.    Plan (Upcoming Events):   Discharge/Transfer Needs:     Bedside Shift Report Completed :   Bedside Safety Check Completed:

## 2018-10-05 NOTE — PROGRESS NOTES
Colon & Rectal Surgery Progress Note             Interval History:   Postop Day #: 3  Sleeping this AM, no nausea since NGT. 2L output.  Adequate UOP.  Minimal pain.  HR 90's                Medications:   I have reviewed this patient's current medications               Physical Exam:   Blood pressure 118/58, pulse 80, temperature 98.3  F (36.8  C), temperature source Oral, resp. rate 18, height 1.524 m (5'), weight 54.6 kg (120 lb 5.9 oz), SpO2 97 %.    Intake/Output Summary (Last 24 hours) at 10/05/18 0920  Last data filed at 10/05/18 0831   Gross per 24 hour   Intake           932.91 ml   Output             3430 ml   Net         -2497.09 ml     GEN:  somnolent but arousable  ABD:  Round, incision CDI, stoma pink, RRC flushed with thick green output         Data:        Lab Results   Component Value Date     10/05/2018    Lab Results   Component Value Date    CHLORIDE 95 10/05/2018    Lab Results   Component Value Date    BUN 6 10/05/2018      Lab Results   Component Value Date    POTASSIUM 3.4 10/05/2018    Lab Results   Component Value Date    CO2 27 10/05/2018    Lab Results   Component Value Date    CR 0.54 10/05/2018    CR 0.56 10/04/2018        Lab Results   Component Value Date    HGB Canceled, Test credited 10/05/2018    HGB 9.0 (L) 10/04/2018     Lab Results   Component Value Date    PLT Canceled, Test credited 10/05/2018     10/04/2018     Lab Results   Component Value Date    WBC Canceled, Test credited 10/05/2018    WBC 10.9 10/04/2018            Assessment and Plan:   POD 3 open sigmoid colectomy and end colostomy with takedown fisutla  Ileus: needs TPN, NGT  Colovesical fistula: Keep hannah until Monday, will get a cystogram  Continue NARAYAN for now.  Cardiac/afib: management per Hospitalist  Encourage ambulation, mirtha Reddy MD  [facility]  Office Phone # [phone]

## 2018-10-05 NOTE — PLAN OF CARE
Problem: Patient Care Overview  Goal: Plan of Care/Patient Progress Review  Outcome: Declining  VSS alert/oriented continues on PCA dilaudid at  0.3mg denies pain reports abdominal cramping. Projectile X2 episodes 150 cc output and 450 cc out put.MD paged after second emesis episode new order to place BG tube to suction. IV zofran and compazine utilizes for nausea and vomiting.  0538 NG place on low intermittent suction. Went in to Afib RVR heart rate 130s.  NG output after placement 800 cc dark green bile content.  scheduled tylenol not given feeding tube out for NG tube placement .  Cath cares done colostomy draining small dark fluid NARAYAN drain sanguineous drainage.  Coccyx red and blanchable.  NPO for clinical  Tele SR w /inverted T waves

## 2018-10-05 NOTE — PLAN OF CARE
Problem: Patient Care Overview  Goal: Plan of Care/Patient Progress Review  Outcome: Declining  VSS alert/oriented continues on PCA dilaudid at  0.3mg denies pain reports abdominal cramping. Projectile X2 episodes 150 cc output and 450 cc out put.MD paged after second emesis episode new order to place BG tube to suction. IV zofran and compazine utilizes for nausea and vomiting.  0538 NG place on low intermittent suction. Went in to Afib RVR heart rate 130s.

## 2018-10-05 NOTE — PLAN OF CARE
Problem: Patient Care Overview  Goal: Plan of Care/Patient Progress Review  Outcome: No Change  Patient mag 1.9, administered mag replacement per orders, lab will recheck mag in AM.  Patient has PCA, colostomy, catheter, NARAYAN drain.  LR infusing 75 per hour through right internal jugular.  MD placed orders for TPN and lipids.  Colostomy bag changed by WOC today.  Patient has no nausea or vomiting.  NG tube for decompression, with moderate amounts of green drainage. K (3.4) and phos (2.2) to be replaced today.

## 2018-10-05 NOTE — PLAN OF CARE
Problem: Patient Care Overview  Goal: Plan of Care/Patient Progress Review  Outcome: No Change  Transferred to third floor telemetry at 1900. Vitals stable and afebrile. Pain eased with iv dilaudid pca. States she wants to sleep this evening. Settled into her room, family went home. Pt slept. Woke up at 2140 with emesis. Cleaned up and given zofran prn. Feeding tube intact but clamped and not infusing. Iv maintenance at 75ml/hr. Receiving iv antibiotics. Colostomy patent and NARAYAN to bulb suction. Midline incision clean, dry and intact. Babb patent.

## 2018-10-06 ENCOUNTER — APPOINTMENT (OUTPATIENT)
Dept: PHYSICAL THERAPY | Facility: CLINIC | Age: 83
DRG: 853 | End: 2018-10-06
Payer: MEDICARE

## 2018-10-06 LAB
ALBUMIN SERPL-MCNC: 1.6 G/DL (ref 3.4–5)
ALP SERPL-CCNC: 40 U/L (ref 40–150)
ALT SERPL W P-5'-P-CCNC: 9 U/L (ref 0–50)
ANION GAP SERPL CALCULATED.3IONS-SCNC: 4 MMOL/L (ref 3–14)
AST SERPL W P-5'-P-CCNC: 20 U/L (ref 0–45)
BACTERIA SPEC CULT: NO GROWTH
BACTERIA SPEC CULT: NO GROWTH
BASOPHILS # BLD AUTO: 0 10E9/L (ref 0–0.2)
BASOPHILS NFR BLD AUTO: 0.2 %
BILIRUB DIRECT SERPL-MCNC: 0.2 MG/DL (ref 0–0.2)
BILIRUB SERPL-MCNC: 0.6 MG/DL (ref 0.2–1.3)
BUN SERPL-MCNC: 7 MG/DL (ref 7–30)
CALCIUM SERPL-MCNC: 6.7 MG/DL (ref 8.5–10.1)
CHLORIDE SERPL-SCNC: 99 MMOL/L (ref 94–109)
CO2 SERPL-SCNC: 33 MMOL/L (ref 20–32)
CREAT SERPL-MCNC: 0.56 MG/DL (ref 0.52–1.04)
DIFFERENTIAL METHOD BLD: ABNORMAL
EOSINOPHIL # BLD AUTO: 0.4 10E9/L (ref 0–0.7)
EOSINOPHIL NFR BLD AUTO: 2.2 %
ERYTHROCYTE [DISTWIDTH] IN BLOOD BY AUTOMATED COUNT: 13.9 % (ref 10–15)
GFR SERPL CREATININE-BSD FRML MDRD: >90 ML/MIN/1.7M2
GLUCOSE BLDC GLUCOMTR-MCNC: 152 MG/DL (ref 70–99)
GLUCOSE BLDC GLUCOMTR-MCNC: 84 MG/DL (ref 70–99)
GLUCOSE SERPL-MCNC: 108 MG/DL (ref 70–99)
HCT VFR BLD AUTO: 26.7 % (ref 35–47)
HGB BLD-MCNC: 8.9 G/DL (ref 11.7–15.7)
IMM GRANULOCYTES # BLD: 0.1 10E9/L (ref 0–0.4)
IMM GRANULOCYTES NFR BLD: 0.8 %
INR PPP: 1.15 (ref 0.86–1.14)
LACTATE BLD-SCNC: 0.8 MMOL/L (ref 0.7–2)
LYMPHOCYTES # BLD AUTO: 0.8 10E9/L (ref 0.8–5.3)
LYMPHOCYTES NFR BLD AUTO: 5.2 %
Lab: NORMAL
Lab: NORMAL
MAGNESIUM SERPL-MCNC: 1.9 MG/DL (ref 1.6–2.3)
MCH RBC QN AUTO: 34 PG (ref 26.5–33)
MCHC RBC AUTO-ENTMCNC: 33.3 G/DL (ref 31.5–36.5)
MCV RBC AUTO: 102 FL (ref 78–100)
MONOCYTES # BLD AUTO: 0.9 10E9/L (ref 0–1.3)
MONOCYTES NFR BLD AUTO: 5.3 %
NEUTROPHILS # BLD AUTO: 14.1 10E9/L (ref 1.6–8.3)
NEUTROPHILS NFR BLD AUTO: 86.3 %
NRBC # BLD AUTO: 0 10*3/UL
NRBC BLD AUTO-RTO: 0 /100
PHOSPHATE SERPL-MCNC: 1.8 MG/DL (ref 2.5–4.5)
PHOSPHATE SERPL-MCNC: 2.5 MG/DL (ref 2.5–4.5)
PLATELET # BLD AUTO: 250 10E9/L (ref 150–450)
POTASSIUM SERPL-SCNC: 3.4 MMOL/L (ref 3.4–5.3)
PREALB SERPL IA-MCNC: 7 MG/DL (ref 15–45)
PROT SERPL-MCNC: 4.6 G/DL (ref 6.8–8.8)
RBC # BLD AUTO: 2.62 10E12/L (ref 3.8–5.2)
SODIUM SERPL-SCNC: 136 MMOL/L (ref 133–144)
SPECIMEN SOURCE: NORMAL
SPECIMEN SOURCE: NORMAL
TRIGL SERPL-MCNC: 94 MG/DL
WBC # BLD AUTO: 16.3 10E9/L (ref 4–11)

## 2018-10-06 PROCEDURE — 00000146 ZZHCL STATISTIC GLUCOSE BY METER IP

## 2018-10-06 PROCEDURE — 25000128 H RX IP 250 OP 636: Performed by: INTERNAL MEDICINE

## 2018-10-06 PROCEDURE — 84100 ASSAY OF PHOSPHORUS: CPT | Performed by: INTERNAL MEDICINE

## 2018-10-06 PROCEDURE — 25000125 ZZHC RX 250: Performed by: INTERNAL MEDICINE

## 2018-10-06 PROCEDURE — 84478 ASSAY OF TRIGLYCERIDES: CPT | Performed by: INTERNAL MEDICINE

## 2018-10-06 PROCEDURE — 83735 ASSAY OF MAGNESIUM: CPT | Performed by: INTERNAL MEDICINE

## 2018-10-06 PROCEDURE — 12000007 ZZH R&B INTERMEDIATE

## 2018-10-06 PROCEDURE — 80053 COMPREHEN METABOLIC PANEL: CPT | Performed by: INTERNAL MEDICINE

## 2018-10-06 PROCEDURE — 97530 THERAPEUTIC ACTIVITIES: CPT | Mod: GP

## 2018-10-06 PROCEDURE — 82248 BILIRUBIN DIRECT: CPT | Performed by: INTERNAL MEDICINE

## 2018-10-06 PROCEDURE — 25000128 H RX IP 250 OP 636: Performed by: COLON & RECTAL SURGERY

## 2018-10-06 PROCEDURE — 99233 SBSQ HOSP IP/OBS HIGH 50: CPT | Performed by: INTERNAL MEDICINE

## 2018-10-06 PROCEDURE — 85025 COMPLETE CBC W/AUTO DIFF WBC: CPT | Performed by: INTERNAL MEDICINE

## 2018-10-06 PROCEDURE — 40000193 ZZH STATISTIC PT WARD VISIT

## 2018-10-06 PROCEDURE — 85610 PROTHROMBIN TIME: CPT | Performed by: INTERNAL MEDICINE

## 2018-10-06 PROCEDURE — 83605 ASSAY OF LACTIC ACID: CPT | Performed by: INTERNAL MEDICINE

## 2018-10-06 PROCEDURE — 84134 ASSAY OF PREALBUMIN: CPT | Performed by: INTERNAL MEDICINE

## 2018-10-06 RX ORDER — METOPROLOL TARTRATE 1 MG/ML
5 INJECTION, SOLUTION INTRAVENOUS EVERY 6 HOURS
Status: DISCONTINUED | OUTPATIENT
Start: 2018-10-06 | End: 2018-10-11

## 2018-10-06 RX ORDER — KETOROLAC TROMETHAMINE 15 MG/ML
15 INJECTION, SOLUTION INTRAMUSCULAR; INTRAVENOUS EVERY 6 HOURS PRN
Status: DISPENSED | OUTPATIENT
Start: 2018-10-06 | End: 2018-10-11

## 2018-10-06 RX ADMIN — TAZOBACTAM SODIUM AND PIPERACILLIN SODIUM 3.38 G: 375; 3 INJECTION, SOLUTION INTRAVENOUS at 05:54

## 2018-10-06 RX ADMIN — METOPROLOL TARTRATE 5 MG: 5 INJECTION, SOLUTION INTRAVENOUS at 14:32

## 2018-10-06 RX ADMIN — SODIUM CHLORIDE, POTASSIUM CHLORIDE, SODIUM LACTATE AND CALCIUM CHLORIDE: 600; 310; 30; 20 INJECTION, SOLUTION INTRAVENOUS at 00:06

## 2018-10-06 RX ADMIN — TAZOBACTAM SODIUM AND PIPERACILLIN SODIUM 3.38 G: 375; 3 INJECTION, SOLUTION INTRAVENOUS at 12:22

## 2018-10-06 RX ADMIN — ENOXAPARIN SODIUM 40 MG: 40 INJECTION SUBCUTANEOUS at 14:32

## 2018-10-06 RX ADMIN — METOPROLOL TARTRATE 5 MG: 5 INJECTION, SOLUTION INTRAVENOUS at 08:39

## 2018-10-06 RX ADMIN — TAZOBACTAM SODIUM AND PIPERACILLIN SODIUM 3.38 G: 375; 3 INJECTION, SOLUTION INTRAVENOUS at 23:36

## 2018-10-06 RX ADMIN — TAZOBACTAM SODIUM AND PIPERACILLIN SODIUM 3.38 G: 375; 3 INJECTION, SOLUTION INTRAVENOUS at 00:06

## 2018-10-06 RX ADMIN — KETOROLAC TROMETHAMINE 15 MG: 15 INJECTION, SOLUTION INTRAMUSCULAR; INTRAVENOUS at 01:21

## 2018-10-06 RX ADMIN — I.V. FAT EMULSION 250 ML: 20 EMULSION INTRAVENOUS at 08:48

## 2018-10-06 RX ADMIN — METOPROLOL TARTRATE 5 MG: 5 INJECTION, SOLUTION INTRAVENOUS at 21:53

## 2018-10-06 RX ADMIN — POTASSIUM CHLORIDE 10 MEQ: 2 INJECTION, SOLUTION, CONCENTRATE INTRAVENOUS at 10:06

## 2018-10-06 RX ADMIN — POTASSIUM CHLORIDE 10 MEQ: 2 INJECTION, SOLUTION, CONCENTRATE INTRAVENOUS at 11:13

## 2018-10-06 RX ADMIN — SODIUM PHOSPHATE, MONOBASIC, MONOHYDRATE 20 MMOL: 276; 142 INJECTION, SOLUTION INTRAVENOUS at 13:01

## 2018-10-06 RX ADMIN — TAZOBACTAM SODIUM AND PIPERACILLIN SODIUM 3.38 G: 375; 3 INJECTION, SOLUTION INTRAVENOUS at 18:24

## 2018-10-06 RX ADMIN — Medication 2 G: at 17:04

## 2018-10-06 ASSESSMENT — ACTIVITIES OF DAILY LIVING (ADL)
ADLS_ACUITY_SCORE: 13
ADLS_ACUITY_SCORE: 11
ADLS_ACUITY_SCORE: 13
ADLS_ACUITY_SCORE: 13
ADLS_ACUITY_SCORE: 11
ADLS_ACUITY_SCORE: 13

## 2018-10-06 NOTE — PROGRESS NOTES
[facility]  Hospitalist Progress Note  Jose M Carrero MD 10/06/18    Reason for Stay (Diagnosis): Sepsis, diverticulitis, colovesicular fistula         Assessment and Plan:      Summary of Stay:   Brea Kerr is a 82 year old female came to attention on 9/30/2018 with stool passing in her urine. She also still had abdominal pain, but which she thought was not much changed from when she had the NARAYAN drain placed on 9/20/18.  Now there is evidence (by CT scan and confirmed by feculent drainage into the NARAYAN drain) that the abscess represents a fistulous tract with the colon. 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  Presenting with sepsis.     Her recent PMH is notable for hospitalization at [facility] from 9/19 - 9/23/18 for perforated diverticulitis with abscess formation.  She underwent placement of a NARAYAN drain into the abscess on 9/20 under CT guidance and was discharged home on oral Cipro and Metronidazole.      More remote PMH notable for prior episodes of sigmoid diverticulitis and associated paroxysmal atrial fibrillation. And after arriving in the ED, the patient developed A fib with RVR (rate > 140) and her blood pressure fell to the 60's for which reason she was admitted to the ICU and was started on Atrial fibrillation and very briefly on Norepi.  However with the Amio and conversion to NSR, her BP came up.     On 10/2/2018 the patient was taken to the operating room by Dr. Reddy and underwent sigmoid colectomy with end colostomy.  She did very well with the procedure but postoperatively has developed recurrent atrial fibrillation with rapid ventricular response.  Again her blood pressure dropped some.     She was admitted to the ICU following this for management of a-fib w/ RVR and hypotension.  Following amiodarone load, diltiazem drip and digoxin she converted to NSR.    Transferred out of the ICU on 10/4.    NG placed with plans for trophic feeds, but persistent frequent vomiting so is  currently on suction.  TPN recommended by surgery team which has been started.    Intermittently in A. fib with RVR and unable to take oral amiodarone.  Change to IV metoprolol.       Problem List/Assessment and Plan:   Sepsis due to recurrent diverticulitis: Recent hospitalization with NARAYAN drain placement for diverticulitis and abscess.  Colovesicular fistula present.  Now re-presenting with stool drainage and NARAYAN drain and sepsis.  Was on Cipro and Flagyl.  -CRS consulted  -S/P open sigmoid colectomy with end colostomy and takedown of colovesicular fistula.  NARAYAN drain in place  -Continue Zosyn per CRS  -Ostomy nurse consult  -NARAYAN drain in place  -diet per CRS, persistent vomiting so npo with NG low intermittent suction.   -Planning cystogram on Sunday postop day 5 or if cannot be obtained then on Monday.  Only needs to remain in place until then  -On a PCA for pain control, if using infrequently could change to as needed IV Dilaudid at low-dose 0.2 mg starting dose    A. fib with RVR: With associated hypotension briefly requiring norepinephrine.  Started on amiodarone.  Intermittently in A. fib and NSR.  Anticoagulation on hold for now.    -Currently on oral amiodarone daily that was started while in the ICU.  Unable to take p.o. due to vomiting   -Intermittently in A. fib with RVR, stop amiodarone and start IV metoprolol 5 mg every 6 hours    Malnutrition: Feeding tube placed, but significant vomiting and have not been able to start tube feeds.  Surgery recommended TPN and dietitian and pharmacy consulted.  Started TPN 10/5.  Will use right IJ CVC.  Follow labs.    Acute hyponatremia: Sodium down to 130.  Possible GI losses versus SIADH from vomiting.  Resolved after starting TPN.    Anemia: Suspect combination of phlebotomy, postsurgical blood loss, and inflammatory process.  -CBC tomorrow    HTN: Actually hypotensive here when in A. fib with RVR so antihypertensives including metoprolol 25 mg twice daily and  losartan 100 mg daily have been on hold.  Blood pressure now improving.  -Restart metoprolol in IV form at 5 mg every 6 hours    Inflammatory polyarthropathy: Had been on Methotrexate but this has been on hold due to recent infection.  Continue to hold.      History of DVT, PE: Not currently on anticoagulant with surgery.  On prophylactic Lovenox dosing.    DVT Prophylaxis: Enoxaparin (Lovenox) SQ  Code Status: Full Code  FEN: N.p.o. while having significant vomiting.  Start TPN per surgery recommendations.  If nausea vomiting improves could consider starting tube feeds  Lines: Continue right IJ CVC for now while starting TPN.  If not able to start trophic feeds in the next couple of days would consider swapping for PICC line.  Continue Babb until cystogram which will likely happen Sunday or Monday  Discharge Dispo: TBD  Estimated Disch Date / # of Days until Disch: Multiple more day hospitalization.  Planning cystogram on Sunday or Monday        Interval History (Subjective):      NG output slowing in today.  Nausea improved.  Mild abdominal pain using PCA infrequently at low dose.  Minimal NARAYAN drain output.  No shortness of breath.  Continues on TPN.                  Physical Exam:      Last Vital Signs:  /82  Pulse 80  Temp 99.6  F (37.6  C) (Axillary)  Resp 18  Ht 1.524 m (5')  Wt 64.3 kg (141 lb 12.8 oz)  SpO2 94%  BMI 27.69 kg/m2    Intake/Output Summary (Last 24 hours) at 10/06/18 1306  Last data filed at 10/06/18 0909   Gross per 24 hour   Intake           2038.4 ml   Output             1635 ml   Net            403.4 ml       Constitutional: Awake, NAD  Eyes: sclera white   HEENT: NG in place draining green bile.  Right IJ C/D/I  Respiratory:     no crackles or wheeze  Cardiovascular: RRR.  No murmur   GI: Mild diffuse tenderness.  NARAYAN drain in place.  Ostomy site red  Genitourinary: Babb in place  Skin: no rash    Musculoskeletal/extremities:   Trace bilateral lower extremity  Neurologic: A&O    Psychiatric: calm, cooperative, flat affect         Medications:      All current medications were reviewed with changes reflected in problem list.         Data:      All new lab and imaging data was reviewed.   Labs:    Recent Labs  Lab 09/30/18  1217 09/30/18  1140   CULT No growth No growth       Recent Labs  Lab 10/06/18  0520 10/05/18  0710 10/04/18  0522    130* 132*   POTASSIUM 3.4 3.4 4.2   CHLORIDE 99 95 100   CO2 33* 27 27   ANIONGAP 4 8 5   * 93 90   BUN 7 6* 5*   CR 0.56 0.54 0.56   GFRESTIMATED >90 >90 >90   GFRESTBLACK >90 >90 >90   PAULINA 6.7* 7.2* 7.2*       Recent Labs  Lab 10/06/18  0520 10/05/18  0710 10/04/18  0522   WBC 16.3* Canceled, Test credited 10.9   HGB 8.9* Canceled, Test credited 9.0*   HCT 26.7* Canceled, Test credited 27.0*   * Canceled, Test credited 102*    Canceled, Test credited 243      Imaging:   None today      Jose M Carrero MD

## 2018-10-06 NOTE — PLAN OF CARE
Problem: Patient Care Overview  Goal: Plan of Care/Patient Progress Review  Outcome: No Change  [id number]    Vitals: VSS except T max 100.2, PRN toradol x1, recheck 99.5  Neuro: A&O  Lungs: LS fine crackles, diminished, 2L NC  Cardiac: Tele: SR  IV: Triple internal jugular on left, TPN infusing at 30/hr, LR infusing at 45/hr for total of 75ml/hr, dressing changed this morning  Labs: Phos 1.8, ordered replacement bag. BG 0200: 102, 0600: 108, INR 1.15, WBC 16.3, Hgb 8.9  GI: BS active, colostomy stoma red and protruding, minimal dark brown liquid stool  : hannah in place, ptent  Drains: NG to low int. Suction, dark green drainage  Diet: NPO w/meds/ice chips  Pain: Denies  Activity: total assist  Plan: continue to monitor labs, starting lipids this morning, IV abx therapy, monitor drains and output.

## 2018-10-06 NOTE — PLAN OF CARE
Problem: Patient Care Overview  Goal: Plan of Care/Patient Progress Review  Discharge Planner PT    Patient plan for discharge:  Return home with assist of spouse and daughter in law from [place] (DIL plans to fly in and stay as long as needed - per pt)  Current status: Pt in bed upon arrival on 2L O2 with multiple lines, IV, hannah.  Pt transferred to sitting at EOB with max A x2 with HOB raised and therapist providing assist behind back to decrease use of abdominal musculature.   Pt transferred sit <> stand with Min A x 2. Pt able to take ~ 6 small sidesteps with B hand held assist to move 3' from bed to recliner.  Patient unsteady with ambulation, demonstrates posterior lean noted during transfer, would benefit from walker for increased anterior weight shift.      Barriers to return to prior living situation: 1 step to enter, current level of mobility, poor activity tolerance and pain  Recommendations for discharge: Home with Home PT and assist of family  Rationale for recommendations: Continued skilled PT necessary to progress independence with all mobility, increase activity tolerance, strength and balance to enable pt to navigate her home and community environments safely.       Entered by: Niyah Hartmann 10/06/2018 5:08 PM

## 2018-10-06 NOTE — PROGRESS NOTES
COLON & RECTAL SURGERY  PROGRESS NOTE    October 5, 2018  Post-op Day # 4 open sigmoid colectomy with colostomy    SUBJECTIVE:  Transferred from ICU, remains in Afib intermittently rate controlled but with preserved BP, pain controlled on PCA, still high output from NGT but decreasing, TPN initiated, mild temp to 100.2 isolated, no mobilization from bed, reports improved abdominal symptoms, scant stoma output.    OBJECTIVE:  Temp:  [98.1  F (36.7  C)-100.2  F (37.9  C)] 99.6  F (37.6  C)  Heart Rate:  [] 124  Resp:  [18] 18  BP: (114-144)/(55-82) 141/82  SpO2:  [94 %-95 %] 94 %    Intake/Output Summary (Last 24 hours) at 10/05/18 0825  Last data filed at 10/05/18 0426   Gross per 24 hour   Intake          1104.41 ml   Output             1880 ml   Net          -775.59 ml     NG - 1625  UOP - 2325  Stoma - 50    GENERAL: wakens to voice, alert, A&O x 3  HEAD: Normocephalic atraumatic, NG in place with bilious output  SCLERA: Anicteric  EXTREMITIES: Warm and well perfused  ABDOMEN:  Soft, non-distended. No guarding, rigidity, or peritoneal signs. Stoma pink with RRC, some gas and thin output low  Volume. NGT in place with dark bilious output, flushed at bedside to assure patency.  INCISION:  C/d/i, dermabond  Babb in place with dimple urine    LABS:  Lab Results   Component Value Date    WBC Canceled, Test credited 10/05/2018     Lab Results   Component Value Date    HGB Canceled, Test credited 10/05/2018     Lab Results   Component Value Date    HCT Canceled, Test credited 10/05/2018     Lab Results   Component Value Date    PLT Canceled, Test credited 10/05/2018     Last Basic Metabolic Panel:  Lab Results   Component Value Date     10/05/2018      Lab Results   Component Value Date    POTASSIUM 3.4 10/05/2018     Lab Results   Component Value Date    CHLORIDE 95 10/05/2018     Lab Results   Component Value Date    PAULINA 7.2 10/05/2018     Lab Results   Component Value Date    CO2 27 10/05/2018     Lab  Results   Component Value Date    BUN 6 10/05/2018     Lab Results   Component Value Date    CR 0.54 10/05/2018     Lab Results   Component Value Date    GLC 93 10/05/2018       ASSESSMENT/PLAN: POD#4 open sigmoid colectomy with colostomy complicated by afib with RVR and ileus requiring NG decompression    1. NPO. Continue NGT. Continue TPN for severe protein calorie malnutrition.  2. Continue PCA  3. Continue IVF but adjust IVF to limit total with TPN/Lipids to 100cc or less otherwise she will quickly become fluid overloaded   4. Continue Babb. Plan for cystogram POD#5 (may need to be deferred to POD#6 if cysto not offered over weekend).  5. Continue Zosyn day 3/5, leukocytosis to 16K and mild temp, monitor  6. Lovenox for ppx  7. Management of afib per hospitalist, may require IV amio or CCB, she won't absorb any oral meds  8. Would hold any further toradol use in her  9. Must get her up and out of bed and begin reconditioning, needs IS and teaching to bedside  10. Replete KPhos  Appreciate ongoing assistance from hospitalist team    For questions/paging, please contact the CRS office at [phone].    Eyad Arita MD  Fellow, Colon & Rectal Surgery  [facility]  [facility]  [phone]

## 2018-10-06 NOTE — PLAN OF CARE
Problem: Patient Care Overview  Goal: Plan of Care/Patient Progress Review  Outcome: No Change  Pain: Pain in abd.  PCA pump used prn.  LOC: alert and oriented  Mobility: Assist of 3 to transfer to chair.  3 people needed to guide tubing and lines.  Pt could bear wt and take steps.  Steady on feet. Up in chair for 2 hours this afternoon.  Lungs: clear, diminished. 94% 2L.  Used incentive spirometer with encouragement.  Tele: fliped from a-fib rvr to SR and back to a-fib this shift.  IV metoprolol given per orders.  Drains: colostomy (50cc out) and NARAYAN drain (5cc out) abdomen  GI: NPO/ice chips.  TPN/lipids infusing per orders. NG to low intermittent suction. 200 ml out  : hannah catheter  IV: triple lumen internal jugular line. TPN, lipids infusing.  Phos and potassium replaced this shift.  Magnesium still needs to be replaced after phos is done.  Other: midline incision lower abdomen.  Open to air.  Approximated with clear adhesive glue. Blood sugar checks 128 and 84 this shift .

## 2018-10-07 ENCOUNTER — APPOINTMENT (OUTPATIENT)
Dept: CT IMAGING | Facility: CLINIC | Age: 83
DRG: 853 | End: 2018-10-07
Attending: INTERNAL MEDICINE
Payer: MEDICARE

## 2018-10-07 LAB
ANION GAP SERPL CALCULATED.3IONS-SCNC: 3 MMOL/L (ref 3–14)
BUN SERPL-MCNC: 12 MG/DL (ref 7–30)
CALCIUM SERPL-MCNC: 6.9 MG/DL (ref 8.5–10.1)
CHLORIDE SERPL-SCNC: 95 MMOL/L (ref 94–109)
CO2 SERPL-SCNC: 32 MMOL/L (ref 20–32)
CREAT SERPL-MCNC: 0.61 MG/DL (ref 0.52–1.04)
ERYTHROCYTE [DISTWIDTH] IN BLOOD BY AUTOMATED COUNT: 13.8 % (ref 10–15)
GFR SERPL CREATININE-BSD FRML MDRD: >90 ML/MIN/1.7M2
GLUCOSE BLDC GLUCOMTR-MCNC: 109 MG/DL (ref 70–99)
GLUCOSE BLDC GLUCOMTR-MCNC: 128 MG/DL (ref 70–99)
GLUCOSE BLDC GLUCOMTR-MCNC: 136 MG/DL (ref 70–99)
GLUCOSE BLDC GLUCOMTR-MCNC: 140 MG/DL (ref 70–99)
GLUCOSE BLDC GLUCOMTR-MCNC: 149 MG/DL (ref 70–99)
GLUCOSE SERPL-MCNC: 137 MG/DL (ref 70–99)
HCT VFR BLD AUTO: 26.9 % (ref 35–47)
HGB BLD-MCNC: 8.8 G/DL (ref 11.7–15.7)
MAGNESIUM SERPL-MCNC: 2.1 MG/DL (ref 1.6–2.3)
MCH RBC QN AUTO: 33.1 PG (ref 26.5–33)
MCHC RBC AUTO-ENTMCNC: 32.7 G/DL (ref 31.5–36.5)
MCV RBC AUTO: 101 FL (ref 78–100)
PHOSPHATE SERPL-MCNC: 2.1 MG/DL (ref 2.5–4.5)
PLATELET # BLD AUTO: 245 10E9/L (ref 150–450)
POTASSIUM SERPL-SCNC: 3.8 MMOL/L (ref 3.4–5.3)
RBC # BLD AUTO: 2.66 10E12/L (ref 3.8–5.2)
SODIUM SERPL-SCNC: 130 MMOL/L (ref 133–144)
WBC # BLD AUTO: 16.1 10E9/L (ref 4–11)

## 2018-10-07 PROCEDURE — 84100 ASSAY OF PHOSPHORUS: CPT | Performed by: INTERNAL MEDICINE

## 2018-10-07 PROCEDURE — 25000125 ZZHC RX 250: Performed by: INTERNAL MEDICINE

## 2018-10-07 PROCEDURE — 74177 CT ABD & PELVIS W/CONTRAST: CPT

## 2018-10-07 PROCEDURE — 25000128 H RX IP 250 OP 636: Performed by: INTERNAL MEDICINE

## 2018-10-07 PROCEDURE — 83735 ASSAY OF MAGNESIUM: CPT | Performed by: INTERNAL MEDICINE

## 2018-10-07 PROCEDURE — 85027 COMPLETE CBC AUTOMATED: CPT | Performed by: INTERNAL MEDICINE

## 2018-10-07 PROCEDURE — 87040 BLOOD CULTURE FOR BACTERIA: CPT | Performed by: INTERNAL MEDICINE

## 2018-10-07 PROCEDURE — 25000128 H RX IP 250 OP 636: Performed by: SURGERY

## 2018-10-07 PROCEDURE — 80048 BASIC METABOLIC PNL TOTAL CA: CPT | Performed by: INTERNAL MEDICINE

## 2018-10-07 PROCEDURE — 12000007 ZZH R&B INTERMEDIATE

## 2018-10-07 PROCEDURE — 99233 SBSQ HOSP IP/OBS HIGH 50: CPT | Performed by: INTERNAL MEDICINE

## 2018-10-07 PROCEDURE — 00000146 ZZHCL STATISTIC GLUCOSE BY METER IP

## 2018-10-07 PROCEDURE — 25000128 H RX IP 250 OP 636: Performed by: COLON & RECTAL SURGERY

## 2018-10-07 RX ORDER — IOPAMIDOL 755 MG/ML
69 INJECTION, SOLUTION INTRAVASCULAR ONCE
Status: COMPLETED | OUTPATIENT
Start: 2018-10-07 | End: 2018-10-07

## 2018-10-07 RX ADMIN — METOPROLOL TARTRATE 5 MG: 5 INJECTION, SOLUTION INTRAVENOUS at 14:33

## 2018-10-07 RX ADMIN — TAZOBACTAM SODIUM AND PIPERACILLIN SODIUM 3.38 G: 375; 3 INJECTION, SOLUTION INTRAVENOUS at 05:31

## 2018-10-07 RX ADMIN — IOPAMIDOL 69 ML: 755 INJECTION, SOLUTION INTRAVENOUS at 09:09

## 2018-10-07 RX ADMIN — POTASSIUM CHLORIDE 10 MEQ: 2 INJECTION, SOLUTION, CONCENTRATE INTRAVENOUS at 09:24

## 2018-10-07 RX ADMIN — SODIUM CHLORIDE, PRESERVATIVE FREE 57 ML: 5 INJECTION INTRAVENOUS at 09:09

## 2018-10-07 RX ADMIN — POTASSIUM CHLORIDE 10 MEQ: 2 INJECTION, SOLUTION, CONCENTRATE INTRAVENOUS at 10:36

## 2018-10-07 RX ADMIN — METOPROLOL TARTRATE 5 MG: 5 INJECTION, SOLUTION INTRAVENOUS at 08:12

## 2018-10-07 RX ADMIN — SODIUM PHOSPHATE, MONOBASIC, MONOHYDRATE 15 MMOL: 276; 142 INJECTION, SOLUTION INTRAVENOUS at 12:46

## 2018-10-07 RX ADMIN — METOPROLOL TARTRATE 5 MG: 5 INJECTION, SOLUTION INTRAVENOUS at 20:41

## 2018-10-07 RX ADMIN — TAZOBACTAM SODIUM AND PIPERACILLIN SODIUM 3.38 G: 375; 3 INJECTION, SOLUTION INTRAVENOUS at 17:36

## 2018-10-07 RX ADMIN — METOPROLOL TARTRATE 5 MG: 5 INJECTION, SOLUTION INTRAVENOUS at 02:03

## 2018-10-07 RX ADMIN — Medication: at 22:04

## 2018-10-07 RX ADMIN — KETOROLAC TROMETHAMINE 15 MG: 15 INJECTION, SOLUTION INTRAMUSCULAR; INTRAVENOUS at 08:12

## 2018-10-07 RX ADMIN — ASCORBIC ACID, VITAMIN A PALMITATE, CHOLECALCIFEROL, THIAMINE HYDROCHLORIDE, RIBOFLAVIN-5 PHOSPHATE SODIUM, PYRIDOXINE HYDROCHLORIDE, NIACINAMIDE, DEXPANTHENOL, ALPHA-TOCOPHEROL ACETATE, VITAMIN K1, FOLIC ACID, BIOTIN, CYANOCOBALAMIN: 200; 3300; 200; 6; 3.6; 6; 40; 15; 10; 150; 600; 60; 5 INJECTION, SOLUTION INTRAVENOUS at 18:01

## 2018-10-07 RX ADMIN — SODIUM CHLORIDE, POTASSIUM CHLORIDE, SODIUM LACTATE AND CALCIUM CHLORIDE: 600; 310; 30; 20 INJECTION, SOLUTION INTRAVENOUS at 08:06

## 2018-10-07 RX ADMIN — ENOXAPARIN SODIUM 40 MG: 40 INJECTION SUBCUTANEOUS at 14:33

## 2018-10-07 RX ADMIN — TAZOBACTAM SODIUM AND PIPERACILLIN SODIUM 3.38 G: 375; 3 INJECTION, SOLUTION INTRAVENOUS at 12:02

## 2018-10-07 ASSESSMENT — ACTIVITIES OF DAILY LIVING (ADL)
ADLS_ACUITY_SCORE: 11
ADLS_ACUITY_SCORE: 12

## 2018-10-07 NOTE — PROGRESS NOTES
Cross cover:    Patient is known to me from last week when she was in the ICU.    I was called with report that the patient's fever now is greater than 101.5.  I note that over the last 24 hours she is started to have evidence of small bowel obstruction with marked increase in vomiting.  A nasogastric tube is been placed.    I do not see a note from colorectal surgery from yesterday.  I will ask for a CT scan of the abdomen/pelvis with contrast.  It will be important for colorectal to be notified of the changes but will defer to day rounder based on the results of the CT scan.    [place]

## 2018-10-07 NOTE — PLAN OF CARE
Problem: Patient Care Overview  Goal: Plan of Care/Patient Progress Review  Outcome: No Change  Pain: Pain at NARAYAN drain site. PCA pump used prn.  LOC: alert and oriented.  Sleepy at times.  Daughter in law at bedside this afternoon.  Mobility: Assist of 2 to transfer to chair.  Pt able to bear wt and take a few steps.  Sat up in chair for 2 hours this afternoon.    Lungs: clear, diminished. 96% 2L.  Used incentive spirometer with encouragement.  Tele: SR Scheduled IV metoprolol given per orders.  Drains: colostomy and NARAYAN drain abdomen  GI: NPO/ice chips.  TPN infusing per orders. NG to low intermittent suction.   : hannah catheter  IV: triple lumen CVC line. TPN infusing.  Phos and potassium replaced this shift.  Re-check in morning  Other: Continue IV zosyn per orders. Midline incision lower abdomen, approximated with clear adhesive glue. No drainage. Blood sugar checks 128 and 149 this shift . SW, WOC, PT and colorectal following.  Fever of 101.7 overnight. CT of abdomen done today.  Blood cultures drawn. Temp 99.5 and 97.5 this shift.

## 2018-10-07 NOTE — PROGRESS NOTES
[facility]  Hospitalist Progress Note  Jose M Carrero MD 10/07/18    Reason for Stay (Diagnosis):  Sepsis, diverticulitis, colovesicular fistula         Assessment and Plan:      Summary of Stay:   Brea Kerr is a 82 year old female came to attention on 9/30/2018 with stool passing in her urine. She also still had abdominal pain, but which she thought was not much changed from when she had the NARAYAN drain placed on 9/20/18.  Now there is evidence (by CT scan and confirmed by feculent drainage into the NARAYAN drain) that the abscess represents a fistulous tract with the colon. 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  Presenting with sepsis.     Her recent PMH is notable for hospitalization at [facility] from 9/19 - 9/23/18 for perforated diverticulitis with abscess formation.  She underwent placement of a NARAYAN drain into the abscess on 9/20 under CT guidance and was discharged home on oral Cipro and Metronidazole.      More remote PMH notable for prior episodes of sigmoid diverticulitis and associated paroxysmal atrial fibrillation. And after arriving in the ED, the patient developed A fib with RVR (rate > 140) and her blood pressure fell to the 60's for which reason she was admitted to the ICU and was started on Atrial fibrillation and very briefly on Norepi.  However with the Amio and conversion to NSR, her BP came up.     On 10/2/2018 the patient was taken to the operating room by Dr. Reddy and underwent sigmoid colectomy with end colostomy.  She did very well with the procedure but postoperatively has developed recurrent atrial fibrillation with rapid ventricular response.  Again her blood pressure dropped some.     She was admitted to the ICU following this for management of a-fib w/ RVR and hypotension.  Following amiodarone load, diltiazem drip and digoxin she converted to NSR.    Transferred out of the ICU on 10/4.    NG placed with plans for trophic feeds, but persistent frequent vomiting so  is currently on suction.  TPN recommended by surgery team which has been started.    Intermittently in A. fib with RVR and unable to take oral amiodarone.  Change to IV metoprolol and has remained in NSR for the last 24 hours.    Did have fever up to 101.7 despite being on Zosyn.  Overnight a CT abdomen was obtained showing small pleural effusions and intraperitoneal fluid without sign of abscess formation at this point.  Did obtain blood cultures given central line in place.       Problem List/Assessment and Plan:   Sepsis due to recurrent diverticulitis, colovesicular fistula: Recent hospitalization with NARAYAN drain placement for diverticulitis and abscess.  Colovesicular fistula present.  Now re-presenting with stool drainage and NARAYAN drain and sepsis.  Was on Cipro and Flagyl.  Fever 10/7 and persistent leukocytosis to 16.  -CRS consulted  -S/P open sigmoid colectomy with end colostomy and takedown of colovesicular fistula.  NARAYAN drain in place  -Continue Zosyn per CRS  -Ostomy nurse consult  -NARAYAN drain in place  -diet per CRS, persistent vomiting so npo with NG low intermittent suction.   -Planning cystogram on Monday.  Babb catheter needs to remain in place until then  -On a PCA for pain control, if using infrequently could change to as needed IV Dilaudid at low-dose 0.2 mg starting dose  -PT, patient needs to mobilize  -Incentive spirometer    A. fib with RVR: With associated hypotension briefly requiring norepinephrine.  Started on amiodarone.  Intermittently in A. fib and NSR.  Anticoagulation on hold for now.    -Was on oral amiodarone daily that was started while in the ICU.  Unable to take p.o. due to vomiting   -Intermittently in A. fib with RVR, stopped amiodarone and started IV metoprolol 5 mg every 6 hours and has remained in NSR.    Malnutrition: Feeding tube placed, but significant vomiting and have not been able to start tube feeds.  Surgery recommended TPN and dietitian and pharmacy consulted.  Started  TPN 10/5.  Will use right IJ CVC.  Follow labs.    Acute hyponatremia: Sodium down to 130.  Possible GI losses versus SIADH from vomiting.  Monitor while on TPN.    Electrolyte abnormalities: Phosphorus low.  Calcium low as well but when corrected for hypoalbuminemia is near the lower limit of normal.  Electrolyte replacement protocols.    Anemia: Suspect combination of phlebotomy, postsurgical blood loss, and inflammatory process.  Overall stable and high 8 range.  -CBC tomorrow    HTN: Actually hypotensive here when in A. fib with RVR so antihypertensives including metoprolol 25 mg twice daily and losartan 100 mg daily have been on hold.  Blood pressure now improving.  -Restarted metoprolol in IV form at 5 mg every 6 hours    Inflammatory polyarthropathy: Had been on Methotrexate but this has been on hold due to recent infection.  Continue to hold.      History of DVT, PE: Not currently on anticoagulant with surgery.  On prophylactic Lovenox dosing.    DVT Prophylaxis: Enoxaparin (Lovenox) SQ  Code Status: Full Code  FEN: N.p.o. while having significant vomiting.  Started TPN per surgery recommendations.  If nausea vomiting improves could consider starting tube feeds  Lines: Continue right IJ CVC for now while starting TPN.  If not able to start trophic feeds in the next few days would consider swapping for PICC line.  Continue Babb until cystogram Monday  Discharge Dispo: TBD  Estimated Disch Date / # of Days until Disch: Multiple more day hospitalization.  Planning cystogram tomorrow        Interval History (Subjective):      Fever overnight to 101.7.  Overnight physician ordered CT abdomen.  Fluid in the abdomen and small pleural effusions.  Fever curve now broke.  She feels okay this morning just tired.  Ongoing NG output.  Minimal NARAYAN serosanguineous output.  Does not feel nauseous this morning.  Pain well controlled on PCA.  No shortness of breath.  No cough.                  Physical Exam:      Last Vital  Signs:  /63 (BP Location: Right arm)  Pulse 84  Temp 97.5  F (36.4  C) (Oral)  Resp 12  Ht 1.524 m (5')  Wt 61.8 kg (136 lb 4.8 oz)  SpO2 97%  BMI 26.62 kg/m2    Intake/Output Summary (Last 24 hours) at 10/07/18 1331  Last data filed at 10/07/18 0900   Gross per 24 hour   Intake             2057 ml   Output             1790 ml   Net              267 ml       Constitutional: Awake, NAD, appears fatigued  Eyes: sclera white   HEENT: NG in place draining green bile.  Right IJ C/D/I  Respiratory:   no crackles or wheeze  Cardiovascular: RRR.  No murmur   GI: Mild diffuse tenderness.  NARAYAN drain in place with small serosanguineous drainage.  Ostomy site red  Genitourinary: Babb in place  Skin: no rash    Musculoskeletal/extremities:   Trace bilateral lower extremity  Neurologic: A&O   Psychiatric: calm, cooperative, flat affect         Medications:      All current medications were reviewed with changes reflected in problem list.         Data:      All new lab and imaging data was reviewed.   Labs:    Recent Labs  Lab 10/07/18  0820 10/07/18  0815   CULT PENDING PENDING       Recent Labs  Lab 10/07/18  0525 10/06/18  0520 10/05/18  0710   * 136 130*   POTASSIUM 3.8 3.4 3.4   CHLORIDE 95 99 95   CO2 32 33* 27   ANIONGAP 3 4 8   * 108* 93   BUN 12 7 6*   CR 0.61 0.56 0.54   GFRESTIMATED >90 >90 >90   GFRESTBLACK >90 >90 >90   PAULINA 6.9* 6.7* 7.2*       Recent Labs  Lab 10/07/18  0525 10/06/18  0520 10/05/18  0710   WBC 16.1* 16.3* Canceled, Test credited   HGB 8.8* 8.9* Canceled, Test credited   HCT 26.9* 26.7* Canceled, Test credited   * 102* Canceled, Test credited    250 Canceled, Test credited      Imaging:   Recent Results (from the past 24 hour(s))   CT Abdomen Pelvis w Contrast    Narrative    CT ABDOMEN AND PELVIS WITH CONTRAST  10/7/2018 9:10 AM    HISTORY: Surgery on 10/2/2018 for diverticular abscess and bladder  fistula. Vomiting two days ago. Fever. The concern is for  an  intra-abdominal infection.    COMPARISON: A CT on 9/30/2018.    TECHNIQUE: Routine transverse CT imaging of the abdomen and pelvis was  performed following the uneventful administration of 69 mL of  Isovue-370 intravenous contrast. Radiation dose for this scan was  reduced using automated exposure control, adjustment of the mA and/or  kV according to patient size, or iterative reconstruction technique.    FINDINGS: There has been development of small bilateral pleural  effusions with bibasilar atelectasis or infiltrates. Again seen is  cholelithiasis. There are interval surgical changes of the pelvis.  There is a left anterior pelvic wall ostomy tube extending into the  descending colon. There is also a drainage tube entering the right  anterior pelvis and extending into the posterior cul-de-sac. A bladder  catheter and nasogastric tube are also present. There is a moderate  amount of fluid within the pelvis. A few of these areas may be  somewhat loculated. However, a distinct focal abscess is not  definitely seen. A small amount of gas within the anterior aspect of  the lower pelvis outside of the bowel lumen by the bladder may be  related to the recent surgery. No other abnormal mass or gas/fluid  collection is seen. There is gas within the gastrointestinal tract  with no current obstruction seen.      Impression    IMPRESSION: Surgical changes as described above. There is a moderate  amount of free fluid within the pelvis. Although some of this may be  loculated, I do not see a definite distinct abscess.    MD Jose M WRIGHT MD

## 2018-10-07 NOTE — PLAN OF CARE
"Problem: Patient Care Overview  Goal: Plan of Care/Patient Progress Review  PT: Chart reviewed, spoke with RN who stated pt has been fevered, now has possible bowel obstruction. Attempted to see pt for PT treatment however pt stated she to \"too tired\" this morning and requested PT return later for PT session. PT will return for treatment as time allows.       "

## 2018-10-07 NOTE — PLAN OF CARE
Problem: Patient Care Overview  Goal: Plan of Care/Patient Progress Review  Outcome: No Change  A&O x 4, assist x 2, NPO, temp elevated during shift and continued to climb, doctor paged, waiting for response, pt states that she is allergic to Tylenol, tele SR, IV zosyn given twice during shift, labs drawn this morning, reposition q2 hrs, NARAYAN drain & colostomy output small amount to measure, hannah output 300 mL, NG output 165 mL, will continue with POC.

## 2018-10-07 NOTE — PROGRESS NOTES
COLON & RECTAL SURGERY  PROGRESS NOTE    October 5, 2018  Post-op Day # 5 open sigmoid colectomy with colostomy    SUBJECTIVE:  Afib has been controlled, abdominal symptoms continue to be controlled with NGT decompression, minimal output from stoma but + flatus, noted fever of >101 overnight, cultures drawn and CT ordered by primary, noted bilateral pleural effusions and some fluid in pelvis that as expected has not yet organized into any drainable collections. Got out of bed to chair but not walking, doing minimal IS    OBJECTIVE:  Temp:  [97.5  F (36.4  C)-101.7  F (38.7  C)] 97.5  F (36.4  C)  Pulse:  [84] 84  Heart Rate:  [72-95] 72  Resp:  [12-20] 12  BP: (115-137)/(49-65) 115/63  SpO2:  [90 %-97 %] 97 %    Intake/Output Summary (Last 24 hours) at 10/05/18 0825  Last data filed at 10/05/18 0426   Gross per 24 hour   Intake          1104.41 ml   Output             1880 ml   Net          -775.59 ml     NG - 565  UOP - 1250  Stoma - 65  IS - 600cc    GENERAL: wakens to voice, alert, A&O x 3  HEAD: Normocephalic atraumatic, NG in place with bilious output, clearing slightly  SCLERA: Anicteric  EXTREMITIES: Warm and well perfused, no significant edema  ABDOMEN:  Soft, non-distended. No guarding, rigidity, or peritoneal signs. Stoma pink with RRC, some gas and thin output low  Volume. NGT in place with dark bilious output, flushed at bedside to assure patency, NARAYAN serous  INCISION:  C/d/i, dermabond  Babb in place with dimple urine    LABS:  Lab Results   Component Value Date    WBC Canceled, Test credited 10/05/2018     Lab Results   Component Value Date    HGB Canceled, Test credited 10/05/2018     Lab Results   Component Value Date    HCT Canceled, Test credited 10/05/2018     Lab Results   Component Value Date    PLT Canceled, Test credited 10/05/2018     Last Basic Metabolic Panel:  Lab Results   Component Value Date     10/05/2018      Lab Results   Component Value Date    POTASSIUM 3.4 10/05/2018     Lab  Results   Component Value Date    CHLORIDE 95 10/05/2018     Lab Results   Component Value Date    PAULINA 7.2 10/05/2018     Lab Results   Component Value Date    CO2 27 10/05/2018     Lab Results   Component Value Date    BUN 6 10/05/2018     Lab Results   Component Value Date    CR 0.54 10/05/2018     Lab Results   Component Value Date    GLC 93 10/05/2018       ASSESSMENT/PLAN: POD#5 open sigmoid colectomy with colostomy complicated by afib with RVR and ileus requiring NG decompression and now fever, pleural effusion    1. NPO. Continue NGT. Continue TPN for severe protein calorie malnutrition.  2. Continue PCA for pain control   3. Continue IVF + TPN/Lipids to 100cc or less otherwise she will quickly become fluid overloaded   4. Continue Hannah. Plan for cystogram tomorrow, POD#6 before hannah removal.  5. Continue Zosyn day, leukocytosis stable at 16K and febrile, very high risk for abscess formation but too early to see on CT. Other causes could be significant atelectasis, needs aggressive IS and ambulation with PT, follow up cultures  6. Lovenox for ppx  7. Management of afib per hospitalist, IV BB for now, tele  8. PT reconditioning  10. Replete NaPhos and monitor electrolytes  Appreciate ongoing assistance from hospitalist team    For questions/paging, please contact the CRS office at [phone].    Eyad Arita MD  Fellow, Colon & Rectal Surgery  [facility]  [facility]  [phone]

## 2018-10-07 NOTE — PROGRESS NOTES
SW consult received. Chart review. PT rec's home with home PT services. WOC RN note from 10/5 anticipates home care WOC needs.  SW attempted to meet with pt to discuss home care services and her selection of agency to provide those services. Pt sleeping, SWS will return later.   Pt appears several days out for discharge.     SW will follow up when pt is awake.

## 2018-10-07 NOTE — PLAN OF CARE
Problem: Sepsis/Septic Shock (Adult)  Goal: Signs and Symptoms of Listed Potential Problems Will be Absent, Minimized or Managed (Sepsis/Septic Shock)  Signs and symptoms of listed potential problems will be absent, minimized or managed by discharge/transition of care (reference Sepsis/Septic Shock (Adult) CPG).   Outcome: No Change  A/O, sleepy this shift. Pain at NARAYAN site, PCA Dilaudid. Sm amt serous drainage at NARAYAN site , drsg changed. Phos 2.5 after replacement. K+ and Mg replaced, recheck in AM. Tele SR/SA.  .  15ml output from ostomy. 10ml output fro NGT/green. Incision mid abd CDI. LS diminished.

## 2018-10-08 ENCOUNTER — APPOINTMENT (OUTPATIENT)
Dept: GENERAL RADIOLOGY | Facility: CLINIC | Age: 83
DRG: 853 | End: 2018-10-08
Attending: PHYSICIAN ASSISTANT
Payer: MEDICARE

## 2018-10-08 LAB
ALBUMIN SERPL-MCNC: 1.4 G/DL (ref 3.4–5)
ALP SERPL-CCNC: 51 U/L (ref 40–150)
ALT SERPL W P-5'-P-CCNC: 15 U/L (ref 0–50)
ANION GAP SERPL CALCULATED.3IONS-SCNC: 4 MMOL/L (ref 3–14)
AST SERPL W P-5'-P-CCNC: 31 U/L (ref 0–45)
BILIRUB SERPL-MCNC: 0.4 MG/DL (ref 0.2–1.3)
BUN SERPL-MCNC: 17 MG/DL (ref 7–30)
CALCIUM SERPL-MCNC: 6.7 MG/DL (ref 8.5–10.1)
CHLORIDE SERPL-SCNC: 96 MMOL/L (ref 94–109)
CO2 SERPL-SCNC: 32 MMOL/L (ref 20–32)
CREAT SERPL-MCNC: 0.58 MG/DL (ref 0.52–1.04)
ERYTHROCYTE [DISTWIDTH] IN BLOOD BY AUTOMATED COUNT: 13.7 % (ref 10–15)
GFR SERPL CREATININE-BSD FRML MDRD: >90 ML/MIN/1.7M2
GLUCOSE BLDC GLUCOMTR-MCNC: 130 MG/DL (ref 70–99)
GLUCOSE BLDC GLUCOMTR-MCNC: 92 MG/DL (ref 70–99)
GLUCOSE SERPL-MCNC: 147 MG/DL (ref 70–99)
HCT VFR BLD AUTO: 24.6 % (ref 35–47)
HGB BLD-MCNC: 8.2 G/DL (ref 11.7–15.7)
INR PPP: 1.22 (ref 0.86–1.14)
MAGNESIUM SERPL-MCNC: 1.9 MG/DL (ref 1.6–2.3)
MCH RBC QN AUTO: 33.7 PG (ref 26.5–33)
MCHC RBC AUTO-ENTMCNC: 33.3 G/DL (ref 31.5–36.5)
MCV RBC AUTO: 101 FL (ref 78–100)
PHOSPHATE SERPL-MCNC: 2.5 MG/DL (ref 2.5–4.5)
PLATELET # BLD AUTO: 228 10E9/L (ref 150–450)
POTASSIUM SERPL-SCNC: 4 MMOL/L (ref 3.4–5.3)
PREALB SERPL IA-MCNC: 5 MG/DL (ref 15–45)
PROT SERPL-MCNC: 4.9 G/DL (ref 6.8–8.8)
RBC # BLD AUTO: 2.43 10E12/L (ref 3.8–5.2)
SODIUM SERPL-SCNC: 132 MMOL/L (ref 133–144)
TRIGL SERPL-MCNC: 84 MG/DL
WBC # BLD AUTO: 14.6 10E9/L (ref 4–11)

## 2018-10-08 PROCEDURE — 25000128 H RX IP 250 OP 636: Performed by: INTERNAL MEDICINE

## 2018-10-08 PROCEDURE — 25000128 H RX IP 250 OP 636: Performed by: COLON & RECTAL SURGERY

## 2018-10-08 PROCEDURE — 25000125 ZZHC RX 250: Performed by: INTERNAL MEDICINE

## 2018-10-08 PROCEDURE — 85610 PROTHROMBIN TIME: CPT | Performed by: INTERNAL MEDICINE

## 2018-10-08 PROCEDURE — 99232 SBSQ HOSP IP/OBS MODERATE 35: CPT | Performed by: INTERNAL MEDICINE

## 2018-10-08 PROCEDURE — 83735 ASSAY OF MAGNESIUM: CPT | Performed by: INTERNAL MEDICINE

## 2018-10-08 PROCEDURE — 80053 COMPREHEN METABOLIC PANEL: CPT | Performed by: INTERNAL MEDICINE

## 2018-10-08 PROCEDURE — 84100 ASSAY OF PHOSPHORUS: CPT | Performed by: INTERNAL MEDICINE

## 2018-10-08 PROCEDURE — 12000007 ZZH R&B INTERMEDIATE

## 2018-10-08 PROCEDURE — 84134 ASSAY OF PREALBUMIN: CPT | Performed by: INTERNAL MEDICINE

## 2018-10-08 PROCEDURE — 51600 INJECTION FOR BLADDER X-RAY: CPT

## 2018-10-08 PROCEDURE — 85027 COMPLETE CBC AUTOMATED: CPT | Performed by: INTERNAL MEDICINE

## 2018-10-08 PROCEDURE — 00000146 ZZHCL STATISTIC GLUCOSE BY METER IP

## 2018-10-08 PROCEDURE — 84478 ASSAY OF TRIGLYCERIDES: CPT | Performed by: INTERNAL MEDICINE

## 2018-10-08 RX ADMIN — ASCORBIC ACID, VITAMIN A PALMITATE, CHOLECALCIFEROL, THIAMINE HYDROCHLORIDE, RIBOFLAVIN-5 PHOSPHATE SODIUM, PYRIDOXINE HYDROCHLORIDE, NIACINAMIDE, DEXPANTHENOL, ALPHA-TOCOPHEROL ACETATE, VITAMIN K1, FOLIC ACID, BIOTIN, CYANOCOBALAMIN: 200; 3300; 200; 6; 3.6; 6; 40; 15; 10; 150; 600; 60; 5 INJECTION, SOLUTION INTRAVENOUS at 09:45

## 2018-10-08 RX ADMIN — METOPROLOL TARTRATE 5 MG: 5 INJECTION, SOLUTION INTRAVENOUS at 13:59

## 2018-10-08 RX ADMIN — METOPROLOL TARTRATE 5 MG: 5 INJECTION, SOLUTION INTRAVENOUS at 09:41

## 2018-10-08 RX ADMIN — TAZOBACTAM SODIUM AND PIPERACILLIN SODIUM 3.38 G: 375; 3 INJECTION, SOLUTION INTRAVENOUS at 06:18

## 2018-10-08 RX ADMIN — TAZOBACTAM SODIUM AND PIPERACILLIN SODIUM 3.38 G: 375; 3 INJECTION, SOLUTION INTRAVENOUS at 00:44

## 2018-10-08 RX ADMIN — METOPROLOL TARTRATE 5 MG: 5 INJECTION, SOLUTION INTRAVENOUS at 20:49

## 2018-10-08 RX ADMIN — POTASSIUM CHLORIDE 10 MEQ: 10 INJECTION, SOLUTION INTRAVENOUS at 11:56

## 2018-10-08 RX ADMIN — I.V. FAT EMULSION 250 ML: 20 EMULSION INTRAVENOUS at 09:20

## 2018-10-08 RX ADMIN — TAZOBACTAM SODIUM AND PIPERACILLIN SODIUM 3.38 G: 375; 3 INJECTION, SOLUTION INTRAVENOUS at 18:01

## 2018-10-08 RX ADMIN — TAZOBACTAM SODIUM AND PIPERACILLIN SODIUM 3.38 G: 375; 3 INJECTION, SOLUTION INTRAVENOUS at 23:55

## 2018-10-08 RX ADMIN — METOPROLOL TARTRATE 5 MG: 5 INJECTION, SOLUTION INTRAVENOUS at 01:58

## 2018-10-08 RX ADMIN — ENOXAPARIN SODIUM 40 MG: 40 INJECTION SUBCUTANEOUS at 15:33

## 2018-10-08 RX ADMIN — POTASSIUM CHLORIDE 10 MEQ: 10 INJECTION, SOLUTION INTRAVENOUS at 10:45

## 2018-10-08 RX ADMIN — TAZOBACTAM SODIUM AND PIPERACILLIN SODIUM 3.38 G: 375; 3 INJECTION, SOLUTION INTRAVENOUS at 13:04

## 2018-10-08 RX ADMIN — Medication 2 G: at 15:33

## 2018-10-08 ASSESSMENT — ACTIVITIES OF DAILY LIVING (ADL)
ADLS_ACUITY_SCORE: 12
ADLS_ACUITY_SCORE: 12
ADLS_ACUITY_SCORE: 11
ADLS_ACUITY_SCORE: 12

## 2018-10-08 NOTE — PLAN OF CARE
Problem: Sepsis/Septic Shock (Adult)  Goal: Signs and Symptoms of Listed Potential Problems Will be Absent, Minimized or Managed (Sepsis/Septic Shock)  Signs and symptoms of listed potential problems will be absent, minimized or managed by discharge/transition of care (reference Sepsis/Septic Shock (Adult) CPG).   Outcome: No Change  Lethargic this shift.Turned and repositioned q 2 hrs. Heels elevated. VSS afebrile.  Tele SR. LS clear, diminished.  Ostomy 20ml out. BS hypoactive.  Denies pain at rest.

## 2018-10-08 NOTE — PLAN OF CARE
Problem: Patient Care Overview  Goal: Plan of Care/Patient Progress Review  Outcome: No Change  A&O x 4, assist x 2, NPO, LS diminished, tele SR, reposition q2 hrs, labs drawn this morning, PCA pump cleared, NARAYAN/ostomy/hannah/NG tube in place, IV zosyn for abx, will continue with POC.

## 2018-10-08 NOTE — PROGRESS NOTES
[facility]  Hospitalist Progress Note  Jose M Carrero MD 10/08/18    Reason for Stay (Diagnosis):  Sepsis, diverticulitis, colovesicular fistula         Assessment and Plan:      Summary of Stay:   Brea Kerr is a 82 year old female came to attention on 9/30/2018 with stool passing in her urine. She also still had abdominal pain, but which she thought was not much changed from when she had the NARAYAN drain placed on 9/20/18.  Now there is evidence (by CT scan and confirmed by feculent drainage into the NARAYAN drain) that the abscess represents a fistulous tract with the colon. 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  Presenting with sepsis.     Her recent PMH is notable for hospitalization at [facility] from 9/19 - 9/23/18 for perforated diverticulitis with abscess formation.  She underwent placement of a NAARYAN drain into the abscess on 9/20 under CT guidance and was discharged home on oral Cipro and Metronidazole.      More remote PMH notable for prior episodes of sigmoid diverticulitis and associated paroxysmal atrial fibrillation. And after arriving in the ED, the patient developed A fib with RVR (rate > 140) and her blood pressure fell to the 60's for which reason she was admitted to the ICU and was started on Atrial fibrillation and very briefly on Norepi.  However with the Amio and conversion to NSR, her BP came up.     On 10/2/2018 the patient was taken to the operating room by Dr. Reddy and underwent sigmoid colectomy with end colostomy.  She did very well with the procedure but postoperatively has developed recurrent atrial fibrillation with rapid ventricular response.  Again her blood pressure dropped some.     She was admitted to the ICU following this for management of a-fib w/ RVR and hypotension.  Following amiodarone load, diltiazem drip and digoxin she converted to NSR.    Transferred out of the ICU on 10/4.    NG placed with plans for trophic feeds, but persistent frequent vomiting so  is currently on suction.  TPN recommended by surgery team which has been started.    Intermittently in A. fib with RVR and unable to take oral amiodarone.  Change to IV metoprolol and has remained in NSR for the last 24 hours.    Did have fever up to 101.7 despite being on Zosyn.  Overnight a CT abdomen was obtained showing small pleural effusions and intraperitoneal fluid without sign of abscess formation at this point.  Did obtain blood cultures given central line in place.  Fever curve now better.  Cystogram does not show any sign of ongoing colovesicular fistula.       Problem List/Assessment and Plan:   Sepsis due to recurrent diverticulitis, colovesicular fistula: Recent hospitalization with NARAYAN drain placement for diverticulitis and abscess.  Colovesicular fistula present.  Now re-presenting with stool drainage and NARAYAN drain and sepsis.  Was on Cipro and Flagyl.  Fever 10/7 and persistent leukocytosis to 16.  -CRS consulted  -S/P open sigmoid colectomy with end colostomy and takedown of colovesicular fistula.  NARAYAN drain in place  -Continue Zosyn per CRS  -Ostomy nurse consult  -NARAYAN drain in place  -diet per CRS, NG in place and output is slowed.  Question clamping trial today and if goes well could we start tube feeds?  Defer to CRS  -Cystogram shows no evidence of ongoing colovesicular fistula.  Surgery team may recommend Babb removal today  -On a PCA for pain control, if using infrequently could change to as needed IV Dilaudid at low-dose 0.2 mg starting dose  -PT, patient needs to mobilize  -Incentive spirometer    A. fib with RVR: With associated hypotension briefly requiring norepinephrine.  Started on amiodarone.  Intermittently in A. fib and NSR.  Anticoagulation on hold for now.    -Was on oral amiodarone daily that was started while in the ICU.  Unable to take p.o. due to vomiting   -Intermittently in A. fib with RVR, stopped amiodarone and started IV metoprolol 5 mg every 6 hours and has remained in  NSR.    Malnutrition: Feeding tube placed, but significant vomiting and have not been able to start tube feeds.  Surgery recommended TPN and dietitian and pharmacy consulted.  Started TPN 10/5.  Will use right IJ CVC.  Follow labs.    Acute hyponatremia: Sodium down to 130.  Possible GI losses versus SIADH from vomiting.  Monitor while on TPN.    Electrolyte abnormalities: Phosphorus low.  Calcium low as well but when corrected for hypoalbuminemia is near the lower limit of normal.  Electrolyte replacement protocols.    Anemia: Suspect combination of phlebotomy, postsurgical blood loss, and inflammatory process.  Overall stable and high 8 range.  -CBC tomorrow    HTN: Actually hypotensive here when in A. fib with RVR so antihypertensives including metoprolol 25 mg twice daily and losartan 100 mg daily have been on hold.  Blood pressure now improving.  -Restarted metoprolol in IV form at 5 mg every 6 hours, continue while n.p.o.    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  Continue to hold.      History of DVT, PE: Not currently on anticoagulant with surgery.  On prophylactic Lovenox dosing.    DVT Prophylaxis: Enoxaparin (Lovenox) SQ  Code Status: Full Code  FEN: N.p.o. while having significant vomiting.  Started TPN per surgery recommendations.  If nausea vomiting improves could consider clamping trial and then starting tube feeds  Lines: Continue right IJ CVC for now while on TPN.  If not able to start trophic feeds in the next few days would consider swapping for PICC line.  Babb in place, normal cystogram today so surgery may recommend removal  Discharge Dispo: TBD  Estimated Disch Date / # of Days until Disch: Multiple more day hospitalization pending return of gut function        Interval History (Subjective):      Fever curve better.  Abdominal pain well controlled.  No nausea although remains on intermittent suction from the NG tube.  No chest  pain or shortness of breath.                  Physical Exam:      Last Vital Signs:  /60 (BP Location: Left arm)  Pulse 74  Temp 97.7  F (36.5  C) (Oral)  Resp 12  Ht 1.524 m (5')  Wt 62.6 kg (138 lb 1.6 oz)  SpO2 95%  BMI 26.97 kg/m2    Intake/Output Summary (Last 24 hours) at 10/08/18 1041  Last data filed at 10/08/18 0556   Gross per 24 hour   Intake              539 ml   Output           1447.5 ml   Net           -908.5 ml       Constitutional: Awake, NAD   Eyes: sclera white   HEENT: NG in place draining green bile.  Right IJ C/D/I  Respiratory:   no crackles or wheeze  Cardiovascular: RRR.  No murmur   GI: Mild diffuse tenderness to palpation.  NARAYAN drain in place with small serosanguineous drainage.  Ostomy site red  Genitourinary: Babb in place  Skin: no rash    Musculoskeletal/extremities:   Trace bilateral lower extremity  Neurologic: A&O   Psychiatric: calm, cooperative, flat affect         Medications:      All current medications were reviewed with changes reflected in problem list.         Data:      All new lab and imaging data was reviewed.   Labs:    Recent Labs  Lab 10/07/18  0820 10/07/18  0815   CULT No growth after 20 hours No growth after 20 hours       Recent Labs  Lab 10/08/18  0610 10/07/18  0525 10/06/18  0520   * 130* 136   POTASSIUM 4.0 3.8 3.4   CHLORIDE 96 95 99   CO2 32 32 33*   ANIONGAP 4 3 4   * 137* 108*   BUN 17 12 7   CR 0.58 0.61 0.56   GFRESTIMATED >90 >90 >90   GFRESTBLACK >90 >90 >90   PAULINA 6.7* 6.9* 6.7*       Recent Labs  Lab 10/08/18  0610 10/07/18  0525 10/06/18  0520   WBC 14.6* 16.1* 16.3*   HGB 8.2* 8.8* 8.9*   HCT 24.6* 26.9* 26.7*   * 101* 102*    245 250      Imaging:     Recent Results (from the past 24 hour(s))   XR Cystogram    Narrative    CYSTOGRAM   10/8/2018 9:09 AM     HISTORY: Postoperative sigmoid colectomy with repair of colovesical  fistula. Evaluate for bladder leak.    COMPARISON: 10/7/2018-CT abdomen and  pelvis.    TECHNIQUE: 200 mL of cystoscopy-Conray 2 contrast material were  instilled into the urinary bladder during real-time fluoroscopic  imaging via an indwelling balloontipped bladder catheter. Spot  radiographs were obtained with the patient in the supine and right and  left anterior oblique positions. Postevacuation images were also  obtained.     Fluoroscopy time: 1.1 minutes. Number of images: 9. Number of cine  clips: None.    FINDINGS: The bladder has a normal configuration. No extraluminal  contrast is visualized. The postevacuation image is unremarkable.      Impression    IMPRESSION: No evidence of bladder leak.        Jose M Carrero MD

## 2018-10-08 NOTE — PROGRESS NOTES
COLON & RECTAL SURGERY  PROGRESS NOTE    October 8, 2018  Post-op Day # 6 open sigmoid colectomy with colostomy     SUBJECTIVE:  Attempted to see patient, however she was down in cystogram    OBJECTIVE:  Temp:  [95.7  F (35.4  C)-98.7  F (37.1  C)] 98.4  F (36.9  C)  Pulse:  [66-74] 74  Heart Rate:  [72-82] 82  Resp:  [12-18] 12  BP: (115-139)/(54-63) 121/55  SpO2:  [96 %-97 %] 97 %    Intake/Output Summary (Last 24 hours) at 10/08/18 0808  Last data filed at 10/08/18 0556   Gross per 24 hour   Intake              539 ml   Output           1647.5 ml   Net          -1108.5 ml       LABS:  Lab Results   Component Value Date    WBC 14.6 10/08/2018     Lab Results   Component Value Date    HGB 8.2 10/08/2018     Lab Results   Component Value Date    HCT 24.6 10/08/2018     Lab Results   Component Value Date     10/08/2018     Last Basic Metabolic Panel:  Lab Results   Component Value Date     10/08/2018      Lab Results   Component Value Date    POTASSIUM 4.0 10/08/2018     Lab Results   Component Value Date    CHLORIDE 96 10/08/2018     Lab Results   Component Value Date    PAULINA 6.7 10/08/2018     Lab Results   Component Value Date    CO2 32 10/08/2018     Lab Results   Component Value Date    BUN 17 10/08/2018     Lab Results   Component Value Date    CR 0.58 10/08/2018     Lab Results   Component Value Date     10/08/2018       ASSESSMENT/PLAN: POD 6 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 Afebrile overnight. WBC improving from 16.1 to14.6.  Will be by later this morning to see patient. Pending cystogram results will possibly remove hannah catheter. Continue NPO,continue NGT, continue TPN.   - Encourage IS and continue PT  - Lovenox for prophylaxis   - Appreciate hospitalist medical management of comorbidities  For questions/paging, please contact the CRS office at [phone].    Betty Mariee PA-C  [facility]  Phone: [phone]     Colon and Rectal Surgery Attending Note    Patient seen and examined independently.  Agree with above assessment and plan.  Doing better. Cystogram without leak today.  abd soft, stoma pink with thin green output.  NGT functioning  Plan  discontinue hannah  Continue NGT for now.  Ice chips okay.  Lovenox.  PT/OT, out of bed.    Emma Reddy MD  [facility]  Office Phone # [phone]

## 2018-10-08 NOTE — PLAN OF CARE
Problem: Patient Care Overview  Goal: Plan of Care/Patient Progress Review  PT: Treatment attempted x 2 this AM. Patient declines due to fatigue. Per chart, pt still needing Ax1-2 for all mobility. Will continue to follow and treat as pt agreeable.

## 2018-10-08 NOTE — PLAN OF CARE
Problem: Patient Care Overview  Goal: Plan of Care/Patient Progress Review  Outcome: No Change  /60 (BP Location: Left arm)  Pulse 74  Temp 98  F (36.7  C) (Oral)  Resp 14  Ht 1.524 m (5')  Wt 62.6 kg (138 lb 1.6 oz)  SpO2 97%  BMI 26.97 kg/m2    Neuro: WDL - A&Ox4  Pain: Slight abd pain where incision is  Resp: 2L 02 - WDL  Cardiac: SR  GI/: discontinued orders for hannah. Ostomy in place  Diet: NPO - TPN & Lipids infusing  Skin/Mobility: Assist 2  Plan: Cystogram today - neg for bladder leak - telephone order with read back to discontinue hannah. Continue w/ POC. Continue to monitor.

## 2018-10-09 ENCOUNTER — APPOINTMENT (OUTPATIENT)
Dept: PHYSICAL THERAPY | Facility: CLINIC | Age: 83
DRG: 853 | End: 2018-10-09
Attending: COLON & RECTAL SURGERY
Payer: MEDICARE

## 2018-10-09 LAB
ANION GAP SERPL CALCULATED.3IONS-SCNC: 4 MMOL/L (ref 3–14)
BUN SERPL-MCNC: 15 MG/DL (ref 7–30)
CALCIUM SERPL-MCNC: 7 MG/DL (ref 8.5–10.1)
CHLORIDE SERPL-SCNC: 96 MMOL/L (ref 94–109)
CO2 SERPL-SCNC: 30 MMOL/L (ref 20–32)
CREAT SERPL-MCNC: 0.56 MG/DL (ref 0.52–1.04)
ERYTHROCYTE [DISTWIDTH] IN BLOOD BY AUTOMATED COUNT: 13.4 % (ref 10–15)
GFR SERPL CREATININE-BSD FRML MDRD: >90 ML/MIN/1.7M2
GLUCOSE BLDC GLUCOMTR-MCNC: 115 MG/DL (ref 70–99)
GLUCOSE BLDC GLUCOMTR-MCNC: 116 MG/DL (ref 70–99)
GLUCOSE BLDC GLUCOMTR-MCNC: 120 MG/DL (ref 70–99)
GLUCOSE BLDC GLUCOMTR-MCNC: 127 MG/DL (ref 70–99)
GLUCOSE SERPL-MCNC: 102 MG/DL (ref 70–99)
HCT VFR BLD AUTO: 24.8 % (ref 35–47)
HGB BLD-MCNC: 8.1 G/DL (ref 11.7–15.7)
INTERPRETATION ECG - MUSE: NORMAL
MAGNESIUM SERPL-MCNC: 2.1 MG/DL (ref 1.6–2.3)
MCH RBC QN AUTO: 33.3 PG (ref 26.5–33)
MCHC RBC AUTO-ENTMCNC: 32.7 G/DL (ref 31.5–36.5)
MCV RBC AUTO: 102 FL (ref 78–100)
PLATELET # BLD AUTO: 265 10E9/L (ref 150–450)
POTASSIUM SERPL-SCNC: 4.2 MMOL/L (ref 3.4–5.3)
RBC # BLD AUTO: 2.43 10E12/L (ref 3.8–5.2)
SODIUM SERPL-SCNC: 130 MMOL/L (ref 133–144)
WBC # BLD AUTO: 13.4 10E9/L (ref 4–11)

## 2018-10-09 PROCEDURE — 83735 ASSAY OF MAGNESIUM: CPT | Performed by: INTERNAL MEDICINE

## 2018-10-09 PROCEDURE — 25000128 H RX IP 250 OP 636: Performed by: INTERNAL MEDICINE

## 2018-10-09 PROCEDURE — 40000193 ZZH STATISTIC PT WARD VISIT: Performed by: PHYSICAL THERAPIST

## 2018-10-09 PROCEDURE — 25000125 ZZHC RX 250: Performed by: INTERNAL MEDICINE

## 2018-10-09 PROCEDURE — 25000128 H RX IP 250 OP 636: Performed by: COLON & RECTAL SURGERY

## 2018-10-09 PROCEDURE — 99233 SBSQ HOSP IP/OBS HIGH 50: CPT | Performed by: INTERNAL MEDICINE

## 2018-10-09 PROCEDURE — G0463 HOSPITAL OUTPT CLINIC VISIT: HCPCS

## 2018-10-09 PROCEDURE — 00000146 ZZHCL STATISTIC GLUCOSE BY METER IP

## 2018-10-09 PROCEDURE — 97530 THERAPEUTIC ACTIVITIES: CPT | Mod: GP | Performed by: PHYSICAL THERAPIST

## 2018-10-09 PROCEDURE — 12000007 ZZH R&B INTERMEDIATE

## 2018-10-09 PROCEDURE — 80048 BASIC METABOLIC PNL TOTAL CA: CPT | Performed by: INTERNAL MEDICINE

## 2018-10-09 PROCEDURE — 85027 COMPLETE CBC AUTOMATED: CPT | Performed by: INTERNAL MEDICINE

## 2018-10-09 PROCEDURE — 97116 GAIT TRAINING THERAPY: CPT | Mod: GP | Performed by: PHYSICAL THERAPIST

## 2018-10-09 RX ORDER — DEXTROSE MONOHYDRATE 25 G/50ML
25-50 INJECTION, SOLUTION INTRAVENOUS
Status: DISCONTINUED | OUTPATIENT
Start: 2018-10-09 | End: 2018-10-15

## 2018-10-09 RX ORDER — NICOTINE POLACRILEX 4 MG
15-30 LOZENGE BUCCAL
Status: DISCONTINUED | OUTPATIENT
Start: 2018-10-09 | End: 2018-10-15

## 2018-10-09 RX ADMIN — POTASSIUM CHLORIDE: 2 INJECTION, SOLUTION, CONCENTRATE INTRAVENOUS at 19:27

## 2018-10-09 RX ADMIN — TAZOBACTAM SODIUM AND PIPERACILLIN SODIUM 3.38 G: 375; 3 INJECTION, SOLUTION INTRAVENOUS at 19:26

## 2018-10-09 RX ADMIN — I.V. FAT EMULSION 250 ML: 20 EMULSION INTRAVENOUS at 08:33

## 2018-10-09 RX ADMIN — METOPROLOL TARTRATE 5 MG: 5 INJECTION, SOLUTION INTRAVENOUS at 08:31

## 2018-10-09 RX ADMIN — Medication: at 14:07

## 2018-10-09 RX ADMIN — ENOXAPARIN SODIUM 40 MG: 40 INJECTION SUBCUTANEOUS at 14:17

## 2018-10-09 RX ADMIN — METOPROLOL TARTRATE 5 MG: 5 INJECTION, SOLUTION INTRAVENOUS at 14:16

## 2018-10-09 RX ADMIN — METOPROLOL TARTRATE 5 MG: 5 INJECTION, SOLUTION INTRAVENOUS at 02:12

## 2018-10-09 RX ADMIN — TAZOBACTAM SODIUM AND PIPERACILLIN SODIUM 3.38 G: 375; 3 INJECTION, SOLUTION INTRAVENOUS at 05:43

## 2018-10-09 RX ADMIN — METOPROLOL TARTRATE 5 MG: 5 INJECTION, SOLUTION INTRAVENOUS at 19:27

## 2018-10-09 RX ADMIN — TAZOBACTAM SODIUM AND PIPERACILLIN SODIUM 3.38 G: 375; 3 INJECTION, SOLUTION INTRAVENOUS at 12:39

## 2018-10-09 RX ADMIN — ASCORBIC ACID, VITAMIN A PALMITATE, CHOLECALCIFEROL, THIAMINE HYDROCHLORIDE, RIBOFLAVIN-5 PHOSPHATE SODIUM, PYRIDOXINE HYDROCHLORIDE, NIACINAMIDE, DEXPANTHENOL, ALPHA-TOCOPHEROL ACETATE, VITAMIN K1, FOLIC ACID, BIOTIN, CYANOCOBALAMIN: 200; 3300; 200; 6; 3.6; 6; 40; 15; 10; 150; 600; 60; 5 INJECTION, SOLUTION INTRAVENOUS at 00:53

## 2018-10-09 ASSESSMENT — ACTIVITIES OF DAILY LIVING (ADL)
ADLS_ACUITY_SCORE: 12
ADLS_ACUITY_SCORE: 11
ADLS_ACUITY_SCORE: 12
ADLS_ACUITY_SCORE: 12

## 2018-10-09 ASSESSMENT — PAIN DESCRIPTION - DESCRIPTORS: DESCRIPTORS: ACHING

## 2018-10-09 NOTE — PLAN OF CARE
"Problem: Patient Care Overview  Goal: Plan of Care/Patient Progress Review  Discharge Planner PT   Patient plan for discharge: Considering TCU at this time as an option  Current status: Pt has not participated in PT the past 2 days. Today, minimal c/o pain. Mod A x 2 to get to EOB with HOB fully elevated, pt moving LE\"s ok, but not trunk. Stood with min A x2, walked with fww approx 4' total with c/o dizziness. 2 IV poles of tubes/lines to manage. Up to recliner with goal of 1 hr. Updated white board for goal of being up to recliner 2-3 x daily for 1 hr minimum. Pt educated to use IS.   Barriers to return to prior living situation: Stairs, family would need to be present and able to provide mod A x2, pt is not walking functional distance of caring for her stoma/bag.   Recommendations for discharge: TCU  Rationale for recommendations: Skilled PT while hospitalized and at next level of care recommended for pt to regain strength, functional mobility, stamina and gait with assistive device ( including stairs) to be functionally mobile again. Currently pt is Mod A x2 bed mobility, walking 4 feet with CGA.        Entered by: Annie Avendaño 10/09/2018 3:32 PM           "

## 2018-10-09 NOTE — PLAN OF CARE
Problem: Patient Care Overview  Goal: Plan of Care/Patient Progress Review  Outcome: Improving  RN- VSS, afeb. Pt is alert and oriented. Pain is well controlled with PCA. Up in chair with assist of 2. Weaned off 02 and 92-95% on RA. Using IS with encouragement. Plan to trial NG clamp tomorrow. Voided large amounts of incont urine X2 after hannah DC'd. Uses call light. Continue current POC.

## 2018-10-09 NOTE — PLAN OF CARE
Problem: Sepsis/Septic Shock (Adult)  Goal: Signs and Symptoms of Listed Potential Problems Will be Absent, Minimized or Managed (Sepsis/Septic Shock)  Signs and symptoms of listed potential problems will be absent, minimized or managed by discharge/transition of care (reference Sepsis/Septic Shock (Adult) CPG).   Outcome: No Change  Pt. A&Ox4, up with assist of 2 but has been resting in bed during the shift. Tele: SR. Pt. Has PCA Dilaudid for pain. Pt. Continues on zosyn.TPN infusing without difficulty, NG tube to LIS. Trial NG  Clamp to be done today. Incontinent at times. Lung sounds diminished, O2 sat's at 92-95%. Pt denies any needs at this time. Will continue with POC.

## 2018-10-09 NOTE — PROGRESS NOTES
"[facility] Nurse Inpatient Ostomy Assessment      Assessment of ostomy and needs for:   Colostomy      Data:   History:      Per MD note(s):  S/p 10/3:  fito sigmoidectomy with Colostomy     Type of ostomy:  Colostomy  Stoma assessment: pouch changed 10/9  ? Size of stoma:  Slightly smaller than last pouch change ~ 1 1/2\",  pink-red, round, moist, edematous and protruberant with RRC in Os  Mucocutaneous Junction (MCJ):  UTV  Peristomal skin: UTV; noted to have erythema to lower 6 o'clock area and looks like old drain site  Abdominal assessment: midline intact incision  Output:  small,     Intake/Output Summary (Last 24 hours) at 10/10/18 1540  Last data filed at 10/10/18 1502   Gross per 24 hour   Intake           2723.5 ml   Output             3950 ml   Net          -1226.5 ml     Current pouching system:  Coloplast,  one piece and flat on 10/9  Pain:  tender     Diet:             Active Diet Order      NPO for Medical/Clinical Reasons Except for: Meds  Labs:   Recent Labs   Lab Test  10/09/18   0550  10/08/18   0610   ALBUMIN   --   1.4*   HGB  8.1*  8.2*   INR   --   1.22*   WBC  13.4*  14.6*           Intervention:     Patient's chart evaluated.      Assessments done today:  Stoma assessed and pouch change technique demonstrated for pt and family    Education: continued with pt and family; review of all handouts. Pouch change and skin care; empty pouch, cleaning neck; pouching options    Prepared for discharge by: Supplies ordered and reviewed in room    Pt registered for ostomy supply samples? On discharge with Coloplast         Assessment:     Stoma assessment: viable, pink-red, protruberant with RRC intubated in Os; small amount brown liquid effluent in pouch.    Pt remains NPO with extremely low Albumen on TPN and lipids; pt is feeling more positive today but easily tears up.     Learning needs/ comprehension: moving slowly with information due to pt status; family was very engaged and " asked appropriate questions    Effectiveness of current pouching/ supply plan: TBD;          Plan:     Plan:   ? Current pouching system: Coloplast 1 pc and ring 10/9    Education:  ? Education continued thru the stay:   Pouch Emptying and Pouch Replacement, How to cuff and clean pouch, How to empty pouch, Removal of pouch, Preparation of new pouch, Cutting out or evaluating a pattern, Applying paste or rings, Applying appliance to abdomen, Peristomal skin care, Diet and hydration / fluid balance, Odor / flatus management and Lifestyle Adjustments   o Supply company:  [employer]  o Do WOC Nurse recommend home care ? TCU vs Home care as pt will need support when discharged

## 2018-10-09 NOTE — PLAN OF CARE
Problem: Patient Care Overview  Goal: Plan of Care/Patient Progress Review  A&O. Up with assist x2. Lung sounds: dim. Tele: SR. Diet: NPO. NG tube removed, denies nausea. NARAYAN drain removed. internal jugular infusing TPN. . Pain: 5/10 pain, remains controlled with PCA pump. Cont POC.

## 2018-10-09 NOTE — PROGRESS NOTES
COLON & RECTAL SURGERY  PROGRESS NOTE    October 9, 2018  Post-op Day # 7 fito sigmoidectomy    SUBJECTIVE:  No acute events. Reports she slept most of the night. Up to chair yesterday. Hannah removed and voiding. Denies nausea. Pain controlled, not using PCA much.    OBJECTIVE:  Temp:  [97.6  F (36.4  C)-98.9  F (37.2  C)] 97.6  F (36.4  C)  Heart Rate:  [72-84] 83  Resp:  [12-16] 16  BP: (119-144)/(53-73) 126/63  SpO2:  [92 %-97 %] 95 %    Intake/Output Summary (Last 24 hours) at 10/09/18 0716  Last data filed at 10/09/18 0601   Gross per 24 hour   Intake             3237 ml   Output             1675 ml   Net             1562 ml       GENERAL:  Awake, alert, no acute distress  HEAD: NGT with scant thin output  EXTREMITIES: Warm and well perfused  ABDOMEN:  Soft, appropriately tender (mostly at drain site), non-distended. No guarding, rigidity, or peritoneal signs. Stoma healthy with red rubber in place, thin green output in bag  INCISION:  C/d/i    LABS:  Lab Results   Component Value Date    WBC 13.4 10/09/2018     Lab Results   Component Value Date    HGB 8.1 10/09/2018     Lab Results   Component Value Date    HCT 24.8 10/09/2018     Lab Results   Component Value Date     10/09/2018     Last Basic Metabolic Panel:  Lab Results   Component Value Date     10/08/2018      Lab Results   Component Value Date    POTASSIUM 4.0 10/08/2018     Lab Results   Component Value Date    CHLORIDE 96 10/08/2018     Lab Results   Component Value Date    PAULINA 6.7 10/08/2018     Lab Results   Component Value Date    CO2 32 10/08/2018     Lab Results   Component Value Date    BUN 17 10/08/2018     Lab Results   Component Value Date    CR 0.58 10/08/2018     Lab Results   Component Value Date     10/08/2018       ASSESSMENT/PLAN: POD 7 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 Cystogram negative and hannah removed. Slowly improving.  Post op a fib, now sinus.    - Continue NPO, NGT clamp trial today, continue TPN  - Encourage IS and continue PT  - Lovenox for prophylaxis   - Appreciate hospitalist medical management of comorbidities    Will update Dr. Reddy    For questions/paging, please contact the CRS office at [phone].    Scout Reed MD  Colorectal Surgery Fellow  [facility]  Phone: [phone]      Colon and Rectal Surgery Attending Note    Patient seen and examined independently.  Agree with above assessment and plan.  No nausea with NGT clamped. Just hooked back to suction  Abd soft, stoma pink with small gas in the bag. No stool  NARAYAN serous.  Plan  NGT clamping trial in process  discontinue NARAYAN  Encourage ambulation  Continue NPO, TPN.  discontinue ABX give it has been 7 days post op.    Emma Reddy MD  [facility]  Office Phone # [phone]

## 2018-10-09 NOTE — PROGRESS NOTES
[facility]  Hospitalist Progress Note  Name: Brea Kerr    MRN: [id number]  Physician:  Keegan Gardner DO, FHM (Text Page)    Assessment & Plan   Summary of Stay:  Brea Kerr is a 82 year old female came to attention on 9/30/2018 with stool passing in her urine. She also still had abdominal pain, but which she thought was not much changed from when she had the NARAYAN drain placed on 9/20/18.  Now there is evidence (by CT scan and confirmed by feculent drainage into the NARAYAN drain) that the abscess represents a fistulous tract with the colon. 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  Presenting with sepsis.      Her recent PMH is notable for hospitalization at [facility] from 9/19 - 9/23/18 for perforated diverticulitis with abscess formation.  She underwent placement of a NARAYAN drain into the abscess on 9/20 under CT guidance and was discharged home on oral Cipro and Metronidazole.       More remote PMH notable for prior episodes of sigmoid diverticulitis and associated paroxysmal atrial fibrillation. And after arriving in the ED, the patient developed A fib with RVR (rate > 140) and her blood pressure fell to the 60's for which reason she was admitted to the ICU and was started on Atrial fibrillation and very briefly on Norepi.  However with the Amio and conversion to NSR, her BP came up.      On 10/2/2018 the patient was taken to the operating room by Dr. Reddy and underwent sigmoid colectomy with end colostomy.  She did very well with the procedure but postoperatively has developed recurrent atrial fibrillation with rapid ventricular response.  Again her blood pressure dropped.      She was admitted to the ICU following this for management of a-fib w/ RVR and hypotension.  Following amiodarone load, diltiazem drip and digoxin she converted to NSR.    Transferred out of the ICU on 10/4.  Given challenges with oral intake and conversion amiodarone was not ordered oral.  She has been maintained on IV  metoprolol.     NG placed with plans for trophic feeds, but persistent frequent vomiting so it was on suction.  TPN started.  Her NGT was pulled given improvement so anticipate some oral intake may start in the next day     Intermittently in A. fib with RVR and unable to take oral amiodarone.  Change to IV metoprolol and has remained in NSR for the last 24 hours.     Sepsis due to recurrent diverticulitis, colovesicular fistula s/p Green sigmoidectomy:   -  Post-op site cares/drains, activity restrictions, diet, antibiotics, DVT proph per CRS.  -Continue Zosyn per CRS  -Ostomy nurse involved  -Recent Cystogram shows no evidence of ongoing colovesicular fistula. Babb is now removed.  -On a PCA for pain control, if using infrequently.  Stop PCA when diet started possibly tomorrow.  -Incentive spirometer     Weight Gain:  -  Called by staff over concern for weight gain.  Her weight is up over 10 lbs, however it appears many of these were bed weights and inconsistent.  When I examine her, she is somewhat volume overloaded though does not appear tremendously so.  She certainly does not appear to have marked anasarca.  For now, I would reduce IVF as able and we are concentrating TPN fluid volume.  Hold on extra diuretics.  I suspect she will mobilize some of the fluid on her own.  I have asked for daily weights but from a bedside scale.  Continue to watch volume status, fluid shifts challenging with low albumin level.    A. fib with RVR: With associated hypotension briefly requiring norepinephrine.  Started on amiodarone.  Intermittently in A. fib and NSR.    -Was on oral amiodarone daily that was started while in the ICU.  Unable to take p.o. due to vomiting.  Has just been on metoprolol IV but doing well.  I think a lot of the afib issues driving the rhythm/rate was the infection/initial surgical stress.  -  Probably would go to just oral metoprolol once tolerating po.  If afib occurs again would reconsider  amiodarone long term.     Malnutrition: Feeding tube placed, but significant vomiting and have not been able to start tube feeds.  Surgery recommended TPN and dietitian and pharmacy consulted.  Started TPN 10/5.  Will use right IJ CVC.  Given low albumin and third spacing concerns, we are using more concentrated/lower volume TPN.  -  Leave internal jugular central line in for now.  If diet advances, see how proceeds before deciding to remove.  If appears will need long term TPN, I would change to PICC line in a day or two depending on timing with trying a oral diet.    Acute hyponatremia: Sodium staying around 130.  Possible GI losses versus SIADH.  Stable lately around 130, continue to monitor.     Electrolyte abnormalities:  Phosphorus and calcium recently noted to be low.  Though corrected for hypoalbuminemia is near the lower limit of normal.  Electrolyte replacement protocols.  Recheck tomorrow.     Anemia: Suspect combination of phlebotomy, postsurgical blood loss, and inflammatory process.  Overall stable and high 8 range.  -CBC tomorrow     HTN:  Prior hypotensive issues led to metoprolol 25 mg twice daily and losartan 100 mg daily being on hold.  More recently on IV metoprolol.  BP reasonable at the moment.    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  Continue to hold.      History of DVT, PE:  Was not on PTA full dose tx currently.  On prophylactic Lovenox dosing.     DVT Prophylaxis: Enoxaparin (Lovenox) SQ  Code Status: Full Code  Disposition Plan   Expected discharge multiple more day hospitalization pending return of gut function.  Likely to TCU/rehab eventually.       Entered: Keegan Gardner 10/09/2018, 5:40 PM       Interval History   Assumed care, history reviewed.  Ms Kerr feels well.  No new complaints.  ABD pain controlled.  No other new pain.  No SOB, nausea.  No appetite though.  Feels a little stronger.    -Data reviewed today:  I reviewed all new labs and imaging reports over the last 24 hours. I personally reviewed no images or EKG's today.    Physical Exam   Temp: 97.3  F (36.3  C) Temp src: Oral BP: 126/69   Heart Rate: 84 Resp: 16 SpO2: 94 % O2 Device: None (Room air)    Vitals:    10/06/18 0500 10/07/18 0526 10/08/18 0617   Weight: 64.3 kg (141 lb 12.8 oz) 61.8 kg (136 lb 4.8 oz) 62.6 kg (138 lb 1.6 oz)     Vital Signs with Ranges  Temp:  [97.3  F (36.3  C)-98.9  F (37.2  C)] 97.3  F (36.3  C)  Heart Rate:  [71-84] 84  Resp:  [16] 16  BP: (119-139)/(53-71) 126/69  SpO2:  [90 %-95 %] 94 %  I/O last 3 completed shifts:  In: 3237 [I.V.:391]  Out: 1930 [Urine:1300; Emesis/NG output:400; Drains:10; Stool:220]    GEN:  Alert, oriented x 3, appears ill but comfortable, NAD.  Up in the chair when I saw her.  HEENT:  Normocephalic/atraumatic, no scleral icterus, no nasal discharge, mouth moist.  Right internal jugular line appears stable without spreading erythema/discharge.  CV:  Regular rate and rhythm, distant.  LUNGS:  Clear to auscultation upper with decreased breath sounds bases.  No wheezes/retractions.  Symmetric chest rise on inhalation noted.  ABD:  Hypoactive bowel sounds, soft, mildly distended.  Detailed ostomy/surgical site exam deferred to CRS.  No guarding/rigidity.  EXT:  Trace peripheral edema.  No cyanosis.  No acute joint synovitis noted.  SKIN:  Dry to touch, no exanthems noted in the visualized areas.    Medications     IV fluid REPLACEMENT ONLY       IV fluid REPLACEMENT ONLY       HYDROmorphone       lactated ringers Stopped (10/07/18 0930)     parenteral nutrition - ADULT compounded formula       parenteral nutrition - Clinimix E 65 mL/hr at 10/09/18 0840       enoxaparin  40 mg Subcutaneous Q24H     insulin aspart  1-12 Units Subcutaneous Q4H     [START ON 10/10/2018] lipids  250 mL Intravenous Q24H     metoprolol  5 mg Intravenous Q6H     piperacillin-tazobactam  3.375 g Intravenous Q6H     sodium chloride (PF)  10  mL Intracatheter Q8H     Data       Recent Labs  Lab 10/09/18  0550 10/08/18  0610 10/07/18  0525   WBC 13.4* 14.6* 16.1*   HGB 8.1* 8.2* 8.8*   HCT 24.8* 24.6* 26.9*   * 101* 101*    228 245       Recent Labs  Lab 10/09/18  0550 10/08/18  0610 10/07/18  0525 10/06/18  1910 10/06/18  0520  10/04/18  0522   * 132* 130*  --  136  < > 132*   POTASSIUM 4.2 4.0 3.8  --  3.4  < > 4.2   CHLORIDE 96 96 95  --  99  < > 100   CO2 30 32 32  --  33*  < > 27   ANIONGAP 4 4 3  --  4  < > 5   * 147* 137*  --  108*  < > 90   BUN 15 17 12  --  7  < > 5*   CR 0.56 0.58 0.61  --  0.56  < > 0.56   GFRESTIMATED >90 >90 >90  --  >90  < > >90   GFRESTBLACK >90 >90 >90  --  >90  < > >90   PAULINA 7.0* 6.7* 6.9*  --  6.7*  < > 7.2*   MAG 2.1 1.9 2.1  --  1.9  < > 1.7   PHOS  --  2.5 2.1* 2.5 1.8*  < > 2.2*   PROTTOTAL  --  4.9*  --   --  4.6*  --  5.1*   ALBUMIN  --  1.4*  --   --  1.6*  --  1.9*   BILITOTAL  --  0.4  --   --  0.6  --  0.7   ALKPHOS  --  51  --   --  40  --  42   AST  --  31  --   --  20  --  23   ALT  --  15  --   --  9  --  11   < > = values in this interval not displayed.    No results found for this or any previous visit (from the past 24 hour(s)).

## 2018-10-09 NOTE — PROGRESS NOTES
CLINICAL NUTRITION SERVICES - REASSESSMENT NOTE      Recommendations Ordered by Registered Dietitian (RD):   Custom TPN regimen (see below)   Future/Additional Recommendations:     ?Trophic feeds vs diet advancement   Malnutrition 10/9:   % Weight Loss: Up to 5% in 1 month --> now masked by fluid  % Intake:</= 50% for >/= 5 days (severe malnutrition), <75% of needs for >/=1 month --> improving with TPN  Subcutaneous Fat Loss:Orbital region moderate depletion (hollowed and dark circles/loose skin) and Upper arm region moderate depletion (lack of ample pinch depth, loose skin)  Muscle Loss:Temporal region moderate depletion, Acromion bone region mild to moderate depletion, Scapular bone region moderate depletion and Dorsal hand region moderate depletion upper thigh region moderate depletion, calf with at least mild depletion masked by fluid  Fluid Retention: Trace      Malnutrition Diagnosis: Severe malnutrition  In Context of:  Acute on Chronic illness or disease     EVALUATION OF PROGRESS TOWARD GOALS/NEW FINDINGS   Progress towards goals will be monitored and evaluated per protocol and Practice Guidelines    Patient remains NPO and on TPN as follows, goal rate reached 10/6:   - Clinimix (D15, AA5) at goal rate of 65 ml/hr x 24 hrs (1560 ml) + 250 ml 20% lipids 5x/week  - This provides 1465 kcal (27 kcal/kg), 78 g protein (1.4 g/kg), 234 g CHO, GIR: 3, 24% kcal from fat.  - Total fluids (TPN + IVFs) = 75 ml/hr or per MD    Patient reports ongoing nausea, poor appetite.      I/O last 3 completed shifts:    In: 3237 [I.V.:391]    Out: 1930 [Urine:1300; Emesis/NG output:400; Drains:10; Stool:220] --> NG removed this afternoon    Fluid: +1 BLE trace edema    Weight: 62 kg - up ~10 kg since admit    Edgard nutrition score: ; total score:     Meds: reviewed    Labs:   Recent Labs   Lab Test  10/09/18   0550  10/08/18   0610  10/07/18   0525  10/06/18   0520  10/05/18   0710   POTASSIUM  4.2  4.0  3.8  3.4  3.4     Recent  Labs   Lab Test  10/08/18   0610  10/07/18   0525  10/06/18   1910  10/06/18   0520  10/05/18   0710   PHOS  2.5  2.1*  2.5  1.8*  2.2*     Recent Labs   Lab Test  10/09/18   0550  10/08/18   0610  10/07/18   0525  10/06/18   0520  10/05/18   0710   MAG  2.1  1.9  2.1  1.9  1.9     Recent Labs   Lab Test  10/09/18   0550  10/08/18   0610  10/07/18   0525  10/06/18   0520  10/05/18   0710   NA  130*  132*  130*  136  130*     Recent Labs   Lab Test  10/09/18   0550  10/08/18   0610  10/07/18   0525  10/06/18   0520  10/05/18   0710   CR  0.56  0.58  0.61  0.56  0.54       Recent Labs  Lab 10/09/18  0550 10/08/18  0610 10/07/18  0525 10/06/18  0520 10/05/18  0710 10/04/18  0522 10/03/18  0530   * 147* 137* 108* 93 90 121*     No results found for: A1C    ASSESSED NUTRITION NEEDS (PER APPROVED PRACTICE GUIDELINES, Dosing weight: 54 kg):  Estimated Energy Needs: 7994-1779 kcals (25-35 Kcal/Kg)  Justification: maintenance  Estimated Protein Needs: 65-96+ grams protein (1.2-1.5 g pro/Kg)  Justification: post-op and preservation of lean body mass  Estimated Fluid Needs: >1 mL/Kcal  Justification: maintenance    Previous Goals:   TPN to reach goal rate w/in 48 hrs.  Evaluation: Met  TPN to meet % needs while remains NPO.  Evaluation: Ongoing, assessed needs updated today    Previous Nutrition Diagnosis:   Inadequate protein-energy intake related to altered GI function as evidenced by PO intake likely meeting <50% of needs for >1 week, patient report of 4% weight loss in <1 month, fat and muscle loss and sigmoid resection planned 10/2  Evaluation: improving with TPN, updated below    MALNUTRITION (Assessed 10/9)  % Weight Loss: Up to 5% in 1 month --> now masked by fluid  % Intake:</= 50% for >/= 5 days (severe malnutrition), <75% of needs for >/=1 month --> improving with TPN  Subcutaneous Fat Loss:Orbital region moderate depletion (hollowed and dark circles/loose skin) and Upper arm region moderate depletion  (lack of ample pinch depth, loose skin)  Muscle Loss:Temporal region moderate depletion, Acromion bone region mild to moderate depletion, Scapular bone region moderate depletion and Dorsal hand region moderate depletion upper thigh region moderate depletion, calf with at least mild depletion masked by fluid  Fluid Retention: Trace      Malnutrition Diagnosis: Severe malnutrition  In Context of:  Acute on Chronic illness or disease    CURRENT NUTRITION DIAGNOSIS  Inadequate oral intake related to altered GI function as evidenced by PO intake likely meeting <50% of needs for >1 week, patient report of 4% weight loss in <1 month, fat and muscle loss, sigmoid resection 10/2 and TPN reliance x 4 days while NPO    INTERVENTIONS  Recommendations / Nutrition Prescription  Custom TPN regimen, concentrated total fluids:   85 grams amino acids and 270 grams dextrose (D 28.1%, AA 8.9%) at 40 mL/hr with 250 mL 20% lipids daily to provide 1758 kcal daily (28% kcal from fat, GIR of 3.5).    Justify wean of TPN when able to consistently meet >65% of needs orally.    Implementation  Nutrition education: reviewed POC with patient  PN Composition and PN schedule: Entered orders to reflect regimen outlined above    Collaboration and Referral of care: Discussed patient during interdisciplinary care rounds this morning and with Dr. Reddy, Dr. Gardner and PharmD    Goals  TPN at goal rate to meet % of estimated needs      MONITORING AND EVALUATION:  Progress towards goals will be monitored and evaluated per protocol and Practice Guidelines      Blanka Cota RDN, LD, [facility]  Pager - 3rd floor/ICU: [phone]  Pager - All other floors: [phone]  Pager - Weekend/holiday: [phone]  Office: [phone]

## 2018-10-09 NOTE — CONSULTS
CM saw patient to discuss TCU at discharge. On 10/6 PT recommended home with PT. At that time she was a max asst of 2 to get out of bed. She has declined PT since then d/t fatigue. PT is seeing her today for evaluation of discharge needs. Patient is receptive to TCU. Provided her with TCU packet for choices. SW/CM will f/u for choices and send referrals.     CM will continue to follow patient until discharge for any additional needs.     Varsha Omalley RN, BSN, CTS  [facility]  [phone]

## 2018-10-10 ENCOUNTER — APPOINTMENT (OUTPATIENT)
Dept: PHYSICAL THERAPY | Facility: CLINIC | Age: 83
DRG: 853 | End: 2018-10-10
Payer: MEDICARE

## 2018-10-10 LAB
ANION GAP SERPL CALCULATED.3IONS-SCNC: 3 MMOL/L (ref 3–14)
BUN SERPL-MCNC: 15 MG/DL (ref 7–30)
CA-I SERPL ISE-MCNC: 4.4 MG/DL (ref 4.4–5.2)
CALCIUM SERPL-MCNC: 7.3 MG/DL (ref 8.5–10.1)
CHLORIDE SERPL-SCNC: 98 MMOL/L (ref 94–109)
CO2 SERPL-SCNC: 30 MMOL/L (ref 20–32)
CREAT SERPL-MCNC: 0.55 MG/DL (ref 0.52–1.04)
GFR SERPL CREATININE-BSD FRML MDRD: >90 ML/MIN/1.7M2
GLUCOSE BLDC GLUCOMTR-MCNC: 113 MG/DL (ref 70–99)
GLUCOSE BLDC GLUCOMTR-MCNC: 118 MG/DL (ref 70–99)
GLUCOSE BLDC GLUCOMTR-MCNC: 118 MG/DL (ref 70–99)
GLUCOSE BLDC GLUCOMTR-MCNC: 121 MG/DL (ref 70–99)
GLUCOSE BLDC GLUCOMTR-MCNC: 124 MG/DL (ref 70–99)
GLUCOSE BLDC GLUCOMTR-MCNC: 124 MG/DL (ref 70–99)
GLUCOSE SERPL-MCNC: 113 MG/DL (ref 70–99)
MAGNESIUM SERPL-MCNC: 1.7 MG/DL (ref 1.6–2.3)
PHOSPHATE SERPL-MCNC: 2.7 MG/DL (ref 2.5–4.5)
POTASSIUM SERPL-SCNC: 4.1 MMOL/L (ref 3.4–5.3)
SODIUM SERPL-SCNC: 131 MMOL/L (ref 133–144)

## 2018-10-10 PROCEDURE — 84100 ASSAY OF PHOSPHORUS: CPT | Performed by: INTERNAL MEDICINE

## 2018-10-10 PROCEDURE — 25000125 ZZHC RX 250: Performed by: INTERNAL MEDICINE

## 2018-10-10 PROCEDURE — 40000193 ZZH STATISTIC PT WARD VISIT: Performed by: PHYSICAL THERAPY ASSISTANT

## 2018-10-10 PROCEDURE — 25000128 H RX IP 250 OP 636: Performed by: INTERNAL MEDICINE

## 2018-10-10 PROCEDURE — 83735 ASSAY OF MAGNESIUM: CPT | Performed by: INTERNAL MEDICINE

## 2018-10-10 PROCEDURE — 25000128 H RX IP 250 OP 636: Performed by: SURGERY

## 2018-10-10 PROCEDURE — 99232 SBSQ HOSP IP/OBS MODERATE 35: CPT | Performed by: INTERNAL MEDICINE

## 2018-10-10 PROCEDURE — 00000146 ZZHCL STATISTIC GLUCOSE BY METER IP

## 2018-10-10 PROCEDURE — 25000128 H RX IP 250 OP 636: Performed by: COLON & RECTAL SURGERY

## 2018-10-10 PROCEDURE — 40000905 ZZH STATISTIC WOC PT EDUCATION, > 60 MIN

## 2018-10-10 PROCEDURE — 97116 GAIT TRAINING THERAPY: CPT | Mod: GP | Performed by: PHYSICAL THERAPY ASSISTANT

## 2018-10-10 PROCEDURE — 12000007 ZZH R&B INTERMEDIATE

## 2018-10-10 PROCEDURE — 82330 ASSAY OF CALCIUM: CPT | Performed by: INTERNAL MEDICINE

## 2018-10-10 PROCEDURE — 80048 BASIC METABOLIC PNL TOTAL CA: CPT | Performed by: INTERNAL MEDICINE

## 2018-10-10 RX ORDER — HEPARIN SODIUM,PORCINE 10 UNIT/ML
5-10 VIAL (ML) INTRAVENOUS
Status: DISCONTINUED | OUTPATIENT
Start: 2018-10-10 | End: 2018-10-14

## 2018-10-10 RX ORDER — LIDOCAINE 40 MG/G
CREAM TOPICAL
Status: DISCONTINUED | OUTPATIENT
Start: 2018-10-10 | End: 2018-10-17 | Stop reason: HOSPADM

## 2018-10-10 RX ORDER — HEPARIN SODIUM,PORCINE 10 UNIT/ML
5-10 VIAL (ML) INTRAVENOUS EVERY 24 HOURS
Status: DISCONTINUED | OUTPATIENT
Start: 2018-10-10 | End: 2018-10-15

## 2018-10-10 RX ADMIN — POTASSIUM CHLORIDE: 2 INJECTION, SOLUTION, CONCENTRATE INTRAVENOUS at 19:51

## 2018-10-10 RX ADMIN — METOPROLOL TARTRATE 5 MG: 5 INJECTION, SOLUTION INTRAVENOUS at 12:08

## 2018-10-10 RX ADMIN — METOPROLOL TARTRATE 5 MG: 5 INJECTION, SOLUTION INTRAVENOUS at 06:35

## 2018-10-10 RX ADMIN — Medication 2 G: at 08:59

## 2018-10-10 RX ADMIN — TAZOBACTAM SODIUM AND PIPERACILLIN SODIUM 3.38 G: 375; 3 INJECTION, SOLUTION INTRAVENOUS at 00:17

## 2018-10-10 RX ADMIN — HEPARIN, PORCINE (PF) 10 UNIT/ML INTRAVENOUS SYRINGE 5 ML: at 09:38

## 2018-10-10 RX ADMIN — TAZOBACTAM SODIUM AND PIPERACILLIN SODIUM 3.38 G: 375; 3 INJECTION, SOLUTION INTRAVENOUS at 06:04

## 2018-10-10 RX ADMIN — I.V. FAT EMULSION 250 ML: 20 EMULSION INTRAVENOUS at 08:58

## 2018-10-10 RX ADMIN — METOPROLOL TARTRATE 5 MG: 5 INJECTION, SOLUTION INTRAVENOUS at 00:37

## 2018-10-10 RX ADMIN — METOPROLOL TARTRATE 5 MG: 5 INJECTION, SOLUTION INTRAVENOUS at 18:19

## 2018-10-10 RX ADMIN — ONDANSETRON 4 MG: 2 INJECTION INTRAMUSCULAR; INTRAVENOUS at 21:28

## 2018-10-10 RX ADMIN — SODIUM CHLORIDE, POTASSIUM CHLORIDE, SODIUM LACTATE AND CALCIUM CHLORIDE: 600; 310; 30; 20 INJECTION, SOLUTION INTRAVENOUS at 21:16

## 2018-10-10 RX ADMIN — ENOXAPARIN SODIUM 40 MG: 40 INJECTION SUBCUTANEOUS at 14:19

## 2018-10-10 ASSESSMENT — ACTIVITIES OF DAILY LIVING (ADL)
ADLS_ACUITY_SCORE: 15

## 2018-10-10 NOTE — PROGRESS NOTES
Care Transition Initial Assessment - SW  Reason For Consult: discharge planning  Met with: Patient and Family  Spouse and ITZEL Jenn   Active Problems:    Sepsis (H)    Colovesical fistula       DATA  Lives With: spouse  Living Arrangements: house (twin house)  Description of Support System: Supportive, Involved  Who is your support system?: , Children  Support Assessment: Adequate family and caregiver support, Adequate social supports. Prior to admission pt Independent with all ALDS, and driving. Has Adult children who are supportive and a daughter who is a RN  Identified issues/concerns regarding health management: Following now due sigmoid Resection. Pt has New Ostomy       Resources List: Home Care,( initially plan was home with home care and family support)   Skilled Nursing Facility ( Planning on TCU./ Private room preferred. Wants Private room. Aware of cost      Quality Of Family Relationships: supportive  Transportation Available: car, family or friend will provide    ASSESSMENT  Cognitive Status:  awake, alert and oriented  Concerns to be addressed: Met with pt who was working with C RN and Ostomy cares. Pt lives at home with her spouse. At this time ITZEL is here from [place] and is planning to stay to assist with needs. Pt's spouse not able to drive at night due to macular degeneration. PT is agreeable to TCU at this time. Prior to health issues pt was independent at home with her cane, shared cooking and cleaning responsibilities and did the night time driving.    Spoke with pt and family about TCU options. Pt is currently still on TPN. Plan is to wean off at time of discharge.  Sw did talk with family about if TPN needed ongoing how this may effect facilities that would assess pt. Pt has a LTC ins Police as well. Spoke with pt/elizabeth about calling to initiate the exclusionary dates this policy may have    Choices for TCU at   1. [facility]  2.[facility]  3. [facility] in [place] .( generally  this is only shared rooms,. Will check)  [facility] in [facility]  SW/CC following for TCU support  Will send referrals for tomorrow once rick can speak with MD about possible Date

## 2018-10-10 NOTE — PROGRESS NOTES
"COLON & RECTAL SURGERY  PROGRESS NOTE    October 10, 2018  Post-op Day # 8 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     SUBJECTIVE:  Pt reports she is doing well this morning. She states pain is minimal at rest and increases with movement. She denies nausea, but says \"the idea of food makes me nauseous.\" She ambulated multiple times yesterday.    OBJECTIVE:  Temp:  [97.3  F (36.3  C)-98  F (36.7  C)] 98  F (36.7  C)  Heart Rate:  [71-84] 80  Resp:  [16] 16  BP: (126-142)/(63-71) 127/67  SpO2:  [93 %-94 %] 94 %    Intake/Output Summary (Last 24 hours) at 10/10/18 0759  Last data filed at 10/10/18 0616   Gross per 24 hour   Intake              845 ml   Output             2800 ml   Net            -1955 ml       GENERAL:  Awake, alert, no acute distress, lying in bed  HEAD: Nomocephalic atraumatic  SCLERA: anicteric  EXTREMITIES: warm and well perfused  ABDOMEN:  Soft, appropriately tender, non-distended, no rebound or guarding, no peritoneal signs. Stoma pink and viable with RRC in place, scant serosanguinous output.  INCISION:  C/d/i, staples, NARAYAN drain site with clean dry dressing    LABS:  Lab Results   Component Value Date    WBC 13.4 10/09/2018     Lab Results   Component Value Date    HGB 8.1 10/09/2018     Lab Results   Component Value Date    HCT 24.8 10/09/2018     Lab Results   Component Value Date     10/09/2018     Last Basic Metabolic Panel:  Lab Results   Component Value Date     10/10/2018      Lab Results   Component Value Date    POTASSIUM 4.1 10/10/2018     Lab Results   Component Value Date    CHLORIDE 98 10/10/2018     Lab Results   Component Value Date    PAULINA 7.3 10/10/2018     Lab Results   Component Value Date    CO2 30 10/10/2018     Lab Results   Component Value Date    BUN 15 10/10/2018     Lab Results   Component Value Date    CR 0.55 10/10/2018     Lab Results   Component Value Date     " 10/10/2018       ASSESSMENT/PLAN: POD  8 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 Pt remained afebrile,  VSS overnight.     1. Advance to gentle clear liquids this morning  2. Continue TPN   3. Discontinued antibiotics   4. Continue PCA dilaudid  5. Encourage ambulate, OOB  6. Lovenox for prophylaxis, will not need with discharge.  7. Continue stoma teaching.     For questions/paging, please contact the CRS office at [phone].    Betty Mariee PA-C  [facility]  Phone: [phone]     [facility] Attending Note    Patient seen and examined independently.  Agree with above assessment and plan.  No nausea, NGT removed. Small stoma output.  abd soft, incision CDI stoma pink  RRC im place irrigated with 200 ml of water. Green liquid return  Plan  Clears. If tolerating, transition to oral pain meds  Discontinue ABX  Encourage ambuation  Continue TPN  Stoma teaching, patient needs to engage in stoma cares.  Discharge planning.    Emma Reddy MD  [facility]  Office Phone # [phone]

## 2018-10-10 NOTE — PLAN OF CARE
Problem: Patient Care Overview  Goal: Plan of Care/Patient Progress Review  Outcome: Improving  /69 (BP Location: Left arm)  Pulse 74  Temp 97.3  F (36.3  C) (Oral)  Resp 16  Ht 1.524 m (5')  Wt 62.6 kg (138 lb 1.6 oz)  SpO2 94%  BMI 26.97 kg/m2    Neuro: WDL  Pain: denies  Resp: WDL  Cardiac: WDL  GI/: Purewick in place - voiding no difficulties  Diet: NPO  Skin/Mobility: Assist 1  Plan: **Please use standing weight from now on Per MD request** Continue to monitor. Continue w/ POC.

## 2018-10-10 NOTE — PLAN OF CARE
Problem: Patient Care Overview  Goal: Plan of Care/Patient Progress Review  Discharge Planner PT   Patient plan for discharge: Considering TCU at this time as an option  Current status: Pt transfers sit to/from stand with min - CGA with vcs for hand placement. Pt amb 40' with ww with min assist - CGA with assisting with 2 IV poles. Pt did c/o following 20' of being dizzy. Vcs for pt to amb closer ww to improve posture.   Barriers to return to prior living situation: Stairs, family would need to be present and able to provide mod A x2, pt is not walking functional distance of caring for her stoma/bag.   Recommendations for discharge: TCU per plan established by the PT.    Rationale for recommendations: Skilled PT while hospitalized and at next level of care recommended for pt to regain strength, functional mobility, stamina and gait with assistive device ( including stairs) to be functionally mobile again. Currently pt is Mod A x2 bed mobility, walking 4 feet with CGA.        Entered by: May Qiu 10/10/2018 1:09 PM

## 2018-10-10 NOTE — PLAN OF CARE
Problem: Patient Care Overview  Goal: Plan of Care/Patient Progress Review    VSS. Pain controlled with PCA. Up to chair and ambulated in hallway. TPN and lipids infusing. Magnesium replaced. Patient has not wanted to try very much of clear liquids, tolerated a popsicle. , 124, and 113. Incision and colostomy WDL. Purewick for incontinence and strict I/O's with good output. Tele - SR prolonged QT for AM shift, SR for PM shift. Will continue to monitor.

## 2018-10-10 NOTE — PROGRESS NOTES
[facility]  Hospitalist Progress Note  Name: Brea Kerr    MRN: [id number]  Physician:  Gorge Goins MD  10/10/2018    Assessment & Plan   Summary of Stay:  Brea Kerr is a 82 year old female came to attention on 9/30/2018 with stool passing in her urine. Her recent PMH is notable for hospitalization at [facility] from 9/19 - 9/23/18 for perforated diverticulitis with abscess formation.  She underwent placement of a NARAYAN drain into the abscess on 9/20 under CT guidance and was discharged home on oral Cipro and Metronidazole.     She also had abdominal pain, but which she thought was not much changed from when she had the NARAYAN drain placed on 9/20/18.  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        More remote PMH notable for prior episodes of sigmoid diverticulitis and associated paroxysmal atrial fibrillation. And after arriving in the ED, the patient developed A fib with RVR (rate > 140) and her blood pressure fell to the 60's for which reason she was admitted to the ICU and was started on Amiodarone and very briefly on Norepi.  However with the Amio and conversion to NSR, her BP came up.      On 10/2/2018 the patient was taken to the operating room by Dr. Reddy and underwent sigmoid colectomy with end colostomy.  She did very well with the procedure but postoperatively has developed recurrent atrial fibrillation with rapid ventricular response.  Again her blood pressure dropped. She was admitted to the ICU following this for management of a-fib w/ RVR and hypotension.  Following amiodarone load, diltiazem drip and digoxin she converted to NSR.    Transferred out of the ICU on 10/4.  Given challenges with oral intake and conversion amiodarone was not ordered oral.  She has been maintained on IV metoprolol.     NG placed with plans for trophic feeds, but persistent frequent vomiting so it was  on suction.  TPN started.  Her NGT was pulled given improvement so anticipate some oral intake may start in the next day     Currently, doing well in terms of a.fib on IV metoprolol and has remained in NSR. Also NG has been removed and diet being advanced.      Sepsis due to recurrent diverticulitis, colovesicular fistula s/p Green sigmoidectomy:   -Post-op site cares/drains, activity restrictions, diet, antibiotics, DVT proph per CRS.  -Completed course of Zosyn and stopped now per CRS  -Ostomy nurse involved  -Recent Cystogram shows no evidence of ongoing colovesicular fistula. Babb is now removed.  -On a PCA for pain control, if using infrequently.  Stop PCA when diet started, hopefully soon   -Incentive spirometer     Weight Gain: Looking closely at weights, this seems to be close to 4 lbs since admission, likely due to third spacing and episodes of RVR, breathing stable, no signs of CHF   -- Monitor weights, no need for diuresis at this time    A. fib with RVR: With associated hypotension briefly requiring norepinephrine. Was on amiodarone although stopped now due to variable PO intake.  Intermittently in A. fib and NSR.    -- Currently doing well on scheduled IV metoprolol, change to PO metoprolol once taking PO consistently.   -- Suspect lot of the afib issues were driven by the infection/initial surgical stress, although would be reasonable to follow up with cardiology as outpatient to discuss anti-coagulation      Malnutrition: Feeding tube placed, but significant vomiting and have not been able to start tube feeds.  Surgery recommended TPN and dietitian and pharmacy consulted.  Started TPN 10/5.  Will use right IJ CVC.  Given low albumin and third spacing concerns, we are using more concentrated/lower volume TPN.    -  Leave internal jugular central line in for now if it looks like we can stop TPN soon, otherwise change to PICC line in a day or two depending on timing with trying a oral diet.    Acute  hyponatremia: Sodium staying around 130.  Possible GI losses versus SIADH.  Stable lately around 130, continue to monitor.     Electrolyte abnormalities:  Phosphorus and calcium recently noted to be low.  Though corrected for hypoalbuminemia is near the lower limit of normal.  Electrolyte replacement protocols.       Anemia: Suspect combination of phlebotomy, postsurgical blood loss, and inflammatory process.  Overall stable and high 8 range. Monitor     HTN:  Prior hypotensive issues led to metoprolol 25 mg twice daily and losartan 100 mg daily being on hold.  More recently on IV metoprolol.  BP reasonable at the moment.    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  Continue to hold.      History of DVT, PE:  Was not on anti-coagulation prior to admission recently. On prophylactic Lovenox dosing.     DVT Prophylaxis: Enoxaparin (Lovenox) SQ  Code Status: Full Code  Disposition Plan   Expected discharge - 3 more day hospitalization pending return of gut function.  Likely to TCU/rehab eventually.       Entered: Gorge Goins 10/10/2018, 1:33 PM       Interval History   Assumed care, history reviewed.  Discussed with nursing staff.     Ms Kerr feels well.  No new complaints.  pain controlled.  No other new pain.  No SOB, nausea.  Little appetite, NG removed, Feels a little stronger.    -Data reviewed today: I reviewed all new labs and imaging reports over the last 24 hours. I personally reviewed no images or EKG's today.    Physical Exam   Temp: 98  F (36.7  C) Temp src: Oral BP: 125/70   Heart Rate: 84 Resp: 16 SpO2: 94 % O2 Device: None (Room air)    Vitals:    10/07/18 0526 10/08/18 0617 10/10/18 0610   Weight: 61.8 kg (136 lb 4.8 oz) 62.6 kg (138 lb 1.6 oz) 53.5 kg (118 lb)     Vital Signs with Ranges  Temp:  [97.3  F (36.3  C)-98  F (36.7  C)] 98  F (36.7  C)  Heart Rate:  [71-84] 84  Resp:  [16] 16  BP: (125-142)/(63-71) 125/70  SpO2:  [93 %-94  %] 94 %  I/O last 3 completed shifts:  In: 845 [I.V.:435]  Out: 2905 [Urine:2650; Emesis/NG output:150; Drains:5; Stool:100]    GEN:  Alert, oriented x 3, appears ill but comfortable, NAD.    HEENT:  Normocephalic/atraumatic, no scleral icterus, no nasal discharge, mouth moist.  Right internal jugular line appears stable without spreading erythema/discharge.  CV:  Regular rate and rhythm, distant.  LUNGS:  Clear to auscultation upper with decreased breath sounds bases.  No wheezes/retractions.  Symmetric chest rise on inhalation noted.  ABD:  Hypoactive bowel sounds, soft, mildly distended. Ostomy with some output . Detailed ostomy/surgical site exam deferred to CRS.  No guarding/rigidity.  EXT:  Trace peripheral edema.  No cyanosis.  No acute joint synovitis noted.  SKIN:  Dry to touch, no exanthems noted in the visualized areas.    Medications     IV fluid REPLACEMENT ONLY       IV fluid REPLACEMENT ONLY       HYDROmorphone       lactated ringers 30 mL/hr at 10/10/18 1219     parenteral nutrition - ADULT compounded formula       parenteral nutrition - ADULT compounded formula 40 mL/hr at 10/10/18 0901       enoxaparin  40 mg Subcutaneous Q24H     heparin lock flush  5-10 mL Intracatheter Q24H     insulin aspart  1-12 Units Subcutaneous Q4H     lipids  250 mL Intravenous Q24H     metoprolol  5 mg Intravenous Q6H     sodium chloride (PF)  10 mL Intracatheter Q8H     Data       Recent Labs  Lab 10/09/18  0550 10/08/18  0610 10/07/18  0525   WBC 13.4* 14.6* 16.1*   HGB 8.1* 8.2* 8.8*   HCT 24.8* 24.6* 26.9*   * 101* 101*    228 245       Recent Labs  Lab 10/10/18  0555 10/09/18  0550 10/08/18  0610 10/07/18  0525  10/06/18  0520  10/04/18  0522   * 130* 132* 130*  --  136  < > 132*   POTASSIUM 4.1 4.2 4.0 3.8  --  3.4  < > 4.2   CHLORIDE 98 96 96 95  --  99  < > 100   CO2 30 30 32 32  --  33*  < > 27   ANIONGAP 3 4 4 3  --  4  < > 5   * 102* 147* 137*  --  108*  < > 90   BUN 15 15 17 12  --   7  < > 5*   CR 0.55 0.56 0.58 0.61  --  0.56  < > 0.56   GFRESTIMATED >90 >90 >90 >90  --  >90  < > >90   GFRESTBLACK >90 >90 >90 >90  --  >90  < > >90   PAULINA 7.3* 7.0* 6.7* 6.9*  --  6.7*  < > 7.2*   MAG 1.7 2.1 1.9 2.1  --  1.9  < > 1.7   PHOS 2.7  --  2.5 2.1*  < > 1.8*  < > 2.2*   PROTTOTAL  --   --  4.9*  --   --  4.6*  --  5.1*   ALBUMIN  --   --  1.4*  --   --  1.6*  --  1.9*   BILITOTAL  --   --  0.4  --   --  0.6  --  0.7   ALKPHOS  --   --  51  --   --  40  --  42   AST  --   --  31  --   --  20  --  23   ALT  --   --  15  --   --  9  --  11   < > = values in this interval not displayed.    No results found for this or any previous visit (from the past 24 hour(s)).

## 2018-10-10 NOTE — PLAN OF CARE
Problem: Patient Care Overview  Goal: Plan of Care/Patient Progress Review  Outcome: No Change  Pt. A&Ox3, up with assist of 1, Pt. C/o abd pain and continues on PCA dilaudid for pain control. IVF infusing without difficulty to Right internal jugular triple lumen central line. Abx (zosyn) for sepsis. Pt. Continues to be NPO at this time. BGL at IY=838, at 7228=[id number]. Purewick on overnight. Lung sounds diminished. O2 sat's at 93-94%. Colostomy bag with small amount of stool in bag. Pt. Denies any needs at this time. Will continue with POC.

## 2018-10-11 LAB
BASOPHILS # BLD AUTO: 0.1 10E9/L (ref 0–0.2)
BASOPHILS NFR BLD AUTO: 0.8 %
DIFFERENTIAL METHOD BLD: ABNORMAL
EOSINOPHIL # BLD AUTO: 0.7 10E9/L (ref 0–0.7)
EOSINOPHIL NFR BLD AUTO: 6.6 %
ERYTHROCYTE [DISTWIDTH] IN BLOOD BY AUTOMATED COUNT: 13.5 % (ref 10–15)
GLUCOSE BLDC GLUCOMTR-MCNC: 105 MG/DL (ref 70–99)
GLUCOSE BLDC GLUCOMTR-MCNC: 108 MG/DL (ref 70–99)
GLUCOSE BLDC GLUCOMTR-MCNC: 113 MG/DL (ref 70–99)
GLUCOSE BLDC GLUCOMTR-MCNC: 114 MG/DL (ref 70–99)
GLUCOSE BLDC GLUCOMTR-MCNC: 116 MG/DL (ref 70–99)
GLUCOSE BLDC GLUCOMTR-MCNC: 120 MG/DL (ref 70–99)
HCT VFR BLD AUTO: 26.1 % (ref 35–47)
HGB BLD-MCNC: 8.6 G/DL (ref 11.7–15.7)
IMM GRANULOCYTES # BLD: 0.2 10E9/L (ref 0–0.4)
IMM GRANULOCYTES NFR BLD: 1.8 %
LYMPHOCYTES # BLD AUTO: 1.2 10E9/L (ref 0.8–5.3)
LYMPHOCYTES NFR BLD AUTO: 11.1 %
MAGNESIUM SERPL-MCNC: 2 MG/DL (ref 1.6–2.3)
MCH RBC QN AUTO: 33.6 PG (ref 26.5–33)
MCHC RBC AUTO-ENTMCNC: 33 G/DL (ref 31.5–36.5)
MCV RBC AUTO: 102 FL (ref 78–100)
MONOCYTES # BLD AUTO: 1.5 10E9/L (ref 0–1.3)
MONOCYTES NFR BLD AUTO: 13.8 %
NEUTROPHILS # BLD AUTO: 7.1 10E9/L (ref 1.6–8.3)
NEUTROPHILS NFR BLD AUTO: 65.9 %
NRBC # BLD AUTO: 0 10*3/UL
NRBC BLD AUTO-RTO: 0 /100
PLATELET # BLD AUTO: 328 10E9/L (ref 150–450)
PLATELET # BLD AUTO: NORMAL 10E9/L (ref 150–450)
POTASSIUM SERPL-SCNC: 4.2 MMOL/L (ref 3.4–5.3)
RBC # BLD AUTO: 2.56 10E12/L (ref 3.8–5.2)
SODIUM SERPL-SCNC: 133 MMOL/L (ref 133–144)
WBC # BLD AUTO: 10.8 10E9/L (ref 4–11)

## 2018-10-11 PROCEDURE — 25000128 H RX IP 250 OP 636: Performed by: INTERNAL MEDICINE

## 2018-10-11 PROCEDURE — 25000132 ZZH RX MED GY IP 250 OP 250 PS 637: Mod: GY | Performed by: INTERNAL MEDICINE

## 2018-10-11 PROCEDURE — 84132 ASSAY OF SERUM POTASSIUM: CPT | Performed by: COLON & RECTAL SURGERY

## 2018-10-11 PROCEDURE — A9270 NON-COVERED ITEM OR SERVICE: HCPCS | Mod: GY | Performed by: INTERNAL MEDICINE

## 2018-10-11 PROCEDURE — 83735 ASSAY OF MAGNESIUM: CPT | Performed by: COLON & RECTAL SURGERY

## 2018-10-11 PROCEDURE — 25000125 ZZHC RX 250: Performed by: INTERNAL MEDICINE

## 2018-10-11 PROCEDURE — 00000146 ZZHCL STATISTIC GLUCOSE BY METER IP

## 2018-10-11 PROCEDURE — 84295 ASSAY OF SERUM SODIUM: CPT | Performed by: COLON & RECTAL SURGERY

## 2018-10-11 PROCEDURE — 25000128 H RX IP 250 OP 636: Performed by: COLON & RECTAL SURGERY

## 2018-10-11 PROCEDURE — 99232 SBSQ HOSP IP/OBS MODERATE 35: CPT | Performed by: INTERNAL MEDICINE

## 2018-10-11 PROCEDURE — 85049 AUTOMATED PLATELET COUNT: CPT | Performed by: COLON & RECTAL SURGERY

## 2018-10-11 PROCEDURE — 84295 ASSAY OF SERUM SODIUM: CPT | Performed by: INTERNAL MEDICINE

## 2018-10-11 PROCEDURE — 85025 COMPLETE CBC W/AUTO DIFF WBC: CPT | Performed by: INTERNAL MEDICINE

## 2018-10-11 PROCEDURE — 12000007 ZZH R&B INTERMEDIATE

## 2018-10-11 RX ORDER — METOPROLOL TARTRATE 50 MG
50 TABLET ORAL 2 TIMES DAILY WITH MEALS
Status: DISCONTINUED | OUTPATIENT
Start: 2018-10-11 | End: 2018-10-17 | Stop reason: HOSPADM

## 2018-10-11 RX ORDER — OXYCODONE HYDROCHLORIDE 5 MG/1
5 TABLET ORAL EVERY 4 HOURS PRN
Status: DISCONTINUED | OUTPATIENT
Start: 2018-10-11 | End: 2018-10-17 | Stop reason: HOSPADM

## 2018-10-11 RX ORDER — HYDROMORPHONE HYDROCHLORIDE 1 MG/ML
.3-.5 INJECTION, SOLUTION INTRAMUSCULAR; INTRAVENOUS; SUBCUTANEOUS
Status: DISCONTINUED | OUTPATIENT
Start: 2018-10-11 | End: 2018-10-17 | Stop reason: HOSPADM

## 2018-10-11 RX ADMIN — I.V. FAT EMULSION 250 ML: 20 EMULSION INTRAVENOUS at 09:24

## 2018-10-11 RX ADMIN — Medication 0.5 MG: at 14:05

## 2018-10-11 RX ADMIN — METOPROLOL TARTRATE 5 MG: 5 INJECTION, SOLUTION INTRAVENOUS at 00:16

## 2018-10-11 RX ADMIN — ENOXAPARIN SODIUM 40 MG: 40 INJECTION SUBCUTANEOUS at 14:32

## 2018-10-11 RX ADMIN — METOPROLOL TARTRATE 5 MG: 5 INJECTION, SOLUTION INTRAVENOUS at 05:45

## 2018-10-11 RX ADMIN — Medication 0.5 MG: at 23:53

## 2018-10-11 RX ADMIN — SODIUM CHLORIDE, POTASSIUM CHLORIDE, SODIUM LACTATE AND CALCIUM CHLORIDE: 600; 310; 30; 20 INJECTION, SOLUTION INTRAVENOUS at 22:15

## 2018-10-11 RX ADMIN — METOPROLOL TARTRATE 50 MG: 50 TABLET, FILM COATED ORAL at 18:41

## 2018-10-11 RX ADMIN — Medication 1 MG: at 23:52

## 2018-10-11 RX ADMIN — POTASSIUM CHLORIDE: 2 INJECTION, SOLUTION, CONCENTRATE INTRAVENOUS at 20:39

## 2018-10-11 RX ADMIN — METOPROLOL TARTRATE 5 MG: 5 INJECTION, SOLUTION INTRAVENOUS at 12:30

## 2018-10-11 ASSESSMENT — ACTIVITIES OF DAILY LIVING (ADL)
ADLS_ACUITY_SCORE: 16
ADLS_ACUITY_SCORE: 15

## 2018-10-11 ASSESSMENT — PAIN DESCRIPTION - DESCRIPTORS
DESCRIPTORS: ACHING
DESCRIPTORS: ACHING

## 2018-10-11 NOTE — PLAN OF CARE
MD notified [id number]: Can you please place order for AM labs or at least potassium if you would like them? Thank you!

## 2018-10-11 NOTE — PLAN OF CARE
Problem: Patient Care Overview  Goal: Plan of Care/Patient Progress Review  Outcome: Improving  Orientation: Alert and oriented x4  VSS. 97% on RA.   Tele: NSR. HR 82.   LS: Clear, denies SOB/PITTS  GI: BS audible/active x4, colostomy LLQ, stoma pink/red/protruding intact, small amount of liquid stool. TPN running through IJ.  Passing gas. +N/V, Zofran given with improvement.   : Adequate urine output. Yes  Skin: Incision mid abdomen, sutured and open to air,CDI. Bruisies/drains/devices  Activity: Ax1-2. Pt resting comfortably between cares.  Pain: 0/10.  Plan: TPN, IVF, Dilaudid PCA, WOC/PT following. Continue with current cares.

## 2018-10-11 NOTE — PROGRESS NOTES
[facility]  PROGRESS NOTE    October 11, 2018  Post-op Day # 9 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    SUBJECTIVE: She states pain is tolerable. Pt denies nausea, though notes single episode of emesis last night. Pt reports she had been out of bed yesterday and hopes to do more today.     OBJECTIVE:  Temp:  [97.6  F (36.4  C)-99.4  F (37.4  C)] 98.7  F (37.1  C)  Heart Rate:  [69-84] 80  Resp:  [12-16] 12  BP: (125-151)/(60-80) 148/62  SpO2:  [94 %-97 %] 95 %    Intake/Output Summary (Last 24 hours) at 10/11/18 0809  Last data filed at 10/11/18 0515   Gross per 24 hour   Intake           2184.7 ml   Output             3150 ml   Net           -965.3 ml       GENERAL:  Awake, alert, no acute distress, lying in bed  HEAD: Nomocephalic atraumatic  SCLERA: anicteric  EXTREMITIES: warm and well perfused  ABDOMEN:  Soft, appropriately tender, non-distended, no rebound or guarding, no peritoneal signs. Stoma pink and viable with RRC in place, gas in bag. Stoma irrigated with return of mild succus.  INCISION:  C/d/i,  LABS:  Lab Results   Component Value Date    WBC 13.4 10/09/2018     Lab Results   Component Value Date    HGB 8.1 10/09/2018     Lab Results   Component Value Date    HCT 24.8 10/09/2018     Lab Results   Component Value Date     10/11/2018     Last Basic Metabolic Panel:  Lab Results   Component Value Date     10/10/2018      Lab Results   Component Value Date    POTASSIUM 4.1 10/10/2018     Lab Results   Component Value Date    CHLORIDE 98 10/10/2018     Lab Results   Component Value Date    PAULINA 7.3 10/10/2018     Lab Results   Component Value Date    CO2 30 10/10/2018     Lab Results   Component Value Date    BUN 15 10/10/2018     Lab Results   Component Value Date    CR 0.55 10/10/2018     Lab Results   Component Value Date     10/10/2018       ASSESSMENT/PLAN: POD 9 open sigmoid colectomy  with colostomy for 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 Pt remains afebrile, VSS overnight.     1. Continue clear liquid diet  2. Encourage decreasing narcotics, will discontinue PCA once tolerating more of a diet  3. Continue TPN  4. Continue stoma teaching  5. Encourage OOB, ambulate multiple times today  6. Lovenox for prophylaxis, will not need with discharge  7. Discharge planning    For questions/paging, please contact the CRS office at [phone].    Betty Mariee PA-C  [facility]  Phone: [phone]     [facility] Attending Note    Patient seen and examined independently.  Agree with above assessment and plan.  Feeling better walked some yesterday.    abd soft, no signs of infection  Gas in the stoma bag, small green liquid with RRC irrigation  Plan  Continue clears  Increase activity.  discontinue PCA later today  lovenox    Emma Reddy MD  [facility]  Office Phone # [phone]

## 2018-10-11 NOTE — PLAN OF CARE
Problem: Patient Care Overview  Goal: Plan of Care/Patient Progress Review  PT-attempt to see ,report walking x 2 with staff and feeling good about it. Also doing LE ex at least 2 times. Will plan to see tomorrow as able.

## 2018-10-11 NOTE — PROGRESS NOTES
[facility]  Hospitalist Progress Note  Name: Brea Kerr    MRN: [id number]  Physician:  Gorge Goins MD  10/11/2018    Assessment & Plan   Summary of Stay:  Brea Kerr is a 82 year old female came to attention on 9/30/2018 with stool passing in her urine. Her recent PMH is notable for hospitalization at [facility] from 9/19 - 9/23/18 for perforated diverticulitis with abscess formation.  She underwent placement of a NARAYAN drain into the abscess on 9/20 under CT guidance and was discharged home on oral Cipro and Metronidazole.     She also had abdominal pain, but which she thought was not much changed from when she had the NARAYAN drain placed on 9/20/18.  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        More remote PMH notable for prior episodes of sigmoid diverticulitis and associated paroxysmal atrial fibrillation. And after arriving in the ED, the patient developed A fib with RVR (rate > 140) and her blood pressure fell to the 60's for which reason she was admitted to the ICU and was started on Amiodarone and very briefly on Norepi.  However with the Amio and conversion to NSR, her BP came up.      On 10/2/2018 the patient was taken to the operating room by Dr. Reddy and underwent sigmoid colectomy with end colostomy.  She did very well with the procedure but postoperatively has developed recurrent atrial fibrillation with rapid ventricular response.  Again her blood pressure dropped. She was admitted to the ICU following this for management of a-fib w/ RVR and hypotension.  Following amiodarone load, diltiazem drip and digoxin she converted to NSR.    Transferred out of the ICU on 10/4.  Given challenges with oral intake and conversion amiodarone was not ordered oral.  She has been maintained on IV metoprolol.     NG placed with plans for trophic feeds, but persistent frequent vomiting so it was  on suction.  TPN started.  Her NGT was pulled given improvement so anticipate some oral intake may start in the next day     Currently, doing well in terms of a.fib on metoprolol and has remained in NSR. Also NG has been removed and diet being advanced.      Sepsis due to recurrent diverticulitis, colovesicular fistula s/p Green sigmoidectomy:   -Post-op site cares/drains, activity restrictions, diet, antibiotics, DVT proph per CRS.  -Completed course of Zosyn and stopped now per CRS  -Ostomy nurse involved  -Recent Cystogram shows no evidence of ongoing colovesicular fistula. Babb is now removed.  - Stop PCA and change to PRN dilaudid   -Incentive spirometer     Weight Gain: Looking closely at weights, this seems to be close to 4 lbs since admission, likely due to third spacing and episodes of RVR, breathing stable, no signs of CHF   -- Monitor weights, no need for diuresis at this time    A. fib with RVR: With associated hypotension briefly requiring norepinephrine. Was on amiodarone although stopped now due to variable PO intake.  Intermittently in A. fib and NSR.    -- Currently doing well on scheduled IV metoprolol, change to PO metoprolol 50 mg BID   -- Suspect lot of the afib issues were driven by the infection/initial surgical stress, although would be reasonable to follow up with cardiology as outpatient to discuss anti-coagulation      Malnutrition: Feeding tube placed, but significant vomiting and have not been able to start tube feeds.  Surgery recommended TPN and dietitian and pharmacy consulted.  Started TPN 10/5.  Will use right IJ CVC.  Given low albumin and third spacing concerns, we are using more concentrated/lower volume TPN.    -  Leave internal jugular central line in for now if it looks like we can stop TPN soon, otherwise change to PICC line in a day or two depending on timing with trying a oral diet.     Acute hyponatremia: Sodium staying around 130.  Possible GI losses versus SIADH.   Stable lately around 130, continue to monitor.     Electrolyte abnormalities:  Phosphorus and calcium recently noted to be low.  Though corrected for hypoalbuminemia is near the lower limit of normal.  Electrolyte replacement protocols.       Anemia: Suspect combination of phlebotomy, postsurgical blood loss, and inflammatory process.  Overall stable and high 8 range. Monitor     HTN:  Prior hypotensive issues led to metoprolol 25 mg twice daily and losartan 100 mg daily being on hold.  More recently on IV metoprolol.  BP reasonable at the moment.    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  Continue to hold.      History of DVT, PE:  Was not on anti-coagulation prior to admission recently. On prophylactic Lovenox dosing.     DVT Prophylaxis: Enoxaparin (Lovenox) SQ  Code Status: Full Code  Disposition Plan   Expected discharge - 2 more day hospitalization pending return of gut function.  Likely to TCU/rehab eventually.       Entered: Gorge Goins 10/11/2018, 3:50 PM       Interval History    history reviewed.  Discussed with nursing staff.     Ms Kerr feels well.  No new complaints.  pain controlled.  No other new pain.  No SOB, nausea.  Taking some PO.     -Data reviewed today: I reviewed all new labs and imaging reports over the last 24 hours. I personally reviewed no images or EKG's today.    Physical Exam   Temp: 97.4  F (36.3  C) Temp src: Oral BP: 158/60   Heart Rate: 77 Resp: 16 SpO2: 95 % O2 Device: None (Room air)    Vitals:    10/08/18 0617 10/10/18 0610 10/11/18 0845   Weight: 62.6 kg (138 lb 1.6 oz) 53.5 kg (118 lb) 52 kg (114 lb 11.2 oz)     Vital Signs with Ranges  Temp:  [97.4  F (36.3  C)-99.4  F (37.4  C)] 97.4  F (36.3  C)  Heart Rate:  [69-83] 77  Resp:  [12-16] 16  BP: (132-158)/(60-80) 158/60  SpO2:  [94 %-97 %] 95 %  I/O last 3 completed shifts:  In: 3972.2 [P.O.:80; I.V.:1420]  Out: 2750 [Urine:2675; Stool:75]    GEN:   Alert, oriented x 3, appears ill but comfortable, NAD.    HEENT:  Normocephalic/atraumatic, no scleral icterus, no nasal discharge, mouth moist.  Right internal jugular line appears stable without spreading erythema/discharge.  CV:  Regular rate and rhythm, distant.  LUNGS:  Clear to auscultation upper with decreased breath sounds bases.  No wheezes/retractions.  Symmetric chest rise on inhalation noted.  ABD:  Hypoactive bowel sounds, soft, mildly distended. Ostomy with some output . Detailed ostomy/surgical site exam deferred to CRS.  No guarding/rigidity.  EXT:  Trace peripheral edema.  No cyanosis.  No acute joint synovitis noted.  SKIN:  Dry to touch, no exanthems noted in the visualized areas.    Medications     IV fluid REPLACEMENT ONLY       lactated ringers 35 mL/hr at 10/11/18 1242     parenteral nutrition - ADULT compounded formula       parenteral nutrition - ADULT compounded formula 40 mL/hr at 10/11/18 0742       enoxaparin  40 mg Subcutaneous Q24H     heparin lock flush  5-10 mL Intracatheter Q24H     insulin aspart  1-12 Units Subcutaneous Q4H     lipids  250 mL Intravenous Q24H     metoprolol tartrate  50 mg Oral BID w/meals     sodium chloride (PF)  10 mL Intracatheter Q8H     Data       Recent Labs  Lab 10/11/18  0550 10/09/18  0550 10/08/18  0610   WBC 10.8 13.4* 14.6*   HGB 8.6* 8.1* 8.2*   HCT 26.1* 24.8* 24.6*   * 102* 101*     Canceled, Test credited [id number]       Recent Labs  Lab 10/11/18  0550 10/10/18  0555 10/09/18  0550 10/08/18  0610 10/07/18  0525  10/06/18  0520    131* 130* 132* 130*  --  136   POTASSIUM 4.2 4.1 4.2 4.0 3.8  --  3.4   CHLORIDE  --  98 96 96 95  --  99   CO2  --  30 30 32 32  --  33*   ANIONGAP  --  3 4 4 3  --  4   GLC  --  113* 102* 147* 137*  --  108*   BUN  --  15 15 17 12  --  7   CR  --  0.55 0.56 0.58 0.61  --  0.56   GFRESTIMATED  --  >90 >90 >90 >90  --  >90   GFRESTBLACK  --  >90 >90 >90 >90  --  >90   PAULINA  --  7.3* 7.0* 6.7* 6.9*  --   6.7*   MAG 2.0 1.7 2.1 1.9 2.1  --  1.9   PHOS  --  2.7  --  2.5 2.1*  < > 1.8*   PROTTOTAL  --   --   --  4.9*  --   --  4.6*   ALBUMIN  --   --   --  1.4*  --   --  1.6*   BILITOTAL  --   --   --  0.4  --   --  0.6   ALKPHOS  --   --   --  51  --   --  40   AST  --   --   --  31  --   --  20   ALT  --   --   --  15  --   --  9   < > = values in this interval not displayed.    No results found for this or any previous visit (from the past 24 hour(s)).

## 2018-10-11 NOTE — PLAN OF CARE
Problem: Patient Care Overview  Goal: Plan of Care/Patient Progress Review  Outcome: Improving  A&O x 4, assist x 2, clear liquid diet,  & 120, insulin not given, order parameters not met, tele SR, internal jugular 3 lumen right neck, LR 50 mL/hr & TPN 40 mL/hr (total of 90 mL/hr per doctor order), ostomy & pure wick in place, total outputs are documented, PCA pump, reposition q2 hrs, PCU collect for labs, per doctor note electrolyte replacement labs to be done this morning but no order found, sticky note regarding this, will continue with POC.

## 2018-10-11 NOTE — PROGRESS NOTES
Discharge Planner   Discharge Plans in progress: Referral sent for TCU placement for weekend   Barriers to discharge plan: pt is being weaned off TPN, new ostomy   Follow up plan: referral sent to 1. [facility] 2. [facility] in [place] 3. [facility]. Private room        Entered by: Corinne C. White 10/11/2018 3:50 PM

## 2018-10-11 NOTE — PLAN OF CARE
Problem: Patient Care Overview  Goal: Plan of Care/Patient Progress Review  Outcome: Improving  A&O X4. Up with assist X2, walker and gait belt. Up to chair X2, ambulated X2 with staff. PT following. Refusing positioning at times. VSS on RA. C/o abdominal pain, improved per patient. PCA pump discontinued - PRN IV Dilaudid. LR 50 mL/hr decreased to 35 mL/hr, TPN 40 mL/hr, Lipids. Right internal jugular with good blood return. Ostomy in place, Colorectal surgery and WOC following. Purewick in place with good output as well, changed this shift. , 116. SW following, plan to discharge to TCU when medically cleared - possible 3 days.

## 2018-10-12 ENCOUNTER — APPOINTMENT (OUTPATIENT)
Dept: CT IMAGING | Facility: CLINIC | Age: 83
DRG: 853 | End: 2018-10-12
Attending: PHYSICIAN ASSISTANT
Payer: MEDICARE

## 2018-10-12 LAB
ANION GAP SERPL CALCULATED.3IONS-SCNC: 7 MMOL/L (ref 3–14)
BUN SERPL-MCNC: 21 MG/DL (ref 7–30)
CALCIUM SERPL-MCNC: 7.7 MG/DL (ref 8.5–10.1)
CHLORIDE SERPL-SCNC: 101 MMOL/L (ref 94–109)
CO2 SERPL-SCNC: 25 MMOL/L (ref 20–32)
CREAT SERPL-MCNC: 0.56 MG/DL (ref 0.52–1.04)
ERYTHROCYTE [DISTWIDTH] IN BLOOD BY AUTOMATED COUNT: 13.7 % (ref 10–15)
GFR SERPL CREATININE-BSD FRML MDRD: >90 ML/MIN/1.7M2
GLUCOSE BLDC GLUCOMTR-MCNC: 104 MG/DL (ref 70–99)
GLUCOSE BLDC GLUCOMTR-MCNC: 105 MG/DL (ref 70–99)
GLUCOSE BLDC GLUCOMTR-MCNC: 113 MG/DL (ref 70–99)
GLUCOSE BLDC GLUCOMTR-MCNC: 114 MG/DL (ref 70–99)
GLUCOSE BLDC GLUCOMTR-MCNC: 132 MG/DL (ref 70–99)
GLUCOSE SERPL-MCNC: 142 MG/DL (ref 70–99)
HCT VFR BLD AUTO: 26.5 % (ref 35–47)
HGB BLD-MCNC: 8.7 G/DL (ref 11.7–15.7)
MAGNESIUM SERPL-MCNC: 1.8 MG/DL (ref 1.6–2.3)
MCH RBC QN AUTO: 33.1 PG (ref 26.5–33)
MCHC RBC AUTO-ENTMCNC: 32.8 G/DL (ref 31.5–36.5)
MCV RBC AUTO: 101 FL (ref 78–100)
PHOSPHATE SERPL-MCNC: 3.2 MG/DL (ref 2.5–4.5)
PLATELET # BLD AUTO: 352 10E9/L (ref 150–450)
POTASSIUM SERPL-SCNC: 4.4 MMOL/L (ref 3.4–5.3)
RBC # BLD AUTO: 2.63 10E12/L (ref 3.8–5.2)
SODIUM SERPL-SCNC: 133 MMOL/L (ref 133–144)
WBC # BLD AUTO: 11.5 10E9/L (ref 4–11)

## 2018-10-12 PROCEDURE — 40000905 ZZH STATISTIC WOC PT EDUCATION, > 60 MIN: Performed by: NURSE PRACTITIONER

## 2018-10-12 PROCEDURE — 00000146 ZZHCL STATISTIC GLUCOSE BY METER IP

## 2018-10-12 PROCEDURE — 25000125 ZZHC RX 250: Performed by: INTERNAL MEDICINE

## 2018-10-12 PROCEDURE — 84100 ASSAY OF PHOSPHORUS: CPT | Performed by: INTERNAL MEDICINE

## 2018-10-12 PROCEDURE — 12000007 ZZH R&B INTERMEDIATE

## 2018-10-12 PROCEDURE — 25000132 ZZH RX MED GY IP 250 OP 250 PS 637: Mod: GY | Performed by: INTERNAL MEDICINE

## 2018-10-12 PROCEDURE — A9270 NON-COVERED ITEM OR SERVICE: HCPCS | Mod: GY | Performed by: INTERNAL MEDICINE

## 2018-10-12 PROCEDURE — 25000128 H RX IP 250 OP 636: Performed by: INTERNAL MEDICINE

## 2018-10-12 PROCEDURE — 25000131 ZZH RX MED GY IP 250 OP 636 PS 637: Mod: GY | Performed by: INTERNAL MEDICINE

## 2018-10-12 PROCEDURE — 99232 SBSQ HOSP IP/OBS MODERATE 35: CPT | Performed by: INTERNAL MEDICINE

## 2018-10-12 PROCEDURE — 74177 CT ABD & PELVIS W/CONTRAST: CPT

## 2018-10-12 PROCEDURE — G0463 HOSPITAL OUTPT CLINIC VISIT: HCPCS | Performed by: NURSE PRACTITIONER

## 2018-10-12 PROCEDURE — 80048 BASIC METABOLIC PNL TOTAL CA: CPT | Performed by: INTERNAL MEDICINE

## 2018-10-12 PROCEDURE — 25000128 H RX IP 250 OP 636: Performed by: COLON & RECTAL SURGERY

## 2018-10-12 PROCEDURE — 85027 COMPLETE CBC AUTOMATED: CPT | Performed by: INTERNAL MEDICINE

## 2018-10-12 PROCEDURE — 83735 ASSAY OF MAGNESIUM: CPT | Performed by: INTERNAL MEDICINE

## 2018-10-12 RX ORDER — IOPAMIDOL 755 MG/ML
500 INJECTION, SOLUTION INTRAVASCULAR ONCE
Status: COMPLETED | OUTPATIENT
Start: 2018-10-12 | End: 2018-10-12

## 2018-10-12 RX ADMIN — I.V. FAT EMULSION 250 ML: 20 EMULSION INTRAVENOUS at 10:13

## 2018-10-12 RX ADMIN — Medication 1 MG: at 22:21

## 2018-10-12 RX ADMIN — SODIUM CHLORIDE, PRESERVATIVE FREE 10 ML: 5 INJECTION INTRAVENOUS at 20:11

## 2018-10-12 RX ADMIN — Medication 2 G: at 15:29

## 2018-10-12 RX ADMIN — METOPROLOL TARTRATE 50 MG: 50 TABLET, FILM COATED ORAL at 18:51

## 2018-10-12 RX ADMIN — IOPAMIDOL 58 ML: 755 INJECTION, SOLUTION INTRAVENOUS at 14:30

## 2018-10-12 RX ADMIN — ONDANSETRON 4 MG: 4 TABLET, ORALLY DISINTEGRATING ORAL at 13:57

## 2018-10-12 RX ADMIN — ENOXAPARIN SODIUM 40 MG: 40 INJECTION SUBCUTANEOUS at 15:31

## 2018-10-12 RX ADMIN — SODIUM CHLORIDE 54 ML: 9 INJECTION, SOLUTION INTRAVENOUS at 14:30

## 2018-10-12 RX ADMIN — METOPROLOL TARTRATE 50 MG: 50 TABLET, FILM COATED ORAL at 10:13

## 2018-10-12 RX ADMIN — SODIUM CHLORIDE, PRESERVATIVE FREE 5 ML: 5 INJECTION INTRAVENOUS at 06:10

## 2018-10-12 ASSESSMENT — ACTIVITIES OF DAILY LIVING (ADL)
ADLS_ACUITY_SCORE: 15
ADLS_ACUITY_SCORE: 16
ADLS_ACUITY_SCORE: 15
ADLS_ACUITY_SCORE: 15

## 2018-10-12 NOTE — PROGRESS NOTES
CLINICAL NUTRITION SERVICES - REASSESSMENT NOTE      Recommendations Ordered by Registered Dietitian (RD):   Wean custom TPN (ok to switch back to [employer] if TPN needs ongoing)   Future/Additional Recommendations:   Multiple issues that need to be addressed I.J. line needs to be removed per MD notes and PICC placement. TCU placement will not accept patient with TPN. With functioning gut, would benefit from at least trophic feeds. Based on baseline trends and minimal PO, ongoing nausea since diet advanced, do not anticipate patient to meet estimated needs orally.    Recommendations: wean TPN today (custom regimen) and place post pyloric feeding tube (prefer patient go to IR for placement given recent surgery), trial trophic feeds x 24 hours. If patient does not tolerate enteral trial, then pursue PICC placement and resume TPN 10/13. If tolerates enteral trial, advance to goal rate and continue PO intake as tolerated.    Malnutrition 10/9:   % Weight Loss: Up to 5% in 1 month --> now masked by fluid  % Intake:</= 50% for >/= 5 days (severe malnutrition), <75% of needs for >/=1 month --> improving with TPN  Subcutaneous Fat Loss:Orbital region moderate depletion (hollowed and dark circles/loose skin) and Upper arm region moderate depletion (lack of ample pinch depth, loose skin)  Muscle Loss:Temporal region moderate depletion, Acromion bone region mild to moderate depletion, Scapular bone region moderate depletion and Dorsal hand region moderate depletion upper thigh region moderate depletion, calf with at least mild depletion masked by fluid  Fluid Retention: Trace      Malnutrition Diagnosis: Severe malnutrition  In Context of:  Acute on Chronic illness or disease     EVALUATION OF PROGRESS TOWARD GOALS/NEW FINDINGS   Progress towards goals will be monitored and evaluated per protocol and Practice Guidelines    Patient remains NPO and on TPN as follows, custom regimen:  85 grams amino acids and 270 grams dextrose (D  28.1%, AA 8.9%) at 40 mL/hr with 250 mL 20% lipids daily to provide 1758 kcal daily (28% kcal from fat, GIR of 3.5).    Patient reports ongoing nausea and cites taste aversions to many options - associates liquid options with previous intolerance when sick. Notes the odor from her ostomy is making her nauseas as well. No appetite. Only had one orange juice yesterday. Declined offer to order breakfast this AM.    I/O last 3 completed shifts:  In: 1930 [P.O.:80; I.V.:896]    Out: 900 [Urine:825; Stool:75]    Fluid: +1 L/R ankle edema    Weight: 52 kg -closer to admit weight now that standing scales used.    Meds: LR IVF at 35 ml/hr    Labs:   Recent Labs   Lab Test  10/12/18   0607  10/11/18   0550  10/10/18   0555  10/09/18   0550  10/08/18   0610   POTASSIUM  4.4  4.2  4.1  4.2  4.0     Recent Labs   Lab Test  10/12/18   0607  10/10/18   0555  10/08/18   0610  10/07/18   0525  10/06/18   1910   PHOS  3.2  2.7  2.5  2.1*  2.5     Recent Labs   Lab Test  10/12/18   0607  10/11/18   0550  10/10/18   0555  10/09/18   0550  10/08/18   0610   MAG  1.8  2.0  1.7  2.1  1.9     Recent Labs   Lab Test  10/12/18   0607  10/11/18   0550  10/10/18   0555  10/09/18   0550  10/08/18   0610   NA  133  133  131*  130*  132*     Recent Labs   Lab Test  10/12/18   0607  10/10/18   0555  10/09/18   0550  10/08/18   0610  10/07/18   0525   CR  0.56  0.55  0.56  0.58  0.61       Recent Labs  Lab 10/12/18  0607 10/10/18  0555 10/09/18  0550 10/08/18  0610 10/07/18  0525 10/06/18  0520   * 113* 102* 147* 137* 108*       ASSESSED NUTRITION NEEDS (PER APPROVED PRACTICE GUIDELINES, Dosing weight: 54 kg):  Estimated Energy Needs: 1168-2497 kcals (25-35 Kcal/Kg)  Justification: maintenance  Estimated Protein Needs: 65-96+ grams protein (1.2-1.5 g pro/Kg)  Justification: post-op and preservation of lean body mass  Estimated Fluid Needs: >1 mL/Kcal  Justification: maintenance    Previous Goals:   TPN at goal rate to meet % of  estimated needs  Evaluation: Met    Previous Nutrition Diagnosis:   Inadequate oral intake related to altered GI function as evidenced by PO intake likely meeting <50% of needs for >1 week, patient report of 4% weight loss in <1 month, fat and muscle loss, sigmoid resection 10/2 and TPN reliance x 4 days while NPO  Evaluation: Ongoing, updated below    MALNUTRITION (Assessed 10/9)  % Weight Loss: Up to 5% in 1 month --> now masked by fluid  % Intake:</= 50% for >/= 5 days (severe malnutrition), <75% of needs for >/=1 month --> improving with TPN  Subcutaneous Fat Loss:Orbital region moderate depletion (hollowed and dark circles/loose skin) and Upper arm region moderate depletion (lack of ample pinch depth, loose skin)  Muscle Loss:Temporal region moderate depletion, Acromion bone region mild to moderate depletion, Scapular bone region moderate depletion and Dorsal hand region moderate depletion upper thigh region moderate depletion, calf with at least mild depletion masked by fluid  Fluid Retention: Trace      Malnutrition Diagnosis: Severe malnutrition  In Context of:  Acute on Chronic illness or disease    CURRENT NUTRITION DIAGNOSIS  Inadequate oral intake related to altered GI function, N/V as evidenced by PO intake likely meeting <50% of needs for >1 week, patient report of 4% weight loss in <1 month, fat and muscle loss, sigmoid resection 10/2 and TPN reliance x 4 days while NPO, minimal PO since diet advanced to clear liquids    INTERVENTIONS  Recommendations / Nutrition Prescription  Multiple issues that need to be addressed I.J. line needs to be removed per MD notes and PICC placement. TCU placement will not accept patient with TPN. With functioning gut, would benefit from at least trophic feeds. Based on baseline trends and minimal PO, ongoing nausea since diet advanced, do not anticipate patient to meet estimated needs orally.    Recommendations: wean TPN today (custom regimen) and place post pyloric  feeding tube (prefer patient go to IR for placement given recent surgery), trial trophic feeds x 24 hours. If patient does not tolerate enteral trial, then pursue PICC placement and resume TPN 10/13. If tolerates enteral trial, advance to goal rate and continue PO intake as tolerated.     Implementation  Nutrition education: reviewed POC with patient  PN Composition and PN schedule: Pending POC    Collaboration and Referral of care: Discussed patient during interdisciplinary care rounds this morning and with  PharmD, NAIN    Goals  TPN at goal rate to meet % of estimated needs      MONITORING AND EVALUATION:  Progress towards goals will be monitored and evaluated per protocol and Practice Guidelines      Blanka Cota RDN, LD, CNSC  Pager - 3rd floor/ICU: [phone]  Pager - All other floors: [phone]  Pager - Weekend/holiday: [phone]  Office: [phone]

## 2018-10-12 NOTE — PLAN OF CARE
Problem: Patient Care Overview  Goal: Plan of Care/Patient Progress Review  Outcome: Improving  A&O x 4, assist x 1, clear liquid diet, , internal jugular right side of neck, PCU collect, labs drawn and sent, LR 35 mL/hr & TPN 40 mL/hr, tele SR, ostomy & purewick output documented, reposition/change q2 hrs, IV dilaudid given once during shift for 6/10 abdomen pain, will continue with POC.

## 2018-10-12 NOTE — PROGRESS NOTES
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    SUBJECTIVE:  Pt states she feels \"gaggy\" and cold today. She has taken minimal oral intake. She states her pain is minimal at rest and increases to about 6/10 with movement. She ambulated twice yesterday.     OBJECTIVE:  Temp:  [97  F (36.1  C)-98.5  F (36.9  C)] 97.7  F (36.5  C)  Heart Rate:  [63-85] 63  Resp:  [16-20] 20  BP: (121-158)/(41-74) 129/41  SpO2:  [95 %-96 %] 96 %    Intake/Output Summary (Last 24 hours) at 10/12/18 0912  Last data filed at 10/11/18 1429   Gross per 24 hour   Intake             1850 ml   Output              900 ml   Net              950 ml       GENERAL:  Awake, alert, no acute distress, lying in bed  HEAD: Nomocephalic atraumatic  SCLERA: anicteric  EXTREMITIES: warm and well perfused  ABDOMEN:  Soft, appropriately tender, non-distended, no rebound or guarding, no peritoneal signs. Irrigated RRC in stoma with 50cc, some gas return  INCISION:  C/d/i, dermabond    LABS:  Lab Results   Component Value Date    WBC 10.8 10/11/2018     Lab Results   Component Value Date    HGB 8.6 10/11/2018     Lab Results   Component Value Date    HCT 26.1 10/11/2018     Lab Results   Component Value Date    PLT Canceled, Test credited 10/11/2018     10/11/2018     Last Basic Metabolic Panel:  Lab Results   Component Value Date     10/12/2018      Lab Results   Component Value Date    POTASSIUM 4.4 10/12/2018     Lab Results   Component Value Date    CHLORIDE 101 10/12/2018     Lab Results   Component Value Date    PAULINA 7.7 10/12/2018     Lab Results   Component Value Date    CO2 25 10/12/2018     Lab Results   Component Value Date    BUN 21 10/12/2018     Lab Results   Component Value Date    CR 0.56 10/12/2018     Lab Results   Component Value Date     10/12/2018       ASSESSMENT/PLAN: " POD 10 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 Pt remains afebrile, VSS. Stool output minimal.     1. Clear liquid diet, will discuss continued nutrition strategies with Dr. Reddy  2. Will obtain IV/PO abd/pelvic CT scan this morning  3. Encourage minimizing narcotics, will discontinue PCA once tolerating more of a diet  4. Continue TPN  5. Continue stoma teaching  6. Encourage OOB, ambulate multiples times daily  7. Lovenox for prophylaxis, will not need with discharge  8. Dispo: discharge planning, possible in 1-2 days pending tolerating diet and increased functioning of stoma    For questions/paging, please contact the CRS office at [phone].    Betty Mariee PA-C  [facility]  Phone: [phone]

## 2018-10-12 NOTE — PROGRESS NOTES
Continue current management at this time. Discharge Planner   Discharge Plans in progress: Pt has been offered TCU bed for Sat/Sun at [facility] IF OFF TPN  Barriers to discharge plan: would need to send supplies for Ostomy   Follow up plan: need to update TCU facility hopefully by end of day what day we anticipate         Entered by: Corinne C. White 10/12/2018 8:21 AM     CM: Pt if not able to get to [facility] due to timing of discharge [facility] and [facility] . Private room

## 2018-10-12 NOTE — PROGRESS NOTES
SPIRITUAL HEALTH SERVICES Progress Note  FR Med Surg 3    Brea was alert and conversant.  She had just been seen by her physician and is processing her understanding of her condition and prognosis.  Brea enjoyed sharing about her children and grandchildren her Ca[employer] johnnie.    Pt appreciates [employer] [facility] provided by volunteers. Visit interrupted by arrival of dtr in law and [employer] .  Pt is aware of [facility] availability and will ctc as needed.      Plan: Spiritual Health Services remains available for additional emotional/spiritual support.    Luis Antonio Mackey MA  Staff   Pager: [phone]  Phone: [phone]

## 2018-10-12 NOTE — PROGRESS NOTES
[facility]  Hospitalist Progress Note  Name: Brea Kerr    MRN: [id number]  Physician:  Gorge Goins MD  10/12/2018    Assessment & Plan   Summary of Stay:  Brea Kerr is a 82 year old female came to attention on 9/30/2018 with stool passing in her urine. Her recent PMH is notable for hospitalization at [facility] from 9/19 - 9/23/18 for perforated diverticulitis with abscess formation.  She underwent placement of a NARAYAN drain into the abscess on 9/20 under CT guidance and was discharged home on oral Cipro and Metronidazole.     She also had abdominal pain, but which she thought was not much changed from when she had the NARAYAN drain placed on 9/20/18.  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        More remote PMH notable for prior episodes of sigmoid diverticulitis and associated paroxysmal atrial fibrillation. And after arriving in the ED, the patient developed A fib with RVR (rate > 140) and her blood pressure fell to the 60's for which reason she was admitted to the ICU and was started on Amiodarone and very briefly on Norepi.  However with the Amio and conversion to NSR, her BP came up.      On 10/2/2018 the patient was taken to the operating room by Dr. Reddy and underwent sigmoid colectomy with end colostomy.  She did very well with the procedure but postoperatively has developed recurrent atrial fibrillation with rapid ventricular response.  Again her blood pressure dropped. She was admitted to the ICU following this for management of a-fib w/ RVR and hypotension.  Following amiodarone load, diltiazem drip and digoxin she converted to NSR.    Transferred out of the ICU on 10/4.  Given challenges with oral intake and conversion amiodarone was not ordered oral.  She has been maintained on IV metoprolol.     NG placed with plans for trophic feeds, but persistent frequent vomiting so it was  on suction.  TPN started.  Her NGT was pulled given improvement so anticipate some oral intake may start in the next day     Currently, doing well in terms of a.fib on metoprolol and has remained in NSR. Also NG has been removed and off PCA. Main issue at present is advancing her diet and ability to take PO.      Sepsis due to recurrent diverticulitis, colovesicular fistula s/p Green sigmoidectomy: Completed course of Zosyn and stopped now per CRS. Recent Cystogram shows no evidence of ongoing colovesicular fistula. Babb is now removed.  -Post-op site cares/drains, activity restrictions, diet, antibiotics, DVT proph per CRS.  -Ostomy nurse involved  -Stop PCA and change to PRN dilaudid   -Incentive spirometer  --> low stool output, noted plan for CT abdomen today.      Weight Gain: likely due to third spacing, breathing stable, no signs of CHF   -- Monitor weights, no need for diuresis at this time    A. fib with RVR: With associated hypotension briefly requiring norepinephrine. Was on amiodarone although stopped now due to variable PO intake.  Intermittently in A. fib and NSR.  has a previous hx of a.fib as well, not on anti-coagulation at baseline   -- was on scheduled IV metoprolol, changed to PO metoprolol 50 mg BID , stable on that   -- Suspect lot of the afib issues were driven by the infection/initial surgical stress, although would be reasonable to follow up with cardiology as outpatient      Malnutrition: Feeding tube placed, but significant vomiting and have not been able to start tube feeds.  Surgery recommended TPN and dietitian and pharmacy consulted.  Started TPN 10/5.  Will use right IJ CVC.  Given low albumin and third spacing concerns, we are using more concentrated/lower volume TPN.    -  Has a internal jugular central line for TPN, if it looks like long term plan for TPN, then change to PICC line. Await CT results and CR surgery input     Acute hyponatremia: Sodium staying around 130.  Possible  GI losses versus SIADH.  Stable lately around 130, continue to monitor.     Electrolyte abnormalities:  Phosphorus and calcium recently noted to be low.  Though corrected for hypoalbuminemia is near the lower limit of normal.  Electrolyte replacement protocols.       Anemia: Suspect combination of phlebotomy, postsurgical blood loss, and inflammatory process.  Overall stable and high 8 range. Monitor     HTN:  Prior hypotensive issues led to metoprolol 25 mg twice daily and losartan 100 mg daily being on hold.  More recently on IV metoprolol.  BP reasonable at the moment.    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  Continue to hold.      History of DVT, PE:  Was not on anti-coagulation prior to admission recently. On prophylactic Lovenox dosing.     DVT Prophylaxis: Enoxaparin (Lovenox) SQ  Code Status: Full Code  Disposition Plan   Expected discharge - pending return of gut function, and if able to tolerate oral intake, otherwise she may need TPN for a longer time,   Likely to TCU/rehab eventually.       Entered: Gorge Goins 10/12/2018, 12:30 PM       Interval History    chart reviewed.  Discussed with nursing staff.     Ms Kerr feels well.  No new complaints.  pain controlled.  No SOB, nausea.  Does not feel like eating much, on clears, low ostomy output     -Data reviewed today: I reviewed all new labs and imaging reports over the last 24 hours. I personally reviewed no images or EKG's today.    Physical Exam   Temp: 97.7  F (36.5  C) Temp src: Oral BP: 139/60   Heart Rate: 66 Resp: 20 SpO2: 96 % O2 Device: None (Room air)    Vitals:    10/08/18 0617 10/10/18 0610 10/11/18 0845   Weight: 62.6 kg (138 lb 1.6 oz) 53.5 kg (118 lb) 52 kg (114 lb 11.2 oz)     Vital Signs with Ranges  Temp:  [97  F (36.1  C)-98.5  F (36.9  C)] 97.7  F (36.5  C)  Heart Rate:  [63-85] 66  Resp:  [16-20] 20  BP: (121-149)/(41-74) 139/60  SpO2:  [95 %-96 %] 96 %  I/O  last 3 completed shifts:  In: 1930 [P.O.:80; I.V.:896]  Out: 900 [Urine:825; Stool:75]    GEN:  Alert, oriented x 3, appears ill but comfortable, NAD.    HEENT:  Normocephalic/atraumatic, no scleral icterus, no nasal discharge, mouth moist.  Right internal jugular line appears stable without spreading erythema/discharge.  CV:  Regular rate and rhythm, distant.  LUNGS:  Clear to auscultation upper with decreased breath sounds bases.  No wheezes/retractions.  Symmetric chest rise on inhalation noted.  ABD:  Hypoactive bowel sounds, soft, mildly distended. Ostomy present. Detailed ostomy/surgical site exam deferred to CRS.  No guarding/rigidity.  EXT:  Trace peripheral edema.  No cyanosis.  No acute joint synovitis noted.  SKIN:  Dry to touch, no exanthems noted in the visualized areas.    Medications     IV fluid REPLACEMENT ONLY       lactated ringers 35 mL/hr at 10/11/18 2215     parenteral nutrition - ADULT compounded formula 40 mL/hr at 10/11/18 2039       enoxaparin  40 mg Subcutaneous Q24H     heparin lock flush  5-10 mL Intracatheter Q24H     insulin aspart  1-12 Units Subcutaneous Q4H     lipids  250 mL Intravenous Q24H     metoprolol tartrate  50 mg Oral BID w/meals     sodium chloride (PF)  10 mL Intracatheter Q8H     Data       Recent Labs  Lab 10/11/18  0550 10/09/18  0550 10/08/18  0610   WBC 10.8 13.4* 14.6*   HGB 8.6* 8.1* 8.2*   HCT 26.1* 24.8* 24.6*   * 102* 101*     Canceled, Test credited 265 228       Recent Labs  Lab 10/12/18  0607 10/11/18  0550 10/10/18  0555 10/09/18  0550 10/08/18  0610  10/06/18  0520    133 131* 130* 132*  < > 136   POTASSIUM 4.4 4.2 4.1 4.2 4.0  < > 3.4   CHLORIDE 101  --  98 96 96  < > 99   CO2 25  --  30 30 32  < > 33*   ANIONGAP 7  --  3 4 4  < > 4   *  --  113* 102* 147*  < > 108*   BUN 21  --  15 15 17  < > 7   CR 0.56  --  0.55 0.56 0.58  < > 0.56   GFRESTIMATED >90  --  >90 >90 >90  < > >90   GFRESTBLACK >90  --  >90 >90 >90  < > >90   PAULINA  7.7*  --  7.3* 7.0* 6.7*  < > 6.7*   MAG 1.8 2.0 1.7 2.1 1.9  < > 1.9   PHOS 3.2  --  2.7  --  2.5  < > 1.8*   PROTTOTAL  --   --   --   --  4.9*  --  4.6*   ALBUMIN  --   --   --   --  1.4*  --  1.6*   BILITOTAL  --   --   --   --  0.4  --  0.6   ALKPHOS  --   --   --   --  51  --  40   AST  --   --   --   --  31  --  20   ALT  --   --   --   --  15  --  9   < > = values in this interval not displayed.    No results found for this or any previous visit (from the past 24 hour(s)).

## 2018-10-12 NOTE — PLAN OF CARE
Problem: Sepsis/Septic Shock (Adult)  Goal: Signs and Symptoms of Listed Potential Problems Will be Absent, Minimized or Managed (Sepsis/Septic Shock)  Signs and symptoms of listed potential problems will be absent, minimized or managed by discharge/transition of care (reference Sepsis/Septic Shock (Adult) CPG).   Outcome: Improving  RN- VSS, afeb. Alert and oriented. Denies pain. Up in room with assist of 1. Tolerating clears. No nausea. Purewick in place- adequate UO. Colostomy intact- minimal stool output. TPN and lipids. Continue current POC.

## 2018-10-12 NOTE — PLAN OF CARE
Problem: Patient Care Overview  Goal: Plan of Care/Patient Progress Review  PT: Attempted to see pt in PM during set time, pt being taken off unit for CT scan, will re attempt this PM as schedule allows     Upon second attempt pt sleeping, daughter present and stated pt had episode of emesis during/after CT, pt not appropriate for therapy this PM

## 2018-10-13 ENCOUNTER — APPOINTMENT (OUTPATIENT)
Dept: PHYSICAL THERAPY | Facility: CLINIC | Age: 83
DRG: 853 | End: 2018-10-13
Payer: MEDICARE

## 2018-10-13 LAB
ANION GAP SERPL CALCULATED.3IONS-SCNC: 6 MMOL/L (ref 3–14)
BACTERIA SPEC CULT: NO GROWTH
BACTERIA SPEC CULT: NO GROWTH
BUN SERPL-MCNC: 15 MG/DL (ref 7–30)
CALCIUM SERPL-MCNC: 7.8 MG/DL (ref 8.5–10.1)
CHLORIDE SERPL-SCNC: 100 MMOL/L (ref 94–109)
CO2 SERPL-SCNC: 26 MMOL/L (ref 20–32)
CREAT SERPL-MCNC: 0.58 MG/DL (ref 0.52–1.04)
ERYTHROCYTE [DISTWIDTH] IN BLOOD BY AUTOMATED COUNT: 13.9 % (ref 10–15)
GFR SERPL CREATININE-BSD FRML MDRD: >90 ML/MIN/1.7M2
GLUCOSE BLDC GLUCOMTR-MCNC: 109 MG/DL (ref 70–99)
GLUCOSE BLDC GLUCOMTR-MCNC: 118 MG/DL (ref 70–99)
GLUCOSE BLDC GLUCOMTR-MCNC: 130 MG/DL (ref 70–99)
GLUCOSE BLDC GLUCOMTR-MCNC: 85 MG/DL (ref 70–99)
GLUCOSE BLDC GLUCOMTR-MCNC: 88 MG/DL (ref 70–99)
GLUCOSE BLDC GLUCOMTR-MCNC: 89 MG/DL (ref 70–99)
GLUCOSE BLDC GLUCOMTR-MCNC: 92 MG/DL (ref 70–99)
GLUCOSE BLDC GLUCOMTR-MCNC: 95 MG/DL (ref 70–99)
GLUCOSE BLDC GLUCOMTR-MCNC: 99 MG/DL (ref 70–99)
GLUCOSE SERPL-MCNC: 78 MG/DL (ref 70–99)
HCT VFR BLD AUTO: 25.5 % (ref 35–47)
HGB BLD-MCNC: 8.3 G/DL (ref 11.7–15.7)
Lab: NORMAL
Lab: NORMAL
MAGNESIUM SERPL-MCNC: 2 MG/DL (ref 1.6–2.3)
MCH RBC QN AUTO: 33.1 PG (ref 26.5–33)
MCHC RBC AUTO-ENTMCNC: 32.5 G/DL (ref 31.5–36.5)
MCV RBC AUTO: 102 FL (ref 78–100)
PLATELET # BLD AUTO: 372 10E9/L (ref 150–450)
POTASSIUM SERPL-SCNC: 4.2 MMOL/L (ref 3.4–5.3)
RBC # BLD AUTO: 2.51 10E12/L (ref 3.8–5.2)
SODIUM SERPL-SCNC: 132 MMOL/L (ref 133–144)
SPECIMEN SOURCE: NORMAL
SPECIMEN SOURCE: NORMAL
WBC # BLD AUTO: 12.1 10E9/L (ref 4–11)

## 2018-10-13 PROCEDURE — 83735 ASSAY OF MAGNESIUM: CPT | Performed by: INTERNAL MEDICINE

## 2018-10-13 PROCEDURE — 97116 GAIT TRAINING THERAPY: CPT | Mod: GP | Performed by: PHYSICAL THERAPY ASSISTANT

## 2018-10-13 PROCEDURE — 25000125 ZZHC RX 250: Performed by: INTERNAL MEDICINE

## 2018-10-13 PROCEDURE — 00000146 ZZHCL STATISTIC GLUCOSE BY METER IP

## 2018-10-13 PROCEDURE — 99207 ZZC CDG-MDM COMPONENT: MEETS LOW - DOWN CODED: CPT | Performed by: INTERNAL MEDICINE

## 2018-10-13 PROCEDURE — 25000128 H RX IP 250 OP 636: Performed by: INTERNAL MEDICINE

## 2018-10-13 PROCEDURE — 80048 BASIC METABOLIC PNL TOTAL CA: CPT | Performed by: INTERNAL MEDICINE

## 2018-10-13 PROCEDURE — 97530 THERAPEUTIC ACTIVITIES: CPT | Mod: GP | Performed by: PHYSICAL THERAPY ASSISTANT

## 2018-10-13 PROCEDURE — 25000132 ZZH RX MED GY IP 250 OP 250 PS 637: Mod: GY | Performed by: INTERNAL MEDICINE

## 2018-10-13 PROCEDURE — 40000193 ZZH STATISTIC PT WARD VISIT: Performed by: PHYSICAL THERAPY ASSISTANT

## 2018-10-13 PROCEDURE — 99232 SBSQ HOSP IP/OBS MODERATE 35: CPT | Performed by: INTERNAL MEDICINE

## 2018-10-13 PROCEDURE — 85027 COMPLETE CBC AUTOMATED: CPT | Performed by: INTERNAL MEDICINE

## 2018-10-13 PROCEDURE — 12000007 ZZH R&B INTERMEDIATE

## 2018-10-13 PROCEDURE — 25000128 H RX IP 250 OP 636: Performed by: COLON & RECTAL SURGERY

## 2018-10-13 RX ADMIN — DEXTROSE AND SODIUM CHLORIDE: 5; 900 INJECTION, SOLUTION INTRAVENOUS at 09:13

## 2018-10-13 RX ADMIN — SODIUM CHLORIDE, POTASSIUM CHLORIDE, SODIUM LACTATE AND CALCIUM CHLORIDE: 600; 310; 30; 20 INJECTION, SOLUTION INTRAVENOUS at 01:26

## 2018-10-13 RX ADMIN — METOPROLOL TARTRATE 50 MG: 50 TABLET, FILM COATED ORAL at 09:14

## 2018-10-13 RX ADMIN — ENOXAPARIN SODIUM 40 MG: 40 INJECTION SUBCUTANEOUS at 19:35

## 2018-10-13 RX ADMIN — Medication 2 G: at 11:01

## 2018-10-13 RX ADMIN — ONDANSETRON 4 MG: 2 INJECTION INTRAMUSCULAR; INTRAVENOUS at 00:18

## 2018-10-13 ASSESSMENT — ACTIVITIES OF DAILY LIVING (ADL)
ADLS_ACUITY_SCORE: 15

## 2018-10-13 NOTE — PLAN OF CARE
Problem: Patient Care Overview  Goal: Plan of Care/Patient Progress Review  Discharge Planner PT   Patient plan for discharge: not stated but hopeful for a speedy recovery  Current status: daughter present for session 1 assist for bed mobility gait with RW 60 feet  Barriers to return to prior living situation: mobility and tolerance to activity  Recommendations for discharge: TCU per plan established by the PT  Rationale for recommendations: limited tolerance to activity and needs to be more mobile, would recommend 4 times daily walking but she is resistant, better participation with family present.       Entered by: Marcela Mercedes 10/13/2018 3:16 PM

## 2018-10-13 NOTE — PROGRESS NOTES
NUTRITION BRIEF NOTE  See RD note 10/12 for full reassessment details    New findings in last 24 hours:    Diet order: full liquids. Consumed 1/2 of cream of potato soup and small amounts of juice. Minimal appetite ongoing and many food aversions. Patient has baseline low intake, with lactose intolerance.    TPN off since 10/12 PM (due to limited 24 hour hang time on custom TPN bags). Lack of current central access (I.J. Line in for 2 weeks)  I/O last 3 completed shifts:  In: 3011 [P.O.:1010; I.V.:929]  Out: 1350 [Urine:1300; Stool:50]    Meds: D5 IVF at 50 mL per hour provides 204 kcal, 60 gm CHO started 10/13 due to borderline hypoglycemia  Recent Labs   Lab Test  10/13/18   0758  10/12/18   0607  10/11/18   0550  10/10/18   0555  10/09/18   0550   POTASSIUM  4.2  4.4  4.2  4.1  4.2     Recent Labs   Lab Test  10/12/18   0607  10/10/18   0555  10/08/18   0610  10/07/18   0525  10/06/18   1910   PHOS  3.2  2.7  2.5  2.1*  2.5     Recent Labs   Lab Test  10/13/18   0540  10/12/18   0607  10/11/18   0550  10/10/18   0555  10/09/18   0550   MAG  2.0  1.8  2.0  1.7  2.1     Recent Labs   Lab Test  10/13/18   0758  10/12/18   0607  10/11/18   0550  10/10/18   0555  10/09/18   0550   NA  132*  133  133  131*  130*     Recent Labs   Lab Test  10/13/18   0758  10/12/18   0607  10/10/18   0555  10/09/18   0550  10/08/18   0610   CR  0.58  0.56  0.55  0.56  0.58       Recent Labs  Lab 10/13/18  0758 10/12/18  0607 10/10/18  0555 10/09/18  0550 10/08/18  0610 10/07/18  0525   GLC 78 142* 113* 102* 147* 137*         Assessed Nutrition Needs (PER APPROVED PRACTICE GUIDELINES, Dosing weight: 54 kg):  Estimated Energy Needs: 7176-3724 kcals (25-35 Kcal/Kg)  Justification: maintenance  Estimated Protein Needs: 65-96+ grams protein (1.2-1.5 g pro/Kg)  Justification: post-op and preservation of lean body mass  Estimated Fluid Needs: >1 mL/Kcal  Justification: maintenance      Interventions:      Diet advancement per MD. Do not  anticipate patient can meet estimated needs orally in the near future.     If able to utilize GI tract, nasoenteric feeding tube would be optimal feeding method and likely need ~24 hours to reach goal rate    If bowel rest needed, than PICC and TPN resumption warranted (and will impact current discharge plan)    Collaboration and Referral of care: Discussed patien with PharmD, RN, CRS, Dr. Goins    Please page/consult as needed.      Blanka Cota RDN, LD, CNSC  Pager - 3rd floor/ICU: [phone]  Pager - All other floors: [phone]  Pager - Weekend/holiday: [phone]  Office: [phone]

## 2018-10-13 NOTE — PROVIDER NOTIFICATION
Spoke with Dr Goins regarding plan of care. Awaiting surgery, however at this time IVFs will change to D5 to support low blood sugars while pt has minimal po intake.

## 2018-10-13 NOTE — PROGRESS NOTES
COLON [facility] RECTAL [facility]GERY  PROGRESS NOTE    October 13, 2018  Post-op Day # 11 sigmoid colectomy with end colostomy    SUBJECTIVE:  No issues overnight. Tolerated some soup last night and cream of wheat this AM. No nausea. Did have some emesis after contrast for CT yesterday. Minimal out of bed. TPN held overnight, low blood glucose.    OBJECTIVE:  Temp:  [97.6  F (36.4  C)-98.5  F (36.9  C)] 98.5  F (36.9  C)  Pulse:  [80] 80  Heart Rate:  [63-82] 65  Resp:  [16-20] 16  BP: (117-143)/(60-66) 135/61  SpO2:  [94 %-96 %] 94 %    Intake/Output Summary (Last 24 hours) at 10/13/18 1012  Last data filed at 10/13/18 0942   Gross per 24 hour   Intake             3061 ml   Output             1200 ml   Net             1861 ml     GENERAL:  Awake, alert, no acute distress  EXTREMITIES: Warm and well perfused  ABDOMEN:  Soft, minimally tender, non-distended. No guarding, rigidity, or peritoneal signs. Stoma healthy with red rubber, good amount of gas in the bag, small amount of liquid stool.  INCISION:  C/d/i    LABS:  Lab Results   Component Value Date    WBC 12.1 10/13/2018     Lab Results   Component Value Date    HGB 8.3 10/13/2018     Lab Results   Component Value Date    HCT 25.5 10/13/2018     Lab Results   Component Value Date     10/13/2018     Last Basic Metabolic Panel:  Lab Results   Component Value Date     10/13/2018      Lab Results   Component Value Date    POTASSIUM 4.2 10/13/2018     Lab Results   Component Value Date    CHLORIDE 100 10/13/2018     Lab Results   Component Value Date    PAULINA 7.8 10/13/2018     Lab Results   Component Value Date    CO2 26 10/13/2018     Lab Results   Component Value Date    BUN 15 10/13/2018     Lab Results   Component Value Date    CR 0.58 10/13/2018     Lab Results   Component Value Date    GLC 78 10/13/2018       ASSESSMENT/PLAN: POD 11 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 Pt remains  afebrile, VSS. Stool output minimal.      1. Trial of soft diet - if nausea will need to back off. May need [place] tube for supplemental feeds, hold for today. If poor PO intake, can place at bedside tomorrow and start supplemental feeds. Patient hoping to avoid but seems likely  2. CT with pelvic collections, discussed with IR, not amenable to drainage  3. PO pain control  4. TPN stopped. Discontinue internal jugular line as it has been in place for 2 weeks  5. Continue stoma teaching  6. Encourage OOB, ambulate multiples times daily  7. Lovenox for prophylaxis, will not need with discharge  8. Dispo: discharge planning, possible in 1-2 days pending nutrition plan    Dr. Grant updated    For questions/paging, please contact the CRS office at [phone].    Scout Reed MD  Colorectal Surgery Fellow  [facility]  Phone: [phone]

## 2018-10-13 NOTE — PROGRESS NOTES
[facility]  Hospitalist Progress Note  Name: Brea Kerr    MRN: [id number]  Physician:  Gorge Goins MD  10/13/2018    Assessment & Plan   Summary of Stay:  Brea Kerr is a 82 year old female came to attention on 9/30/2018 with stool passing in her urine. Her recent PMH is notable for hospitalization at [facility] from 9/19 - 9/23/18 for perforated diverticulitis with abscess formation.  She underwent placement of a NARAYAN drain into the abscess on 9/20 under CT guidance and was discharged home on oral Cipro and Metronidazole.     She also had abdominal pain, but which she thought was not much changed from when she had the NARAYAN drain placed on 9/20/18.  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        More remote PMH notable for prior episodes of sigmoid diverticulitis and associated paroxysmal atrial fibrillation. And after arriving in the ED, the patient developed A fib with RVR (rate > 140) and her blood pressure fell to the 60's for which reason she was admitted to the ICU and was started on Amiodarone and very briefly on Norepi.  However with the Amio and conversion to NSR, her BP came up.      On 10/2/2018 the patient was taken to the operating room by Dr. Reddy and underwent sigmoid colectomy with end colostomy.  She did very well with the procedure but postoperatively has developed recurrent atrial fibrillation with rapid ventricular response.  Again her blood pressure dropped. She was admitted to the ICU following this for management of a-fib w/ RVR and hypotension.  Following amiodarone load, diltiazem drip and digoxin she converted to NSR.    Transferred out of the ICU on 10/4.  Given challenges with oral intake and conversion amiodarone was not ordered oral.  She was maintained on IV metoprolol.     NG placed with plans for trophic feeds, but persistent frequent vomiting so it was on  suction.  TPN started.  Her NGT was pulled given improvement     Currently, doing well in terms of a.fib on metoprolol and has remained in NSR. Also NG has been removed and off PCA. Main issue at present is advancing her diet and ability to take PO.      # Sepsis due to recurrent diverticulitis, colovesicular fistula s/p Green sigmoidectomy: Completed course of Zosyn and stopped now per CRS. Recent Cystogram shows no evidence of ongoing colovesicular fistula. Babb is now removed and off PCA. Ostomy nurse involved.   -Post-op site cares/drains, activity restrictions, diet, antibiotics, DVT proph per CRS.    --> patient was noted to have low stool output, she had CT abdomen done which showed no obstruction, however, there are three fluid collections, ?if these are infected in which case need to consider IR drainage, vs post-op serous collection, no fevers, although white count slightly on high side, will await input from colo-rectal surgery.     # Severe Malnutrition: patient had feeding tube placed previously, but had significant vomiting and not been able to start tube feeds.  Surgery recommended TPN, which was started on 10/5. She had a right IJ CVC which was used for TPN.  Given low albumin and third spacing concerns, we are using more concentrated/lower volume TPN.    -- Await surgery input in terms of the plan for further nutrition, discussed with RD, probably attempt trial of keofeed again vs continuing TPN longer term. Either way, the internal jugular central line has been in place for almost 2 weeks and should be removed now, if plan to continue TPN for a longer duration then place PICC.      A. fib with RVR: With associated hypotension briefly requiring norepinephrine. Was on amiodarone although stopped now due to variable PO intake.  Intermittently in A. fib and NSR.  has a previous hx of a.fib as well, not on anti-coagulation at baseline   -- was on scheduled IV metoprolol, changed to PO metoprolol 50  mg BID , stable on that   -- Suspect lot of the afib issues were driven by the infection/initial surgical stress, although would be reasonable to follow up with cardiology as outpatient      Weight Gain: likely due to third spacing, breathing stable, no signs of CHF. no need for diuresis at this time  Acute hyponatremia: Sodium staying around 130.  Possible GI losses versus SIADH.  Stable, continue to monitor.  Anemia: Suspect combination of phlebotomy, postsurgical blood loss, and inflammatory process.  Overall stable in 8 range. Monitor   HTN:  PTA metoprolol 25 mg twice daily and losartan 100 mg daily, were on hold with sepsis. Now on Metoprolol 50 BID, BP reasonable   Inflammatory polyarthropathy: Had been on Methotrexate but this has been on hold due to recent infection.  Continue to hold.    History of DVT, PE:  Was not on anti-coagulation prior to admission recently. On prophylactic Lovenox dosing.     DVT Prophylaxis: Enoxaparin (Lovenox) SQ  Code Status: Full Code  Disposition Plan   Expected discharge - pending return of gut function, and plan for nutrition, also need to address pelvic fluid collections seen on CT, uncertain at this time, Likely to TCU/rehab eventually.       Entered: Gorge Brewster Briseida 10/13/2018, 9:27 AM       Interval History   Chart reviewed.  Discussed with nursing staff.     Ms Kerr feels not much different, No new complaints.  Not much in way of abdominal pain, No SOB,, not eating much, had episode of vomiting yesterday with CT contrast but none since,     -Data reviewed today: I reviewed all new labs and imaging reports over the last 24 hours. I personally reviewed no images or EKG's today.    Physical Exam   Temp: 98.5  F (36.9  C) Temp src: Oral BP: 135/61 Pulse: 80 Heart Rate: 65 Resp: 16 SpO2: 94 % O2 Device: None (Room air)    Vitals:    10/08/18 0617 10/10/18 0610 10/11/18 0845   Weight: 62.6 kg (138 lb 1.6 oz) 53.5 kg (118 lb) 52 kg (114 lb 11.2 oz)     Vital Signs with  Ranges  Temp:  [97.6  F (36.4  C)-98.5  F (36.9  C)] 98.5  F (36.9  C)  Pulse:  [80] 80  Heart Rate:  [63-82] 65  Resp:  [16-20] 16  BP: (117-143)/(60-66) 135/61  SpO2:  [94 %-96 %] 94 %  I/O last 3 completed shifts:  In: 3011 [P.O.:1010; I.V.:929]  Out: 1350 [Urine:1300; Stool:50]    GEN:  Alert, oriented x 3, appears ill but comfortable, NAD.    HEENT:  Normocephalic/atraumatic, no scleral icterus, no nasal discharge, mouth moist.  Right internal jugular line appears stable without spreading erythema/discharge.  CV:  Regular rate and rhythm, distant.  LUNGS:  Clear to auscultation upper with decreased breath sounds bases.  No wheezes/retractions.  Symmetric chest rise on inhalation noted.  ABD:  Hypoactive bowel sounds, soft, mildly distended. Ostomy present with air in bag. Detailed ostomy/surgical site exam deferred to CRS.  No guarding/rigidity.  EXT:  Trace peripheral edema.  No cyanosis.  No acute joint synovitis noted.  SKIN:  Dry to touch, no exanthems noted in the visualized areas.    Medications     IV fluid REPLACEMENT ONLY       dextrose 5% and 0.9% NaCl 50 mL/hr at 10/13/18 0913       enoxaparin  40 mg Subcutaneous Q24H     heparin lock flush  5-10 mL Intracatheter Q24H     insulin aspart  1-12 Units Subcutaneous Q4H     metoprolol tartrate  50 mg Oral BID w/meals     sodium chloride (PF)  10 mL Intracatheter Q8H     Data       Recent Labs  Lab 10/13/18  0758 10/12/18  1345 10/11/18  0550   WBC 12.1* 11.5* 10.8   HGB 8.3* 8.7* 8.6*   HCT 25.5* 26.5* 26.1*   * 101* 102*    352 328  Canceled, Test credited       Recent Labs  Lab 10/13/18  0758 10/13/18  0540 10/12/18  0607 10/11/18  0550 10/10/18  0555  10/08/18  0610   *  --  133 133 131*  < > 132*   POTASSIUM 4.2  --  4.4 4.2 4.1  < > 4.0   CHLORIDE 100  --  101  --  98  < > 96   CO2 26  --  25  --  30  < > 32   ANIONGAP 6  --  7  --  3  < > 4   GLC 78  --  142*  --  113*  < > 147*   BUN 15  --  21  --  15  < > 17   CR 0.58  --   0.56  --  0.55  < > 0.58   GFRESTIMATED >90  --  >90  --  >90  < > >90   GFRESTBLACK >90  --  >90  --  >90  < > >90   PAULINA 7.8*  --  7.7*  --  7.3*  < > 6.7*   MAG  --  2.0 1.8 2.0 1.7  < > 1.9   PHOS  --   --  3.2  --  2.7  --  2.5   PROTTOTAL  --   --   --   --   --   --  4.9*   ALBUMIN  --   --   --   --   --   --  1.4*   BILITOTAL  --   --   --   --   --   --  0.4   ALKPHOS  --   --   --   --   --   --  51   AST  --   --   --   --   --   --  31   ALT  --   --   --   --   --   --  15   < > = values in this interval not displayed.    Recent Results (from the past 24 hour(s))   CT Abdomen Pelvis w Contrast    Narrative    CT ABDOMEN AND PELVIS WITH CONTRAST   10/12/2018 2:43 PM     HISTORY: Minimal stoma output, nausea; status post sigmoid colectomy  on 10/2/2018.    TECHNIQUE:   58mL Isovue-[id number]. Radiation dose for this scan was reduced  using automated exposure control, adjustment of the mA and/or kV  according to patient size, or iterative reconstruction technique.    COMPARISON: 10/7/2018.    FINDINGS:  Again there are changes of sigmoid colectomy and left-sided  colostomy, with colostomy tube. Mild gas in the bladder, presumably  related to recent catheterization. There is mild ill-defined presacral  fluid. There is also minimal free fluid in the lower pelvis. There are  two right pelvic fluid collections, seen on image 64. One has maximal  dimensions of about 7.2 x 2.3 x 3.4 cm. The second has dimensions of 3  x 1 x 2.5 cm. There is a fluid collection in the left lower quadrant  seen on coronal image 61. This measures 5 x 2.2 x 3.2 cm. No small  bowel obstruction. Cholelithiasis. Minimal left pleural effusion.  Unremarkable appendix.  Nothing else acute is seen in the upper  abdominal organs.       Impression    IMPRESSION:  1. There are three pelvic fluid collections, with the largest  measuring 7.2 cm in length.  2. Minimal left pleural effusion.  3. Additional findings discussed above.     GIO FELDER MD

## 2018-10-13 NOTE — PLAN OF CARE
Problem: Patient Care Overview  Goal: Plan of Care/Patient Progress Review  Outcome: No Change  Abd/pelvis CT ordered by colorectal-CT performed-see results review. Right TL internal jugular in place with TPN infusing at 40cc/hr, lipids infusing at 20.8cc/hr for 12 hrs, LR infusing at 35cc/hr. Instructed by nutrition to decrease TPN at 19:00 to 20cc/hr then TPN dc'd at 20:00. Per nutrition note pt to have feeding tube placed tomorrow for trial of tube feedings. If unable to tolerate in 24 hrs-TPN to be restarted. However no current note by colorectal whether feeding tube placement is happening tomorrow or not. TPN decreased per nutrition request however nursing is without knowledge of plan. Mag replaced per protocol-rechecks ordered. BG monitoring. Telemetry: SB/SR. Denies pain. Ostomy with little output-pouch changed by WOC RN and pt's family today. Purewick in place.

## 2018-10-13 NOTE — PLAN OF CARE
Problem: Patient Care Overview  Goal: Plan of Care/Patient Progress Review  Outcome: No Change  Please see note from previous nurse on prior shift in regards to TPN, internal jugular, PICC orders. Pt A&Ox4, VSS, denies pain or need for pain meds.  SR on telemetry.  Lungs clear and equal.  Colostomy putting out small amounts brown loose liquid stool.  Hypoactive BS, passing flatus, tolerating full liquid diet.  TPN was stopped at 2224 when Lipids finished. IVF  Remains infusing Q4  Blood sugars, 118, 88, 89, 92 & 99.  Was given OJ when she was as low as 88. Received IV zofran x1 overnight for mild nausea.  Purewick catheter in place to wall suction, was incontinent of urine twice overnight, new purewick placed and working better.  Barrier to groin area.  Abd incision is open to air with liquid bandage.  Triple lumen internal jugular remains in place with IVF infusing.  Up 1 assist gb/ww.    Pharmacist Alivia came up at 2230 to discuss TPN and clinimax plan, d/t no official orders in regards to TPN, PICC, tube feed, starting clinimax; she placed a note:  IF  RN request TPN d/t not tolerating PO intake after TPN weaned and stopped, to start clinimax once internal jugular pulled and PICC placed.   Plan: Await further orders in regards of TPN, internal jugular removal, PICC placement (for long term TPN use) or feeding tube placement once ABD CT scans are reviewed and see how pt is tolerating PO intake.  Colostomy care.  Colorectal and nutrition following.  Monitor labs--Mg.  Will continue to monitor.

## 2018-10-13 NOTE — PROGRESS NOTES
Pt a&o, calls appropriately. Up to bathroom and chair for meals with one sba, fww and GB. Up in hatch ambulated with therapy. Diet increased to regular. internal jugular dc'd, site wnl. PIV started. Continue IVFs and FSBS until tolerating increased po intake. Tele SR. Mag replaced, recheck in am. Colostomy with small output and gas. Denies pain.

## 2018-10-14 LAB
ERYTHROCYTE [DISTWIDTH] IN BLOOD BY AUTOMATED COUNT: 14 % (ref 10–15)
GLUCOSE BLDC GLUCOMTR-MCNC: 100 MG/DL (ref 70–99)
GLUCOSE BLDC GLUCOMTR-MCNC: 109 MG/DL (ref 70–99)
GLUCOSE BLDC GLUCOMTR-MCNC: 115 MG/DL (ref 70–99)
GLUCOSE BLDC GLUCOMTR-MCNC: 80 MG/DL (ref 70–99)
GLUCOSE BLDC GLUCOMTR-MCNC: 84 MG/DL (ref 70–99)
GLUCOSE BLDC GLUCOMTR-MCNC: 86 MG/DL (ref 70–99)
GLUCOSE BLDC GLUCOMTR-MCNC: 87 MG/DL (ref 70–99)
GLUCOSE BLDC GLUCOMTR-MCNC: 88 MG/DL (ref 70–99)
HCT VFR BLD AUTO: 26.5 % (ref 35–47)
HGB BLD-MCNC: 8.4 G/DL (ref 11.7–15.7)
MCH RBC QN AUTO: 33.1 PG (ref 26.5–33)
MCHC RBC AUTO-ENTMCNC: 31.7 G/DL (ref 31.5–36.5)
MCV RBC AUTO: 104 FL (ref 78–100)
PLATELET # BLD AUTO: 379 10E9/L (ref 150–450)
RBC # BLD AUTO: 2.54 10E12/L (ref 3.8–5.2)
WBC # BLD AUTO: 8.8 10E9/L (ref 4–11)

## 2018-10-14 PROCEDURE — 85027 COMPLETE CBC AUTOMATED: CPT | Performed by: INTERNAL MEDICINE

## 2018-10-14 PROCEDURE — 85049 AUTOMATED PLATELET COUNT: CPT | Performed by: INTERNAL MEDICINE

## 2018-10-14 PROCEDURE — 25000128 H RX IP 250 OP 636: Performed by: COLON & RECTAL SURGERY

## 2018-10-14 PROCEDURE — 99232 SBSQ HOSP IP/OBS MODERATE 35: CPT | Performed by: INTERNAL MEDICINE

## 2018-10-14 PROCEDURE — 25000132 ZZH RX MED GY IP 250 OP 250 PS 637: Mod: GY | Performed by: INTERNAL MEDICINE

## 2018-10-14 PROCEDURE — 99207 ZZC CDG-MDM COMPONENT: MEETS LOW - DOWN CODED: CPT | Performed by: INTERNAL MEDICINE

## 2018-10-14 PROCEDURE — 12000007 ZZH R&B INTERMEDIATE

## 2018-10-14 PROCEDURE — 36415 COLL VENOUS BLD VENIPUNCTURE: CPT | Performed by: INTERNAL MEDICINE

## 2018-10-14 PROCEDURE — 25000128 H RX IP 250 OP 636: Performed by: INTERNAL MEDICINE

## 2018-10-14 PROCEDURE — 00000146 ZZHCL STATISTIC GLUCOSE BY METER IP

## 2018-10-14 RX ORDER — MULTIPLE VITAMINS W/ MINERALS TAB 9MG-400MCG
1 TAB ORAL DAILY
Status: DISCONTINUED | OUTPATIENT
Start: 2018-10-15 | End: 2018-10-17 | Stop reason: HOSPADM

## 2018-10-14 RX ADMIN — MICONAZOLE NITRATE: 2 POWDER TOPICAL at 11:53

## 2018-10-14 RX ADMIN — METOPROLOL TARTRATE 50 MG: 50 TABLET, FILM COATED ORAL at 08:18

## 2018-10-14 RX ADMIN — ENOXAPARIN SODIUM 40 MG: 40 INJECTION SUBCUTANEOUS at 14:58

## 2018-10-14 RX ADMIN — DEXTROSE AND SODIUM CHLORIDE: 5; 900 INJECTION, SOLUTION INTRAVENOUS at 05:39

## 2018-10-14 RX ADMIN — METOPROLOL TARTRATE 50 MG: 50 TABLET, FILM COATED ORAL at 18:13

## 2018-10-14 ASSESSMENT — ACTIVITIES OF DAILY LIVING (ADL)
ADLS_ACUITY_SCORE: 14

## 2018-10-14 NOTE — PLAN OF CARE
Problem: Patient Care Overview  Goal: Plan of Care/Patient Progress Review  PT-attempted to see had just walked with staff. Will try to see later as able

## 2018-10-14 NOTE — PROGRESS NOTES
[facility]  Hospitalist Progress Note  Name: Brea Kerr    MRN: [id number]  Physician:  Gorge Goins MD  10/14/2018    Assessment & Plan   Summary of Stay:  Brea Kerr is a 82 year old female came to attention on 9/30/2018 with stool passing in her urine. Her recent PMH is notable for hospitalization at [facility] from 9/19 - 9/23/18 for perforated diverticulitis with abscess formation.  She underwent placement of a NARAYAN drain into the abscess on 9/20 under CT guidance and was discharged home on oral Cipro and Metronidazole.     She also had abdominal pain, but which she thought was not much changed from when she had the NARAYAN drain placed on 9/20/18.  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        More remote PMH notable for prior episodes of sigmoid diverticulitis and associated paroxysmal atrial fibrillation. And after arriving in the ED, the patient developed A fib with RVR (rate > 140) and her blood pressure fell to the 60's for which reason she was admitted to the ICU and was started on Amiodarone and very briefly on Norepi.  However with the Amio and conversion to NSR, her BP came up.      On 10/2/2018 the patient was taken to the operating room by Dr. Reddy and underwent sigmoid colectomy with end colostomy.  She did very well with the procedure but postoperatively has developed recurrent atrial fibrillation with rapid ventricular response.  Again her blood pressure dropped. She was admitted to the ICU following this for management of a-fib w/ RVR and hypotension.  Following amiodarone load, diltiazem drip and digoxin she converted to NSR.    Transferred out of the ICU on 10/4.  Given challenges with oral intake and conversion amiodarone was not ordered oral.  She was maintained on IV metoprolol. NG placed with plans for trophic feeds, but persistent frequent vomiting so it was on  suction.  TPN started.  Her NGT was pulled given improvement     Currently, doing well in terms of a.fib on metoprolol and has remained in NSR. Also NG has been removed and off PCA. Main issue at present is advancing her diet and ability to take PO.      # Sepsis due to recurrent diverticulitis, colovesicular fistula s/p Green sigmoidectomy: Completed course of Zosyn and stopped now per CRS. Recent Cystogram shows no evidence of ongoing colovesicular fistula. Babb is now removed and off PCA. Ostomy nurse involved.   -Post-op site cares/drains, activity restrictions, diet, antibiotics, DVT proph per CRS.    --> patient was noted to have low stool output, she had CT abdomen done a couple of days, which showed no obstruction, however, there are three fluid collections, ?if these are infected vs post-op serous collection, Discussed with CRS, they talked to IR and these are not amenable to drainage, at this time plan for watchful waiting, if fevers, increasing white count etc then re-evaluate     # Severe Malnutrition: patient had feeding tube placed previously, but had significant vomiting and not been able to start tube feeds.  Surgery recommended TPN, which was started on 10/5. She had a right IJ CVC which was used for TPN.  Her TPN was stopped on 10/12 and internal jugular catheter was pulled  -- At this time, patient is on regular diet, she was able to eat half of her breakfast today, if she is not able to meet her requirements, then consider keofeed (pt wishes to avoid it given prior experience), Nutrition following   -- Stop IVF, monitor blood sugars    A. fib with RVR: With associated hypotension briefly requiring norepinephrine. Was on amiodarone although stopped now due to variable PO intake.  Intermittently in A. fib and NSR.  has a previous hx of a.fib as well, not on anti-coagulation at baseline   -- was on scheduled IV metoprolol, changed to PO metoprolol 50 mg BID , stable on that   -- Suspect lot of  the afib issues were driven by the infection/initial surgical stress, although would be reasonable to follow up with cardiology as outpatient      Weight Gain: likely due to third spacing, breathing stable, no signs of CHF. no need for diuresis at this time  Acute hyponatremia: Sodium staying around 130.  Possible GI losses versus SIADH.  Stable, continue to monitor.  Anemia: Suspect combination of phlebotomy, postsurgical blood loss, and inflammatory process.  Overall stable in 8 range. Monitor   HTN:  PTA metoprolol 25 mg twice daily and losartan 100 mg daily, were on hold with sepsis. Now on Metoprolol 50 BID, BP reasonable   Inflammatory polyarthropathy: Had been on Methotrexate but this has been on hold due to recent infection.  Continue to hold.    History of DVT, PE:  Was not on anti-coagulation prior to admission recently. On prophylactic Lovenox dosing.     DVT Prophylaxis: Enoxaparin (Lovenox) SQ  Code Status: Full Code  Disposition Plan   Expected discharge - pending ability to take PO and plan for nutrition, likely in next 1-2 days, to TCU/rehab eventually.       Entered: Gorge Goins 10/14/2018, 1:50 PM       Interval History   Chart reviewed.  Discussed with nursing staff, dietician and surgery     Ms Kerr feels about the same, No new complaints.  Not much in way of abdominal pain, No SOB, did eat some breakfast today, no vomiting, no increased pain,     -Data reviewed today: I reviewed all new labs and imaging reports over the last 24 hours. I personally reviewed no images or EKG's today.    Physical Exam   Temp: 97.8  F (36.6  C) Temp src: Oral BP: 142/65   Heart Rate: 68 Resp: 18 SpO2: 95 % O2 Device: None (Room air)    Vitals:    10/10/18 0610 10/11/18 0845 10/14/18 0552   Weight: 53.5 kg (118 lb) 52 kg (114 lb 11.2 oz) 51.7 kg (114 lb)     Vital Signs with Ranges  Temp:  [97.8  F (36.6  C)-98.7  F (37.1  C)] 97.8  F (36.6  C)  Heart Rate:  [61-78] 68  Resp:  [16-18] 18  BP: ()/(47-68)  142/65  SpO2:  [93 %-96 %] 95 %  I/O last 3 completed shifts:  In: 1898 [P.O.:300; I.V.:1598]  Out: 1260 [Urine:1250; Stool:10]    GEN:  Alert, oriented x 3, appears ill but comfortable, NAD.    HEENT:  Normocephalic/atraumatic, no scleral icterus, no nasal discharge, mouth moist.  internal jugular catheter removed, site is clean   CV:  Regular rate and rhythm, distant.  LUNGS:  Clear to auscultation upper with decreased breath sounds bases.  No wheezes/retractions.  Symmetric chest rise on inhalation noted.  ABD:  bowel sounds +, soft, not distended. Ostomy present. Detailed ostomy/surgical site exam deferred to CRS.  No guarding/rigidity.  EXT:  No peripheral edema.  No cyanosis.  No acute joint synovitis noted.  SKIN:  Dry to touch, no exanthems noted in the visualized areas.    Medications     IV fluid REPLACEMENT ONLY         enoxaparin  40 mg Subcutaneous Q24H     heparin lock flush  5-10 mL Intracatheter Q24H     insulin aspart  1-12 Units Subcutaneous Q4H     metoprolol tartrate  50 mg Oral BID w/meals     [START ON 10/15/2018] multivitamin, therapeutic with minerals  1 tablet Oral Daily     sodium chloride (PF)  10 mL Intracatheter Q8H     Data       Recent Labs  Lab 10/14/18  0601 10/13/18  0758 10/12/18  1345   WBC 8.8 12.1* 11.5*   HGB 8.4* 8.3* 8.7*   HCT 26.5* 25.5* 26.5*   * 102* 101*    372 352       Recent Labs  Lab 10/13/18  0758 10/13/18  0540 10/12/18  0607 10/11/18  0550 10/10/18  0555  10/08/18  0610   *  --  133 133 131*  < > 132*   POTASSIUM 4.2  --  4.4 4.2 4.1  < > 4.0   CHLORIDE 100  --  101  --  98  < > 96   CO2 26  --  25  --  30  < > 32   ANIONGAP 6  --  7  --  3  < > 4   GLC 78  --  142*  --  113*  < > 147*   BUN 15  --  21  --  15  < > 17   CR 0.58  --  0.56  --  0.55  < > 0.58   GFRESTIMATED >90  --  >90  --  >90  < > >90   GFRESTBLACK >90  --  >90  --  >90  < > >90   PAULINA 7.8*  --  7.7*  --  7.3*  < > 6.7*   MAG  --  2.0 1.8 2.0 1.7  < > 1.9   PHOS  --   --  3.2   --  2.7  --  2.5   PROTTOTAL  --   --   --   --   --   --  4.9*   ALBUMIN  --   --   --   --   --   --  1.4*   BILITOTAL  --   --   --   --   --   --  0.4   ALKPHOS  --   --   --   --   --   --  51   AST  --   --   --   --   --   --  31   ALT  --   --   --   --   --   --  15   < > = values in this interval not displayed.    No results found for this or any previous visit (from the past 24 hour(s)).

## 2018-10-14 NOTE — PROGRESS NOTES
CALORIE COUNT    Current Diet Order:   Regular     Supplement Order:   Boost with meals (patient has previously refused these)    Approximate Oral Intake for 10/13:   Calories: 550 kcals   Protein: 16 g     Number of Meals Recorded: 3  Number of Snacks Recorded: 0      UPDATED ASSESSED NUTRITION NEEDS (PER APPROVED PRACTICE GUIDELINES, Dosing weight: 52 kg):  Estimated Energy Needs: 0877-4924 kcals (30-35 Kcal/Kg)  Justification: maintenance  Estimated Protein Needs: 62-78+ grams protein (1.2-1.5 g pro/Kg)  Justification: post-op and preservation of lean body mass  Estimated Fluid Needs: >1 mL/Kcal  Justification: maintenance        Summary:   Breakfast: cream of wheat with 4 oz of whole milk  And lunch: blueberry muffin --> consumed 75%  Dinner: meatloaf, mashed potatoes --> consumed 50%  The totals above do not include Boost sent at dinner (patient dislikes and has many food aversions).    Meeting 35% of estimated energy needs and <25% of estimated protein needs through oral intake. Ordered room service with assist for meal ordering guidance, MVI+M.   Discussed with RN, Dr. Goins and CRS --> do not anticipate patient to meet needs orally based on trends thus far. Refusing nasoenteric feeding tube today. Appears nutrition plan is limiting factor in discharge (TPN not clinically indicated and current TCU cannot accept patient on PN).    Calorie counts to continue - appreciate documentation of oral intake.      Blanka Cota RDN, LD, CNSC  3rd floor/ICU: [phone]  All other floors: [phone]  Weekend/holiday: [phone]  Office: [phone]

## 2018-10-14 NOTE — PLAN OF CARE
Problem: Patient Care Overview  Goal: Plan of Care/Patient Progress Review  Outcome: Improving  Pt ambulated w/staff, walker, gait belt x3 in halls. Ostomy 25ml output, ziyad emptied x3, large amounts. + bowel sounds. PO intake ~ 50%. Saline locked. Katrin area red, miconazole ordered/applied. 300 U.O w/purwick+ incontinence. Lungs clear. Needs encouragement to participate in cares. Will not participate in ostomy cares. Pt covers her face with a blanket when staff tends to the ostomy. Stoma pink slight protrusion. Incisions approximated. Glucoses now BID & HS. No ssi needed. Tele NSR.

## 2018-10-14 NOTE — PROGRESS NOTES
[facility]  PROGRESS NOTE    October 14, 2018  Post-op Day # 12 sigmoid fito    SUBJECTIVE:  No issues. Ate 50% of her meals without nausea. Stoma with large amounts of gas and small amount of stool. Pain controlled. States that she is not a big eater at home and typically only eats 50% of her meals before she was sick. Ambulated 2x yesterday and one this AM.    OBJECTIVE:  Temp:  [98  F (36.7  C)-98.7  F (37.1  C)] 98  F (36.7  C)  Heart Rate:  [61-78] 63  Resp:  [16-18] 18  BP: ()/(47-68) 138/59  SpO2:  [93 %-96 %] 96 %    Intake/Output Summary (Last 24 hours) at 10/14/18 0949  Last data filed at 10/14/18 0925   Gross per 24 hour   Intake             1958 ml   Output              610 ml   Net             1348 ml       GENERAL:  Awake, alert, no acute distress  HEAD: Normocephalic atraumatic  SCLERA: Anicteric  EXTREMITIES: Warm and well perfused  ABDOMEN:  Soft, appropriately tender, non-distended. No guarding, rigidity, or peritoneal signs. Stoma healthy with red rubber. Large amount of gas in the bag, small semisolid stool.  INCISION:  C/d/i    LABS:  Lab Results   Component Value Date    WBC 8.8 10/14/2018     Lab Results   Component Value Date    HGB 8.4 10/14/2018     Lab Results   Component Value Date    HCT 26.5 10/14/2018     Lab Results   Component Value Date     10/14/2018     Last Basic Metabolic Panel:  Lab Results   Component Value Date     10/13/2018      Lab Results   Component Value Date    POTASSIUM 4.2 10/13/2018     Lab Results   Component Value Date    CHLORIDE 100 10/13/2018     Lab Results   Component Value Date    PAULINA 7.8 10/13/2018     Lab Results   Component Value Date    CO2 26 10/13/2018     Lab Results   Component Value Date    BUN 15 10/13/2018     Lab Results   Component Value Date    CR 0.58 10/13/2018     Lab Results   Component Value Date    GLC 78 10/13/2018       ASSESSMENT/PLAN: POD#2 s/p open 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 Pt remains afebrile, VSS. Stoma beginning to function      1. Continue diet as tolerated. Encouraged to eat as much as she is able. Does not want boost/etc as she does not like the taste. Only getting 50% of nutritional needs at best per RD, but this sounds like her baseline. Discussed NJ for supplemental calories and she is very much hoping to avoid this. Will continue PO today, and make decisions re: need for NJ tomorrow  2. CT with pelvic collections, discussed with IR, not amenable to drainage. WBC normal and afebrile. Monitor  3. PO pain control  4. Continue stoma teaching  5. Encourage OOB, ambulate multiples times daily  6. Lovenox for prophylaxis, will not need with discharge  7. Dispo: discharge planning to TCU, possible tomorrow if no feeding tube placed, may need additional 24h to get to goal feeds if placed     Dr. Grant updated    For questions/paging, please contact the CRS office at [phone].    Scout Reed MD  Colorectal Surgery Fellow  [facility]  Phone: [phone]

## 2018-10-14 NOTE — PLAN OF CARE
Problem: Patient Care Overview  Goal: Plan of Care/Patient Progress Review  Outcome: Improving  Pt up in chair most of this shift. Awake A/O cooperative daughter at bed side. VSS. LS clear diminished, RA O2 Sat 94%. Tele SR. IVF infusing. BG 95 at dinner time and 130 at bed time. On Regular diet had 50% of dinner. Colostomy intact, passing flatus. No c/o of abdominal pain, no chest pain or sob. Colostomy care demonstrated, pt not ready to look at ostomy bag yet need continuous emotional support. Will monitor and continue POC.

## 2018-10-14 NOTE — PLAN OF CARE
Problem: Sepsis/Septic Shock (Adult)  Goal: Signs and Symptoms of Listed Potential Problems Will be Absent, Minimized or Managed (Sepsis/Septic Shock)  Signs and symptoms of listed potential problems will be absent, minimized or managed by discharge/transition of care (reference Sepsis/Septic Shock (Adult) CPG).   Outcome: Improving  Pt is alert and orientated x4. VSS. Denies pain. Denies N/V.  IV infusing @ 50mL/hr. Up with assist x1. Purewick in place. Colostomy has small amount of output.   and 80, recheck 109. Tele SR. Will continue with POC.

## 2018-10-15 ENCOUNTER — APPOINTMENT (OUTPATIENT)
Dept: PHYSICAL THERAPY | Facility: CLINIC | Age: 83
DRG: 853 | End: 2018-10-15
Payer: MEDICARE

## 2018-10-15 LAB
ALBUMIN SERPL-MCNC: 2.1 G/DL (ref 3.4–5)
ALP SERPL-CCNC: 109 U/L (ref 40–150)
ALT SERPL W P-5'-P-CCNC: 33 U/L (ref 0–50)
ANION GAP SERPL CALCULATED.3IONS-SCNC: 3 MMOL/L (ref 3–14)
AST SERPL W P-5'-P-CCNC: 28 U/L (ref 0–45)
BILIRUB SERPL-MCNC: 0.5 MG/DL (ref 0.2–1.3)
BUN SERPL-MCNC: 9 MG/DL (ref 7–30)
CALCIUM SERPL-MCNC: 8 MG/DL (ref 8.5–10.1)
CHLORIDE SERPL-SCNC: 102 MMOL/L (ref 94–109)
CO2 SERPL-SCNC: 28 MMOL/L (ref 20–32)
CREAT SERPL-MCNC: 0.65 MG/DL (ref 0.52–1.04)
GFR SERPL CREATININE-BSD FRML MDRD: 88 ML/MIN/1.7M2
GLUCOSE BLDC GLUCOMTR-MCNC: 111 MG/DL (ref 70–99)
GLUCOSE BLDC GLUCOMTR-MCNC: 115 MG/DL (ref 70–99)
GLUCOSE BLDC GLUCOMTR-MCNC: 82 MG/DL (ref 70–99)
GLUCOSE BLDC GLUCOMTR-MCNC: 89 MG/DL (ref 70–99)
GLUCOSE BLDC GLUCOMTR-MCNC: 98 MG/DL (ref 70–99)
GLUCOSE BLDC GLUCOMTR-MCNC: 99 MG/DL (ref 70–99)
GLUCOSE SERPL-MCNC: 85 MG/DL (ref 70–99)
INR PPP: 1.12 (ref 0.86–1.14)
MAGNESIUM SERPL-MCNC: 1.7 MG/DL (ref 1.6–2.3)
PHOSPHATE SERPL-MCNC: 2.5 MG/DL (ref 2.5–4.5)
POTASSIUM SERPL-SCNC: 4.7 MMOL/L (ref 3.4–5.3)
PREALB SERPL IA-MCNC: 18 MG/DL (ref 15–45)
PROT SERPL-MCNC: 6.3 G/DL (ref 6.8–8.8)
SODIUM SERPL-SCNC: 133 MMOL/L (ref 133–144)
TRIGL SERPL-MCNC: 146 MG/DL

## 2018-10-15 PROCEDURE — 40000902 ZZH STATISTIC WOC PT EDUCATION, 16-30 MIN

## 2018-10-15 PROCEDURE — 84478 ASSAY OF TRIGLYCERIDES: CPT | Performed by: INTERNAL MEDICINE

## 2018-10-15 PROCEDURE — 97116 GAIT TRAINING THERAPY: CPT | Mod: GP | Performed by: PHYSICAL THERAPY ASSISTANT

## 2018-10-15 PROCEDURE — 25000125 ZZHC RX 250: Performed by: INTERNAL MEDICINE

## 2018-10-15 PROCEDURE — 97530 THERAPEUTIC ACTIVITIES: CPT | Mod: GP | Performed by: PHYSICAL THERAPY ASSISTANT

## 2018-10-15 PROCEDURE — 84134 ASSAY OF PREALBUMIN: CPT | Performed by: INTERNAL MEDICINE

## 2018-10-15 PROCEDURE — 25000132 ZZH RX MED GY IP 250 OP 250 PS 637: Mod: GY | Performed by: INTERNAL MEDICINE

## 2018-10-15 PROCEDURE — 12000007 ZZH R&B INTERMEDIATE

## 2018-10-15 PROCEDURE — 36415 COLL VENOUS BLD VENIPUNCTURE: CPT | Performed by: INTERNAL MEDICINE

## 2018-10-15 PROCEDURE — 99232 SBSQ HOSP IP/OBS MODERATE 35: CPT | Performed by: INTERNAL MEDICINE

## 2018-10-15 PROCEDURE — 85610 PROTHROMBIN TIME: CPT | Performed by: INTERNAL MEDICINE

## 2018-10-15 PROCEDURE — 40000193 ZZH STATISTIC PT WARD VISIT: Performed by: PHYSICAL THERAPY ASSISTANT

## 2018-10-15 PROCEDURE — A9270 NON-COVERED ITEM OR SERVICE: HCPCS | Mod: GY | Performed by: INTERNAL MEDICINE

## 2018-10-15 PROCEDURE — 84100 ASSAY OF PHOSPHORUS: CPT | Performed by: INTERNAL MEDICINE

## 2018-10-15 PROCEDURE — 25000128 H RX IP 250 OP 636: Performed by: COLON & RECTAL SURGERY

## 2018-10-15 PROCEDURE — 83735 ASSAY OF MAGNESIUM: CPT | Performed by: INTERNAL MEDICINE

## 2018-10-15 PROCEDURE — 80053 COMPREHEN METABOLIC PANEL: CPT | Performed by: INTERNAL MEDICINE

## 2018-10-15 PROCEDURE — 00000146 ZZHCL STATISTIC GLUCOSE BY METER IP

## 2018-10-15 RX ORDER — LANOLIN ALCOHOL/MO/W.PET/CERES
3 CREAM (GRAM) TOPICAL
Status: DISCONTINUED | OUTPATIENT
Start: 2018-10-15 | End: 2018-10-17 | Stop reason: HOSPADM

## 2018-10-15 RX ORDER — MAGNESIUM OXIDE 400 MG/1
400 TABLET ORAL 2 TIMES DAILY
Status: DISCONTINUED | OUTPATIENT
Start: 2018-10-15 | End: 2018-10-17 | Stop reason: HOSPADM

## 2018-10-15 RX ADMIN — ENOXAPARIN SODIUM 40 MG: 40 INJECTION SUBCUTANEOUS at 14:12

## 2018-10-15 RX ADMIN — MULTIPLE VITAMINS W/ MINERALS TAB 1 TABLET: TAB at 07:48

## 2018-10-15 RX ADMIN — MAGNESIUM OXIDE TAB 400 MG (241.3 MG ELEMENTAL MG) 400 MG: 400 (241.3 MG) TAB at 21:35

## 2018-10-15 RX ADMIN — METOPROLOL TARTRATE 50 MG: 50 TABLET, FILM COATED ORAL at 20:07

## 2018-10-15 RX ADMIN — MAGNESIUM OXIDE TAB 400 MG (241.3 MG ELEMENTAL MG) 400 MG: 400 (241.3 MG) TAB at 10:24

## 2018-10-15 RX ADMIN — MELATONIN TAB 3 MG 3 MG: 3 TAB at 21:35

## 2018-10-15 ASSESSMENT — ACTIVITIES OF DAILY LIVING (ADL)
ADLS_ACUITY_SCORE: 14

## 2018-10-15 NOTE — PROGRESS NOTES
Continued Ostomy education with pt. Demo of 2 pc Flex system with drainable pouch to pt who agrees that would be a good choice for her use when discharged.   Jenn will plan to come tomorrow and meet around 11 am for final pouch change and education.  Rx completed.

## 2018-10-15 NOTE — PROGRESS NOTES
[facility]  Hospitalist Progress Note  Name: Brea Kerr    MRN: [id number]  Physician:  Nash Paris MD  10/15/2018    Assessment & Plan   Summary of Stay:  Brea Kerr is a 82 year old female came to attention on 9/30/2018 with stool passing in her urine. Her recent PMH is notable for hospitalization at [facility] from 9/19 - 9/23/18 for perforated diverticulitis with abscess formation.  She underwent placement of a NARAYAN drain into the abscess on 9/20 under CT guidance and was discharged home on oral Cipro and Metronidazole.     She also had abdominal pain, but which she thought was not much changed from when she had the NARAYAN drain placed on 9/20/18.  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        More remote PMH notable for prior episodes of sigmoid diverticulitis and associated paroxysmal atrial fibrillation. And after arriving in the ED, the patient developed A fib with RVR (rate > 140) and her blood pressure fell to the 60's for which reason she was admitted to the ICU and was started on Amiodarone and very briefly on Norepi.  However with the Amio and conversion to NSR, her BP came up.      On 10/2/2018 the patient was taken to the operating room by Dr. Reddy and underwent sigmoid colectomy with end colostomy.  She did very well with the procedure but postoperatively has developed recurrent atrial fibrillation with rapid ventricular response.  Again her blood pressure dropped. She was admitted to the ICU following this for management of a-fib w/ RVR and hypotension.  Following amiodarone load, diltiazem drip and digoxin she converted to NSR.    Transferred out of the ICU on 10/4.  Given challenges with oral intake and conversion amiodarone was not ordered oral.  She has been maintained on IV metoprolol.     NG placed with plans for trophic feeds, but persistent frequent vomiting so it was on  suction.  TPN started.  Her NGT was pulled given improvement so anticipate some oral intake may start in the next day     Currently, doing well in terms of a.fib on metoprolol and has remained in NSR. Also NG has been removed and off PCA. Main issue at present is advancing her diet and ability to take PO.      Sepsis due to recurrent diverticulitis, colovesicular fistula s/p Green sigmoidectomy: Completed course of Zosyn and stopped now per CRS. Recent Cystogram shows no evidence of ongoing colovesicular fistula. Babb is now removed.  -Post-op site cares/drains, activity restrictions, diet, antibiotics, DVT proph per CRS.  -Ostomy nurse involved  -Stop PCA and change to PRN dilaudid   -Incentive spirometer  --> low stool output, noted plan for CT abdomen today.      Weight Gain: likely due to third spacing, breathing stable, no signs of CHF   -- Monitor weights, no need for diuresis at this time    A. fib with RVR: With associated hypotension briefly requiring norepinephrine. Was on amiodarone although stopped now due to variable PO intake.  Intermittently in A. fib and NSR.  has a previous hx of a.fib as well, not on anti-coagulation at baseline   -- was on scheduled IV metoprolol, changed to PO metoprolol 50 mg BID , stable on that   -- Suspect lot of the afib issues were driven by the infection/initial surgical stress, although would be reasonable to follow up with cardiology as outpatient      Malnutrition: Feeding tube placed, but significant vomiting and have not been able to start tube feeds.  Surgery recommended TPN and dietitian and pharmacy consulted.  Started TPN 10/5.  Will use right IJ CVC.  Given low albumin and third spacing concerns, we are using more concentrated/lower volume TPN.    -  Has a internal jugular central line for TPN, if it looks like long term plan for TPN, then change to PICC line. Await CT results and CR surgery input     Acute hyponatremia: Sodium staying around 130.  Possible GI  losses versus SIADH.  Stable lately around 130, continue to monitor.     Electrolyte abnormalities:  Phosphorus and calcium recently noted to be low.  Though corrected for hypoalbuminemia is near the lower limit of normal.  Electrolyte replacement protocols.       Anemia: Suspect combination of phlebotomy, postsurgical blood loss, and inflammatory process.  Overall stable and high 8 range. Monitor     HTN:  Prior hypotensive issues led to metoprolol 25 mg twice daily and losartan 100 mg daily being on hold.  More recently on IV metoprolol.  BP reasonable at the moment.    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  Continue to hold.      History of DVT, PE:  Was not on anti-coagulation prior to admission recently. On prophylactic Lovenox dosing.    Plan:  1.  Discontinue telemetry.  2.  Okay to discontinue electrolyte replacement.  We will plan on scheduled magnesium oxide.  3.  Discontinue close monitoring of the patient's glucoses.  She no longer needs insulin.  On my review, it appears that none of the patient's glucoses warranted conservative management.   4.  Encourage multiple small meals.  5.  Physical therapy.  Encourage ambulating.  6.  At this point I think is reasonable to leave the patient's IV out.  If she fails eating we may need to put midline in.     DVT Prophylaxis: Enoxaparin (Lovenox) SQ  Code Status: Full Code  Disposition Plan   Expected discharge - pending return of gut function, and if able to tolerate oral intake, otherwise she may need TPN for a longer time,   Likely to TCU/rehab eventually.       Entered: Nash Paris 10/15/2018, 6:28 PM       Interval History   Chart reviewed, pt interviewed.      Patient known to me from admission to the ICU.  I note that Ms. Kerr is complaining today mostly about being unable to sleep.  She was hypoglycemic last night and had serial glucoses that kept her up all night.    -Data reviewed  today: I reviewed all new labs and imaging reports over the last 24 hours. I personally reviewed no images or EKG's today.    Physical Exam   Temp: 98.8  F (37.1  C) Temp src: Oral BP: 139/75   Heart Rate: 70 Resp: 18 SpO2: 94 % O2 Device: None (Room air)    Vitals:    10/11/18 0845 10/14/18 0552 10/15/18 0627   Weight: 52 kg (114 lb 11.2 oz) 51.7 kg (114 lb) 50.7 kg (111 lb 12.8 oz)     Vital Signs with Ranges  Temp:  [98.1  F (36.7  C)-98.8  F (37.1  C)] 98.8  F (37.1  C)  Heart Rate:  [59-70] 70  Resp:  [16-18] 18  BP: (122-142)/(57-75) 139/75  SpO2:  [94 %-96 %] 94 %  I/O last 3 completed shifts:  In: 1120 [P.O.:1120]  Out: 1975 [Urine:1600; Stool:375]    GEN:  Alert, oriented x 3, appears ill but comfortable, NAD.    HEENT:  Normocephalic/atraumatic, no scleral icterus, no nasal discharge, mouth moist.  Right internal jugular line appears stable without spreading erythema/discharge.  CV:  Regular rate and rhythm, distant.  LUNGS:  Clear to auscultation upper with decreased breath sounds bases.  No wheezes/retractions.  Symmetric chest rise on inhalation noted.  ABD:  Hypoactive bowel sounds, soft, mildly distended. Ostomy present, bag with gas and liquid stool. Detailed ostomy/surgical site exam deferred to CRS.  No guarding/rigidity.  EXT:  Trace peripheral edema.  No cyanosis.  No acute joint synovitis noted.  SKIN:  Dry to touch, no exanthems noted in the visualized areas.    Medications       enoxaparin  40 mg Subcutaneous Q24H     magnesium oxide  400 mg Oral BID     metoprolol tartrate  50 mg Oral BID w/meals     multivitamin, therapeutic with minerals  1 tablet Oral Daily     sodium chloride (PF)  10 mL Intracatheter Q8H     Data       Recent Labs  Lab 10/14/18  0601 10/13/18  0758 10/12/18  1345   WBC 8.8 12.1* 11.5*   HGB 8.4* 8.3* 8.7*   HCT 26.5* 25.5* 26.5*   * 102* 101*    372 352       Recent Labs  Lab 10/15/18  0602 10/13/18  0758 10/13/18  0540 10/12/18  0607  10/10/18  0555   NA  133 132*  --  133  < > 131*   POTASSIUM 4.7 4.2  --  4.4  < > 4.1   CHLORIDE 102 100  --  101  --  98   CO2 28 26  --  25  --  30   ANIONGAP 3 6  --  7  --  3   GLC 85 78  --  142*  --  113*   BUN 9 15  --  21  --  15   CR 0.65 0.58  --  0.56  --  0.55   GFRESTIMATED 88 >90  --  >90  --  >90   GFRESTBLACK >90 >90  --  >90  --  >90   PAULINA 8.0* 7.8*  --  7.7*  --  7.3*   MAG 1.7  --  2.0 1.8  < > 1.7   PHOS 2.5  --   --  3.2  --  2.7   PROTTOTAL 6.3*  --   --   --   --   --    ALBUMIN 2.1*  --   --   --   --   --    BILITOTAL 0.5  --   --   --   --   --    ALKPHOS 109  --   --   --   --   --    AST 28  --   --   --   --   --    ALT 33  --   --   --   --   --    < > = values in this interval not displayed.    No results found for this or any previous visit (from the past 24 hour(s)).

## 2018-10-15 NOTE — PROGRESS NOTES
D:  Based on record reviews, the pt's discharge recommendation changed from TCU to home with home PT.      I:  Diego called Soheila at [facility] ([phone]) and left a voicemail to let them know that the pt will no longer be needing the bed they had for her.    A/P:  Sw will continue to follow pt until discharge and be available as needed.

## 2018-10-15 NOTE — PLAN OF CARE
Problem: Patient Care Overview  Goal: Plan of Care/Patient Progress Review  Discharge Planner PT   Patient plan for discharge: not stated but hopeful for a speedy recovery  Current status: Pt amb 160' with ww with SBA with 1 brief standing rest break. (Pt performed 1 step with B hands on rail with CGA. )Pt in bed upon arrival. Pt transfers supine to /from sidelying indep along with sidelying to/from sit w/ vcs to roll completely to side before coming to sitting position. Pt transfers sit to/from stand indep with vcs for hand placement.  Barriers to return to prior living situation: mobility and tolerance to activity  Recommendations for discharge: Changed to home with home PT after collaboration with the PT.   Rationale for recommendations: Note improvement with all transfers and ambulation. Pt reports daughter in law and spouse would be able to assist if needed.          Entered by: May Qiu 10/15/2018 11:20 AM

## 2018-10-15 NOTE — PLAN OF CARE
Problem: Patient Care Overview  Goal: Plan of Care/Patient Progress Review  Outcome: Improving  Patient stoma bag has brown liquid stool. Eating 75% of meals. Sutures WNL, Tele SB Tele discontinues. Attempted to replace Mg+ but struggled with IV access. Dr. Paris switched replacement to po tablets. DSG CDI to former  site. No edema lungs clear. Up ambulating in the halls with NSG.

## 2018-10-15 NOTE — PROGRESS NOTES
"COLON & RECTAL SURGERY  PROGRESS NOTE    October 15, 2018  Post-op Day # 13 open sigmoid colectomy with colostomy for sigmoid diverticulitis with colovesical fistula    SUBJECTIVE:  Pt reports her pain is minimal to none at rest and increases with movement. She has mild nausea and has been trying to eat what she can of her meals. She states her appetite is slowly improving, but admits at baseline she is \"not a big eater.\"     OBJECTIVE:  Temp:  [97.5  F (36.4  C)-98.1  F (36.7  C)] 98.1  F (36.7  C)  Heart Rate:  [59-76] 59  Resp:  [16-18] 16  BP: (122-166)/(57-81) 142/62  SpO2:  [95 %-96 %] 96 %    Intake/Output Summary (Last 24 hours) at 10/15/18 0800  Last data filed at 10/15/18 0608   Gross per 24 hour   Intake             1000 ml   Output             1800 ml   Net             -800 ml       GENERAL:  Awake, alert, no acute distress, lying in bed  HEAD: Nomocephalic atraumatic  SCLERA: anicteric  EXTREMITIES: warm and well perfused  ABDOMEN:  Soft, nontender, non-distended, no rebound or guarding, no peritoneal signs. Stoma pink and viable with RRC in bag. Stool and gas also in bag  INCISION:  C/d/i, dermabond. NARAYAN drain site with dry dressing.     LABS:  Lab Results   Component Value Date    WBC 8.8 10/14/2018     Lab Results   Component Value Date    HGB 8.4 10/14/2018     Lab Results   Component Value Date    HCT 26.5 10/14/2018     Lab Results   Component Value Date     10/14/2018     Last Basic Metabolic Panel:  Lab Results   Component Value Date     10/15/2018      Lab Results   Component Value Date    POTASSIUM 4.7 10/15/2018     Lab Results   Component Value Date    CHLORIDE 102 10/15/2018     Lab Results   Component Value Date    PAULINA 8.0 10/15/2018     Lab Results   Component Value Date    CO2 28 10/15/2018     Lab Results   Component Value Date    BUN 9 10/15/2018     Lab Results   Component Value Date    CR 0.65 10/15/2018     Lab Results   Component Value Date    GLC 85 10/15/2018 "       ASSESSMENT/PLAN: POD 13 open sigmoid colectomy with 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 Pt is afebrile, VSS. Labs unremarkable this morning.     1. Continue PO today, will discuss nutrition goals with Dr. Reddy further regarding NJ feeding tube vs TPN.  2. PO pain control  3. Stoma education  4. Encourage OOB, ambulate  5. Lovenox for prophylaxis, will not need with discharge     For questions/paging, please contact the CRS office at [phone].    Betty Mariee PA-C  [facility]  Phone: [phone]     Colon and Rectal Surgery Attending Note    Patient seen and examined independently.  Agree with above assessment and plan.  Eating somewhat better. No nausea, stoma functioning  abd soft stoma pink  Plan  Okay to discontinue from surgical point of view if taking in adequate calories.     Emma Reddy MD  [facility]  Office Phone # [phone]

## 2018-10-15 NOTE — PROGRESS NOTES
Calorie Count    Current Diet: Regular    Current Supplements: Boost with meals TID    Date: 10/14/2018  Meals Recorded: 3  Supplements Recorded: 0  Calories consumed - See below  Protein consumed - See below    ASSESSED NUTRITION NEEDS (PER APPROVED PRACTICE GUIDELINES, Dosing weight: 54 kg):  Estimated Energy Needs: 7346-7519 kcals (25-35 Kcal/Kg)  Justification: maintenance  Estimated Protein Needs: 65-96+ grams protein (1.2-1.5 g pro/Kg)  Justification: post-op and preservation of lean body mass  Estimated Fluid Needs: >1 mL/Kcal  Justification: maintenance      Summary:  - Met ~50% needs yesterday based on 50% meal consumption TID - rough estimate and may be lower if reflective of overall meal vs specific food items ordered.    - HATES supplements.  Associate helped to order smoothie as part of dinner meal last evening but patient reports she was not fond of the flavor therefore did not consume.    - Slight increase compared to previous calorie count records though overall inadequate oral intakes with inconsistency since admission.  Likely would be able to better meet needs orally if would be accepting of more frequent meals/supplement use.  Helped to order meals today (remain small) and offered smoothie supplement either with or between meals which patient denied.    - Also noted 100% meal consumption at breakfast, quite small (hot cereal and muffin).    - Main barriers to oral intakes including decreased appetite (baseline), nausea (denies today), increased needs post-op.  - Per discussion with team during rounds, patient has TCU bed available today.   - Likely that patient would benefit from short-term enteral trial given overall inconsistently able to meet needs orally, deferring further/goals of care discussion relating to nutrition to CRS (Barry to see today).   - Will continue following.      Dolores Enriquez, RD, LD  Clinical Dietitian  3rd floor/ICU: [phone]  All other floors:  [phone]  Weekend/holiday: [phone]

## 2018-10-16 ENCOUNTER — APPOINTMENT (OUTPATIENT)
Dept: PHYSICAL THERAPY | Facility: CLINIC | Age: 83
DRG: 853 | End: 2018-10-16
Payer: MEDICARE

## 2018-10-16 PROBLEM — R65.21 SEPTIC SHOCK (H): Status: ACTIVE | Noted: 2018-10-16

## 2018-10-16 PROBLEM — A41.9 SEPTIC SHOCK (H): Status: ACTIVE | Noted: 2018-10-16

## 2018-10-16 PROBLEM — K57.20 ABSCESS OF SIGMOID COLON DUE TO DIVERTICULITIS: Status: ACTIVE | Noted: 2018-10-16

## 2018-10-16 PROBLEM — Z90.49 S/P PARTIAL RESECTION OF COLON: Status: ACTIVE | Noted: 2018-10-16

## 2018-10-16 PROBLEM — Z93.3 S/P COLOSTOMY (H): Status: ACTIVE | Noted: 2018-10-16

## 2018-10-16 PROBLEM — I48.0 PAROXYSMAL ATRIAL FIBRILLATION WITH RVR (H): Status: ACTIVE | Noted: 2018-10-16

## 2018-10-16 LAB
ANION GAP SERPL CALCULATED.3IONS-SCNC: 10 MMOL/L (ref 3–14)
BUN SERPL-MCNC: 12 MG/DL (ref 7–30)
CALCIUM SERPL-MCNC: 7.9 MG/DL (ref 8.5–10.1)
CHLORIDE SERPL-SCNC: 101 MMOL/L (ref 94–109)
CO2 SERPL-SCNC: 24 MMOL/L (ref 20–32)
CREAT SERPL-MCNC: 0.57 MG/DL (ref 0.52–1.04)
GFR SERPL CREATININE-BSD FRML MDRD: >90 ML/MIN/1.7M2
GLUCOSE SERPL-MCNC: 90 MG/DL (ref 70–99)
MAGNESIUM SERPL-MCNC: 1.7 MG/DL (ref 1.6–2.3)
POTASSIUM SERPL-SCNC: 4.1 MMOL/L (ref 3.4–5.3)
SODIUM SERPL-SCNC: 135 MMOL/L (ref 133–144)

## 2018-10-16 PROCEDURE — 25000132 ZZH RX MED GY IP 250 OP 250 PS 637: Mod: GY | Performed by: INTERNAL MEDICINE

## 2018-10-16 PROCEDURE — 97116 GAIT TRAINING THERAPY: CPT | Mod: GP | Performed by: PHYSICAL THERAPIST

## 2018-10-16 PROCEDURE — 83735 ASSAY OF MAGNESIUM: CPT | Performed by: INTERNAL MEDICINE

## 2018-10-16 PROCEDURE — A9270 NON-COVERED ITEM OR SERVICE: HCPCS | Mod: GY | Performed by: INTERNAL MEDICINE

## 2018-10-16 PROCEDURE — 80048 BASIC METABOLIC PNL TOTAL CA: CPT | Performed by: INTERNAL MEDICINE

## 2018-10-16 PROCEDURE — 25000128 H RX IP 250 OP 636: Performed by: COLON & RECTAL SURGERY

## 2018-10-16 PROCEDURE — 99232 SBSQ HOSP IP/OBS MODERATE 35: CPT | Performed by: INTERNAL MEDICINE

## 2018-10-16 PROCEDURE — 97530 THERAPEUTIC ACTIVITIES: CPT | Mod: GP | Performed by: PHYSICAL THERAPIST

## 2018-10-16 PROCEDURE — 12000007 ZZH R&B INTERMEDIATE

## 2018-10-16 PROCEDURE — 36415 COLL VENOUS BLD VENIPUNCTURE: CPT | Performed by: INTERNAL MEDICINE

## 2018-10-16 PROCEDURE — 40000193 ZZH STATISTIC PT WARD VISIT: Performed by: PHYSICAL THERAPIST

## 2018-10-16 PROCEDURE — G0463 HOSPITAL OUTPT CLINIC VISIT: HCPCS

## 2018-10-16 PROCEDURE — 40000902 ZZH STATISTIC WOC PT EDUCATION, 16-30 MIN

## 2018-10-16 RX ORDER — TRAZODONE HYDROCHLORIDE 50 MG/1
50 TABLET, FILM COATED ORAL
Status: DISCONTINUED | OUTPATIENT
Start: 2018-10-16 | End: 2018-10-17 | Stop reason: HOSPADM

## 2018-10-16 RX ORDER — TEMAZEPAM 7.5 MG/1
7.5 CAPSULE ORAL
Status: DISCONTINUED | OUTPATIENT
Start: 2018-10-16 | End: 2018-10-17 | Stop reason: HOSPADM

## 2018-10-16 RX ORDER — TRAZODONE HYDROCHLORIDE 50 MG/1
50 TABLET, FILM COATED ORAL
Qty: 60 TABLET | Refills: 0 | Status: SHIPPED | OUTPATIENT
Start: 2018-10-16 | End: 2018-10-22

## 2018-10-16 RX ORDER — METOPROLOL TARTRATE 50 MG
50 TABLET ORAL 2 TIMES DAILY WITH MEALS
Qty: 60 TABLET | Refills: 0 | Status: SHIPPED | OUTPATIENT
Start: 2018-10-16 | End: 2018-11-20

## 2018-10-16 RX ADMIN — METOPROLOL TARTRATE 50 MG: 50 TABLET, FILM COATED ORAL at 17:44

## 2018-10-16 RX ADMIN — DICLOFENAC 2 G: 10 GEL TOPICAL at 18:06

## 2018-10-16 RX ADMIN — DICLOFENAC 2 G: 10 GEL TOPICAL at 22:06

## 2018-10-16 RX ADMIN — MULTIPLE VITAMINS W/ MINERALS TAB 1 TABLET: TAB at 08:53

## 2018-10-16 RX ADMIN — TRAZODONE HYDROCHLORIDE 50 MG: 50 TABLET ORAL at 00:15

## 2018-10-16 RX ADMIN — METOPROLOL TARTRATE 50 MG: 50 TABLET, FILM COATED ORAL at 08:53

## 2018-10-16 RX ADMIN — MAGNESIUM OXIDE TAB 400 MG (241.3 MG ELEMENTAL MG) 400 MG: 400 (241.3 MG) TAB at 20:06

## 2018-10-16 RX ADMIN — MAGNESIUM OXIDE TAB 400 MG (241.3 MG ELEMENTAL MG) 400 MG: 400 (241.3 MG) TAB at 08:53

## 2018-10-16 RX ADMIN — TRAZODONE HYDROCHLORIDE 50 MG: 50 TABLET ORAL at 22:10

## 2018-10-16 RX ADMIN — ENOXAPARIN SODIUM 40 MG: 40 INJECTION SUBCUTANEOUS at 13:52

## 2018-10-16 ASSESSMENT — ACTIVITIES OF DAILY LIVING (ADL)
ADLS_ACUITY_SCORE: 14

## 2018-10-16 NOTE — PLAN OF CARE
Problem: Patient Care Overview  Goal: Plan of Care/Patient Progress Review  Discharge Planner PT   Patient plan for discharge: return home today? With assist from daughter in law  Current status: Patient moving well today; largest complaint is bilateral knee pain (bone on bone arthritis); sit<>stand, gait x 150 feet, and bed mobility with SBA to modified independent today; decreased activity tolerance secondary to knee pain; Will need youth walker issued if discharging to home today. Declined review of any ex's this am. PT goals met.  Barriers to return to prior living situation: 1 step; will have assist of daughter in law until return to independence  Recommendations for discharge: Home with assist of daughter in law and Home PT until return to independence with mobility and strength  Rationale for recommendations: Patient would benefit from Home PT for home safety eval and progression of strength/mobility until return to independence.       Entered by: Peri Tristan 10/16/2018 9:42 AM

## 2018-10-16 NOTE — PROGRESS NOTES
CLINICAL NUTRITION SERVICES - REASSESSMENT NOTE    Malnutrition:   % Weight Loss: Up to 5% in 1 month  % Intake:<75% of needs for >/=1 month  Subcutaneous Fat Loss:Orbital region moderate depletion (hollowed and dark circles/loose skin) and Upper arm region moderate depletion (lack of ample pinch depth, loose skin)  Muscle Loss:Temporal region moderate depletion, Acromion bone region mild to moderate depletion, Scapular bone region moderate depletion and Dorsal hand region moderate depletion upper thigh region moderate depletion, calf with at least mild depletion masked by fluid  Fluid Retention: Trace      Malnutrition Diagnosis: Severe malnutrition  In Context of:  Acute on Chronic illness or disease     EVALUATION OF PROGRESS TOWARD GOALS   Diet:  Regular+ High protein smoothies w/ meals PRN  Room service assist   Nutrition Support: TPN run 10/5-10/12 when discontinued with diet advancement.     Intake:  Regular diet was ordered on 10/13, since then patient has very slowly improved PO intakes  Yesterday RN documentation and patient report indicate 100% of breakfast, 50% of both lunch and dinner. She likes cereal and fruit for breakfast, really enjoyed garden veggie soup, but disliked the turkey sandwich at dinner. Patient eating dry cereal with milk, canned fruit for breakfast this morning during visit.   - as documented previously  pt dislikes supplements, refuses.     Calorie counts 10/13-10/15:   10/13 - 550 kcals, 16 g pro  10/14 - ~50% needs met (rough estimate)  10/15 - 996 kcal,38.5 g pro (based on % intakes provided).       - food aversions, reduced appetite, prior nausea impacting. Patient did not complain of nausea today.   Patient also notes a lactose intolerance, though she tolerates cottage cheese. She dislikes yogurt, milk, and soy milk.     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 Per CRS FT was to be addressed once again yesterday 10/15  "--> \"OK to discharge from surgical point of view if taking in adequate calories\".     - Weight trending: New low weight of 109# today, of concern to the patient. She hopes to prevent any further loss and appears motivated. High Kcal/High Pro diet ed given (see below).     New Findings:  - Plan for patient to discharge home with help from her daughter-in-law. Per Brea, her DIL is extremely attentive and even \"pushy\" with regard to nutrition and physical therapy.     ASSESSED NUTRITION NEEDS (PER APPROVED PRACTICE GUIDELINES, Dosing weight: 54 kg):  Estimated Energy Needs: 1193-2133 kcals (25-35 Kcal/Kg)  Justification: maintenance  Estimated Protein Needs: 65-96+ grams protein (1.2-1.5 g pro/Kg)  Justification: post-op and preservation of lean body mass  Estimated Fluid Needs: >1 mL/Kcal  Justification: maintenance    Previous Goals:   TPN at goal rate to meet % of estimated needs  Evaluation: No longer applies.     Previous Nutrition Diagnosis:   Inadequate oral intake related to altered GI function, N/V as evidenced by PO intake likely meeting <50% of needs for >1 week, patient report of 4% weight loss in <1 month, fat and muscle loss, sigmoid resection 10/2 and TPN reliance x 4 days while NPO, minimal PO since diet advanced to clear liquids  Evaluation: Ongoing, though slight improvement.    MALNUTRITION  % Weight Loss: Up to 5% in 1 month  % Intake:<75% of needs for >/=1 month   Subcutaneous Fat Loss:Orbital region moderate depletion (hollowed and dark circles/loose skin) and Upper arm region moderate depletion (lack of ample pinch depth, loose skin)  Muscle Loss:Temporal region moderate depletion, Acromion bone region mild to moderate depletion, Scapular bone region moderate depletion and Dorsal hand region moderate depletion upper thigh region moderate depletion, calf with at least mild depletion masked by fluid  Fluid Retention: Trace      Malnutrition Diagnosis: Severe malnutrition  In Context of: "  Acute on Chronic illness or disease    CURRENT NUTRITION DIAGNOSIS  Inadequate oral intake related to altered GI function, previous N/V as evidenced by PO intake meeting on average <50% of needs for >1 week, patient report of 4% weight loss in <1 month, fat and muscle loss, sigmoid resection 10/2 and TPN reliance x 4 days while NPO, minimal but improving PO since diet advancement.     INTERVENTIONS  Recommendations / Nutrition Prescription  Continue diet per MD -- Recommend High Kcal/High Pro focus    Supplements available PRN    Given pt inappropriate for PN, previous documentation of EN refusal (and readdressed yesterday per CRS), OK for discharge from clinical nutrition perspective. Ongoing encouragement of meals and snacks per family, ed given as noted below.       Implementation  Collaboration and Referral of Nutrition care: patient discussed during interdisciplinary rounds.   Nutrition Education: Education given regarding high kcal/high pro, also incorporated low fiber per patient request.     Assessed learning needs, learning preferences, and willingness to learn    Nutrition Education (Content)  a) Provided handout   a. High Calorie High Protein Nutrition Therapy  b. Reduced Fiber Nutrition Therapy  b) Discussed   a. Increased calorie/protein for weight maintenance and gain  b. Importance of adequate intakes  c. Small/frequent meals - up to 6 daily  d. Reviewed low fiber diet recommendations     Nutrition Education (Application):  a) Discussed eating habits and recommended alternative food choices  b) Made recommendations for meals/snacks    Patient verbalizes understanding of diet    Anticipate fair compliance    Diet Education - refer to Education Flowsheet      Goals  Patient to tolerate at least 50% of adequate meals consistently to show improvement in PO.       MONITORING AND EVALUATION:  Progress towards goals will be monitored and evaluated per protocol and Practice Guidelines    Odilia Dobson RD,  LD  3rd floor/ICU: [phone]  All other floors: [phone]  Weekend/holiday: [phone]  Office: [phone]

## 2018-10-16 NOTE — PROGRESS NOTES
"[facility] Nurse Inpatient Ostomy Assessment       Assessment of ostomy and needs for:   Colostomy      Data:   History:      Per MD note(s):  S/p 10/3:  fito sigmoidectomy with Colostomy        Type of ostomy:  Colostomy    Stoma assessment:      Size of stoma:  About 1 1/2\",  red, round, moist, edematous and protruberant      Mucocutaneous Junction (MCJ):  intact, sutures visible    Peristomal skin:  Clear and intact     Abdominal assessment: midline incision intact, remains glued and dry, no erythema    Output: moderate runny brown stool in old pouch  I/O last 3 completed shifts:  In: 480 [P.O.:480]  Out: 600 [Stool:600]      Current pouching system:  Coloplast 1pc flat with ring - was mostly intact but starting to come off today    Pain:  minimal     Diet:           Active Diet Order      Regular Diet Adult    Labs:   Recent Labs   Lab Test  10/15/18   0602  10/14/18   0601   ALBUMIN  2.1*   --    HGB   --   8.4*   INR  1.12   --    WBC   --   8.8           Intervention:     Patient's chart evaluated.      Assessments done today: stoma, pouching system, learning needs, supply needs    Pouch changed today Tues 10-16    Education: continued with pt and family     Prepared for discharge by: Supplies ordered and reviewed in room    Pt registered for ostomy supply samples? On discharge with Coloplast          Assessment:     Stoma assessment:     Learning needs/ comprehension:  Jenn and patient very engaged and asked appropriate questions; Jenn participated in all steps of pouch change today and did well but remains a bit anxious about doing everything right; pt not yet wanting to participate in cares and is deferring everything to her daughter-in-law.  Encouraged pt to at least practice emptying for now, she agreed but is reluctant.       Effectiveness of current pouching/ supply plan:  Flat with ring is working ok; monitor need for convexity          Plan:     Plan:     Current pouching " system:  Coloplast NISSA Flex 2pc, flat cut to fit barrier, clear drainable pouch with filter, with [employer] barrier ring    Pt/DIL prefer the 2pc system and are interested in closed pouches in future.      Education:    Education continued thru the stay:   Pouch Emptying and Pouch Replacement, How to cuff and clean pouch, How to empty pouch, Removal of pouch, Preparation of new pouch, Cutting out or evaluating a pattern, Applying paste or rings, Applying appliance to abdomen, Peristomal skin care, Diet and hydration / fluid balance, Odor / flatus management and Lifestyle Adjustments     Supply company:   [employer]    Do [facility] and Home Nurse recommend home care? YES,  Jenn will also stay with pt at home after discharge to assist with all cares

## 2018-10-16 NOTE — PLAN OF CARE
Problem: Patient Care Overview  Goal: Plan of Care/Patient Progress Review  Outcome: No Change  A&Ox4, VSS, denies pain, Colostomy bag patent, up with A-1, encouraged patient with multiple small meals and is compliant, ambulated at hallway this shift, plan to discharge home with PT, will continue with POC.

## 2018-10-16 NOTE — PROGRESS NOTES
Care Coordination:  Per MD pt will be discharging home tomorrow with orders for home care RN/PT/OT. Met with pt and family at bedside to discuss discharge plan of care. Pt states she will discharge back home wtih her . She has not had home care in the past and is agreeable to it. She will discharge home with new ostomy cares and understands her need for therapies. She would like to change her PCP to [facility] from [facility] in [place] and would like Dr Nichole. She would like to stay in the  system and have  HC. Referral sent to  HC. Follow up appt to establish new PCP arranged at [facility] with Dr Nichole on Mon 10/22 at 3:40. Will cont to follow for discharge plan.    Marcela Joaquin RN CTS  Care Transitions  [phone]

## 2018-10-16 NOTE — PROGRESS NOTES
[facility]  Hospitalist Progress Note  Name: Brea Kerr    MRN: [id number]  Physician:  Nash Paris MD  10/16/2018    Assessment & Plan   Summary of Stay:  Brea Kerr is an 82 year old female came to attention on 9/30/2018 with stool passing in her urine.     Her recent PMH is notable for hospitalization at [facility] from 9/19 - 9/23/18 for perforated diverticulitis with abscess formation.  She underwent placement of a NARAYAN drain into the abscess on 9/20 under CT guidance and was discharged home on oral Cipro and Metronidazole.     With this re-admission, she reported abdominal pain, but she thought it was not much changed from when she had the NARAYAN drain placed on 9/20/18.  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    More remote PMH notable for prior episodes of sigmoid diverticulitis and associated paroxysmal atrial fibrillation. And after arriving in the ED, the patient developed A fib with RVR (rate > 140) and her blood pressure fell to the 60's for which reason she was admitted to the ICU and was started on Amiodarone and very briefly on Norepi.  However with the Amio and conversion to NSR, in addition to fluids, her BP came up and Norepi was able to be stopped.      On 10/2/2018 the patient was taken to the operating room by Dr. Reddy and underwent sigmoid colectomy with end colostomy after which she was re-admitted to the ICU.  Ms. Kerr had done very well with the procedure but postoperatively again developed recurrent atrial fibrillation with rapid ventricular response.  Again her blood pressure dropped.   Following amiodarone re-load, diltiazem drip and digoxin she converted to NSR.   She transferred out of the ICU on 10/4.  At that point, amiodarone was not continued.     Following the procedure, Ms. Kerr had significant problems tolerating oral nutrition, so an NG was placed with plans for trophic feeds.  However, she did not tolerate even very low-volume feeds, so TPN was started.       Currently, doing well in terms of a.fib on metoprolol and has remained in NSR. Ultimately, she was able to resume eating and seems to be regaining some strength.      Sepsis due to recurrent diverticulitis, colovesicular fistula s/p Green sigmoidectomy: Completed course of Zosyn and stopped now per CRS. Recent Cystogram shows no evidence of ongoing colovesicular fistula. Babb is now removed.  -Ostomy nurse involved  -preparing discharge for tomorrow.    A. fib with RVR: With associated hypotension briefly requiring norepinephrine. Was on amiodarone although stopped now due to variable PO intake.  Intermittently in A. fib and NSR.  has a previous hx of a.fib as well, not on anti-coagulation at baseline   -- now stable on PO metoprolol 50 mg BID  -- Suspect lot of the afib issues were driven by the infection/initial surgical stress, although may be reasonable to follow up with cardiology as outpatient      Malnutrition: now eating nearly adequate amounts, but she and family are aware that she needs to continue working on increased intake.     Electrolyte abnormalities:  corrected     Anemia: Suspect combination of phlebotomy, postsurgical blood loss, and inflammatory process.  Overall stable and high 8 range. Monitor     HTN:  Prior hypotensive issues led to metoprolol 25 mg twice daily and losartan 100 mg daily being on hold.    --plan on discharge to home off of Losartan, but on Metoprolol 50 mg BID.    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    --resume methotrexate at home.     History of DVT, PE:  Was not on anti-coagulation prior to admission recently. On prophylactic Lovenox dosing.    Plan:  Anticipate discharge tomorrow to home with HHN, PT/OT.     DVT Prophylaxis: Enoxaparin (Lovenox) SQ  Code Status: Full Code  Disposition Plan   Expected discharge -  tomorrow.     Entered: Nash Paris 10/16/2018, 4:57 PM       Interval History   Generally improving.     -Data reviewed today: I reviewed all new labs and imaging reports over the last 24 hours. I personally reviewed no images or EKG's today.    Physical Exam   Temp: 98.9  F (37.2  C) Temp src: Oral BP: 128/62   Heart Rate: 63 Resp: 18 SpO2: 93 % O2 Device: None (Room air)    Vitals:    10/14/18 0552 10/15/18 0627 10/16/18 0621   Weight: 51.7 kg (114 lb) 50.7 kg (111 lb 12.8 oz) 49.6 kg (109 lb 4 oz)     Vital Signs with Ranges  Temp:  [98.1  F (36.7  C)-98.9  F (37.2  C)] 98.9  F (37.2  C)  Heart Rate:  [61-84] 63  Resp:  [18] 18  BP: (113-131)/(49-63) 128/62  SpO2:  [93 %-94 %] 93 %  I/O last 3 completed shifts:  In: -   Out: 400 [Stool:400]    GEN:  Alert, oriented x 3, appears ill but comfortable, NAD.    HEENT:  Normocephalic/atraumatic, no scleral icterus, no nasal discharge, mouth moist.  Right internal jugular line appears stable without spreading erythema/discharge.  CV:  Regular rate and rhythm, distant.  LUNGS:  Clear to auscultation upper with decreased breath sounds bases.  No wheezes/retractions.  Symmetric chest rise on inhalation noted.  ABD:  Hypoactive bowel sounds, soft, mildly distended. Ostomy present, bag with gas and liquid stool. No guarding/rigidity.  EXT:  Trace peripheral edema.  No cyanosis.  No acute joint synovitis noted.  SKIN:  Dry to touch, no exanthems noted in the visualized areas.    Medications       diclofenac  2 g Transdermal 4x Daily     enoxaparin  40 mg Subcutaneous Q24H     magnesium oxide  400 mg Oral BID     metoprolol tartrate  50 mg Oral BID w/meals     multivitamin, therapeutic with minerals  1 tablet Oral Daily     sodium chloride (PF)  10 mL Intracatheter Q8H     Data       Recent Labs  Lab 10/14/18  0601 10/13/18  0758 10/12/18  1345   WBC 8.8 12.1* 11.5*   HGB 8.4* 8.3* 8.7*   HCT 26.5* 25.5* 26.5*   * 102* 101*    372 352       Recent Labs  Lab  10/16/18  0837 10/15/18  0602 10/13/18  0758 10/13/18  0540 10/12/18  0607  10/10/18  0555    133 132*  --  133  < > 131*   POTASSIUM 4.1 4.7 4.2  --  4.4  < > 4.1   CHLORIDE 101 102 100  --  101  --  98   CO2 24 28 26  --  25  --  30   ANIONGAP 10 3 6  --  7  --  3   GLC 90 85 78  --  142*  --  113*   BUN 12 9 15  --  21  --  15   CR 0.57 0.65 0.58  --  0.56  --  0.55   GFRESTIMATED >90 88 >90  --  >90  --  >90   GFRESTBLACK >90 >90 >90  --  >90  --  >90   PAULINA 7.9* 8.0* 7.8*  --  7.7*  --  7.3*   MAG 1.7 1.7  --  2.0 1.8  < > 1.7   PHOS  --  2.5  --   --  3.2  --  2.7   PROTTOTAL  --  6.3*  --   --   --   --   --    ALBUMIN  --  2.1*  --   --   --   --   --    BILITOTAL  --  0.5  --   --   --   --   --    ALKPHOS  --  109  --   --   --   --   --    AST  --  28  --   --   --   --   --    ALT  --  33  --   --   --   --   --    < > = values in this interval not displayed.    No results found for this or any previous visit (from the past 24 hour(s)).

## 2018-10-16 NOTE — PLAN OF CARE
Problem: Patient Care Overview  Goal: Plan of Care/Patient Progress Review  Outcome: No Change  VS stable. PITTS. Bowel sound positive with liquid stool output from colostomy. No complaints of pain. Trazodone given to help with sleep. Slept well throughout the night.

## 2018-10-16 NOTE — PROVIDER NOTIFICATION
304 CP is not able to sleep. Has had melatonin and still not able to sleep. Can we get an order for something else to help her sleep? Thank you    Order received for trazodone 50 mg po as needed at bedtime.

## 2018-10-16 NOTE — PLAN OF CARE
Problem: PT General Care Plan  Goal: Bed Mobility (PT)  PT Bed Mobility   Physical Therapy Discharge Summary    Reason for therapy discharge:    All goals and outcomes met, no further needs identified.    Progress towards therapy goal(s). See goals on Care Plan in [facility] electronic health record for goal details.  Goals met    Therapy recommendation(s):    Continued therapy is recommended.  Rationale/Recommendations:  Home PT for home safety eval and to maximize return to independence .

## 2018-10-16 NOTE — PROGRESS NOTES
COLON & RECTAL SURGERY  PROGRESS NOTE    October 16, 2018  Post-op Day # 14 open sigmoid colectomy with colostomy for sigmoid diverticulitis with colovesical fistula    SUBJECTIVE:  Pt reports she is doing well today. She is looking forward to stoma education this morning and learning more. She denies nausea and is trying to take in what she can. Overnight she woke and ate a cora cracker with peanut butter. She hears gas via her stoma. She has ambulated multiple times.     OBJECTIVE:  Temp:  [98.1  F (36.7  C)-98.8  F (37.1  C)] 98.1  F (36.7  C)  Heart Rate:  [61-84] 61  Resp:  [16-18] 18  BP: (113-139)/(49-75) 131/57  SpO2:  [93 %-95 %] 93 %    Intake/Output Summary (Last 24 hours) at 10/16/18 0809  Last data filed at 10/16/18 0600   Gross per 24 hour   Intake              480 ml   Output              600 ml   Net             -120 ml       GENERAL:  Awake, alert, no acute distress, lying in bed  HEAD: Nomocephalic atraumatic  SCLERA: anicteric  EXTREMITIES: warm and well perfused  ABDOMEN:  Soft, non tender, non-distended, no rebound or guarding, no peritoneal signs. Stoma pink and viable with brown stool in bag and RRC in bag.   INCISION:  C/d/i, dermabond, drain site with clean dry dressing in place    LABS:  Lab Results   Component Value Date    WBC 8.8 10/14/2018     Lab Results   Component Value Date    HGB 8.4 10/14/2018     Lab Results   Component Value Date    HCT 26.5 10/14/2018     Lab Results   Component Value Date     10/14/2018     Last Basic Metabolic Panel:  Lab Results   Component Value Date     10/15/2018      Lab Results   Component Value Date    POTASSIUM 4.7 10/15/2018     Lab Results   Component Value Date    CHLORIDE 102 10/15/2018     Lab Results   Component Value Date    PAULINA 8.0 10/15/2018     Lab Results   Component Value Date    CO2 28 10/15/2018     Lab Results   Component Value Date    BUN 9 10/15/2018     Lab Results   Component Value Date    CR 0.65 10/15/2018     Lab  Results   Component Value Date    GLC 85 10/15/2018       ASSESSMENT/PLAN: POD 14 open sigmoid colectomy with 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 Pt remains afebrile, VSS overnight.     1. Continue regular diet  2. PO pain control  3. Stoma education  4. Encourage OOB, ambulate  5. Lovenox for prophylaxis, will not need with discharge  6. Dispo: okay to discharge from surgical point of view if taking in adequate calories     For questions/paging, please contact the CRS office at [phone].    Betty Mariee PA-C  [facility]  Phone: [phone]     Colon and Rectal Surgery Attending Note    Patient seen and examined independently.  Agree with above assessment and plan.  Doing better, each day. No nausea. Tolerating food.  abd soft, incision CDI, some bruising  Plan  Regular diet  Okay to discontinue from surgical point of view.    Emma Reddy MD  [facility]  Office Phone # [phone]

## 2018-10-16 NOTE — PLAN OF CARE
Problem: Patient Care Overview  Goal: Plan of Care/Patient Progress Review  Outcome: Improving  Pt has no complaints today.  Ambulating hallways with SBA, walker, gait belt.  Education per WOCN with caregiver present (daughter in law).  ABD is flat with good bowel sounds.  Midline incision is intact with liquid bandage.  Ostomy is new per WOCN today.  Pt tolerated regualar diet without any problem.  Denies pain.  Plan is discharge tomorrow with PT home care.  /57 (BP Location: Right arm)  Pulse 80  Temp 98.1  F (36.7  C) (Oral)  Resp 18  Ht 1.524 m (5')  Wt 49.6 kg (109 lb 4 oz)  SpO2 93%  BMI 21.34 kg/m2

## 2018-10-17 VITALS
SYSTOLIC BLOOD PRESSURE: 135 MMHG | TEMPERATURE: 97.6 F | HEART RATE: 80 BPM | RESPIRATION RATE: 18 BRPM | WEIGHT: 110.2 LBS | BODY MASS INDEX: 21.64 KG/M2 | HEIGHT: 60 IN | DIASTOLIC BLOOD PRESSURE: 60 MMHG | OXYGEN SATURATION: 93 %

## 2018-10-17 LAB
ANION GAP SERPL CALCULATED.3IONS-SCNC: 8 MMOL/L (ref 3–14)
BUN SERPL-MCNC: 13 MG/DL (ref 7–30)
CALCIUM SERPL-MCNC: 8.1 MG/DL (ref 8.5–10.1)
CHLORIDE SERPL-SCNC: 102 MMOL/L (ref 94–109)
CO2 SERPL-SCNC: 24 MMOL/L (ref 20–32)
CREAT SERPL-MCNC: 0.68 MG/DL (ref 0.52–1.04)
GFR SERPL CREATININE-BSD FRML MDRD: 83 ML/MIN/1.7M2
GLUCOSE BLDC GLUCOMTR-MCNC: 105 MG/DL (ref 70–99)
GLUCOSE BLDC GLUCOMTR-MCNC: 86 MG/DL (ref 70–99)
GLUCOSE SERPL-MCNC: 89 MG/DL (ref 70–99)
MAGNESIUM SERPL-MCNC: 1.8 MG/DL (ref 1.6–2.3)
PHOSPHATE SERPL-MCNC: 3.1 MG/DL (ref 2.5–4.5)
PLATELET # BLD AUTO: 421 10E9/L (ref 150–450)
POTASSIUM SERPL-SCNC: 4.1 MMOL/L (ref 3.4–5.3)
SODIUM SERPL-SCNC: 134 MMOL/L (ref 133–144)

## 2018-10-17 PROCEDURE — 00000146 ZZHCL STATISTIC GLUCOSE BY METER IP

## 2018-10-17 PROCEDURE — 83735 ASSAY OF MAGNESIUM: CPT | Performed by: COLON & RECTAL SURGERY

## 2018-10-17 PROCEDURE — 99239 HOSP IP/OBS DSCHRG MGMT >30: CPT | Performed by: INTERNAL MEDICINE

## 2018-10-17 PROCEDURE — A9270 NON-COVERED ITEM OR SERVICE: HCPCS | Mod: GY | Performed by: INTERNAL MEDICINE

## 2018-10-17 PROCEDURE — 85049 AUTOMATED PLATELET COUNT: CPT | Performed by: COLON & RECTAL SURGERY

## 2018-10-17 PROCEDURE — 25000132 ZZH RX MED GY IP 250 OP 250 PS 637: Mod: GY | Performed by: INTERNAL MEDICINE

## 2018-10-17 PROCEDURE — 80048 BASIC METABOLIC PNL TOTAL CA: CPT | Performed by: COLON & RECTAL SURGERY

## 2018-10-17 PROCEDURE — 84100 ASSAY OF PHOSPHORUS: CPT | Performed by: COLON & RECTAL SURGERY

## 2018-10-17 PROCEDURE — 40000902 ZZH STATISTIC WOC PT EDUCATION, 16-30 MIN

## 2018-10-17 PROCEDURE — 36415 COLL VENOUS BLD VENIPUNCTURE: CPT | Performed by: COLON & RECTAL SURGERY

## 2018-10-17 RX ADMIN — METOPROLOL TARTRATE 50 MG: 50 TABLET, FILM COATED ORAL at 08:41

## 2018-10-17 RX ADMIN — MAGNESIUM OXIDE TAB 400 MG (241.3 MG ELEMENTAL MG) 400 MG: 400 (241.3 MG) TAB at 08:41

## 2018-10-17 RX ADMIN — DICLOFENAC 2 G: 10 GEL TOPICAL at 10:09

## 2018-10-17 RX ADMIN — MULTIPLE VITAMINS W/ MINERALS TAB 1 TABLET: TAB at 08:41

## 2018-10-17 ASSESSMENT — ACTIVITIES OF DAILY LIVING (ADL)
ADLS_ACUITY_SCORE: 14

## 2018-10-17 NOTE — PROGRESS NOTES
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  Tolerating diet, ate at least 75% of breakfast.  No n/v.  Good stoma output. Ready to discharge to home.    OBJECTIVE:  Temp:  [97.6  F (36.4  C)-98.9  F (37.2  C)] 97.6  F (36.4  C)  Heart Rate:  [61-81] 61  Resp:  [18-20] 18  BP: (111-142)/(53-68) 135/60  SpO2:  [93 %-95 %] 93 %    Intake/Output Summary (Last 24 hours) at 10/16/18 0809  Last data filed at 10/16/18 0600   Gross per 24 hour   Intake              480 ml   Output              600 ml   Net             -120 ml       GENERAL:  Awake, alert, no acute distress, lying in bed  HEAD: Nomocephalic atraumatic  SCLERA: anicteric  EXTREMITIES: warm and well perfused  ABDOMEN:  Soft, non tender, non-distended, no rebound or guarding, no peritoneal signs. Stoma pink and viable with brown stool in bag and RRC in bag.   INCISION:  C/d/i, dermabond, drain site with clean dry dressing in place    LABS:  Last Comprehensive Metabolic Panel:  Sodium   Date Value Ref Range Status   10/17/2018 134 133 - 144 mmol/L Final     Potassium   Date Value Ref Range Status   10/17/2018 4.1 3.4 - 5.3 mmol/L Final     Chloride   Date Value Ref Range Status   10/17/2018 102 94 - 109 mmol/L Final     Carbon Dioxide   Date Value Ref Range Status   10/17/2018 24 20 - 32 mmol/L Final     Anion Gap   Date Value Ref Range Status   10/17/2018 8 3 - 14 mmol/L Final     Glucose   Date Value Ref Range Status   10/17/2018 89 70 - 99 mg/dL Final     Urea Nitrogen   Date Value Ref Range Status   10/17/2018 13 7 - 30 mg/dL Final     Creatinine   Date Value Ref Range Status   10/17/2018 0.68 0.52 - 1.04 mg/dL Final     GFR Estimate   Date Value Ref Range Status   10/17/2018 83 >60 mL/min/1.7m2 Final     Comment:     Non  GFR Calc     Calcium   Date Value Ref Range Status   10/17/2018 8.1 (L)  8.5 - 10.1 mg/dL Final         ASSESSMENT/PLAN: POD 15 open sigmoid colectomy with 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 Pt remains afebrile, VSS overnight.     1. Continue regular diet  2. PO pain control  3. Stoma education  4. Encourage OOB, ambulate  5. Lovenox for prophylaxis, will not need with discharge  6. Dispo: okay to discharge from surgical point of view    For questions/paging, please contact the CRS office at [phone].    Padma Xie MD  [facility]  Phone: [phone]  Fax: [phone]        ADDENDUM:  Length of stay: 15 days  Indicate Y or N for the following:  UTI  No  C diff  No  PNA  No  SSI No  DVT No  PE  No  CVA No  MI No  Enterocutaneous fistula  No  Peripheral nerve injury  No  Abscess (not adjacent to anastomosis)  No  Leak No    Treated with:   Antibiotics N/A   Drain  N/A   Reoperation  N/A  Death within 30 days No  Reintubation  No  Reoperation  No   Procedure     FOR CANCER CASES: n/a    Time spent:  less than 30 minutes spent in counseling and coordination of care for discharge.

## 2018-10-17 NOTE — PLAN OF CARE
Problem: Patient Care Overview  Goal: Discharge Needs Assessment  Pt will verbalize understanding of discharge instructions and appropriate follow appointments.  Pt will understand basic cares of new ostomy.  Outcome: Adequate for Discharge Date Met: 10/17/18  Pt has no complaints today.  Up in chair for meals.  Denies pain.  Received ostomy education with WOCN yesterday.  Pt is ambulating, eating and voiding without difficulty.  Pt was offered a shower prior to discharge, which she declined.  She stated that at home she has a comfortable shower and shower chair that she would like to use.  Also has family coming to provide hair cares.  Pt and daughter in law were given all discharge instructions and given the opportunity to ask questions for clarification.  /60 (BP Location: Right arm)  Pulse 80  Temp 97.6  F (36.4  C) (Oral)  Resp 18  Ht 1.524 m (5')  Wt 50 kg (110 lb 3.2 oz)  SpO2 93%  BMI 21.52 kg/m2

## 2018-10-17 NOTE — PROGRESS NOTES
Focus: stoma  D\I: New Colostomy and pt expecting to discharge today: WOC  final education provided; review of RX and answered all final questions from pt and family.   A/P: new Colostomy, pouch intact; stoma viable and functioning; pt is ready for discharge to home with family. Updated Rx to provide flat vs Convex wafer and drainable vs closed pouch.   Supplies taken home yesterday by family.

## 2018-10-17 NOTE — PLAN OF CARE
Problem: Patient Care Overview  Goal: Plan of Care/Patient Progress Review  Outcome: No Change  [id number]    Vitals:  VSS, afebrile  Neuro: A&O  Lungs: LS clear, room air  Cardiac: HR regular, no tele  IV: No PIV access  Labs: am labs, BG 0200: 105  GI: BS hyperactive, colostomy intact, minimal output  : voiding adequately  Diet: Regular  Pain: denies  Activity: SBA  Plan: Plan to d.c today.

## 2018-10-17 NOTE — PROGRESS NOTES
Discharge Planner   Discharge Plans in progress: Pt will discharge home today with [facility].  Pt reported that her  and daughter-in-law Jenn will provide transportation.  She says once she has the okay to discharge she will call them to come and pick her up.  They set a tentative time to be here around 1200.    Barriers to discharge plan: Pt wants to make sure that her meds are filled here before she discharges and that she has the walker that she is supposed to discharge with.  Follow up plan: Sw will continue to follow pt until discharge and be available as needed.       Entered by: Luz Marina Odonnell 10/17/2018 9:14 AM

## 2018-10-17 NOTE — PLAN OF CARE
Problem: Patient Care Overview  Goal: Plan of Care/Patient Progress Review  Outcome: Improving  Patient denies pain, up with sBA to toilet, Colostomy bag patent, bowel sounds present, prn Trazodone for sleep, plan to discharge home with PT tomorrow.

## 2018-10-17 NOTE — PROGRESS NOTES
[facility]  Met with pt and family to discuss plans for HC.  Pt to be discharged home today and has agreed to have [facility] follow with services of SN, PT and OT.  Central Intake processing referral.  Pt and family verbalized understanding that initial visit is scheduled for 10/18 or 10/19/18.  Pt has 24 hour phone number for [facility] for any questions or concerns.

## 2018-10-19 ENCOUNTER — MEDICAL CORRESPONDENCE (OUTPATIENT)
Dept: HEALTH INFORMATION MANAGEMENT | Facility: CLINIC | Age: 83
End: 2018-10-19

## 2018-10-19 NOTE — PROGRESS NOTES
Hand-off for Care Transitions to Next Level of Care Provider  Name: Brea Kerr  : 1935  MRN #: [id number]  Reason for Hospitalization:  Sepsis, due to unspecified organism (H) [A41.9]  Admit Date/Time: 2018 11:07 AM  Discharge Date: 10/17/18    Reason for Communication Hand-off Referral: Other Open sigmoid colectomy for sigmoid diverticulitis w/colovesical fistula.     Discharge Plan:  Discharged to: Home with homecare                   Patient agreeable to post-hospital support suggestions:  Yes    Patient is on new medications:   Yes    MTM follow up recommended: No    Tel-Assurance program:  Ineligible    Patient will receive  HOME CARE  at:  Discharge through [facility].     Follow-up specialty is recommended: Yes. Follow up with Colorectal in 3-4 weeks with Dr. Reddy as recommended. Also, follow up with [facility] for OT, PT & RN services.     Follow-up plan:  Future Appointments  Date Time Provider Department Center   10/22/2018 3:40 PM Whitley Nichole MD [facility]     Any outstanding tests or procedures:        Referrals     Future Labs/Procedures    Home care nursing referral     Comments:    RN extended hours visit. RN to assess hydration, nutrition and bowel status and home safety.  RN to teach application of ostomy bag.  RN to provide ostomy care and management.    Your provider has ordered home care nursing services. If you have not been contacted within 2 days of your discharge please call the inpatient department phone number at [phone] .    Home Care OT Referral for Hospital Discharge     Comments:    OT to eval and treat    Your provider has ordered home care - occupational therapy. If you have not been contacted within 2 days of your discharge please call the department phone number listed on the top of this document.    Home Care PT Referral for Hospital Discharge     Comments:    PT to eval and treat    Your provider has ordered home care - physical  therapy. If you have not been contacted within 2 days of your discharge please call the department phone number listed on the top of this document.          Key Recommendations:  Pt is readmitted with Sepsis. Pt was just here 6 days ago with h/o perforated divertic with IR Drain.  Pt had surgery on 10/2/2018 - exploratory laparoscopy, open sigmoid colectomy with end colostomy, extensive lysis of adhesions, take down of colovesical fistula.     Yi Hamilton    Communicated handoff via [employer] to [facility] at [phone].     Sent by Varsha Omalley RN, BSN, CTS  [facility]  [phone]

## 2018-10-22 ENCOUNTER — OFFICE VISIT (OUTPATIENT)
Dept: INTERNAL MEDICINE | Facility: CLINIC | Age: 83
End: 2018-10-22
Payer: MEDICARE

## 2018-10-22 VITALS
RESPIRATION RATE: 24 BRPM | TEMPERATURE: 98.2 F | SYSTOLIC BLOOD PRESSURE: 104 MMHG | DIASTOLIC BLOOD PRESSURE: 82 MMHG | OXYGEN SATURATION: 96 % | BODY MASS INDEX: 21.29 KG/M2 | HEART RATE: 56 BPM | WEIGHT: 109 LBS

## 2018-10-22 DIAGNOSIS — D64.9 LOW HEMOGLOBIN: ICD-10-CM

## 2018-10-22 DIAGNOSIS — F32.0 MILD MAJOR DEPRESSION (H): ICD-10-CM

## 2018-10-22 DIAGNOSIS — F51.01 PRIMARY INSOMNIA: ICD-10-CM

## 2018-10-22 DIAGNOSIS — Z93.3 S/P COLOSTOMY (H): Primary | ICD-10-CM

## 2018-10-22 DIAGNOSIS — M17.10 ARTHRITIS OF KNEE: ICD-10-CM

## 2018-10-22 DIAGNOSIS — I48.0 PAROXYSMAL ATRIAL FIBRILLATION (H): ICD-10-CM

## 2018-10-22 DIAGNOSIS — I48.0 PAROXYSMAL ATRIAL FIBRILLATION WITH RVR (H): ICD-10-CM

## 2018-10-22 DIAGNOSIS — R35.0 URINARY FREQUENCY: ICD-10-CM

## 2018-10-22 LAB
ALBUMIN UR-MCNC: NEGATIVE MG/DL
APPEARANCE UR: CLEAR
BASOPHILS # BLD AUTO: 0.1 10E9/L (ref 0–0.2)
BASOPHILS NFR BLD AUTO: 1.2 %
BILIRUB UR QL STRIP: ABNORMAL
COLOR UR AUTO: YELLOW
DIFFERENTIAL METHOD BLD: ABNORMAL
EOSINOPHIL # BLD AUTO: 0.4 10E9/L (ref 0–0.7)
EOSINOPHIL NFR BLD AUTO: 7.3 %
ERYTHROCYTE [DISTWIDTH] IN BLOOD BY AUTOMATED COUNT: 14 % (ref 10–15)
GLUCOSE UR STRIP-MCNC: NEGATIVE MG/DL
HCT VFR BLD AUTO: 31.3 % (ref 35–47)
HGB BLD-MCNC: 10 G/DL (ref 11.7–15.7)
HGB UR QL STRIP: NEGATIVE
KETONES UR STRIP-MCNC: ABNORMAL MG/DL
LEUKOCYTE ESTERASE UR QL STRIP: NEGATIVE
LYMPHOCYTES # BLD AUTO: 1.5 10E9/L (ref 0.8–5.3)
LYMPHOCYTES NFR BLD AUTO: 25.4 %
MCH RBC QN AUTO: 33.7 PG (ref 26.5–33)
MCHC RBC AUTO-ENTMCNC: 31.9 G/DL (ref 31.5–36.5)
MCV RBC AUTO: 105 FL (ref 78–100)
MONOCYTES # BLD AUTO: 0.8 10E9/L (ref 0–1.3)
MONOCYTES NFR BLD AUTO: 13.6 %
MUCOUS THREADS #/AREA URNS LPF: PRESENT /LPF
NEUTROPHILS # BLD AUTO: 3.1 10E9/L (ref 1.6–8.3)
NEUTROPHILS NFR BLD AUTO: 52.5 %
NITRATE UR QL: NEGATIVE
PH UR STRIP: 6 PH (ref 5–7)
PLATELET # BLD AUTO: 427 10E9/L (ref 150–450)
RBC # BLD AUTO: 2.97 10E12/L (ref 3.8–5.2)
RBC #/AREA URNS AUTO: ABNORMAL /HPF
SOURCE: ABNORMAL
SP GR UR STRIP: 1.02 (ref 1–1.03)
UROBILINOGEN UR STRIP-ACNC: 1 EU/DL (ref 0.2–1)
WBC # BLD AUTO: 6.2 10E9/L (ref 4–11)
WBC #/AREA URNS AUTO: ABNORMAL /HPF

## 2018-10-22 PROCEDURE — 36415 COLL VENOUS BLD VENIPUNCTURE: CPT | Performed by: INTERNAL MEDICINE

## 2018-10-22 PROCEDURE — 81001 URINALYSIS AUTO W/SCOPE: CPT | Performed by: INTERNAL MEDICINE

## 2018-10-22 PROCEDURE — 99214 OFFICE O/P EST MOD 30 MIN: CPT | Performed by: INTERNAL MEDICINE

## 2018-10-22 PROCEDURE — 85025 COMPLETE CBC W/AUTO DIFF WBC: CPT | Performed by: INTERNAL MEDICINE

## 2018-10-22 RX ORDER — MULTIVIT WITH MINERALS/LUTEIN
1 TABLET ORAL DAILY
COMMUNITY

## 2018-10-22 RX ORDER — METOPROLOL TARTRATE 25 MG/1
25 TABLET, FILM COATED ORAL 2 TIMES DAILY
Qty: 180 TABLET | Refills: 1 | Status: SHIPPED | OUTPATIENT
Start: 2018-10-22 | End: 2019-08-25

## 2018-10-22 RX ORDER — METOPROLOL TARTRATE 50 MG
50 TABLET ORAL 2 TIMES DAILY WITH MEALS
Qty: 60 TABLET | Refills: 0 | Status: CANCELLED | OUTPATIENT
Start: 2018-10-22

## 2018-10-22 RX ORDER — TRAZODONE HYDROCHLORIDE 50 MG/1
50 TABLET, FILM COATED ORAL
Qty: 60 TABLET | Refills: 0 | Status: SHIPPED | OUTPATIENT
Start: 2018-10-22 | End: 2018-11-20

## 2018-10-22 RX ORDER — IBUPROFEN 600 MG/1
TABLET, FILM COATED ORAL
Refills: 0 | COMMUNITY
Start: 2018-03-14 | End: 2018-10-22

## 2018-10-22 RX ORDER — MIRTAZAPINE 15 MG/1
15 TABLET, FILM COATED ORAL AT BEDTIME
Qty: 90 TABLET | Refills: 1 | Status: SHIPPED | OUTPATIENT
Start: 2018-10-22 | End: 2019-10-04

## 2018-10-22 RX ORDER — IBUPROFEN 600 MG/1
TABLET, FILM COATED ORAL
Qty: 120 TABLET | Refills: 0 | Status: ON HOLD | OUTPATIENT
Start: 2018-10-22 | End: 2019-11-08

## 2018-10-22 NOTE — PROGRESS NOTES
SUBJECTIVE:   Brea Kerr is a 82 year old female who presents to clinic today for the following health issues:          Hospital Follow-up Visit:    Hospital/Nursing Home/IP Rehab Facility: [facility]  Date of Admission: 09/30/2018  Date of Discharge: 10/17/2018  Reason(s) for Admission:  Fluid-responsive shock due to acute infection with a fib/RVR        10/2/18, the patient underwent sigmoid colectomy with end colostomy a postoperatively again developed recurrent atrial fibrillation with rapid ventricular response.   Following amiodarone re-load, diltiazem drip and digoxin she converted to NSR.   She had TPN post-operatively, as she did not tolerate NG tube feeds.  She was transitioned to oral foods.  She has been eating and drinking okay since the discharge.  She is ambulating at home with a walker.  The patient has concerns of urinary frequency and nocturia since the hospital stay.   The daughters have concerns regarding her mood.   The patient has bene more depressed lately.  The patient denies that she would hurt herself.            Problems taking medications regularly:  None       Medication changes since discharge: None       Problems adhering to non-medication therapy:  None    Summary of hospitalization:  [facility] discharge summary reviewed  Diagnostic Tests/Treatments reviewed.  Follow up needed: surgery  Other Healthcare Providers Involved in Patient s Care:           Update since discharge: improved.     Post Discharge Medication Reconciliation: discharge medications reconciled, continue medications without change.  Plan of care communicated with patient and family and daughter on phone (daughter is a NP)     Coding guidelines for this visit:  Type of Medical   Decision Making Face-to-Face Visit       within 7 Days of discharge Face-to-Face Visit        within 14 days of discharge   Moderate Complexity [id number]   High Complexity [id number]              PROBLEMS TO ADD  ON...    Problem list and histories reviewed & adjusted, as indicated.  Additional history: as documented    Labs reviewed in EPIC    Reviewed and updated as needed this visit by clinical staff       Reviewed and updated as needed this visit by Provider         ROS:  CONSTITUTIONAL: NEGATIVE for fever, chills, change in weight  RESP: NEGATIVE for significant cough or SOB  CV: NEGATIVE for chest pain, palpitations or peripheral edema  Psych: depressed mood    OBJECTIVE:     /82  Pulse 56  Temp 98.2  F (36.8  C) (Oral)  Resp 24  Wt 109 lb (49.4 kg)  SpO2 96%  BMI 21.29 kg/m2  Body mass index is 21.29 kg/(m^2).  GENERAL: healthy, alert and no distress  RESP: lungs clear to auscultation - no rales, rhonchi or wheezes  CV: regular rate and rhythm, normal S1 S2, no S3 or S4, no murmur, click or rub  GI: colostomy bag c/d/i  Psych: depressed affect    ASSESSMENT/PLAN:       (Z93.3) S/P colostomy (H)  (primary encounter diagnosis)  Comment:   Plan: follow up with [facility]      (I48.0) Paroxysmal atrial fibrillation (H)  Comment: perioperatively; currently normal sinus rhythum  Plan: aspirin and metoprolol  Decrease metoprolol to 12.5mg bid    (R35.0) Urinary frequency  (primary encounter diagnosis)  Comment: assess for UTI  Plan: UA with Microscopic reflex to Culture            (F51.01) Primary insomnia  Comment:   Plan: traZODone (DESYREL) 50 MG tablet            (I48.0) Paroxysmal atrial fibrillation with RVR, perioperative  Comment:   Plan: metoprolol tartrate (LOPRESSOR) 25 MG tablet            (M17.10) Arthritis of knee  Comment: trial of voltaren gel  Plan: diclofenac (VOLTAREN) 1 % GEL topical gel,         ibuprofen (ADVIL/MOTRIN) 600 MG tablet            (F32.0) Mild major depression (H)  Comment: start remeron  Plan: mirtazapine (REMERON) 15 MG tablet, MENTAL         HEALTH REFERRAL  - Adult; Outpatient Treatment;        Individual/Couples/Family/Group Therapy/Health         Psychology; Other: Behavioral  Healthcare         Providers [phone]; We will contact you         to schedule the appointment or please call with        any questi...        Reassess in 1 month    (D64.9) Low hemoglobin  Comment: post-op blood loss  Plan: CBC with platelets differential               Whitley Nichole MD  [facility]

## 2018-10-22 NOTE — MR AVS SNAPSHOT
After Visit Summary   10/22/2018    Brea Kerr    MRN: [id number]           Patient Information     Date Of Birth          1935        Visit Information        Provider Department      10/22/2018 3:40 PM Whitley Nichole MD [facility]        Today's Diagnoses     Urinary frequency    -  1    Primary insomnia        Paroxysmal atrial fibrillation with RVR, perioperative        Arthritis of knee        Mild major depression (H)        Low hemoglobin          Care Instructions    Decrease metoprolol 25   Start remeron    Follow up in 1 month          Follow-ups after your visit        Additional Services     MENTAL HEALTH REFERRAL  - Adult; Outpatient Treatment; Individual/Couples/Family/Group Therapy/Health Psychology; Other: Behavioral Healthcare Providers [phone]; We will contact you to schedule the appointment or please call with any questi...       All scheduling is subject to the client's specific insurance plan & benefits, provider/location availability, and provider clinical specialities.  Please arrive 15 minutes early for your first appointment and bring your completed paperwork.    Please be aware that coverage of these services is subject to the terms and limitations of your health insurance plan.  Call member services at your health plan with any benefit or coverage questions.                            Who to contact     If you have questions or need follow up information about today's clinic visit or your schedule please contact [facility] directly at [phone].  Normal or non-critical lab and imaging results will be communicated to you by MyChart, letter or phone within 4 business days after the clinic has received the results. If you do not hear from us within 7 days, please contact the clinic through MyChart or phone. If you have a critical or abnormal lab result, we will notify you by phone as soon as possible.  Submit refill  requests through [employer] or call your pharmacy and they will forward the refill request to us. Please allow 3 business days for your refill to be completed.          Additional Information About Your Visit        Care EveryWhere ID     This is your Care EveryWhere ID. This could be used by other organizations to access your [facility] medical records  [id number]        Your Vitals Were     Pulse Temperature Respirations Pulse Oximetry BMI (Body Mass Index)       56 98.2  F (36.8  C) (Oral) 24 96% 21.29 kg/m2        Blood Pressure from Last 3 Encounters:   10/22/18 104/82   10/17/18 135/60   09/23/18 130/64    Weight from Last 3 Encounters:   10/22/18 109 lb (49.4 kg)   10/17/18 110 lb 3.2 oz (50 kg)   09/19/18 111 lb 12.4 oz (50.7 kg)              We Performed the Following     CBC with platelets differential     MENTAL HEALTH REFERRAL  - Adult; Outpatient Treatment; Individual/Couples/Family/Group Therapy/Health Psychology; Other: Behavioral Healthcare Providers [phone]; We will contact you to schedule the appointment or please call with any questi...     UA with Microscopic reflex to Culture          Today's Medication Changes          These changes are accurate as of 10/22/18  4:55 PM.  If you have any questions, ask your nurse or doctor.               Start taking these medicines.        Dose/Directions    mirtazapine 15 MG tablet   Commonly known as:  REMERON   Used for:  Mild major depression (H)   Started by:  Whitley Nichole MD        Dose:  15 mg   Take 1 tablet (15 mg) by mouth At Bedtime   Quantity:  90 tablet   Refills:  1         These medicines have changed or have updated prescriptions.        Dose/Directions    * metoprolol tartrate 50 MG tablet   Commonly known as:  LOPRESSOR   This may have changed:  Another medication with the same name was added. Make sure you understand how and when to take each.   Used for:  Paroxysmal atrial fibrillation with RVR (H)   Changed by:  Whitley Nichole  MD Kun        Dose:  50 mg   Take 1 tablet (50 mg) by mouth 2 times daily (with meals)   Quantity:  60 tablet   Refills:  0       * metoprolol tartrate 25 MG tablet   Commonly known as:  LOPRESSOR   This may have changed:  You were already taking a medication with the same name, and this prescription was added. Make sure you understand how and when to take each.   Used for:  Paroxysmal atrial fibrillation with RVR (H)   Changed by:  Whitley Nichole MD        Dose:  25 mg   Take 1 tablet (25 mg) by mouth 2 times daily   Quantity:  180 tablet   Refills:  1       * Notice:  This list has 2 medication(s) that are the same as other medications prescribed for you. Read the directions carefully, and ask your doctor or other care provider to review them with you.         Where to get your medicines      These medications were sent to [facility] [id number] [place] [address] [address]  [address] [facility] [facility] [address]     Phone:  [phone]     diclofenac 1 % Gel topical gel    ibuprofen 600 MG tablet    metoprolol tartrate 25 MG tablet    mirtazapine 15 MG tablet    traZODone 50 MG tablet                Primary Care Provider Office Phone # Fax #    [facility] [phone] [address] [place] [address]        Equal Access to Services     [facility] AH: Hadii aad ku hadasho Soomaali, waaxda luqadaha, qaybta kaalmada adeegyada, waxay idiin hayaan mike [place] . So [facility] [phone].    ATENCIÓN: Si habla español, tiene a penaloza disposición servicios gratuitos de asistencia lingüística. Llame al [phone].    We comply with applicable federal civil rights laws and [place] laws. We do not discriminate on the basis of race, color, national origin, age, disability, sex, sexual orientation, or gender identity.            Thank you!     Thank you for choosing [facility]  for your care. Our goal is always to  provide you with excellent care. Hearing back from our patients is one way we can continue to improve our services. Please take a few minutes to complete the written survey that you may receive in the mail after your visit with us. Thank you!             Your Updated Medication List - Protect others around you: Learn how to safely use, store and throw away your medicines at www.disposemymeds.org.          This list is accurate as of 10/22/18  4:55 PM.  Always use your most recent med list.                   Brand Name Dispense Instructions for use Diagnosis    albuterol 108 (90 Base) MCG/ACT inhaler    PROAIR HFA/PROVENTIL HFA/VENTOLIN HFA     Inhale 2 puffs into the lungs every 6 hours as needed for shortness of breath / dyspnea or wheezing        aspirin 81 MG EC tablet      Take 81 mg by mouth At Bedtime        BENADRYL PO      Take 50 mg by mouth daily as needed        CENTRUM SILVER per tablet      Take 1 tablet by mouth daily        diclofenac 1 % Gel topical gel    VOLTAREN    120 g    Place 2 g onto the skin 4 times daily    Arthritis of knee       folic acid 1 MG tablet    FOLVITE     Take 1 mg by mouth daily        ibuprofen 600 MG tablet    ADVIL/MOTRIN    120 tablet    TK 1 T PO Q 6 H PRN P    Arthritis of knee       * metoprolol tartrate 50 MG tablet    LOPRESSOR    60 tablet    Take 1 tablet (50 mg) by mouth 2 times daily (with meals)    Paroxysmal atrial fibrillation with RVR (H)       * metoprolol tartrate 25 MG tablet    LOPRESSOR    180 tablet    Take 1 tablet (25 mg) by mouth 2 times daily    Paroxysmal atrial fibrillation with RVR (H)       mirtazapine 15 MG tablet    REMERON    90 tablet    Take 1 tablet (15 mg) by mouth At Bedtime    Mild major depression (H)       order for DME     1 each    Equipment being ordered: Walker Wheels () and Walker () Treatment Diagnosis: impaired gait    Sepsis, due to unspecified organism (H)       traZODone 50 MG tablet    DESYREL    60 tablet    Take  1 tablet (50 mg) by mouth nightly as needed for sleep    Primary insomnia       Vitamin D (Cholecalciferol) 1000 units Caps      Take 1,000 Units by mouth daily        * Notice:  This list has 2 medication(s) that are the same as other medications prescribed for you. Read the directions carefully, and ask your doctor or other care provider to review them with you.

## 2018-10-22 NOTE — NURSING NOTE
/82  Pulse 56  Temp 98.2  F (36.8  C) (Oral)  Resp 24  Wt 109 lb (49.4 kg)  SpO2 96%  BMI 21.29 kg/m2

## 2018-10-22 NOTE — LETTER
October 23, 2018      Brea Kerr  [address] [facility] [address]        Dear ,    We are writing to inform you of your test results.    The white blood cell count has normalized.   The hemoglobin has improved.     Resulted Orders   UA with Microscopic reflex to Culture   Result Value Ref Range    Color Urine Yellow     Appearance Urine Clear     Glucose Urine Negative NEG^Negative mg/dL    Bilirubin Urine Small (A) NEG^Negative      Comment:      This is an unconfirmed screening test result. A positive result may be false.    Ketones Urine Trace (A) NEG^Negative mg/dL    Specific Gravity Urine 1.025 1.003 - 1.035    pH Urine 6.0 5.0 - 7.0 pH    Protein Albumin Urine Negative NEG^Negative mg/dL    Urobilinogen Urine 1.0 0.2 - 1.0 EU/dL    Nitrite Urine Negative NEG^Negative    Blood Urine Negative NEG^Negative    Leukocyte Esterase Urine Negative NEG^Negative    Source Midstream Urine     WBC Urine 0 - 5 OTO5^0 - 5 /HPF    RBC Urine O - 2 OTO2^O - 2 /HPF    Mucous Urine Present (A) NEG^Negative /LPF   CBC with platelets differential   Result Value Ref Range    WBC 6.2 4.0 - 11.0 10e9/L    RBC Count 2.97 (L) 3.8 - 5.2 10e12/L    Hemoglobin 10.0 (L) 11.7 - 15.7 g/dL    Hematocrit 31.3 (L) 35.0 - 47.0 %     (H) 78 - 100 fl    MCH 33.7 (H) 26.5 - 33.0 pg    MCHC 31.9 31.5 - 36.5 g/dL    RDW 14.0 10.0 - 15.0 %    Platelet Count 427 150 - 450 10e9/L      Comment:      Results confirmed by repeat test    % Neutrophils 52.5 %    % Lymphocytes 25.4 %    % Monocytes 13.6 %    % Eosinophils 7.3 %    % Basophils 1.2 %    Absolute Neutrophil 3.1 1.6 - 8.3 10e9/L    Absolute Lymphocytes 1.5 0.8 - 5.3 10e9/L    Absolute Monocytes 0.8 0.0 - 1.3 10e9/L    Absolute Eosinophils 0.4 0.0 - 0.7 10e9/L    Absolute Basophils 0.1 0.0 - 0.2 10e9/L    Diff Method Automated Method        If you have any questions or concerns, please call the clinic at the number listed above.       Sincerely,        Whitley Nichole,  MD

## 2018-10-23 ASSESSMENT — PATIENT HEALTH QUESTIONNAIRE - PHQ9: SUM OF ALL RESPONSES TO PHQ QUESTIONS 1-9: 25

## 2018-10-26 ENCOUNTER — TELEPHONE (OUTPATIENT)
Dept: INTERNAL MEDICINE | Facility: CLINIC | Age: 83
End: 2018-10-26

## 2018-10-26 NOTE — TELEPHONE ENCOUNTER
Marcela, occupational therapist with [facility], calls stating she evaluated patient on Wednesday and would like to request orders for OT for 1x/week for 2 weeks. Gave verbal order per [facility] protocol.

## 2018-10-29 ENCOUNTER — TRANSFERRED RECORDS (OUTPATIENT)
Dept: HEALTH INFORMATION MANAGEMENT | Facility: CLINIC | Age: 83
End: 2018-10-29

## 2018-10-30 ENCOUNTER — TELEPHONE (OUTPATIENT)
Dept: WOUND CARE | Facility: CLINIC | Age: 83
End: 2018-10-30

## 2018-10-30 ENCOUNTER — TELEPHONE (OUTPATIENT)
Dept: INTERNAL MEDICINE | Facility: CLINIC | Age: 83
End: 2018-10-30

## 2018-10-30 NOTE — TELEPHONE ENCOUNTER
Jenn (pt's DIL) called to discuss issues with obtaining ostomy supplies.  Jenn is acting as pt's primary caregiver until late November.  She has been working with  homecare to continue to learn ostomy cares.  Jenn states there has been contradicting information on what supplies have been ordered and when and she has been running very low on various supplies.  She also notes that the pouches have often not been adhering well and have been leaking within a few hours.  She is frustrated because they have been using different kinds of pouches and says she has not been trained on all of them.  She also is worried because pt has not been participating at all in the pouch changes and she doesn't know what will happen when she leaves at end of month.  Advised Jenn to discuss all of these issues clearly with her home care /nurse Le who is coming out again on Thursday, as home care is who is in charge of all of these things right now.  Provided Jenn with [employer] direct number so she can discuss supply issues directly with the company as needed.  At end of phone call, Jenn noted there was a  at the door with more ostomy supplies, so she thinks she will be ok on supplies until home care comes again.   Advised her that [facility] still available prn by phone, but that she should continue to first direct issues to home care as long as they are involved.

## 2018-11-20 ENCOUNTER — OFFICE VISIT (OUTPATIENT)
Dept: INTERNAL MEDICINE | Facility: CLINIC | Age: 83
End: 2018-11-20
Payer: MEDICARE

## 2018-11-20 VITALS
TEMPERATURE: 97.6 F | HEIGHT: 60 IN | RESPIRATION RATE: 14 BRPM | WEIGHT: 114 LBS | BODY MASS INDEX: 22.38 KG/M2 | HEART RATE: 69 BPM | DIASTOLIC BLOOD PRESSURE: 70 MMHG | SYSTOLIC BLOOD PRESSURE: 118 MMHG | OXYGEN SATURATION: 96 %

## 2018-11-20 DIAGNOSIS — I48.0 PAROXYSMAL ATRIAL FIBRILLATION WITH RVR (H): ICD-10-CM

## 2018-11-20 DIAGNOSIS — F32.0 MILD MAJOR DEPRESSION (H): Primary | ICD-10-CM

## 2018-11-20 DIAGNOSIS — Z93.3 S/P COLOSTOMY (H): ICD-10-CM

## 2018-11-20 DIAGNOSIS — M06.9 RHEUMATOID ARTHRITIS INVOLVING MULTIPLE SITES, UNSPECIFIED RHEUMATOID FACTOR PRESENCE: ICD-10-CM

## 2018-11-20 PROBLEM — N39.0 URINARY TRACT INFECTION: Status: RESOLVED | Noted: 2018-09-19 | Resolved: 2018-11-20

## 2018-11-20 PROBLEM — A41.9 SEPSIS (H): Status: RESOLVED | Noted: 2018-09-30 | Resolved: 2018-11-20

## 2018-11-20 PROCEDURE — 99214 OFFICE O/P EST MOD 30 MIN: CPT | Performed by: INTERNAL MEDICINE

## 2018-11-20 ASSESSMENT — PATIENT HEALTH QUESTIONNAIRE - PHQ9: SUM OF ALL RESPONSES TO PHQ QUESTIONS 1-9: 1

## 2018-11-20 NOTE — MR AVS SNAPSHOT
After Visit Summary   11/20/2018    Brea Kerr    MRN: [id number]           Patient Information     Date Of Birth          1935        Visit Information        Provider Department      11/20/2018 2:00 PM Whitley Nichole MD [facility]        Today's Diagnoses     Mild major depression (H)    -  1    Paroxysmal atrial fibrillation with RVR, perioperative        S/P colostomy (H)        Rheumatoid arthritis involving multiple sites, unspecified rheumatoid factor presence (H)          Care Instructions    Follow up in 6 months          Follow-ups after your visit        Who to contact     If you have questions or need follow up information about today's clinic visit or your schedule please contact [facility] directly at [phone].  Normal or non-critical lab and imaging results will be communicated to you by MyChart, letter or phone within 4 business days after the clinic has received the results. If you do not hear from us within 7 days, please contact the clinic through MyChart or phone. If you have a critical or abnormal lab result, we will notify you by phone as soon as possible.  Submit refill requests through [employer] or call your pharmacy and they will forward the refill request to us. Please allow 3 business days for your refill to be completed.          Additional Information About Your Visit        Care EveryWhere ID     This is your Care EveryWhere ID. This could be used by other organizations to access your [facility] medical records  [id number]        Your Vitals Were     Pulse Temperature Respirations Height Pulse Oximetry Breastfeeding?    69 97.6  F (36.4  C) (Oral) 14 5' (1.524 m) 96% No    BMI (Body Mass Index)                   22.26 kg/m2            Blood Pressure from Last 3 Encounters:   11/20/18 118/70   10/22/18 104/82   10/17/18 135/60    Weight from Last 3 Encounters:   11/20/18 114 lb (51.7 kg)   10/22/18 109 lb (49.4 kg)    10/17/18 110 lb 3.2 oz (50 kg)              Today, you had the following     No orders found for display       Primary Care Provider Office Phone # Fax #    [facility] [phone] [address] [place] [address]        Equal Access to Services     [facility] : Hadii aad ku hadasho Soomaali, waaxda luqadaha, qaybta kaalmada adeegyada, waxay idiin hayaan aderolando shoemakerjennifergodfrey lajess ge. So [facility] [phone].    ATENCIÓN: Si habla español, tiene a penaloza disposición servicios gratuitos de asistencia lingüística. LlSt. [facility] [phone].    We comply with applicable federal civil rights laws and [place] laws. We do not discriminate on the basis of race, color, national origin, age, disability, sex, sexual orientation, or gender identity.            Thank you!     Thank you for choosing [facility]  for your care. Our goal is always to provide you with excellent care. Hearing back from our patients is one way we can continue to improve our services. Please take a few minutes to complete the written survey that you may receive in the mail after your visit with us. Thank you!             Your Updated Medication List - Protect others around you: Learn how to safely use, store and throw away your medicines at www.disposemymeds.org.          This list is accurate as of 11/20/18  2:34 PM.  Always use your most recent med list.                   Brand Name Dispense Instructions for use Diagnosis    albuterol 108 (90 Base) MCG/ACT inhaler    PROAIR HFA/PROVENTIL HFA/VENTOLIN HFA     Inhale 2 puffs into the lungs every 6 hours as needed for shortness of breath / dyspnea or wheezing        aspirin 81 MG EC tablet      Take 81 mg by mouth At Bedtime        BENADRYL PO      Take 50 mg by mouth daily as needed        CENTRUM SILVER tablet      Take 1 tablet by mouth daily        diclofenac 1 % Gel topical gel    VOLTAREN    120 g    Place 2 g onto the skin 4 times daily    Arthritis of  knee       folic acid 1 MG tablet    FOLVITE     Take 1 mg by mouth daily        ibuprofen 600 MG tablet    ADVIL/MOTRIN    120 tablet    TK 1 T PO Q 6 H PRN P    Arthritis of knee       METHOTREXATE PO      Take 2.5 mg by mouth Takes three times a week.        metoprolol tartrate 25 MG tablet    LOPRESSOR    180 tablet    Take 1 tablet (25 mg) by mouth 2 times daily    Paroxysmal atrial fibrillation with RVR (H)       mirtazapine 15 MG tablet    REMERON    90 tablet    Take 1 tablet (15 mg) by mouth At Bedtime    Mild major depression (H)       order for DME     1 each    Equipment being ordered: Walker Wheels () and Walker () Treatment Diagnosis: impaired gait    Sepsis, due to unspecified organism (H)       Vitamin D (Cholecalciferol) 1000 units Caps      Take 1,000 Units by mouth daily

## 2018-11-20 NOTE — PROGRESS NOTES
SUBJECTIVE:   Brea Kerr is a 82 year old female who presents to clinic today for the following health issues:    Patient is present with her .     Depression Followup    Status since last visit: Improved     See PHQ-9 for current symptoms.  Other associated symptoms: None    Complicating factors:   Significant life event:  Recent hospital stay  Current substance abuse:  None  Anxiety or Panic symptoms:  No    PHQ 10/22/2018   PHQ-9 Total Score 25   Q9: Suicide Ideation Nearly every day     Today she has a PHQ 9 score of 1.    Atrial fibrillation.  Has happened twice perioperatively.  No atrial fibrillation since. She had seen cardiology in the past after her first perioperative a fib episode, and decided that she did not need anti-coagulation.  Recently had decreased metoprolol since her pulse was low, and patient is feeling better. No chest pain or palpitations. Only has occurred around surgeries.       S/p colostomy.  Followed up with the surgeon.  She will return in one year.   She found good, disposable bags.    RA.  Following with rheumatology.  She is on methotrexate.  Has an appt on Monday.     PHQ-9  English   PHQ-9   Any Language  Suicide Assessment Five-step Evaluation and Treatment (SAFE-T)    Amount of exercise or physical activity: walking more    Problems taking medications regularly: No    Medication side effects: none    Diet: regular (no restrictions)        PROBLEMS TO ADD ON...    Problem list and histories reviewed & adjusted, as indicated.  Additional history: as documented    Labs reviewed in EPIC    Reviewed and updated as needed this visit by clinical staff  Tobacco  Allergies  Meds  Med Hx  Surg Hx  Fam Hx  Soc Hx      Reviewed and updated as needed this visit by Provider         ROS:  CONSTITUTIONAL: NEGATIVE for fever, chills, change in weight  RESP: NEGATIVE for significant cough or SOB  CV: NEGATIVE for chest pain, palpitations or peripheral edema  Psych: depression  and insomnia improved    OBJECTIVE:     /70 (BP Location: Left arm, Patient Position: Sitting, Cuff Size: Adult Large)  Pulse 69  Temp 97.6  F (36.4  C) (Oral)  Resp 14  Ht 5' (1.524 m)  Wt 114 lb (51.7 kg)  SpO2 96%  Breastfeeding? No  BMI 22.26 kg/m2  Body mass index is 22.26 kg/(m^2).     GENERAL: healthy, alert and no distress  RESP: lungs clear to auscultation - no rales, rhonchi or wheezes  CV: regular rate and rhythm, normal S1 S2, no S3 or S4, no murmur, click or rub  GI: colostomy bag c/d/i  Psych: normal affect    ASSESSMENT/PLAN:       (F32.0) Mild major depression (H)  (primary encounter diagnosis)  Comment: much improved  Plan: patient is on remeron    (I48.0) Paroxysmal atrial fibrillation with RVR, perioperative  Comment: not in a fib today  Plan: aspirin and metoprolol    (Z93.3) S/P colostomy (H)  Comment: c/d/i  Plan: pt to monitor and follow up with surgeon in 1 year, as planned    (M06.9) Rheumatoid arthritis involving multiple sites, unspecified rheumatoid factor presence (H)  Comment:   Plan: methotrexate  Pt to follow up with rheumatology        Whitley Nichole MD  [facility]

## 2019-01-22 ENCOUNTER — TELEPHONE (OUTPATIENT)
Dept: INTERNAL MEDICINE | Facility: CLINIC | Age: 84
End: 2019-01-22

## 2019-01-22 NOTE — TELEPHONE ENCOUNTER
Colostomy supply forms received from [facility] and put in Dr. Nichole's in basket. Fax to alternate fax number on form once complete.

## 2019-01-22 NOTE — TELEPHONE ENCOUNTER
Gi from home care [phone] and [facility] [phone] called to order all of diomedes's ostomy supplies. Pouches, skin barrier,gauze.  Said she faxed these orders last week and again yesterday.

## 2019-01-30 NOTE — TELEPHONE ENCOUNTER
"[facility] [employer] called about Colostomy supply forms received, missing on order /form \"frequency of change.\"    They will refax the form for us to complete    Fax [phone]    Any questions? [phone] ext [phone] can leave a detailed message on vm.  Karyn Omalley   Pt Service Rep  "

## 2019-01-31 NOTE — TELEPHONE ENCOUNTER
Patient calling back.    1.  Uses 3 pouches per day  2.  Skin barrier--1 every 3 days  3.  Sponges--uses 10 per day  4.  Seals--1 every 3 days.    Please advise, thanks.

## 2019-01-31 NOTE — TELEPHONE ENCOUNTER
VM left for patient to call back, please find out frequency of change for listed items, forms in [place] basket.  1. How many pouches a day are used.  2. How much/many times does she use the skin barrier.  3. How many gauze sponges are used.  4.How many cohesive seals are used    SARMEEN JUNIOR CMA

## 2019-02-01 ENCOUNTER — TELEPHONE (OUTPATIENT)
Dept: INTERNAL MEDICINE | Facility: CLINIC | Age: 84
End: 2019-02-01

## 2019-02-01 NOTE — TELEPHONE ENCOUNTER
[facility]  No sting barrier orders  Dr. Nichole's in basket   Fax

## 2019-04-12 ENCOUNTER — TELEPHONE (OUTPATIENT)
Dept: INTERNAL MEDICINE | Facility: CLINIC | Age: 84
End: 2019-04-12

## 2019-04-12 NOTE — LETTER
[facility]  [address], [address]  [place], [place]  [address]                                            TEL:[phone]  FAX:[phone]        Brea Kerr  [address] [address] [address]      April 12, 2019      Dear Brea,    At [facility] we care about your health and well-being.  A review of your chart has indicated that you are due for a depression follow up.  Please contact us at [phone] to schedule an appointment.           Sincerely,      Whitley Nichole M.D.

## 2019-04-12 NOTE — TELEPHONE ENCOUNTER
Panel Management Review      Patient has the following on her problem list:     Depression / Dysthymia review    Measure:  Needs PHQ-9 score of 4 or less during index window.  Administer PHQ-9 and if score is 5 or more, send encounter to provider for next steps.    5 - 7 month window range: yes    PHQ-9 SCORE 10/22/2018 11/20/2018   PHQ-9 Total Score 25 1       If PHQ-9 recheck is 5 or more, route to provider for next steps.    Patient is due for:  PHQ9      Composite cancer screening  Chart review shows that this patient is due/due soon for the following None  Summary:    Patient is due/failing the following:   FOLLOW UP and PHQ9    Action needed:   Patient needs office visit for depression f/u. and Patient needs to do PHQ9.    Type of outreach:    Sent letter.    Questions for provider review:    None                                                                                                                                    Brit De La O CMA       Chart routed to no one .

## 2019-08-19 ENCOUNTER — TELEPHONE (OUTPATIENT)
Dept: INTERNAL MEDICINE | Facility: CLINIC | Age: 84
End: 2019-08-19

## 2019-08-25 DIAGNOSIS — I48.0 PAROXYSMAL ATRIAL FIBRILLATION WITH RVR (H): ICD-10-CM

## 2019-08-26 NOTE — TELEPHONE ENCOUNTER
"Requested Prescriptions   Pending Prescriptions Disp Refills     metoprolol tartrate (LOPRESSOR) 25 MG tablet [Pharmacy Med Name:  Last Written Prescription Date:  10/22/2018  Last Fill Quantity: 180,  # refills: 1   Last office visit: 11/20/2018 with prescribing provider:     Future Office Visit:   METOPROLOL TARTRATE 25MG TABLETS] 180 tablet 0     Sig: TAKE 1 TABLET(25 MG) BY MOUTH TWICE DAILY       Beta-Blockers Protocol Passed - 8/25/2019  8:55 PM        Passed - Blood pressure under 140/90 in past 12 months     BP Readings from Last 3 Encounters:   11/20/18 118/70   10/22/18 104/82   10/17/18 135/60                 Passed - Patient is age 6 or older        Passed - Recent (12 mo) or future (30 days) visit within the authorizing provider's specialty     Patient had office visit in the last 12 months or has a visit in the next 30 days with authorizing provider or within the authorizing provider's specialty.  See \"Patient Info\" tab in inbasket, or \"Choose Columns\" in Meds & Orders section of the refill encounter.              Passed - Medication is active on med list        "

## 2019-08-27 NOTE — TELEPHONE ENCOUNTER
Routing refill request to provider for review/approval because:  A break in medication    Last refill 10/22/18 for a 6 month supply

## 2019-08-28 RX ORDER — METOPROLOL TARTRATE 25 MG/1
TABLET, FILM COATED ORAL
Qty: 180 TABLET | Refills: 0 | Status: SHIPPED | OUTPATIENT
Start: 2019-08-28 | End: 2019-10-04

## 2019-09-25 ENCOUNTER — TELEPHONE (OUTPATIENT)
Dept: INTERNAL MEDICINE | Facility: CLINIC | Age: 84
End: 2019-09-25

## 2019-09-25 NOTE — TELEPHONE ENCOUNTER
[facility]  Protective powder orders  Dr. Nichole's in basket   Fax

## 2019-10-04 ENCOUNTER — OFFICE VISIT (OUTPATIENT)
Dept: INTERNAL MEDICINE | Facility: CLINIC | Age: 84
End: 2019-10-04
Payer: MEDICARE

## 2019-10-04 VITALS
HEART RATE: 60 BPM | SYSTOLIC BLOOD PRESSURE: 142 MMHG | DIASTOLIC BLOOD PRESSURE: 60 MMHG | RESPIRATION RATE: 16 BRPM | TEMPERATURE: 97.5 F | HEIGHT: 60 IN | WEIGHT: 124.9 LBS | BODY MASS INDEX: 24.52 KG/M2 | OXYGEN SATURATION: 98 %

## 2019-10-04 DIAGNOSIS — I48.0 PAROXYSMAL ATRIAL FIBRILLATION WITH RVR (H): ICD-10-CM

## 2019-10-04 DIAGNOSIS — F32.0 MILD MAJOR DEPRESSION (H): Primary | ICD-10-CM

## 2019-10-04 DIAGNOSIS — M06.9 RHEUMATOID ARTHRITIS INVOLVING MULTIPLE SITES, UNSPECIFIED RHEUMATOID FACTOR PRESENCE: ICD-10-CM

## 2019-10-04 DIAGNOSIS — Z13.220 SCREENING CHOLESTEROL LEVEL: ICD-10-CM

## 2019-10-04 DIAGNOSIS — M81.0 AGE RELATED OSTEOPOROSIS, UNSPECIFIED PATHOLOGICAL FRACTURE PRESENCE: ICD-10-CM

## 2019-10-04 DIAGNOSIS — Z93.3 S/P COLOSTOMY (H): ICD-10-CM

## 2019-10-04 DIAGNOSIS — E55.9 VITAMIN D DEFICIENCY: ICD-10-CM

## 2019-10-04 DIAGNOSIS — I10 ESSENTIAL HYPERTENSION: ICD-10-CM

## 2019-10-04 LAB
ALBUMIN SERPL-MCNC: 3.7 G/DL (ref 3.4–5)
ALP SERPL-CCNC: 85 U/L (ref 40–150)
ALT SERPL W P-5'-P-CCNC: 19 U/L (ref 0–50)
ANION GAP SERPL CALCULATED.3IONS-SCNC: 3 MMOL/L (ref 3–14)
AST SERPL W P-5'-P-CCNC: 23 U/L (ref 0–45)
BASOPHILS # BLD AUTO: 0 10E9/L (ref 0–0.2)
BASOPHILS NFR BLD AUTO: 0.4 %
BILIRUB SERPL-MCNC: 1.1 MG/DL (ref 0.2–1.3)
BUN SERPL-MCNC: 16 MG/DL (ref 7–30)
CALCIUM SERPL-MCNC: 9 MG/DL (ref 8.5–10.1)
CHLORIDE SERPL-SCNC: 106 MMOL/L (ref 94–109)
CHOLEST SERPL-MCNC: 227 MG/DL
CO2 SERPL-SCNC: 28 MMOL/L (ref 20–32)
CREAT SERPL-MCNC: 0.8 MG/DL (ref 0.52–1.04)
DIFFERENTIAL METHOD BLD: ABNORMAL
EOSINOPHIL # BLD AUTO: 0.3 10E9/L (ref 0–0.7)
EOSINOPHIL NFR BLD AUTO: 3.1 %
ERYTHROCYTE [DISTWIDTH] IN BLOOD BY AUTOMATED COUNT: 14.2 % (ref 10–15)
GFR SERPL CREATININE-BSD FRML MDRD: 69 ML/MIN/{1.73_M2}
GLUCOSE SERPL-MCNC: 93 MG/DL (ref 70–99)
HCT VFR BLD AUTO: 43.1 % (ref 35–47)
HDLC SERPL-MCNC: 40 MG/DL
HGB BLD-MCNC: 14.1 G/DL (ref 11.7–15.7)
LDLC SERPL CALC-MCNC: 161 MG/DL
LYMPHOCYTES # BLD AUTO: 1.7 10E9/L (ref 0.8–5.3)
LYMPHOCYTES NFR BLD AUTO: 20.9 %
MCH RBC QN AUTO: 33.8 PG (ref 26.5–33)
MCHC RBC AUTO-ENTMCNC: 32.7 G/DL (ref 31.5–36.5)
MCV RBC AUTO: 103 FL (ref 78–100)
MONOCYTES # BLD AUTO: 0.9 10E9/L (ref 0–1.3)
MONOCYTES NFR BLD AUTO: 11.7 %
NEUTROPHILS # BLD AUTO: 5.1 10E9/L (ref 1.6–8.3)
NEUTROPHILS NFR BLD AUTO: 63.9 %
NONHDLC SERPL-MCNC: 187 MG/DL
PLATELET # BLD AUTO: 205 10E9/L (ref 150–450)
POTASSIUM SERPL-SCNC: 4.4 MMOL/L (ref 3.4–5.3)
PROT SERPL-MCNC: 7.3 G/DL (ref 6.8–8.8)
RBC # BLD AUTO: 4.17 10E12/L (ref 3.8–5.2)
SODIUM SERPL-SCNC: 137 MMOL/L (ref 133–144)
TRIGL SERPL-MCNC: 129 MG/DL
TSH SERPL DL<=0.005 MIU/L-ACNC: 1.96 MU/L (ref 0.4–4)
WBC # BLD AUTO: 8 10E9/L (ref 4–11)

## 2019-10-04 PROCEDURE — 84443 ASSAY THYROID STIM HORMONE: CPT | Performed by: INTERNAL MEDICINE

## 2019-10-04 PROCEDURE — 82306 VITAMIN D 25 HYDROXY: CPT | Performed by: INTERNAL MEDICINE

## 2019-10-04 PROCEDURE — 36415 COLL VENOUS BLD VENIPUNCTURE: CPT | Performed by: INTERNAL MEDICINE

## 2019-10-04 PROCEDURE — 99214 OFFICE O/P EST MOD 30 MIN: CPT | Performed by: INTERNAL MEDICINE

## 2019-10-04 PROCEDURE — 85025 COMPLETE CBC W/AUTO DIFF WBC: CPT | Performed by: INTERNAL MEDICINE

## 2019-10-04 PROCEDURE — 80053 COMPREHEN METABOLIC PANEL: CPT | Performed by: INTERNAL MEDICINE

## 2019-10-04 PROCEDURE — 80061 LIPID PANEL: CPT | Performed by: INTERNAL MEDICINE

## 2019-10-04 RX ORDER — ACETAMINOPHEN 500 MG
500-1000 TABLET ORAL EVERY 6 HOURS PRN
COMMUNITY

## 2019-10-04 RX ORDER — LOSARTAN POTASSIUM 100 MG/1
100 TABLET ORAL DAILY
Qty: 90 TABLET | Refills: 4 | Status: SHIPPED | OUTPATIENT
Start: 2019-10-04 | End: 2020-12-02

## 2019-10-04 RX ORDER — LOSARTAN POTASSIUM 100 MG/1
100 TABLET ORAL DAILY
COMMUNITY
End: 2019-10-04

## 2019-10-04 RX ORDER — METOPROLOL TARTRATE 25 MG/1
TABLET, FILM COATED ORAL
Qty: 180 TABLET | Refills: 4 | Status: SHIPPED | OUTPATIENT
Start: 2019-10-04 | End: 2020-12-02

## 2019-10-04 SDOH — HEALTH STABILITY: MENTAL HEALTH: HOW OFTEN DO YOU HAVE A DRINK CONTAINING ALCOHOL?: NEVER

## 2019-10-04 ASSESSMENT — ANXIETY QUESTIONNAIRES
6. BECOMING EASILY ANNOYED OR IRRITABLE: SEVERAL DAYS
5. BEING SO RESTLESS THAT IT IS HARD TO SIT STILL: NOT AT ALL
7. FEELING AFRAID AS IF SOMETHING AWFUL MIGHT HAPPEN: NOT AT ALL
3. WORRYING TOO MUCH ABOUT DIFFERENT THINGS: NOT AT ALL
GAD7 TOTAL SCORE: 1
IF YOU CHECKED OFF ANY PROBLEMS ON THIS QUESTIONNAIRE, HOW DIFFICULT HAVE THESE PROBLEMS MADE IT FOR YOU TO DO YOUR WORK, TAKE CARE OF THINGS AT HOME, OR GET ALONG WITH OTHER PEOPLE: NOT DIFFICULT AT ALL
1. FEELING NERVOUS, ANXIOUS, OR ON EDGE: NOT AT ALL
2. NOT BEING ABLE TO STOP OR CONTROL WORRYING: NOT AT ALL

## 2019-10-04 ASSESSMENT — PATIENT HEALTH QUESTIONNAIRE - PHQ9
SUM OF ALL RESPONSES TO PHQ QUESTIONS 1-9: 1
5. POOR APPETITE OR OVEREATING: NOT AT ALL

## 2019-10-04 ASSESSMENT — MIFFLIN-ST. JEOR: SCORE: 953.04

## 2019-10-04 NOTE — NURSING NOTE
BP (!) 142/60 (BP Location: Left arm, Patient Position: Sitting, Cuff Size: Adult Regular)   Pulse 60   Temp 97.5  F (36.4  C) (Oral)   Resp 16   Ht 1.524 m (5')   Wt 56.7 kg (124 lb 14.4 oz)   SpO2 98%   BMI 24.39 kg/m

## 2019-10-04 NOTE — PROGRESS NOTES
Subjective     Brea Kerr is a 83 year old female who presents to clinic today for the following health issues:    HPI   Depression Followup    How are you doing with your depression since your last visit? Improved     Are you having other symptoms that might be associated with depression? No    Have you had a significant life event?  No     Are you feeling anxious or having panic attacks?   No    Do you have any concerns with your use of alcohol or other drugs? No    Social History     Tobacco Use     Smoking status: Never Smoker     Smokeless tobacco: Never Used   Substance Use Topics     Alcohol use: Not Currently     Frequency: Never     Drug use: None     PHQ 10/22/2018 11/20/2018   PHQ-9 Total Score 25 1   Q9: Thoughts of better off dead/self-harm past 2 weeks Nearly every day Not at all     PHQ-9 score of 1 and [place] score of 1.     Rheumatoid arthritis involving multiple sites, unspecified rheumatoid factor presence  She does not have a regular exercise routine secondary to having arthritis in her knees. She follows up with rheumatology regularly.     S/P colostomy   Pt reports that she has the colostomy bag for life. She follows up with Dr. Lundy.     Essential hypertension  BP Readings from Last 3 Encounters:   10/04/19 (!) 142/60   11/20/18 118/70   10/22/18 104/82     Pt reports that her b/p at home are around 139/70 but it is always higher in the morning. She notes that her reading at home is less than when she is in the clinic.     Osteoporosis. Patient had a recent bone density revealing osteoporosis.    Suicide Assessment Five-step Evaluation and Treatment (SAFE-T)      How many servings of fruits and vegetables do you eat daily?  0-1    On average, how many sweetened beverages do you drink each day (soda, juice, sweet tea, etc)?   0    How many days per week do you miss taking your medication? 0    Recent Labs   Lab Test 10/17/18  0712 10/16/18  0837 10/15/18  0602  10/08/18  0610  10/06/18  0520    TRIG  --   --  146  --  84  --  94   ALT  --   --  33  --  15  --  9   CR 0.68 0.57 0.65   < > 0.58   < > 0.56   GFRESTIMATED 83 >90 88   < > >90   < > >90   GFRESTBLACK >90 >90 >90   < > >90   < > >90   POTASSIUM 4.1 4.1 4.7   < > 4.0   < > 3.4    < > = values in this interval not displayed.   Reviewed and updated as needed this visit by Provider  Allergies  Meds       Review of Systems   ROS COMP: CONSTITUTIONAL: NEGATIVE for fever, chills, change in weight  RESP: NEGATIVE for significant cough or SOB  CV: NEGATIVE for chest pain, palpitations or peripheral edema  PSYCHIATRIC: NEGATIVE for changes in mood or affect  This document serves as a record of the services and decisions personally performed and made by Whitley Nichole MD. It was created on her behalf by Deanna Odell, a trained medical scribe. The creation of this document is based on the provider's statements to the medical scribe.  Deanna Odell October 4, 2019 9:59 AM       Objective    BP (!) 142/60 (BP Location: Left arm, Patient Position: Sitting, Cuff Size: Adult Regular)   Pulse 60   Temp 97.5  F (36.4  C) (Oral)   Resp 16   Ht 1.524 m (5')   Wt 56.7 kg (124 lb 14.4 oz)   SpO2 98%   BMI 24.39 kg/m    Body mass index is 24.39 kg/m .  Physical Exam   GENERAL: healthy, alert and no distress  RESP: lungs clear to auscultation - no rales, rhonchi or wheezes  CV: regular rate and rhythm, normal S1 S2, no S3 or S4, no murmur, click or rub, no peripheral edema and peripheral pulses strong  PSYCH: mentation appears normal, affect normal/bright    Diagnostic Test Results:  Labs reviewed in Epic  No results found for this or any previous visit (from the past 24 hour(s)).        Assessment & Plan     (F32.0) Mild major depression (H)  (primary encounter diagnosis)  Comment: PHQ-9 score of 1.   Plan: Continue with current regime.     (M06.9) Rheumatoid arthritis involving multiple sites, unspecified rheumatoid factor presence (H)  Comment: stable  Plan:  Pt follows up with rheumatology.    (Z93.3) S/P colostomy (H)  Comment: Stable.  Plan: Pt follow up with a specialist.     (I48.0) Paroxysmal atrial fibrillation with RVR, perioperative  Comment: rate-controlled  Plan: metoprolol tartrate (LOPRESSOR) 25 MG tablet,         TSH with free T4 reflex            (I10) Essential hypertension  Comment: Not at goal.  Plan: losartan (COZAAR) 100 MG tablet, Comprehensive         metabolic panel, CBC with platelets         differential    -f/u to recheck the blood pressure in 1 month    (Z13.220) Screening cholesterol level  Comment: Screen  Plan: Lipid panel reflex to direct LDL Fasting            (M81.0) Age related osteoporosis, unspecified pathological fracture presence  Comment: bone density through outside facility  Plan: ENDOCRINOLOGY ADULT REFERRAL        Recommended pt to be seen by a specialist.     (E55.9) Vitamin D deficiency  Comment: Screen  Plan: Vitamin D Deficiency                         FUTURE APPOINTMENTS:  Return in about 6 months (around 4/4/2020) for Routine Visit.    The information in this document, created by the medical scribe for me, accurately reflects the services I personally performed and the decisions made by me. I have reviewed and approved this document for accuracy.     October 4, 2019 10:14 AM   Whitley Nichole MD  [facility]

## 2019-10-05 ASSESSMENT — ANXIETY QUESTIONNAIRES: GAD7 TOTAL SCORE: 1

## 2019-10-06 LAB — DEPRECATED CALCIDIOL+CALCIFEROL SERPL-MC: 54 UG/L (ref 20–75)

## 2019-10-10 ENCOUNTER — ALLIED HEALTH/NURSE VISIT (OUTPATIENT)
Dept: NURSING | Facility: CLINIC | Age: 84
End: 2019-10-10
Payer: MEDICARE

## 2019-10-10 DIAGNOSIS — Z23 NEED FOR PROPHYLACTIC VACCINATION AND INOCULATION AGAINST INFLUENZA: Primary | ICD-10-CM

## 2019-10-10 PROCEDURE — 99207 ZZC NO CHARGE NURSE ONLY: CPT

## 2019-10-10 PROCEDURE — G0008 ADMIN INFLUENZA VIRUS VAC: HCPCS

## 2019-10-10 PROCEDURE — 90662 IIV NO PRSV INCREASED AG IM: CPT

## 2019-10-18 NOTE — DISCHARGE SUMMARY
[facility] Discharge Summary    Brea Kerr MRN# [id number]   Age: 82 year old YOB: 1935     Date of Admission:  9/30/2018  Date of Discharge::  10/17/2018  Admitting Physician:  Concepción García MD  Discharge Physician:  Nash Paris MD     Home clinic: [facility]          Admission Diagnoses:   Sepsis, due to unspecified organism (H) [A41.9]          Discharge Diagnosis:   Principal Problem:    Fluid-responsive shock due to acute infection with a fib/RVR  Active Problems:    Sepsis (H)    Colovesical fistula    Paroxysmal atrial fibrillation with RVR, perioperative    Abscess of sigmoid colon due to diverticulitis    S/P colostomy (H)    S/P partial resection of colon            Procedures:   CT scan of the abdomen and pelvis.  Open sigmoid colectomy with colostomy.          Medications Prior to Admission:     Prescriptions Prior to Admission   Medication Sig Dispense Refill Last Dose     aspirin 81 MG EC tablet Take 81 mg by mouth At Bedtime    9/29/2018 at pm     folic acid (FOLVITE) 1 MG tablet Take 1 mg by mouth daily   9/29/2018 at Unknown time     Vitamin D, Cholecalciferol, 1000 units CAPS Take 1,000 Units by mouth daily   9/29/2018 at Unknown time     albuterol (PROAIR HFA/PROVENTIL HFA/VENTOLIN HFA) 108 (90 Base) MCG/ACT inhaler Inhale 2 puffs into the lungs every 6 hours as needed for shortness of breath / dyspnea or wheezing   More than a month at Unknown time     [DISCONTINUED] ciprofloxacin (CIPRO) 500 MG tablet Take 1 tablet (500 mg) by mouth every 12 hours for 10 days 20 tablet 0 9/29/2018 at pm     [DISCONTINUED] losartan (COZAAR) 100 MG tablet Take 100 mg by mouth daily   9/28/2018 at Unknown time     [DISCONTINUED] metoprolol tartrate (LOPRESSOR) 25 MG tablet Take 25 mg by mouth 2 times daily   9/30/2018 at am     [DISCONTINUED] metroNIDAZOLE (FLAGYL) 500 MG tablet Take 1 tablet (500 mg) by mouth every 8 hours for 10 days 30 tablet 0 9/30/2018 at am      [DISCONTINUED] sodium chloride, PF, (NORMAL SALINE FLUSH) 0.9% PF flush 5 mLs by Intracatheter route every 8 hours 450 mL 0 9/29/2018 at Unknown time             Discharge Medications:     Current Discharge Medication List      START taking these medications    Details   diclofenac (VOLTAREN) 1 % GEL topical gel Place 2 g onto the skin 4 times daily  Qty: 120 g, Refills: 0    Associated Diagnoses: Arthritis of knee      order for DME Equipment being ordered: Walker Wheels () and Walker ()  Treatment Diagnosis: impaired gait  Qty: 1 each, Refills: 0    Associated Diagnoses: Sepsis, due to unspecified organism (H)      traZODone (DESYREL) 50 MG tablet Take 1 tablet (50 mg) by mouth nightly as needed for sleep  Qty: 60 tablet, Refills: 0    Associated Diagnoses: Primary insomnia         CONTINUE these medications which have CHANGED    Details   metoprolol tartrate (LOPRESSOR) 50 MG tablet Take 1 tablet (50 mg) by mouth 2 times daily (with meals)  Qty: 60 tablet, Refills: 0    Associated Diagnoses: Paroxysmal atrial fibrillation with RVR (H)         CONTINUE these medications which have NOT CHANGED    Details   aspirin 81 MG EC tablet Take 81 mg by mouth At Bedtime       folic acid (FOLVITE) 1 MG tablet Take 1 mg by mouth daily      Vitamin D, Cholecalciferol, 1000 units CAPS Take 1,000 Units by mouth daily      albuterol (PROAIR HFA/PROVENTIL HFA/VENTOLIN HFA) 108 (90 Base) MCG/ACT inhaler Inhale 2 puffs into the lungs every 6 hours as needed for shortness of breath / dyspnea or wheezing         STOP taking these medications       ciprofloxacin (CIPRO) 500 MG tablet Comments:   Reason for Stopping:         losartan (COZAAR) 100 MG tablet Comments:   Reason for Stopping:         metroNIDAZOLE (FLAGYL) 500 MG tablet Comments:   Reason for Stopping:         sodium chloride, PF, (NORMAL SALINE FLUSH) 0.9% PF flush Comments:   Reason for Stopping:                     Consultations:   Consultation during this  Wounds admission received from cardiology and colorectal surgery.            Hospital Course:   Brea Kerr is an 82 year old female who came to attention on 9/30/2018 with passing stool in her urine.      Her recent PMH is notable for hospitalization at [facility] from 9/19 - 9/23/18 for perforated diverticulitis with abscess formation.  She had undergone placement of a NARAYAN drain into the abscess on 9/20 under CT guidance and was discharged home on oral Cipro and Metronidazole.      With this re-admission, she reported abdominal pain, but she thought it was not much changed from when she had the NARAYAN drain placed on 9/20/18.  On 9/30, at the time of re-admission there is evidence (by CT scan) that feculent urine output is due to colovesicular fistula.      More remote PMH notable for prior episodes of sigmoid diverticulitis and associated paroxysmal atrial fibrillation. With this in mind, it is noted that after arriving in the ED, the patient developed A fib with RVR (rate > 140) and her blood pressure fell to the 60's for which reason she was admitted to the ICU and started on Amiodarone and very briefly on Norepi.  However with the Amio and conversion to NSR, in addition to fluids, her BP came up and Norepi was able to be stopped.      On 10/2/2018 the patient was taken to the operating room by Dr. Reddy and underwent sigmoid colectomy with end colostomy after which she was re-admitted to the ICU.  Ms. Kerr had done very well with the procedure but postoperatively again developed recurrent atrial fibrillation with rapid ventricular response.  Again her blood pressure dropped.   Following amiodarone re-load, diltiazem drip and digoxin she converted to NSR.   She transferred out of the ICU on 10/4.  At that point, amiodarone was not continued.      Following the procedure, Ms. Kerr had significant problems tolerating oral nutrition, so an NG was placed with plans for trophic feeds. However, she did not tolerate even very  low-volume feeds, so TPN was started.    Fortunately, with time and supportive care, the patient was able to resume eating and to mobilize.      Currently, doing well in terms of a.fib on metoprolol and has remained in NSR.     /60 (BP Location: Right arm)  Pulse 80  Temp 97.6  F (36.4  C) (Oral)  Resp 18  Ht 1.524 m (5')  Wt 50 kg (110 lb 3.2 oz)  SpO2 93%  BMI 21.52 kg/m2  At the time of discharge, Ms. Kerr is alert, coherent and in NAD.   Heart: RRR without R/M/G  Chest: CTA. No increased WOB.  Abd: left lower quadrant ostomy is healthy appearing. No remarkable abd wall tenderness.   Extrem: no edema.          Discharge Instructions and Follow-Up:   Discharge diet: Low fiber, frequent meals, nutritional supplements   Discharge activity: Activity as tolerated with limited lifting per CRS.   Discharge follow-up: With Dr. Reddy in 4 weeks.   I have arranged for pt to establish care with Dr. Nichole at [facility].           Discharge Disposition:   Discharged to home      Attestation:  I have reviewed today's vital signs, notes, medications, labs and imaging.  Total time: 40 minutes    Nash Paris MD

## 2019-10-28 ENCOUNTER — TRANSFERRED RECORDS (OUTPATIENT)
Dept: HEALTH INFORMATION MANAGEMENT | Facility: CLINIC | Age: 84
End: 2019-10-28

## 2019-10-30 ENCOUNTER — HEALTH MAINTENANCE LETTER (OUTPATIENT)
Age: 84
End: 2019-10-30

## 2019-11-07 ENCOUNTER — APPOINTMENT (OUTPATIENT)
Dept: MRI IMAGING | Facility: CLINIC | Age: 84
DRG: 065 | End: 2019-11-07
Attending: EMERGENCY MEDICINE
Payer: MEDICARE

## 2019-11-07 ENCOUNTER — NURSE TRIAGE (OUTPATIENT)
Dept: INTERNAL MEDICINE | Facility: CLINIC | Age: 84
End: 2019-11-07

## 2019-11-07 ENCOUNTER — HOSPITAL ENCOUNTER (INPATIENT)
Facility: CLINIC | Age: 84
LOS: 2 days | Discharge: HOME | DRG: 065 | End: 2019-11-09
Attending: EMERGENCY MEDICINE | Admitting: INTERNAL MEDICINE
Payer: MEDICARE

## 2019-11-07 ENCOUNTER — ALLIED HEALTH/NURSE VISIT (OUTPATIENT)
Dept: NURSING | Facility: CLINIC | Age: 84
End: 2019-11-07
Payer: MEDICARE

## 2019-11-07 VITALS — SYSTOLIC BLOOD PRESSURE: 142 MMHG | HEART RATE: 76 BPM | OXYGEN SATURATION: 96 % | DIASTOLIC BLOOD PRESSURE: 86 MMHG

## 2019-11-07 DIAGNOSIS — Z53.9 DIAGNOSIS FOR ++++ WALK IN CLINIC ++++: Primary | ICD-10-CM

## 2019-11-07 DIAGNOSIS — I67.1 ANEURYSM, CAROTID ARTERY, INTERNAL: ICD-10-CM

## 2019-11-07 DIAGNOSIS — I63.9 CEREBROVASCULAR ACCIDENT (CVA), UNSPECIFIED MECHANISM (H): ICD-10-CM

## 2019-11-07 DIAGNOSIS — N39.0 URINARY TRACT INFECTION WITHOUT HEMATURIA, SITE UNSPECIFIED: ICD-10-CM

## 2019-11-07 DIAGNOSIS — N30.00 ACUTE CYSTITIS WITHOUT HEMATURIA: Primary | ICD-10-CM

## 2019-11-07 LAB
ALBUMIN UR-MCNC: 10 MG/DL
ANION GAP SERPL CALCULATED.3IONS-SCNC: 6 MMOL/L (ref 3–14)
APPEARANCE UR: CLEAR
BASOPHILS # BLD AUTO: 0.1 10E9/L (ref 0–0.2)
BASOPHILS NFR BLD AUTO: 0.8 %
BILIRUB UR QL STRIP: NEGATIVE
BUN SERPL-MCNC: 24 MG/DL (ref 7–30)
CALCIUM SERPL-MCNC: 9 MG/DL (ref 8.5–10.1)
CHLORIDE SERPL-SCNC: 103 MMOL/L (ref 94–109)
CO2 SERPL-SCNC: 29 MMOL/L (ref 20–32)
COLOR UR AUTO: YELLOW
CREAT SERPL-MCNC: 0.83 MG/DL (ref 0.52–1.04)
CREAT SERPL-MCNC: 0.9 MG/DL (ref 0.52–1.04)
DIFFERENTIAL METHOD BLD: ABNORMAL
EOSINOPHIL # BLD AUTO: 0.2 10E9/L (ref 0–0.7)
EOSINOPHIL NFR BLD AUTO: 3.4 %
ERYTHROCYTE [DISTWIDTH] IN BLOOD BY AUTOMATED COUNT: 13.4 % (ref 10–15)
GFR SERPL CREATININE-BSD FRML MDRD: 60 ML/MIN/{1.73_M2}
GFR SERPL CREATININE-BSD FRML MDRD: 66 ML/MIN/{1.73_M2}
GLUCOSE BLDC GLUCOMTR-MCNC: 133 MG/DL (ref 70–99)
GLUCOSE SERPL-MCNC: 98 MG/DL (ref 70–99)
GLUCOSE UR STRIP-MCNC: NEGATIVE MG/DL
HCT VFR BLD AUTO: 43 % (ref 35–47)
HGB BLD-MCNC: 14 G/DL (ref 11.7–15.7)
HGB UR QL STRIP: NEGATIVE
HYALINE CASTS #/AREA URNS LPF: 7 /LPF (ref 0–2)
IMM GRANULOCYTES # BLD: 0 10E9/L (ref 0–0.4)
IMM GRANULOCYTES NFR BLD: 0.3 %
KETONES UR STRIP-MCNC: NEGATIVE MG/DL
LEUKOCYTE ESTERASE UR QL STRIP: ABNORMAL
LYMPHOCYTES # BLD AUTO: 1.6 10E9/L (ref 0.8–5.3)
LYMPHOCYTES NFR BLD AUTO: 25.1 %
MCH RBC QN AUTO: 33.2 PG (ref 26.5–33)
MCHC RBC AUTO-ENTMCNC: 32.6 G/DL (ref 31.5–36.5)
MCV RBC AUTO: 102 FL (ref 78–100)
MONOCYTES # BLD AUTO: 1.1 10E9/L (ref 0–1.3)
MONOCYTES NFR BLD AUTO: 16.8 %
MUCOUS THREADS #/AREA URNS LPF: PRESENT /LPF
NEUTROPHILS # BLD AUTO: 3.4 10E9/L (ref 1.6–8.3)
NEUTROPHILS NFR BLD AUTO: 53.6 %
NITRATE UR QL: NEGATIVE
NRBC # BLD AUTO: 0 10*3/UL
NRBC BLD AUTO-RTO: 0 /100
PH UR STRIP: 6 PH (ref 5–7)
PLATELET # BLD AUTO: 182 10E9/L (ref 150–450)
PLATELET # BLD AUTO: 196 10E9/L (ref 150–450)
POTASSIUM SERPL-SCNC: 3.9 MMOL/L (ref 3.4–5.3)
RBC # BLD AUTO: 4.22 10E12/L (ref 3.8–5.2)
RBC #/AREA URNS AUTO: 6 /HPF (ref 0–2)
SODIUM SERPL-SCNC: 138 MMOL/L (ref 133–144)
SOURCE: ABNORMAL
SP GR UR STRIP: 1.02 (ref 1–1.03)
SQUAMOUS #/AREA URNS AUTO: <1 /HPF (ref 0–1)
TRANS CELLS #/AREA URNS HPF: 1 /HPF (ref 0–1)
TROPONIN I SERPL-MCNC: <0.015 UG/L (ref 0–0.04)
UROBILINOGEN UR STRIP-MCNC: 4 MG/DL (ref 0–2)
WBC # BLD AUTO: 6.4 10E9/L (ref 4–11)
WBC #/AREA URNS AUTO: 45 /HPF (ref 0–5)

## 2019-11-07 PROCEDURE — 84484 ASSAY OF TROPONIN QUANT: CPT | Performed by: EMERGENCY MEDICINE

## 2019-11-07 PROCEDURE — 36415 COLL VENOUS BLD VENIPUNCTURE: CPT

## 2019-11-07 PROCEDURE — 99223 1ST HOSP IP/OBS HIGH 75: CPT | Mod: AI | Performed by: INTERNAL MEDICINE

## 2019-11-07 PROCEDURE — 82565 ASSAY OF CREATININE: CPT | Performed by: INTERNAL MEDICINE

## 2019-11-07 PROCEDURE — 87086 URINE CULTURE/COLONY COUNT: CPT | Performed by: EMERGENCY MEDICINE

## 2019-11-07 PROCEDURE — 25000128 H RX IP 250 OP 636: Performed by: INTERNAL MEDICINE

## 2019-11-07 PROCEDURE — 70549 MR ANGIOGRAPH NECK W/O&W/DYE: CPT

## 2019-11-07 PROCEDURE — 85049 AUTOMATED PLATELET COUNT: CPT | Performed by: INTERNAL MEDICINE

## 2019-11-07 PROCEDURE — 12000000 ZZH R&B MED SURG/OB

## 2019-11-07 PROCEDURE — 00000146 ZZHCL STATISTIC GLUCOSE BY METER IP

## 2019-11-07 PROCEDURE — 25500064 ZZH RX 255 OP 636: Performed by: EMERGENCY MEDICINE

## 2019-11-07 PROCEDURE — 25000132 ZZH RX MED GY IP 250 OP 250 PS 637: Mod: GY | Performed by: INTERNAL MEDICINE

## 2019-11-07 PROCEDURE — 70544 MR ANGIOGRAPHY HEAD W/O DYE: CPT

## 2019-11-07 PROCEDURE — 85025 COMPLETE CBC W/AUTO DIFF WBC: CPT | Performed by: EMERGENCY MEDICINE

## 2019-11-07 PROCEDURE — 80048 BASIC METABOLIC PNL TOTAL CA: CPT | Performed by: EMERGENCY MEDICINE

## 2019-11-07 PROCEDURE — 25000132 ZZH RX MED GY IP 250 OP 250 PS 637: Mod: GY | Performed by: EMERGENCY MEDICINE

## 2019-11-07 PROCEDURE — A9585 GADOBUTROL INJECTION: HCPCS | Performed by: EMERGENCY MEDICINE

## 2019-11-07 PROCEDURE — 99285 EMERGENCY DEPT VISIT HI MDM: CPT | Mod: 25

## 2019-11-07 PROCEDURE — 70553 MRI BRAIN STEM W/O & W/DYE: CPT

## 2019-11-07 PROCEDURE — 36415 COLL VENOUS BLD VENIPUNCTURE: CPT | Performed by: INTERNAL MEDICINE

## 2019-11-07 PROCEDURE — 93005 ELECTROCARDIOGRAM TRACING: CPT

## 2019-11-07 PROCEDURE — 81001 URINALYSIS AUTO W/SCOPE: CPT | Performed by: EMERGENCY MEDICINE

## 2019-11-07 RX ORDER — VITAMIN B COMPLEX
1000 TABLET ORAL DAILY
Status: DISCONTINUED | OUTPATIENT
Start: 2019-11-08 | End: 2019-11-09 | Stop reason: HOSPADM

## 2019-11-07 RX ORDER — AMOXICILLIN 250 MG
2 CAPSULE ORAL 2 TIMES DAILY PRN
Status: DISCONTINUED | OUTPATIENT
Start: 2019-11-07 | End: 2019-11-09 | Stop reason: HOSPADM

## 2019-11-07 RX ORDER — CEFTRIAXONE 1 G/1
1 INJECTION, POWDER, FOR SOLUTION INTRAMUSCULAR; INTRAVENOUS EVERY 24 HOURS
Status: DISCONTINUED | OUTPATIENT
Start: 2019-11-07 | End: 2019-11-09 | Stop reason: HOSPADM

## 2019-11-07 RX ORDER — GADOBUTROL 604.72 MG/ML
7.5 INJECTION INTRAVENOUS ONCE
Status: COMPLETED | OUTPATIENT
Start: 2019-11-07 | End: 2019-11-07

## 2019-11-07 RX ORDER — POTASSIUM CHLORIDE 7.45 MG/ML
10 INJECTION INTRAVENOUS
Status: DISCONTINUED | OUTPATIENT
Start: 2019-11-07 | End: 2019-11-09 | Stop reason: HOSPADM

## 2019-11-07 RX ORDER — HYDRALAZINE HYDROCHLORIDE 20 MG/ML
10 INJECTION INTRAMUSCULAR; INTRAVENOUS EVERY 6 HOURS PRN
Status: DISCONTINUED | OUTPATIENT
Start: 2019-11-07 | End: 2019-11-09 | Stop reason: HOSPADM

## 2019-11-07 RX ORDER — ONDANSETRON 2 MG/ML
4 INJECTION INTRAMUSCULAR; INTRAVENOUS EVERY 6 HOURS PRN
Status: DISCONTINUED | OUTPATIENT
Start: 2019-11-07 | End: 2019-11-09 | Stop reason: HOSPADM

## 2019-11-07 RX ORDER — LOSARTAN POTASSIUM 100 MG/1
100 TABLET ORAL DAILY
Status: DISCONTINUED | OUTPATIENT
Start: 2019-11-08 | End: 2019-11-09 | Stop reason: HOSPADM

## 2019-11-07 RX ORDER — POTASSIUM CHLORIDE 29.8 MG/ML
20 INJECTION INTRAVENOUS
Status: DISCONTINUED | OUTPATIENT
Start: 2019-11-07 | End: 2019-11-07

## 2019-11-07 RX ORDER — POTASSIUM CHLORIDE 1500 MG/1
20-40 TABLET, EXTENDED RELEASE ORAL
Status: DISCONTINUED | OUTPATIENT
Start: 2019-11-07 | End: 2019-11-09 | Stop reason: HOSPADM

## 2019-11-07 RX ORDER — POTASSIUM CHLORIDE 1.5 G/1.58G
20-40 POWDER, FOR SOLUTION ORAL
Status: DISCONTINUED | OUTPATIENT
Start: 2019-11-07 | End: 2019-11-09 | Stop reason: HOSPADM

## 2019-11-07 RX ORDER — METOPROLOL TARTRATE 25 MG/1
25 TABLET, FILM COATED ORAL 2 TIMES DAILY
Status: DISCONTINUED | OUTPATIENT
Start: 2019-11-07 | End: 2019-11-09 | Stop reason: HOSPADM

## 2019-11-07 RX ORDER — ACETAMINOPHEN 325 MG/1
650 TABLET ORAL EVERY 4 HOURS PRN
Status: DISCONTINUED | OUTPATIENT
Start: 2019-11-07 | End: 2019-11-09 | Stop reason: HOSPADM

## 2019-11-07 RX ORDER — ASPIRIN 81 MG/1
81 TABLET ORAL AT BEDTIME
Status: DISCONTINUED | OUTPATIENT
Start: 2019-11-08 | End: 2019-11-09 | Stop reason: HOSPADM

## 2019-11-07 RX ORDER — NALOXONE HYDROCHLORIDE 0.4 MG/ML
.1-.4 INJECTION, SOLUTION INTRAMUSCULAR; INTRAVENOUS; SUBCUTANEOUS
Status: DISCONTINUED | OUTPATIENT
Start: 2019-11-07 | End: 2019-11-09 | Stop reason: HOSPADM

## 2019-11-07 RX ORDER — CEPHALEXIN 500 MG/1
500 CAPSULE ORAL ONCE
Status: COMPLETED | OUTPATIENT
Start: 2019-11-07 | End: 2019-11-07

## 2019-11-07 RX ORDER — AMOXICILLIN 250 MG
1 CAPSULE ORAL 2 TIMES DAILY PRN
Status: DISCONTINUED | OUTPATIENT
Start: 2019-11-07 | End: 2019-11-09 | Stop reason: HOSPADM

## 2019-11-07 RX ORDER — POTASSIUM CL/LIDO/0.9 % NACL 10MEQ/0.1L
10 INTRAVENOUS SOLUTION, PIGGYBACK (ML) INTRAVENOUS
Status: DISCONTINUED | OUTPATIENT
Start: 2019-11-07 | End: 2019-11-09 | Stop reason: HOSPADM

## 2019-11-07 RX ORDER — LIDOCAINE 40 MG/G
CREAM TOPICAL
Status: DISCONTINUED | OUTPATIENT
Start: 2019-11-07 | End: 2019-11-09 | Stop reason: HOSPADM

## 2019-11-07 RX ORDER — ONDANSETRON 4 MG/1
4 TABLET, ORALLY DISINTEGRATING ORAL EVERY 6 HOURS PRN
Status: DISCONTINUED | OUTPATIENT
Start: 2019-11-07 | End: 2019-11-09 | Stop reason: HOSPADM

## 2019-11-07 RX ORDER — ASPIRIN 81 MG/1
324 TABLET, CHEWABLE ORAL ONCE
Status: COMPLETED | OUTPATIENT
Start: 2019-11-07 | End: 2019-11-07

## 2019-11-07 RX ADMIN — METOPROLOL TARTRATE 25 MG: 25 TABLET ORAL at 21:56

## 2019-11-07 RX ADMIN — CEFTRIAXONE SODIUM 1 G: 1 INJECTION, POWDER, FOR SOLUTION INTRAMUSCULAR; INTRAVENOUS at 21:55

## 2019-11-07 RX ADMIN — ASPIRIN 81 MG 324 MG: 81 TABLET ORAL at 18:32

## 2019-11-07 RX ADMIN — CEPHALEXIN 500 MG: 500 CAPSULE ORAL at 17:32

## 2019-11-07 RX ADMIN — GADOBUTROL 10 ML: 604.72 INJECTION INTRAVENOUS at 16:22

## 2019-11-07 ASSESSMENT — ENCOUNTER SYMPTOMS
HEADACHES: 0
VOMITING: 0
CONFUSION: 1
DIZZINESS: 0
SPEECH DIFFICULTY: 1

## 2019-11-07 ASSESSMENT — MIFFLIN-ST. JEOR: SCORE: 972.38

## 2019-11-07 NOTE — TELEPHONE ENCOUNTER
Reason for Disposition    New neurologic deficit that is present NOW, sudden onset of ANY of the following: * Weakness of the face, arm, or leg on one side of the body * Numbness of the face, arm, or leg on one side of the body * Loss of speech or garbled speech    Additional Information    Negative: Difficult to awaken or acting confused (e.g., disoriented, slurred speech)    Protocols used: NEUROLOGIC DEFICIT-A-OH

## 2019-11-07 NOTE — TELEPHONE ENCOUNTER
This encounter was used to populate protocol while patient was in clinic for triage. See 11/7/19 allied nursing visit note for documentation.

## 2019-11-07 NOTE — PROGRESS NOTES
Patient and patient's daughter walked in to clinic today with concerns of stroke symptoms. Writer roomed patient for further triage.    Daughter states she was informed by another family member that on Tuesday, patient had an episode where she was having word finding trouble, and have garbled speech. At the time, her blood pressure was 180/95. Patient denies any one-sided weakness or numbness at the time of this episode.    Daughter states that patient has had word finding trouble since this episode on and off. Daughter states patient has not been aware that she has been having issues with speech. Patient denies any one-sided weakness or numbness, confusion, vision changes, or headache. No facial droop apparent. Patient has equal arm and leg strength bilaterally. Shawnpeer reports history of hypertension and high cholesterol. Dad passed from stroke.     Writer advised ER. Patient and daughter refused. Writer explained concern for stroke or TIA. Daughter asked to schedule a future appointment instead. Writer huddled with covering provider for primary care provider, Bev Wheat NP, who agrees patient should go to the ER.    Writer advised patient and daughter of covering provider's instructions, both in agreement to go to ER. Writer offered to bring patient to ER in a wheelchair, patient refused. Daughter drove patient to ER, writer assisted patient out to the car.

## 2019-11-07 NOTE — ED PROVIDER NOTES
History     Chief Complaint:  Hypertension and Slurred Speech    The history is provided by a relative.      Brea Kerr is a 83 year old female with a history of hypertension, paroxysmal atrial fibrillation, among others who presents with daughter for evaluation of altered mental status that began 2 days ago. Beginning two days ago the patient's sons noticed she began having slurred speech and noted high blood pressure, 180/95. Symptoms alleviated, which is why they did not present before. Today around 1100 her brothers noticed she had slurred speech and difficulty putting her thoughts together. Symptoms not subsiding prompted their presentation. Here, the patient denies any chest pain, emesis, dizziness, headache, or recent falls.    Allergies:  Hmg-Coa-R inhibitors  Lisinopril  Sulfa drugs     Medications:    Albuterol  Aspirin 81 mg  Benadryl  Folvite  Cozaar  Methotrexate  Lopressor  Centrum    Past Medical History:    Diverticula  Hypertension  Colovesical fistula  Paroxysmal atrial fibrillation  Abscess of sigmoid colon  Colostomy  Depression  RA     Past Surgical History:    Colostomy  Splenectomy    Family History:    Mother - CAD, Cerebrovascular disease  Father - CAD, Cerebrovascular disease  Brother - Diabetes    Social History:  The patient was accompanied to the ED by daughter.  Smoking Status: Never  Smokeless Tobacco: Never  Alcohol Use: Not currently  Drug Use: No   Marital Status:   [2]     Review of Systems   Cardiovascular: Negative for chest pain.   Gastrointestinal: Negative for vomiting.   Neurological: Positive for speech difficulty. Negative for dizziness and headaches.   Psychiatric/Behavioral: Positive for confusion.       Physical Exam     Patient Vitals for the past 24 hrs:   BP Temp Temp src Pulse Heart Rate Resp SpO2 Height Weight   11/07/19 1540 -- -- -- -- 59 14 96 % -- --   11/07/19 1535 -- -- -- -- 61 11 98 % -- --   11/07/19 1530 (!) 166/86 -- -- 63 -- -- 97 % -- --  "  11/07/19 1522 -- 98.5  F (36.9  C) Temporal -- 62 18 97 % 1.549 m (5' 1\") 57 kg (125 lb 10.6 oz)      Physical Exam  General: Well-nourished, nontoxic  Eyes: PERRL, EOMI, no nystagmus.  No scleral icterus or conjunctival injection.    ENT:  Moist mucus membranes, posterior oropharynx clear without erythema or exudates. TM normal bilaterally  Neck: Supple with full range of motion  Respiratory:  Lungs clear to auscultation bilaterally, no crackles/rubs/wheezes.  Good air movement  CV: Normal rate and rhythm, no murmurs/rubs/gallops  GI:  Abdomen soft and non-distended.  No tenderness, guarding or rebound  Skin: Warm, dry.  No rashes or petechiae  MSK: No peripheral edema or calf tenderness  Neuro: Alert and oriented to person/place/time.  No aphasia/facial droop/dysarthria.  Tongue midline, normal strength at SCM/trapezius/BUE/BLE.  Normal finger to nose. Normal gait.  Negative romberg, sensation intact over face/BUE/BLE  Psychiatric: Normal affect    Emergency Department Course     ECG:  ECG taken at 1542, ECG read at 1542 Dr. Glenna Belle, DO  Normal sinus rhythm  Normal ECG  Rate 75 bpm. NH interval 156. QRS duration 86. QT/QTc 420/469. P-R-T axes 30 -17 16.     Imaging:  Radiology findings were communicated with the patient who voiced understanding of the findings.    MR Brain w/o & w Contrast  IMPRESSION:  1.  Numerous acute/subacute left MCA vascular distribution cortical and subcortical infarcts as detailed above, without evidence of hemorrhagic transformation or significant mass effect.  2.  Otherwise there is age-appropriate generalized brain volume loss and a mild to moderate degree of nonspecific T2 FLAIR hyperintensity, likely related to chronic small vessel vascular change.  Reading per radiology.     MRA Neck (Carotids) wo & w Contrast  IMPRESSION:  1.  Normal neck MRA.  Reading per radiology.     MRA Brain ([place]) wo Contrast  IMPRESSION:  1.  No evidence of significant intracranial " stenosis. Note made of a mild stenosis, less than 50%, in the M1 segment of left middle cerebral artery.  2.  There are bilateral supraclinoid ICA aneurysms as detailed above  Reading per radiology.     Laboratory:  Laboratory findings were communicated with the patient who voiced understanding of the findings.    CBC: AWNL (WBC 6.4, HGB 14.0, )   BMP: GFR 60 (L) o/w WNL (Creatinine 0.90)   Troponin (Collected 1606): <0.015   Glucose by Meter (Collected 1531): 133 (H)     UA with Microscopic: Protein Albumin Urine 10 (H), Urobilinogen mg/dL 4.0 (H), Leukocyte Esterase Urine Large (A), WBC/HPF 45 (H), RBC/HPF 6 (H), Mucous Urine Present (A), Hyaline Casts 7 (H) o/w WNL   Urine Culture Aerobic Bacterial: Pending    Interventions:  1732 Keflex 500 mg PO   1832 Aspirin 324 mg PO    Emergency Department Course:  Nursing notes and vitals reviewed.  EKG obtained in the ED, see results above.    The patient was sent for a MR Brain w/o & w Contrast, MRA Neck (Carotids) wo & w Contrast, MRA Brain ([place]) wo Contrast while in the emergency department, results above.    IV was inserted and blood was drawn for laboratory testing, results above.   The patient provided a urine sample here in the emergency department. This was sent for laboratory testing, findings above.     (1526)   I performed an exam of the patient as documented above. History obtained from daughter.    (1719)   I updated patient with results and plan of care.    (1830)   I updated patient and family with results and plan of care.    (1833)   I spoke with Dr. Key of the Stroke Neurology service regarding patient's presentation, findings, and plan of care.     (1900)   I spoke with Dr. Aggarwal of the Hospitalist service regarding patient's presentation, findings, and plan of care.     Findings and plan explained to the Patient and daughter who consents to admission. Discussed the patient with Dr. Aggarwal, who will admit the patient to a  medicine bed for further monitoring, evaluation, and treatment.     Impression & Plan      Medical Decision Making:  Patient is an 83-year-old female presenting with reported word finding difficulty.  Daughter states that she was last seen normal a few days ago.  I discussed with both patient and daughter she is not a TPA candidate given symptoms greater than 4.5 hours.  She is without obvious focal neuro deficit at bedside.  EKG without focal ischemia or underlying arrhythmia.  Screening troponin negative.  Patient denies any active chest pain and I doubt ACS.  Her labs are reassuring.  UA does suggest infection today however, urine culture sent.  Patient received a dose of Keflex. there is no evidence to suggest sepsis.  She did undergo formal MRI which confirms numerous acute/subacute left MCA subcortical infarcts.  MRA shows bilateral ICA aneurysms though no indication for emergent endovascular intervention.  Stroke neuro made aware of patient.  She received an ASA during her time in the ED.  Patient remained hemodyncamically stable but would benefit from admission for further echo/carotid duplex.     [employer] Stroke Scale  Exam Interval: Baseline   Score    Level of consciousness: (0)   Alert, keenly responsive    LOC questions: (0)   Answers both questions correctly    LOC commands: (0)   Performs both tasks correctly    Best gaze: (0)   Normal    Visual: (0)   No visual loss    Facial palsy: (0)   Normal symmetrical movements    Motor arm (left): (0)   No drift    Motor arm (right): (0)   No drift    Motor leg (left): (0)   No drift    Motor leg (right): (0)   No drift    Limb ataxia: (0)   Absent    Sensory: (0)   Normal- no sensory loss    Best language: (0)   Normal- no aphasia    Dysarthria: (0)   Normal    Extinction and inattention: (0)   No abnormality        Total Score:  0      Diagnosis:    ICD-10-CM    1. Urinary tract infection without hematuria, site unspecified N39.0    2.  Cerebrovascular accident (CVA), unspecified mechanism (H) I63.9    3. Aneurysm, carotid artery, internal I67.1         Disposition:   Admission.    Scribe Disclosure:  I, Geovanny Hays, am serving as a scribe at 3:35 PM on 11/7/2019 to document services personally performed by Glenna Belle DO, based on my observations and the provider's statements to me.  11/7/2019   [facility] EMERGENCY DEPARTMENT       Glenna Belle DO  11/07/19 1903

## 2019-11-08 ENCOUNTER — APPOINTMENT (OUTPATIENT)
Dept: OCCUPATIONAL THERAPY | Facility: CLINIC | Age: 84
DRG: 065 | End: 2019-11-08
Attending: INTERNAL MEDICINE
Payer: MEDICARE

## 2019-11-08 ENCOUNTER — APPOINTMENT (OUTPATIENT)
Dept: SPEECH THERAPY | Facility: CLINIC | Age: 84
DRG: 065 | End: 2019-11-08
Attending: INTERNAL MEDICINE
Payer: MEDICARE

## 2019-11-08 ENCOUNTER — APPOINTMENT (OUTPATIENT)
Dept: CARDIOLOGY | Facility: CLINIC | Age: 84
DRG: 065 | End: 2019-11-08
Attending: INTERNAL MEDICINE
Payer: MEDICARE

## 2019-11-08 ENCOUNTER — APPOINTMENT (OUTPATIENT)
Dept: PHYSICAL THERAPY | Facility: CLINIC | Age: 84
DRG: 065 | End: 2019-11-08
Attending: INTERNAL MEDICINE
Payer: MEDICARE

## 2019-11-08 LAB
BACTERIA SPEC CULT: NORMAL
CHOLEST SERPL-MCNC: 201 MG/DL
ERYTHROCYTE [DISTWIDTH] IN BLOOD BY AUTOMATED COUNT: 13.2 % (ref 10–15)
HBA1C MFR BLD: 5.2 % (ref 0–5.6)
HCT VFR BLD AUTO: 39.6 % (ref 35–47)
HDLC SERPL-MCNC: 34 MG/DL
HGB BLD-MCNC: 12.9 G/DL (ref 11.7–15.7)
LDLC SERPL CALC-MCNC: 149 MG/DL
Lab: NORMAL
MCH RBC QN AUTO: 33.5 PG (ref 26.5–33)
MCHC RBC AUTO-ENTMCNC: 32.6 G/DL (ref 31.5–36.5)
MCV RBC AUTO: 103 FL (ref 78–100)
NONHDLC SERPL-MCNC: 167 MG/DL
PLATELET # BLD AUTO: 177 10E9/L (ref 150–450)
RBC # BLD AUTO: 3.85 10E12/L (ref 3.8–5.2)
SPECIMEN SOURCE: NORMAL
TRIGL SERPL-MCNC: 92 MG/DL
WBC # BLD AUTO: 5.3 10E9/L (ref 4–11)

## 2019-11-08 PROCEDURE — 93306 TTE W/DOPPLER COMPLETE: CPT | Mod: 26 | Performed by: INTERNAL MEDICINE

## 2019-11-08 PROCEDURE — 25000132 ZZH RX MED GY IP 250 OP 250 PS 637: Mod: GY | Performed by: INTERNAL MEDICINE

## 2019-11-08 PROCEDURE — 83036 HEMOGLOBIN GLYCOSYLATED A1C: CPT | Performed by: INTERNAL MEDICINE

## 2019-11-08 PROCEDURE — 97165 OT EVAL LOW COMPLEX 30 MIN: CPT | Mod: GO

## 2019-11-08 PROCEDURE — 92507 TX SP LANG VOICE COMM INDIV: CPT | Mod: GN

## 2019-11-08 PROCEDURE — 80061 LIPID PANEL: CPT | Performed by: INTERNAL MEDICINE

## 2019-11-08 PROCEDURE — 40000264 ECHOCARDIOGRAM COMPLETE

## 2019-11-08 PROCEDURE — 36415 COLL VENOUS BLD VENIPUNCTURE: CPT | Performed by: INTERNAL MEDICINE

## 2019-11-08 PROCEDURE — 92523 SPEECH SOUND LANG COMPREHEN: CPT | Mod: GN

## 2019-11-08 PROCEDURE — 12000000 ZZH R&B MED SURG/OB

## 2019-11-08 PROCEDURE — 97535 SELF CARE MNGMENT TRAINING: CPT | Mod: GO

## 2019-11-08 PROCEDURE — G0426 INPT/ED TELECONSULT50: HCPCS | Mod: G0 | Performed by: NURSE PRACTITIONER

## 2019-11-08 PROCEDURE — 85027 COMPLETE CBC AUTOMATED: CPT | Performed by: INTERNAL MEDICINE

## 2019-11-08 PROCEDURE — 25800025 ZZH RX 258: Performed by: INTERNAL MEDICINE

## 2019-11-08 PROCEDURE — 25000128 H RX IP 250 OP 636: Performed by: INTERNAL MEDICINE

## 2019-11-08 PROCEDURE — 83036 HEMOGLOBIN GLYCOSYLATED A1C: CPT | Performed by: NURSE PRACTITIONER

## 2019-11-08 PROCEDURE — 97161 PT EVAL LOW COMPLEX 20 MIN: CPT | Mod: GP | Performed by: PHYSICAL THERAPIST

## 2019-11-08 PROCEDURE — 99233 SBSQ HOSP IP/OBS HIGH 50: CPT | Performed by: INTERNAL MEDICINE

## 2019-11-08 RX ORDER — ACYCLOVIR 200 MG/1
30 CAPSULE ORAL ONCE
Status: COMPLETED | OUTPATIENT
Start: 2019-11-08 | End: 2019-11-08

## 2019-11-08 RX ADMIN — MELATONIN 1000 UNITS: at 09:57

## 2019-11-08 RX ADMIN — METOPROLOL TARTRATE 25 MG: 25 TABLET ORAL at 21:51

## 2019-11-08 RX ADMIN — LOSARTAN POTASSIUM 100 MG: 100 TABLET ORAL at 09:56

## 2019-11-08 RX ADMIN — ASPIRIN 81 MG: 81 TABLET, COATED ORAL at 21:51

## 2019-11-08 RX ADMIN — CEFTRIAXONE SODIUM 1 G: 1 INJECTION, POWDER, FOR SOLUTION INTRAMUSCULAR; INTRAVENOUS at 21:56

## 2019-11-08 RX ADMIN — SODIUM CHLORIDE 30 ML: 9 INJECTION, SOLUTION INTRAMUSCULAR; INTRAVENOUS; SUBCUTANEOUS at 09:41

## 2019-11-08 RX ADMIN — APIXABAN 2.5 MG: 2.5 TABLET, FILM COATED ORAL at 21:51

## 2019-11-08 RX ADMIN — METOPROLOL TARTRATE 25 MG: 25 TABLET ORAL at 09:56

## 2019-11-08 ASSESSMENT — ACTIVITIES OF DAILY LIVING (ADL)
PREVIOUS_RESPONSIBILITIES: MEAL PREP;HOUSEKEEPING;LAUNDRY;MEDICATION MANAGEMENT;FINANCES;DRIVING
FALL_HISTORY_WITHIN_LAST_SIX_MONTHS: NO
ADLS_ACUITY_SCORE: 16
BATHING: 0-->INDEPENDENT
ADLS_ACUITY_SCORE: 16
TOILETING: 0-->INDEPENDENT
SWALLOWING: 0-->SWALLOWS FOODS/LIQUIDS WITHOUT DIFFICULTY
TRANSFERRING: 0-->INDEPENDENT
AMBULATION: 0-->INDEPENDENT
ADLS_ACUITY_SCORE: 13
RETIRED_COMMUNICATION: 0-->UNDERSTANDS/COMMUNICATES WITHOUT DIFFICULTY
ADLS_ACUITY_SCORE: 13
RETIRED_EATING: 0-->INDEPENDENT
DRESS: 0-->INDEPENDENT
COGNITION: 0 - NO COGNITION ISSUES REPORTED
ADLS_ACUITY_SCORE: 16
ADLS_ACUITY_SCORE: 16

## 2019-11-08 ASSESSMENT — MIFFLIN-ST. JEOR: SCORE: 962.57

## 2019-11-08 NOTE — PLAN OF CARE
Discharge Planner SLP   Patient plan for discharge: Did not discuss  Current status: Pt seen for speech/language evaluation. She denies dysphagia, no documentation indicating difficulty swallowing. She reports difficulty with word finding and organizing her words. She feels this has improved since yesterday but is still below baseline and frustrating. Pt demonstrated adequate attention, is fully oriented, participated WFL in the areas of memory and problem solving t/o informal observation within speech/lang evaluatoin. Pt followed multi step directions with one error at the 3 step level. Pt's verbal expression is notable for deteriorating speech intelligibility with increased sentence lenght or when speaking quickly. She presents with phonemic errors, hesistancies, and intermittent word finding deficits. Pt able to name objects, their functions without difficulty and items to categories functional. Word finding and phonemic errors noted x 2 - pt was able to self correct.     Pt presents with functional language comprehenion at the basic-moderate level (higher levels not assessed today). She also presents with mild-moderate deficits in verbal expression appearing to be a combination of dysarthria and aphasia. Initiate SLP services for speech/language deficits.      Barriers to return to prior living situation:Acuity of illness  Recommendations for discharge: home with OP SLP  Rationale for recommendations: OP SLP for ongoing evaluation and treatment of speech/language skills given they are below baseline, impacting her interactions with others and result in frustration          Entered by: Haley Camacho 11/08/2019 11:39 AM

## 2019-11-08 NOTE — ED NOTES
[facility]  ED Nurse Handoff Report    Brea Kerr is a 83 year old female   ED Chief complaint: Hypertension and Slurred Speech  . ED Diagnosis:   Final diagnoses:   Urinary tract infection without hematuria, site unspecified   Cerebrovascular accident (CVA), unspecified mechanism (H)     Allergies:   Allergies   Allergen Reactions     Hmg-Coa-R Inhibitors      PN: LW Reaction: muscle pain     Lisinopril      PN: LW Reaction: Cough     Sulfa Drugs      Nausea         Code Status: Full Code  Activity level - Baseline/Home:  Independent. Activity Level - Current:   Stand by Assist. Lift room needed: No. Bariatric: No   Needed: No   Isolation: No. Infection: Not Applicable.     Vital Signs:   Vitals:    11/07/19 1540 11/07/19 1545 11/07/19 1600 11/07/19 1615   BP:  (!) 160/81 (!) 153/76 137/78   Pulse:  60 66 61   Resp: 14 17 11 9   Temp:       TempSrc:       SpO2: 96% 95%  96%   Weight:       Height:           Cardiac Rhythm:  ,      Pain level: 0-10 Pain Scale: 0  Patient confused: No. Patient Falls Risk: Yes.   Elimination Status: Has voided   Patient Report - Initial Complaint: Slurred speech. Focused Assessment: A&O x4, ABCs intact. Pt came into ED with daughter for slurred speech that patient has had for over 24 hrs. Pt has no other deficits. Pt ambulated to restroom with SBA. No gait abnormalities noted. Tele is NSR. Pt does have slight left leg and arm drift.  Tests Performed: lab, imaging. Abnormal Results:   Labs Ordered and Resulted from Time of ED Arrival Up to the Time of Departure from the ED   BASIC METABOLIC PANEL - Abnormal; Notable for the following components:       Result Value    GFR Estimate 60 (*)     All other components within normal limits   CBC WITH PLATELETS DIFFERENTIAL - Abnormal; Notable for the following components:     (*)     MCH 33.2 (*)     All other components within normal limits   ROUTINE UA WITH MICROSCOPIC - Abnormal; Notable for the following  components:    Protein Albumin Urine 10 (*)     Urobilinogen mg/dL 4.0 (*)     Leukocyte Esterase Urine Large (*)     WBC Urine 45 (*)     RBC Urine 6 (*)     Mucous Urine Present (*)     Hyaline Casts 7 (*)     All other components within normal limits   GLUCOSE BY METER - Abnormal; Notable for the following components:    Glucose 133 (*)     All other components within normal limits   TROPONIN I   GLUCOSE MONITOR NURSING POCT   URINE CULTURE AEROBIC BACTERIAL     MRA Neck (Carotids) wo & w Contrast   Preliminary Result   IMPRESSION:   1.  Normal neck MRA.      MRA Brain ([place]) wo Contrast   Preliminary Result   IMPRESSION:   1.  No evidence of significant intracranial stenosis. Note made of a mild stenosis, less than 50%, in the M1 segment of left middle cerebral artery.      2.  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            MR Brain w/o & w Contrast   Preliminary Result   IMPRESSION:   1.  Numerous acute/subacute left MCA vascular distribution cortical and subcortical infarcts as detailed above, without evidence of hemorrhagic transformation or significant mass effect.      2.  Otherwise there is age-appropriate generalized brain volume loss and a mild to moderate degree of nonspecific T2 FLAIR hyperintensity, likely related to chronic small vessel vascular change.              .   Treatments provided: See MAR  Family Comments: none  OBS brochure/video discussed/provided to patient:  N/A  ED Medications:   Medications   gadobutrol (GADAVIST) injection 7.5 mL (10 mLs Intravenous Given 11/7/19 1622)   sodium chloride (PF) 0.9% PF flush 60 mL (60 mLs Intravenous Given 11/7/19 1630)   cephALEXin (KEFLEX) capsule 500 mg (500 mg Oral Given 11/7/19 1732)   aspirin (ASA) chewable tablet 324 mg (324 mg Oral Given 11/7/19 1832)     Drips infusing:  No  For the majority of the shift, the patient's behavior Green. Interventions performed were None.     Severe Sepsis OR Septic Shock  Diagnosis Present: No      ED Nurse Name/Phone Number: Keon Metzger, RN,   6:45 PM  'RECEIVING UNIT ED HANDOFF REVIEW    Above ED Nurse Handoff Report was reviewed: Yes  Reviewed by: Lesly Garzon, RN on November 7, 2019 at 8:21 PM   Did you vocera the ED RN:

## 2019-11-08 NOTE — CONSULTS
"  [facility]    Stroke Consult Note    Reason for Consult:  \"CVA, hx of paroxysmal a-fib\"    Chief Complaint: Hypertension and Slurred Speech       HPI  Brea Kerr is a 83 year old female with past medical history significant for HTN and paroxysmal atrial fibrillation who presented to the [facility] ED 11/7 for evaluation of multiple episodes of slurred speech and word finding difficulty. She reports that on Tuesday she first experienced \"garbled speech\", however, this resolved so she did not seek evaluation. Blood pressure at this time was 180/95. Yesterday, she had another episode of word-finding difficulty that did not resolve, so she was brought to the ED for evaluation. She denies associated focal weakness, facial droop, numbness, headache, nausea or vomiting.     Presenting BP was 166/86. Initial MRI brain demonstrated acute/subacute left MCA distribution infarcts. MRA brain notes mild L M1 stenosis <50%. MRA neck was unremarkable. She was admitted for further stroke evaluation and management.     Stroke Evaluation summarized:  MRI/Head CT: MRI brain per HPI.   Intracranial Vascular Imaging: MRA brain per HPI  Cervical Carotid and Vertebral Artery Vascular Imaging: MRA neck per HPI  Echocardiogram: Awaiting  EKG/Telemetry: Sinus  LDL: 149  A1c: 5.2  Troponin: <0.015  Other testing: Not Applicable    Impression  Ischemic Stroke due to cardioembolism - L MCA    Recommendations  - Awaiting echo. Will follow results, however, we do not anticipate this will .   - , patient reports a history of myalgias with prior statin use. Discussed that patient may be a candidate for a PCSK9 inhibitor given that she has had a stroke and failed prior statin therapy. This can be discussed with her PCP in the outpatient setting. Goal LDL 40-90.   - A1c 5.2, within goal <7.0  - Permissive HTN today, then goal BP <140/90. Discussed the importance of home BP monitoring and keeping a log for PCP. " "She is interested in being contacted by study coordinators to hear more about the mGlide BP study  - Patient has documented history of paroxysmal atrial fibrillation. Although previously associated only with acute illness, given the patient's cardioembolic appearing infarct and severely dilated left atrium seen on prior TTE, anticoagulation would be indicated. We discussed the risks, benefits, and pricing of Eliquis, and the patient would like to proceed with this medication.   - PT/OT/SLP    Stroke Education  -[facility]    Patient Follow-up    - in the next 1 week(s) with PCP  - in 4-6 weeks with local neurologist    Thank you for this consult. No further stroke evaluation is recommended, so we will sign off. Please contact us with any additional questions.    YENY Santiago, CNP  Neurology  11/08/2019 1:39 PM  Text Page (8am-5pm)  To page stroke neurology after hours or on a subsequent day, click here: [facility]  Choose \"On Call\" tab at top, then search dropdown box for \"Neurology Adult\" & press Enter, look for Neuro ICU/Stroke  _____________________________________________________    Past Medical History   Past Medical History:   Diagnosis Date     Diverticula, colon      Hypertension      Past Surgical History   Past Surgical History:   Procedure Laterality Date     LAPAROSCOPIC ASSISTED COLOSTOMY N/A 10/2/2018    Procedure: LAPAROSCOPIC ASSISTED COLOSTOMY;;  Surgeon: Emma Reddy MD;  Location: [facility] OR     LAPAROSCOPIC ASSISTED SIGMOID COLECTOMY N/A 10/2/2018    Procedure: LAPAROSCOPIC ASSISTED SIGMOID COLECTOMY;  Exploratory laparoscopy, open sigmoid colectomy with end colostomy, extensive lysis of adhesions, and takedown of colovesical fistula with drain removal and drain placement;  Surgeon: Emma Reddy MD;  Location: [facility] OR     SPLENECTOMY       Medications   Home Meds  Prior to Admission medications    Medication Sig Start Date End Date Taking? Authorizing Provider   acetaminophen (TYLENOL) 500 " MG tablet Take 500-1,000 mg by mouth every 6 hours as needed for mild pain   Yes Reported, Patient   albuterol (PROAIR HFA/PROVENTIL HFA/VENTOLIN HFA) 108 (90 Base) MCG/ACT inhaler Inhale 2 puffs into the lungs every 6 hours as needed for shortness of breath / dyspnea or wheezing   Yes Unknown, Entered By History   aspirin 81 MG EC tablet Take 81 mg by mouth At Bedtime    Yes Unknown, Entered By History   DiphenhydrAMINE HCl (BENADRYL PO) Take 50 mg by mouth daily as needed   Yes Reported, Patient   folic acid (FOLVITE) 1 MG tablet Take 1 mg by mouth daily   Yes Unknown, Entered By History   ibuprofen (ADVIL/MOTRIN) 600 MG tablet TK 1 T PO Q 6 H PRN P 10/22/18  Yes Whitley Nichole MD   losartan (COZAAR) 100 MG tablet Take 1 tablet (100 mg) by mouth daily 10/4/19  Yes Whitley Nichole MD   METHOTREXATE PO Take 5 mg by mouth once a week    Yes Reported, Patient   metoprolol tartrate (LOPRESSOR) 25 MG tablet TAKE 1 TABLET(25 MG) BY MOUTH TWICE DAILY 10/4/19  Yes Whitley Nichole MD   Multiple Vitamins-Minerals (CENTRUM SILVER) per tablet Take 1 tablet by mouth daily   Yes Reported, Patient   Vitamin D, Cholecalciferol, 1000 units CAPS Take 1,000 Units by mouth daily   Yes Unknown, Entered By History   order for DME Equipment being ordered: Walker Wheels () and Walker ()  Treatment Diagnosis: impaired gait 10/15/18   Nash Paris MD       Scheduled Meds    apixaban ANTICOAGULANT  2.5 mg Oral BID     aspirin  81 mg Oral At Bedtime     cefTRIAXone  1 g Intravenous Q24H     losartan  100 mg Oral Daily     metoprolol tartrate  25 mg Oral BID     sodium chloride (PF)  3 mL Intracatheter Q8H     Vitamin D3  1,000 Units Oral Daily       Infusion Meds      PRN Meds  acetaminophen, hydrALAZINE, lidocaine 4%, lidocaine (buffered or not buffered), melatonin, naloxone, ondansetron **OR** ondansetron, potassium chloride, potassium chloride with lidocaine, potassium chloride, potassium chloride,  senna-docusate **OR** senna-docusate, sodium chloride (PF)    Allergies   Allergies   Allergen Reactions     Hmg-Coa-R Inhibitors      PN: LW Reaction: muscle pain     Lisinopril      PN: LW Reaction: Cough     Sulfa Drugs      Nausea       Family History   Family History   Problem Relation Age of Onset     Coronary Artery Disease Mother      Cerebrovascular Disease Mother      Coronary Artery Disease Father      Cerebrovascular Disease Father      Diabetes Brother      Social History   Social History     Tobacco Use     Smoking status: Never Smoker     Smokeless tobacco: Never Used   Substance Use Topics     Alcohol use: Not Currently     Frequency: Never     Drug use: Not on file       Review of Systems   The 10 point Review of Systems is negative other than noted in the HPI or here.       PHYSICAL EXAMINATION   Temp:  [96.5  F (35.8  C)-98.5  F (36.9  C)] 98.5  F (36.9  C)  Pulse:  [57-76] 61  Heart Rate:  [53-62] 61  Resp:  [9-20] 20  BP: (113-179)/(50-96) 145/56  SpO2:  [94 %-98 %] 94 %    Neurologic  Mental Status:  alert, oriented x 3, follows commands, naming and repetition normal, speech clear and fluent with occassional word finding difficulty  Cranial Nerves:  visual fields intact (tested by nurse), EOMI with normal smooth pursuit, facial sensation intact and symmetric (tested by nurse), facial movements symmetric, hearing not formally tested but intact to conversation, shoulder shrug equal bilaterally, tongue protrusion midline  Motor:  no abnormal movements, able to move all limbs antigravity spontaneously with no signs of hemiparesis observed, no pronator drift  Reflexes:  unable to test (telestroke)  Sensory:  light touch sensation intact and symmetric throughout upper and lower extremities (assessed by nurse), no extinction on double simultaneous stimulation (assessed by nurse)  Coordination:  normal finger-to-nose and heel-to-shin bilaterally without dysmetria, rapid alternating movements  symmetric  Station/Gait:  unable to test due to telestroke    Dysphagia Screen  Per Nursing    Stroke Scales    NIHSS  Interval     Interval Comments     1a. Level of Consciousness 0-->Alert, keenly responsive   1b. LOC Questions 0-->Answers both questions correctly   1c. LOC Commands 0-->Performs both tasks correctly   2.   Best Gaze 0-->Normal   3.   Visual 0-->No visual loss   4.   Facial Palsy 0-->Normal symmetrical movements   5a. Motor Arm, Left 0-->No drift, limb holds 90 (or 45) degrees for full 10 secs   5b. Motor Arm, Right 0-->No drift, limb holds 90 (or 45) degrees for full 10 secs   6a. Motor Leg, Left 0-->No drift, leg holds 30 degree position for full 5 secs   6b. Motor Leg, right 0-->No drift, leg holds 30 degree position for full 5 secs   7.   Limb Ataxia 0-->Absent   8.   Sensory 0-->Normal, no sensory loss   9.   Best Language 1-->Mild-to-moderate aphasia, some obvious loss of fluency or facility of comprehension, without significant limitation on ideas expressed or form of expression. Reduction of speech and/or comprehension, however, makes conversation. . . (see row details)   10. Dysarthria 0-->Normal   11. Extinction and Inattention  0-->No abnormality   Total 1 (11/08/19 1337)       Imaging  I personally reviewed all imaging; relevant findings per HPI.    Labs Data   CBC  Recent Labs   Lab 11/08/19  0722 11/07/19  2106 11/07/19  1606   WBC 5.3  --  6.4   RBC 3.85  --  4.22   HGB 12.9  --  14.0   HCT 39.6  --  43.0    182 196     Basic Metabolic Panel   Recent Labs   Lab 11/07/19  2106 11/07/19  1606   NA  --  138   POTASSIUM  --  3.9   CHLORIDE  --  103   CO2  --  29   BUN  --  24   CR 0.83 0.90   GLC  --  98   PAULINA  --  9.0     Liver Panel  Recent Labs   Lab Test 10/04/19  1012 10/15/18  0602 10/08/18  0610   PROTTOTAL 7.3 6.3* 4.9*   ALBUMIN 3.7 2.1* 1.4*   BILITOTAL 1.1 0.5 0.4   ALKPHOS 85 109 51   AST 23 28 31   ALT 19 33 15     INR  Recent Labs   Lab Test 10/15/18  0602  10/08/18  0610 10/06/18  0520   INR 1.12 1.22* 1.15*      Lipid Profile  Recent Labs   Lab Test 11/08/19  0722 10/04/19  1012 10/15/18  0602   CHOL 201* 227*  --    HDL 34* 40*  --    * 161*  --    TRIG 92 129 146     A1C  Recent Labs   Lab Test 11/08/19  0722   A1C 5.2     Troponin I  Recent Labs   Lab 11/07/19  1606   TROPI <0.015          Stroke Code / Stroke Consult Data Data Telestroke Service Details  (for non-emergent stroke consult with tele)  Video start time 11/08/19   1225   Video end time 11/08/19   1253   Type of service telemedicine diagnostic assessment of acute neurological changes   Reason telemedicine is appropriate patient requires assessment with a specialist for diagnosis and treatment of neurological symptoms   Mode of transmission secure interactive audio and video communication per [employer]   Originating site (patient location) [facility]    Distant site (provider location) [facility]       I have personally spent a total of 50 minutes providing care and consulting with this patient's medical providers today, with more than 50% of this time spent in consultation, coordination of care, and discussion with the patient and/or family regarding diagnostic results, prognosis, symptom management, risks and benefits of management options, and development of plan of care.

## 2019-11-08 NOTE — PLAN OF CARE
"Discharge Planner PT   Patient plan for discharge: return home  Current status: PT: Order received; 1x evaluation completed. At baseline patient reports living in a home with spouse; with 1 step to enter; stairs to basement level but patient doesn't have access; Independent with mobility without an assistive device; no recent falls. Patient presented with mental status changes and elevated blood pressure, slurring speech and intermittent episodes of trouble finding words.  MRI brain showed numerous subacute acute ischemic infarcts in MCA territory.  On eval patient feels her strength and mobility haven't been impacted; She feels she is at baseline with this but has difficulty with speech and feels she has to \" think about things longer\" in regards to cognition. Patient noted to be independent with mobility, tx, and gait in hallway > 150 feet; able to go up and down 1 step with handrail and mod I; appears to have equal strength and intact coordination;Balance appears intact Oriented during session. No PT needs currently identified at this time. Will complete current order.  Barriers to return to prior living situation: none anticipated; will have assist of spouse if needs arise  Recommendations for discharge: Home with spouse  Rationale for recommendations: Patient currently has no functional mobility/PT needs identified; Appears safe to return home from a mobility standpoint; reports she has spouse assist should she have any needs.       Entered by: Peri Tristan 11/08/2019 2:52 PM      "

## 2019-11-08 NOTE — PLAN OF CARE
SLP: Pt having completing ECHO on time of multiple attempts this AM. Talked with pt briefly she does not feel she is having any swallowing difficulty but notes her speech/language is below baseline.

## 2019-11-08 NOTE — PLAN OF CARE
OT: Evaluation and treatment initiated. Pt lives in a home with her spouse. Prior  pt independent in all I/ADLs. Per chart-Patient presented with mental status changes and elevated blood pressure, slurring speech and intermittent episodes of trouble finding words.  MRI brain showed numerous subacute acute ischemic infarcts in MCA territory.  Discharge Planner OT   Patient plan for discharge: Home    Current status: Pt transferred to/from the toilet with Mod I. Pt completed LE dressing with Mod I. Completed the MOCA Cognitive Screen, pt scored 20/30 indicating cognitive impairment. Pt demonstrated difficulty with delayed recall, language, and visuospatial tasks. This Score may be impacted by patient's word finding difficulty and this should be taken account for MOCA Screen. Further cognitive testing warranted.      Barriers to return to prior living situation: Current level of A for I/ADLs; cognition needs further assessment     Recommendations for discharge: Home with assist for I/ADLs (including medication and finance management, meal preparation) and OP OT     Rationale for recommendations: OP OT to further address cognition and the implications on I/ADLs.        Entered by: Inocencio Suazo 11/08/2019 3:54 PM

## 2019-11-08 NOTE — PHARMACY
Anticoagulation coverage check.  Patient has Medicare D through [employer] with $94 (of $415) unmet deductible.    Xarelto/Eliquis  Nov:  Upon receipt of RX, Discharge Pharmacy can provide 1 month free.  Dec: $187 ($154 at [facility], [facility], or [facility])  (Assuming patient stays with same plan in 2020:)  Jan 2020: $455 (fulfills $435 deductible)  Feb-Aug: $46/mo ($29/mo at [facility], [facility], or [facility])  Sept-Dec: $114/mo (coverage gap)    Pradaxa is non-formulary and more expensive.    Jantoven (warfarin)  $11/mo      -SAMREEN Edward, Pharmacy Technician/Liaison, Discharge Pharmacy [phone]

## 2019-11-08 NOTE — PLAN OF CARE
Pt is a/o. Speech is slow at times. Up with SBA.    Tele: NSR.  LS: Clear.   No complains of pain.   Will continue to monitor.

## 2019-11-08 NOTE — PLAN OF CARE
Presentation/Diagnosis: Pt admitted  due to slurred speech and difficulty word finding. Pt diagnosed with CVA.   History: HTN, A-fib, diverticulitis, RA, depression  Labs/Protocols: K protocol. K: 4, Na: 127, Cl: 93, Ma.1, WBC: 21.7, Hgb: 7.5, Platelets: 542  Vitals: BP elevated, other VSS on RA. Pt denies pain this shift.   Cardiac: Bradycardic   Telemetry: Sinus tk with inverted Ts.   Respiratory: WDL  Neuro: A&Ox4. Neuro checks Q 4 hours. Tele stroke done this shift.   GI/: WDL ex Colostomy.   Skin: WDL  LDAs: PIV to R, SL.   Diet: Regular diet, fair appetite.   Activity: SBA with GB.   Teaching: Pt educated on stroke and plan of care.   Plan: Continue to monitor with neuro checks Q 4 hours. Plan to discharge home tomorrow on PO anticoagulant.    Will continue to monitor.

## 2019-11-08 NOTE — PHARMACY-ADMISSION MEDICATION HISTORY
Admission medication history interview status for this patient is complete. See [facility] admission navigator for allergy information, prior to admission medications and immunization status.     Medication history interview source(s):Patient  Medication history resources (including written lists, pill bottles, clinic record):None    Changes made to PTA medication list:  Added: none  Deleted: voltaren gel  Changed: methotrexate    Actions taken by pharmacist (provider contacted, etc):None     Additional medication history information:None    Medication reconciliation/reorder completed by provider prior to medication history? No    For patients on insulin therapy: no (Yes/No)   Lantus/levemir/NPH/Mix 70/30 dose: ___ in AM/PM or twice daily   Sliding scale Novolog Y/N   If Yes, do you have a baseline novolog pre-meal dose: ______units with meals   Patients eat three meals a day: Y/N ---  How many episodes of hypoglycemia (low blood glucose) do you have weekly: ---   How many missed doses do you have a week: ---  How many times do you check your blood glucose per day: ---  Any Barriers to therapy: cost of medications/comfortable with giving injections (if applicable)/ comfortable and confident with current diabetes regimen ---      Prior to Admission medications    Medication Sig Last Dose Taking? Auth Provider   acetaminophen (TYLENOL) 500 MG tablet Take 500-1,000 mg by mouth every 6 hours as needed for mild pain  Yes Reported, Patient   albuterol (PROAIR HFA/PROVENTIL HFA/VENTOLIN HFA) 108 (90 Base) MCG/ACT inhaler Inhale 2 puffs into the lungs every 6 hours as needed for shortness of breath / dyspnea or wheezing  Yes Unknown, Entered By History   aspirin 81 MG EC tablet Take 81 mg by mouth At Bedtime  11/7/2019 at Unknown time Yes Unknown, Entered By History   DiphenhydrAMINE HCl (BENADRYL PO) Take 50 mg by mouth daily as needed  Yes Reported, Patient   folic acid (FOLVITE) 1 MG tablet Take 1 mg by mouth daily 11/7/2019  at Unknown time Yes Unknown, Entered By History   ibuprofen (ADVIL/MOTRIN) 600 MG tablet TK 1 T PO Q 6 H PRN P  Yes Whitley Nichole MD   losartan (COZAAR) 100 MG tablet Take 1 tablet (100 mg) by mouth daily 11/7/2019 at am Yes Whitley Nichole MD   METHOTREXATE PO Take 5 mg by mouth once a week  Past Week at e Yes Reported, Patient   metoprolol tartrate (LOPRESSOR) 25 MG tablet TAKE 1 TABLET(25 MG) BY MOUTH TWICE DAILY 11/7/2019 at am Yes Whitley Nichole MD   Multiple Vitamins-Minerals (CENTRUM SILVER) per tablet Take 1 tablet by mouth daily 11/7/2019 at am Yes Reported, Patient   Vitamin D, Cholecalciferol, 1000 units CAPS Take 1,000 Units by mouth daily 11/7/2019 at am Yes Unknown, Entered By History   order for DME Equipment being ordered: Walker Wheels () and Walker ()  Treatment Diagnosis: impaired gait   Nash Paris MD

## 2019-11-08 NOTE — PROGRESS NOTES
[facility]    Hospitalist Progress Note  Name: Brea Kerr    MRN: [id number]  Provider: Carina Blount MD  Date of Service: 11/08/2019    Assessment & Plan   Summary of Stay: Brea Kerr is a 83 year old female who was admitted on 11/7/2019 for acute subacute CVA.  Patient's past medical history significant for paroxysmal atrial fibrillation, recent hospitalization for sepsis complicated with diverticulitis requiring sigmoidectomy not on chronic anticoagulation, hypertension, dyslipidemia(intolerant to statins resulting in severe myopathy), rheumatoid arthritis on methotrexate.  Presented to the emergency room with mental status changes and elevated blood pressure, slurring speech and intermittent episodes of trouble finding words.  MRI brain showed numerous subacute acute ischemic infarcts in MCA territory.  MRA neck negative for stenosis.    Acute stroke  --MRI/MRA brain on 11/7/2019 confirmed numerous subacute and acute infarcts  --Likely embolic ischemia due to history of atrial fibrillation patient was not on anticoagulation at admission.  --Also history of uncontrolled hypertension  --Permissive hypertension today  --Echo with bubble study ordered  --Neurology consult for stroke  -- will start Eliquis  --PT OT and speech eval    History of paroxysmal atrial fibrillation  --Rate controlled  --started eliquis    Suspected UTI  --Started on Keflex will continue for now    Dyslipidemia  --Not on statins.  --Since discontinued 10 years ago due to significant myopathies    History of rheumatoid arthritis  --On methotrexate              DVT Prophylaxis: We will start Eliquis  Code Status: Full Code    Disposition: Expected discharge in 1-2 days to home      Interval History   Assumed care reviewed chart.  Patient did have some trouble word finding however was able to talk and communicate well.  Does have some facial dissymmetry.  Status is back to normal.  Able to move upper and lower extremities.   Review of all the other symptoms are negative.    -Data reviewed today: I reviewed all new labs and imaging reports over the last 24 hours. I personally reviewed no images or EKG's today.  Telemetry strip was sinus rhythm today    Physical Exam   Temp: 98.5  F (36.9  C) Temp src: Oral BP: (!) 155/70 Pulse: 61 Heart Rate: 58 Resp: 20 SpO2: 94 % O2 Device: None (Room air)    Vitals:    11/07/19 1522 11/08/19 0340   Weight: 57 kg (125 lb 10.6 oz) 56 kg (123 lb 8 oz)     Vital Signs with Ranges  Temp:  [96.5  F (35.8  C)-98.5  F (36.9  C)] 98.5  F (36.9  C)  Pulse:  [57-76] 61  Heart Rate:  [54-62] 58  Resp:  [9-20] 20  BP: (113-179)/(50-96) 155/70  SpO2:  [94 %-98 %] 94 %  No intake/output data recorded.      GEN:  Alert, oriented x 3, appears comfortable, NAD.  Slight facial asymmetry  HEENT:  Normocephalic/atraumatic, no scleral icterus, no nasal discharge, mouth dry.  CV:  Regular rate and rhythm, no murmur or JVD.  S1 + S2 noted, no S3 or S4.  LUNGS:  Clear to auscultation bilaterally without rales/rhonchi/wheezing/retractions.  Symmetric chest rise on inhalation noted.  ABD:  Active bowel sounds, soft, non-tender/non-distended.  No rebound/guarding/rigidity.  EXT:  No edema.  No cyanosis.    SKIN:  Dry to touch, no exanthems noted in the visualized areas.    Medications       aspirin  81 mg Oral At Bedtime     cefTRIAXone  1 g Intravenous Q24H     enoxaparin ANTICOAGULANT  40 mg Subcutaneous Q24H     losartan  100 mg Oral Daily     metoprolol tartrate  25 mg Oral BID     sodium chloride (PF)  3 mL Intracatheter Q8H     Vitamin D3  1,000 Units Oral Daily     Data     Recent Labs   Lab 11/08/19  0722 11/07/19  2106 11/07/19  1606   WBC 5.3  --  6.4   HGB 12.9  --  14.0   HCT 39.6  --  43.0   *  --  102*    182 196     Recent Labs   Lab 11/07/19 2106 11/07/19  1606   NA  --  138   POTASSIUM  --  3.9   CHLORIDE  --  103   CO2  --  29   ANIONGAP  --  6   GLC  --  98   BUN  --  24   CR 0.83 0.90    GFRESTIMATED 66 60*   GFRESTBLACK 76 69   PAULINA  --  9.0     Recent Labs   Lab 11/07/19  1558   CULT PENDING     No results for input(s): AST, ALT, GGT, ALKPHOS, BILITOTAL, BILICONJ, BILIDIRECT, MARGO in the last 168 hours.    Invalid input(s): BILIRUBININDIRECT  No results for input(s): INR in the last 168 hours.  No results for input(s): LACT in the last 168 hours.  No results for input(s): LIPASE in the last 168 hours.  Recent Labs   Lab 11/07/19  1606   TROPI <0.015     Recent Labs   Lab 11/07/19  1558   COLOR Yellow   APPEARANCE Clear   URINEGLC Negative   URINEBILI Negative   URINEKETONE Negative   SG 1.024   UBLD Negative   URINEPH 6.0   PROTEIN 10*   NITRITE Negative   LEUKEST Large*   RBCU 6*   WBCU 45*       Recent Results (from the past 24 hour(s))   MR Brain w/o & w Contrast    Narrative    EXAM: MR BRAIN W/O AND W CONTRAST  LOCATION: [facility]  DATE/TIME: 11/7/2019 4:33 PM    INDICATION: Aphasia.  COMPARISON: MRA [place] of [place] from today.  CONTRAST: 10 mL Gadavist.  TECHNIQUE: Routine multiplanar multisequence head MRI without and with intravenous contrast.    FINDINGS:  INTRACRANIAL CONTENTS: There are numerous acute/subacute infarcts involving the left MCA vascular territory. There is evidence of highly restricted diffusion along the cortex of the left frontal operculum. There is additional cortically restricted   diffusion involving the cortex of the left inferior parietal lobe. There is a focus of cortically restricted diffusion involving the left insula. There is associated FLAIR signal abnormality indicating that these findings are older than 4-6 hours. No   other evidence of highly restricted diffusion. There is no evidence of hemorrhagic transformation or significant mass effect from the above-described left MCA infarcts. No mass, acute hemorrhage, or extra-axial fluid collections. Otherwise there is a   mild to moderate degree of patchy and confluent periventricular and deep  white matter T2 FLAIR hyperintensity, likely related to chronic small vessel vascular change. Mild age-appropriate generalized brain volume loss. Old appearing bilateral cerebellar   infarcts. Normal ventricles and sulci for age. Normal position of the cerebellar tonsils. No pathologic contrast enhancement.    SELLA: No abnormality accounting for technique.    OSSEOUS STRUCTURES/SOFT TISSUES: Normal marrow signal. The left vertebral artery appears dominant. There is a diminished flow void in the distal right vertebral.     ORBITS: Bilateral pseudophakia.     SINUSES/MASTOIDS: No paranasal sinus mucosal disease. No middle ear or mastoid effusion.       Impression    IMPRESSION:  1.  Numerous acute/subacute left MCA vascular distribution cortical and subcortical infarcts as detailed above, without evidence of hemorrhagic transformation or significant mass effect.    2.  Otherwise there is age-appropriate generalized brain volume loss and a mild to moderate degree of nonspecific T2 FLAIR hyperintensity, likely related to chronic small vessel vascular change.       MRA Brain ([place] of [place]) wo Contrast    Narrative    EXAM: MRA BRAIN ([place] OF [place]) WO CONTRAST  LOCATION: [facility]  DATE/TIME: 11/7/2019 4:34 PM    INDICATION: Aphasia  COMPARISON: Brain MR from today. MRA neck from today.  TECHNIQUE: 3D time-of-flight head MRA without intravenous contrast.    FINDINGS:  ANTERIOR CIRCULATION: There is no evidence of significant stenosis. Note made of a 3.5 x 3.5 mm right supraclinoid ICA aneurysm projecting superiorly and laterally, see axial image 89 of the axial time-of-flight sequence. Additionally, there is a 2.0 x   2.0 mm left paraophthalmic distal ICA aneurysm, see axial image 87 of the axial time-of-flight series. There is a mild, less than 50% stenosis within the M1 segment of the left middle cerebral artery. Standard [place] of [place] anatomy. There is fetal   origin of the left posterior  cerebral artery, a normal variant.    POSTERIOR CIRCULATION: No stenosis/occlusion, aneurysm, or high flow vascular malformation. Balanced vertebral arteries supply a normal basilar artery.       Impression    IMPRESSION:  1.  No evidence of significant intracranial stenosis. Note made of a mild stenosis, less than 50%, in the M1 segment of left middle cerebral artery.    2.  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       MRA Neck (Carotids) wo & w Contrast    Narrative    EXAM: MRA NECK (CAROTIDS) WO and W CONTRAST  LOCATION: [facility]  DATE/TIME: 11/7/2019 4:34 PM    INDICATION: Aphasia  COMPARISON: Brain MR from today. MRA [place] of [place] from today.  CONTRAST: 10 mL Gadavist  TECHNIQUE: Neck MRA without and with IV contrast. Stenosis measurements made according to NASCET criteria unless otherwise specified.    FINDINGS:  RIGHT CAROTID: No measurable stenosis or dissection.    LEFT CAROTID: No measurable stenosis or dissection.    VERTEBRAL ARTERIES: No focal stenosis or dissection. The left vertebral artery is dominant. Right vertebral artery is small in caliber throughout, particularly in the V4 segment.    AORTIC ARCH: Classic aortic arch anatomy with no significant stenosis at the origin of the great vessels.      Impression    IMPRESSION:  1.  Normal neck MRA.

## 2019-11-08 NOTE — PROGRESS NOTES
11/08/19 1445   Quick Adds   Type of Visit Initial PT Evaluation   Living Environment   Lives With spouse   Living Arrangements house   Home Accessibility stairs to enter home   Number of Stairs, Main Entrance 1   Stair Railings, Main Entrance other (see comments)  (grab bar on L)   Transportation Anticipated family or friend will provide   Living Environment Comment patient has stairs to basement level but doesn't have to access   Self-Care   Usual Activity Tolerance good   Current Activity Tolerance good   Regular Exercise No   Equipment Currently Used at Home none   Activity/Exercise/Self-Care Comment normally independent with mobility and cares prior to admit   Functional Level Prior   Ambulation 0-->independent   Transferring 0-->independent   Toileting 0-->independent   Bathing 0-->independent   Communication 0-->understands/communicates without difficulty   Swallowing 0-->swallows foods/liquids without difficulty   Cognition 0 - no cognition issues reported   Fall history within last six months no   Which of the above functional risks had a recent onset or change? none   Prior Functional Level Comment patient was independent with mobility and cares prior to admit   General Information   Onset of Illness/Injury or Date of Surgery - Date 11/07/19   Referring Physician Ranulfo Aggarwal MD   Patient/Family Goals Statement return home   Pertinent History of Current Problem (include personal factors and/or comorbidities that impact the POC) per chart:  Brea Kerr is a 83 year old female who was admitted on 11/7/2019 for acute subacute CVA.  Patient's past medical history significant for paroxysmal atrial fibrillation, recent hospitalization for sepsis complicated with diverticulitis requiring sigmoidectomy not on chronic anticoagulation, hypertension, dyslipidemia(intolerant to statins resulting in severe myopathy), rheumatoid arthritis on methotrexate.  Presented to the emergency room with mental status  "changes and elevated blood pressure, slurring speech and intermittent episodes of trouble finding words.  MRI brain showed numerous subacute acute ischemic infarcts in MCA territory.  MRA neck negative for stenosis; see medical record for further information   Precautions/Limitations fall precautions   General Observations patient in bed; agreeable to PT eval   General Info Comments Activity; up with assist   Cognitive Status Examination   Orientation orientation to person, place and time   Level of Consciousness alert   Follows Commands and Answers Questions 100% of the time   Personal Safety and Judgment intact   Memory intact   Pain Assessment   Patient Currently in Pain No   Range of Motion (ROM)   ROM Quick Adds No deficits were identified   Strength   Manual Muscle Testing Quick Adds No deficits were identified   Bed Mobility   Bed Mobility Comments independent   Transfer Skills   Transfer Comments independent   Gait   Gait Comments able to ambulate 150 feet without a device; some knee pain (baseline)   Balance   Balance Comments intact balance to testing   Sensory Examination   Sensory Perception Comments intact per pateint   Coordination   Coordination Comments intact   Muscle Tone   Muscle Tone Comments intact   General Therapy Interventions   Intervention Comments 1x eval only   Clinical Impression   Criteria for Skilled Therapeutic Intervention no;evaluation only;no problems identified which require skilled intervention;current level of function same as previous level of function   PT Diagnosis Acute CVA   Therapy Frequency Other (see comments)  (1x eval only)   Predicted Duration of Therapy Intervention (days/wks) 1x eval only   Anticipated Discharge Disposition Home   Risk & Benefits of therapy have been explained Yes   Patient, Family & other staff in agreement with plan of care Yes   [facility] AM-PAC TM \"6 Clicks\"   2016, Trustees of [facility], under license to [employer].  All " "rights reserved.   6 Clicks Short Forms Basic Mobility Inpatient Short Form   [facility] AM-PAC  \"6 Clicks\" V.2 Basic Mobility Inpatient Short Form   1. Turning from your back to your side while in a flat bed without using bedrails? 4 - None   2. Moving from lying on your back to sitting on the side of a flat bed without using bedrails? 4 - None   3. Moving to and from a bed to a chair (including a wheelchair)? 4 - None   4. Standing up from a chair using your arms (e.g., wheelchair, or bedside chair)? 4 - None   5. To walk in hospital room? 4 - None   6. Climbing 3-5 steps with a railing? 4 - None   Basic Mobility Raw Score (Score out of 24.Lower scores equate to lower levels of function) 24   Total Evaluation Time   Total Evaluation Time (Minutes) 15     "

## 2019-11-08 NOTE — PROGRESS NOTES
11/08/19 1451   Quick Adds   Type of Visit Initial Occupational Therapy Evaluation   Living Environment   Lives With spouse   Living Arrangements house  (Rambler style home with a basement )   Transportation Anticipated family or friend will provide  (Pt typically drives )   Living Environment Comment Pt presents with some word finding difficulty. Spouse can assist as needed and can assist in driving as well, per pt.    Self-Care   Usual Activity Tolerance good   Current Activity Tolerance moderate   Regular Exercise No   Functional Level   Ambulation 0-->independent   Transferring 0-->independent   Toileting 0-->independent   Bathing 0-->independent   Dressing 0-->independent   Fall history within last six months no   General Information   Onset of Illness/Injury or Date of Surgery - Date 11/07/19   Referring Physician Ranulfo Aggarwal MD   Patient/Family Goals Statement Home    Additional Occupational Profile Info/Pertinent History of Current Problem Per chart: Brea Kerr is a 83 year old female known prior history of paroxysmal A. fib with her last hospitalization (October, 2018) for sepsis, complicated recurrent diverticulitis requiring Ritchie sigmoidectomy, not on chronic anticoagulation, hypertension, dyslipidemia  (however with intolerance to statins) resulting to severe myopathy, RA on methotrexate, currently living at home independently with her family and was brought into the emergency room earlier today due to creeping blood pressure levels accompanied with mental status changes that likely occurred 2 days prior to his hospitalization with description and characterization of slurring of speech, intermittent episodes of difficulty of finding the words and putting her thoughts together. Slurring of speech, difficulty putting words, aphasia secondary to acute to subacute CVA. MRI brain showed numerous subacute acute ischemic infarcts in MCA territory.  MRA neck negative for stenosis; see medical  record for further information   Cognitive Status Examination   Orientation orientation to person, place and time   Level of Consciousness alert   Follows Commands (Cognition) WNL   Sensory Examination   Sensory Quick Adds No deficits were identified   Pain Assessment   Patient Currently in Pain Yes, see Vital Sign flowsheet   Range of Motion (ROM)   ROM Quick Adds Shoulder, Left;Shoulder, Right   ROM Comment Pt reported limited shoulder flexion/abduction secondary to arthritis.    Left Shoulder Flexion ROM   (Degrees) - Left Shoulder Flexion AROM 100   Left Shoulder ABduction ROM   (Degrees) - Left Shoulder ABduction AROM, 100   Right Shoulder Flexion ROM   (Degrees) - Right Shoulder Flexion AROM 100   Right Shoulder ABduction ROM   (Degrees) - Right Shoulder ABduction AROM, 100   Strength   Manual Muscle Testing Quick Adds No deficits were identified   Transfer Skill: Sit to Stand   Level of [place]: Sit/Stand   (Mod I)   Transfer Skill: Toilet Transfer   Level of [place]: Toilet   (Mod I)   Lower Body Dressing   Level of [place]: Dress Lower Body   (Mod I)   Instrumental Activities of Daily Living (IADL)   Previous Responsibilities meal prep;housekeeping;laundry;medication management;finances;driving   IADL Comments Spouse assists in shopping.    General Therapy Interventions   Planned Therapy Interventions ADL retraining;IADL retraining;transfer training   Clinical Impression   Criteria for Skilled Therapeutic Interventions Met yes, treatment indicated   OT Diagnosis Decreased activity tolerance for I/ADLs    Influenced by the following impairments Decreased activity tolerance for I/ADLs    Assessment of Occupational Performance 1-3 Performance Deficits   Identified Performance Deficits Decreased activity tolerance for I/ADLs  (dressing, bathing, toileting)   Clinical Decision Making (Complexity) Low complexity   Therapy Frequency Daily   Predicted Duration of Therapy Intervention (days/wks) 3  "days   Anticipated Discharge Disposition Home with Assist;Home with Outpatient Therapy  (OP OT )   Risks and Benefits of Treatment have been explained. Yes   Patient, Family & other staff in agreement with plan of care Yes   [facility] TM \"6 Clicks\"   2016, Trustees of [facility], under license to [employer].  All rights reserved.   6 Clicks Short Forms Daily Activity Inpatient Short Form   [facility]  \"6 Clicks\" Daily Activity Inpatient Short Form   1. Putting on and taking off regular lower body clothing? 4 - None   2. Bathing (including washing, rinsing, drying)? 4 - None   3. Toileting, which includes using toilet, bedpan or urinal? 4 - None   4. Putting on and taking off regular upper body clothing? 4 - None   5. Taking care of personal grooming such as brushing teeth? 4 - None   6. Eating meals? 4 - None   Daily Activity Raw Score (Score out of 24.Lower scores equate to lower levels of function) 24   Total Evaluation Time   Total Evaluation Time (Minutes) 8     "

## 2019-11-08 NOTE — PROGRESS NOTES
11/08/19 1125   General Information   Type of Visit Initial   Start of care date 11/08/19   Patient/Family Goals Statement Improve speech   General Observations WFl   Precautions/Limitations: Hearing WFL   Precautions/Limitations: Vision WFL   General Info Comments Brea Kerr is a 83 year old female known prior history of paroxysmal A. fib with her last hospitalization (October, 2018) for sepsis, complicated recurrent diverticulitis requiring Ritchie sigmoidectomy, not on chronic anticoagulation, hypertension, dyslipidemia  (however with intolerance to statins) resulting to severe myopathy, RA on methotrexate, currently living at home independently with her family and was brought into the emergency room earlier today due to creeping blood pressure levels accompanied with mental status changes that likely occurred 2 days prior to his hospitalization with description and characterization of slurring of speech, intermittent episodes of difficulty of finding the words and putting her thoughts together.   Oral Motor Sensory Function   Comments WFL, pt denies any difficulty swallowing    Cognitive Status Examination   Behavioral Observations WFL   Orientation WFL    Attention intact   Short Term Memory intact  (For 3 word recall)   Cognitive Status Examination comments Pt states at baseline she will forget things that are of little importance to her. Overall, she feels cogntion is at baseline    SLP Auditory Comprehension   Auditory Comprehension intact   Able To Identify Objects field of 4 or more   Able To Identify Pictures field of 4 or more   Able To Follow Commands 2-step commands  (50% accuracy at 3 step )   SLP Verbal Expression   Verbal Expression impaired   Repetition word;phrase  (Increased errors at longer sentence level )   Fluency   (hesistancies )   Verbal Strategies   (Self corrects almost always )   Confrontation Naming 5/5  (phonemic errors noted, able to selft correct )   Responsive Naming -Object  Function 5/5   Motor Speech   Quick Adds Yes   Motor speech comments Increased phonemic errors and intelligibility slightly decreases with increased sentence length    Reading   Quick Adds Yes   SLP Reading   Comprehension paragraph length   Writing   Quick Adds Yes   Writing comments Pt generated 2 appropriate basic level sentences    General Therapy Interventions   Planned Therapy Interventions Language   Language Verbal expression;Auditory comprehension;Reading comprehension;Written expression  (Primary goal - verbal expression (most frustrating to pt))   Clinical Impression   Criteria for skilled therapeutic interventions met Yes   SLP diagnosis Functional receptive aphasia for basic to moderately complex information. Mild-moderate expressive aphasia at the conversation and more complex level    Rehab potential Good, to achieve stated therapy goals   Therapy Frequency 5x/week   Predicated duration of therapy intervention (days/wks) 1 week    Anticipated discharge disposition home w/ outpatient services   Risks and benefits have been explained Yes   Patient, family and other staff in agreement with plan of care Yes   Clinical Impressions comment  Pt seen for speech/language evaluation. She denies dysphagia, no documentation indicating difficulty swallowing. She reports difficulty with word finding and organizing her words. She feels this has improved since yesterday but is still below baseline and frustrating. Pt demonstrated adequate attention, is fully oriented, participated WFL in the areas of memory and problem solving t/o informal observation within speech/lang evaluatoin. Pt followed multi step directions with one error at the 3 step level. Pt's verbal expression is notable for deteriorating speech intelligibility with increased sentence lenght or when speaking quickly. She presents with phonemic errors, hesistancies, and intermittent word finding deficits. Pt able to name objects, their functions without  difficulty and items to categories functional. Word finding and phonemic errors noted x 2 - pt was able to self correct. Pt presents with functional language comprehenion at the basic-moderate level (higher levels not assessed today). She also presents with mild-moderate deficits in verbal expression appearing to be a combination of dysarthria and aphasia. Initiate SLP services for speech/language deficits.     Total Evaluation Time   Total Evaluation Time (Minutes) 25

## 2019-11-09 ENCOUNTER — APPOINTMENT (OUTPATIENT)
Dept: OCCUPATIONAL THERAPY | Facility: CLINIC | Age: 84
DRG: 065 | End: 2019-11-09
Payer: MEDICARE

## 2019-11-09 VITALS
OXYGEN SATURATION: 94 % | WEIGHT: 123.5 LBS | HEART RATE: 63 BPM | TEMPERATURE: 97.1 F | SYSTOLIC BLOOD PRESSURE: 133 MMHG | BODY MASS INDEX: 23.32 KG/M2 | HEIGHT: 61 IN | DIASTOLIC BLOOD PRESSURE: 57 MMHG | RESPIRATION RATE: 16 BRPM

## 2019-11-09 LAB
CREAT SERPL-MCNC: 0.75 MG/DL (ref 0.52–1.04)
GFR SERPL CREATININE-BSD FRML MDRD: 75 ML/MIN/{1.73_M2}
HGB BLD-MCNC: 13.6 G/DL (ref 11.7–15.7)

## 2019-11-09 PROCEDURE — 25000132 ZZH RX MED GY IP 250 OP 250 PS 637: Mod: GY | Performed by: INTERNAL MEDICINE

## 2019-11-09 PROCEDURE — 99239 HOSP IP/OBS DSCHRG MGMT >30: CPT | Performed by: INTERNAL MEDICINE

## 2019-11-09 PROCEDURE — 97535 SELF CARE MNGMENT TRAINING: CPT | Mod: GO

## 2019-11-09 PROCEDURE — 36415 COLL VENOUS BLD VENIPUNCTURE: CPT | Performed by: INTERNAL MEDICINE

## 2019-11-09 PROCEDURE — 82565 ASSAY OF CREATININE: CPT | Performed by: INTERNAL MEDICINE

## 2019-11-09 PROCEDURE — 85018 HEMOGLOBIN: CPT | Performed by: INTERNAL MEDICINE

## 2019-11-09 RX ORDER — CEPHALEXIN 500 MG/1
500 CAPSULE ORAL 3 TIMES DAILY
Qty: 28 CAPSULE | Refills: 0 | Status: SHIPPED | OUTPATIENT
Start: 2019-11-09 | End: 2019-11-12

## 2019-11-09 RX ADMIN — MELATONIN 1000 UNITS: at 09:39

## 2019-11-09 RX ADMIN — LOSARTAN POTASSIUM 100 MG: 100 TABLET ORAL at 09:39

## 2019-11-09 RX ADMIN — APIXABAN 2.5 MG: 2.5 TABLET, FILM COATED ORAL at 09:39

## 2019-11-09 RX ADMIN — METOPROLOL TARTRATE 25 MG: 25 TABLET ORAL at 09:39

## 2019-11-09 ASSESSMENT — ACTIVITIES OF DAILY LIVING (ADL)
ADLS_ACUITY_SCORE: 13

## 2019-11-09 NOTE — PROGRESS NOTES
SPIRITUAL HEALTH SERVICES Progress Note  [facility] Med. Surg. 3    Saw pt Brea per a routine admission request.  Brea reported that she wanted communion, which she received yesterday and today.  Oriented her to  services.  Brea acknowledged the availability of  support but expressed no further needs at this time, adding that she was discharging home soon.    No further plans as pt is discharging home today.    Garcia Garrido M.Div., [facility]  Staff   Pager [phone]

## 2019-11-09 NOTE — PLAN OF CARE
"VS BP (!) 148/62 (BP Location: Right arm)   Pulse 61   Temp 97.9  F (36.6  C) (Oral)   Resp 16   Ht 1.549 m (5' 1\")   Wt 56 kg (123 lb 8 oz)   SpO2 95%   BMI 23.34 kg/m    Lung sounds Clear and equal bilaterally  O2 room air  Tele Sinus Hilton  Bowel sounds Active and audible in all quadrants- ostomy intact  Tolerating Regular diet  IVF Saline Locked  Dressings none  Tests None  CMS Intact- Neuro in tact  Drains Colostomy in left quadrant  Activity up Independent  Pain denies  Patient/family centered care Support given and questions answered  Discharge plan Home with . VSS, stable gait- made independent in the room. Continue POC    "

## 2019-11-09 NOTE — PLAN OF CARE
Discharge Planner OT   Patient plan for discharge: Home today   Current status: Pt completed graded medication set up task with an accuracy of 4/5 medications accurately set up. Pt demonstrated difficulty with complex medications including medications with directions such as  take as needed  and  two tablets, twice daily.  Educated on compensatory techniques for medication management set up. No further IP OT warranted, will defer further OT to next level of care, OP OT.   Barriers to return to prior living situation: Current level of A for I/ADLs; cognition needs further assessment   Recommendations for discharge: Home with assist for I/ADLs (including medication and finance management, meal preparation) and OP OT   Rationale for recommendations: OP OT to further address cognition and the implications on I/ADLs        Occupational Therapy Discharge Summary    Reason for therapy discharge:    No further IP OT warranted, will defer further OT to next level of care, OP OT.     Progress towards therapy goal(s). See goals on Care Plan in [facility] electronic health record for goal details.  Goals partially met     Therapy recommendation(s):     Home with assist for I/ADLs (including medication and finance management, meal preparation) and OP OT            Entered by: Inocencio Suazo 11/09/2019 10:51 AM

## 2019-11-09 NOTE — PLAN OF CARE
"Vitals: /57 (BP Location: Right arm)   Pulse 63   Temp 97.1  F (36.2  C) (Oral)   Resp 16   Ht 1.549 m (5' 1\")   Wt 56 kg (123 lb 8 oz)   SpO2 94%   BMI 23.34 kg/m    Situation/Status:  Neuro: A/O x 4, neuro's all intact. Some word finding difficulty continues.  Pain: Denied  Activity:Indep  Diet: Reg diet  Tele/Cardiac: SB/SR  Lungs: LS-clear-Bilat  GI/: No nausea/vomiting, + Flautus, BS x4. Colostomy in left quadrant.  Skin: CMS intact, WDL  Lines/drains: PIV-SL- removed at discharge  Plan: Discharge to home with . Daughter transported her home. Went over discharge instructions and pt education with the pt and her daughter. Gave her the Stroke folder and went over. Pt took all belongs and NST transported them down to daughters car.Continue to monitor per POC.    "

## 2019-11-09 NOTE — DISCHARGE SUMMARY
[facility]  Discharge Summary  Hospitalist      Date of Admission:  11/7/2019  Date of Discharge:  11/9/2019  Provider:  Carina Blount MD  Date of Service (when I last saw the patient): 11/09/19      Primary Provider: Whitley Nichole          Discharge Diagnosis:   Discharge Diagnoses   Acute stroke numerous ischemic infarct in MCA territory likely embolic.  Acute UTI uncomplicated cystitis    Other medical issues:  Past Medical History:   Diagnosis Date     Diverticula, colon      Hypertension           History of Present Illness   Brea Kerr is an 83 year old female who presented with slurry speech and into the mid and episodes of trouble finding words..  Please see the admission history and physical for full details.    Hospital Course     Brea Kerr was admitted on 11/7/2019.  Brea Kerr is a 83 year old female who was admitted on for acute subacute CVA.  Patient's past medical history significant for paroxysmal atrial fibrillation, recent hospitalization for sepsis complicated with diverticulitis requiring sigmoidectomy not on chronic anticoagulation, hypertension, dyslipidemia(intolerant to statins resulting in severe myopathy), rheumatoid arthritis on methotrexate.  Presented to the emergency room with mental status changes and elevated blood pressure, slurring speech and intermittent episodes of trouble finding words.  MRI brain showed numerous subacute acute ischemic infarcts in MCA territory.  MRA neck negative for stenosis.admitted for acute CVA and UTI.      The following problems were addressed during her hospitalization:    Acute stroke  --MRI/MRA brain on 11/7/2019 confirmed numerous subacute and acute infarcts  --Likely embolic ischemia due to history of atrial fibrillation patient was not on anticoagulation at admission.  --Patient with history of uncontrolled hypertension  --Permissive hypertension initially on admission.  Goal blood pressure is less than 140/90 on  discharge  --Echo with bubble study was negative.  However there was right atrial enlargement  --Neurology consulted for stroke evaluation.  Recommend treatment of LDL which is elevated to 149 goal is less than 90.  Also recommended empiric anticoagulation given history of paroxysmal atrial fibrillation even in absence of bubble study.  --Charted on Eliquis  --PT OT and speech eval during hospitalization     History of paroxysmal atrial fibrillation  --Rate controlled  --started eliquis     Suspected UTI  --Started on Keflex and will complete for 3 days.     Dyslipidemia  --Not on statins.  --Since discontinued 10 years ago due to significant myopathies  --Discussed with primary care provider on discharge and will need to reconsider treatment     History of rheumatoid arthritis  --On methotrexate    Significant Results and Procedures   As noted above    Pending Results   Unresulted Labs Ordered in the Past 30 Days of this Admission     No orders found from 10/8/2019 to 11/8/2019.          Code Status   Full Code       Primary Care Physician   Whitley Nichole    Physical Exam   Temp: 97.1  F (36.2  C) Temp src: Oral BP: 133/57 Pulse: 63 Heart Rate: 62 Resp: 16 SpO2: 94 % O2 Device: None (Room air)    Vitals:    11/07/19 1522 11/08/19 0340   Weight: 57 kg (125 lb 10.6 oz) 56 kg (123 lb 8 oz)     Vital Signs with Ranges  Temp:  [97.1  F (36.2  C)-98.2  F (36.8  C)] 97.1  F (36.2  C)  Pulse:  [63] 63  Heart Rate:  [59-62] 62  Resp:  [16-20] 16  BP: (127-148)/(52-70) 133/57  SpO2:  [92 %-95 %] 94 %  I/O last 3 completed shifts:  In: 246 [P.O.:240; I.V.:6]  Out: -     Constitutional: Awake, alert, cooperative, no apparent distress, and appears stated age.  Respiratory: No increased work of breathing, good air exchange, clear to auscultation bilaterally, no crackles or wheezing.  Cardiovascular: Normal apical impulse,normal S1 and S2,   GI: normal bowel sounds, soft, non-distended, non-tender,      Discharge Disposition    Discharged to home    Consultations This Hospital Stay   NEUROLOGY IP CONSULT  PHYSICAL THERAPY ADULT IP CONSULT  OCCUPATIONAL THERAPY ADULT IP CONSULT  SPEECH LANGUAGE PATH ADULT IP CONSULT  PATIENT [facility] IP CONSULT  PHARMACY LIAISON FOR MEDICATION COVERAGE CONSULT    Time Spent on this Encounter   I, Carina Blount MD, personally saw the patient today and spent greater than 30 minutes discharging this patient.    Discharge Orders      Reason for your hospital stay    Please refer to discharge summary briefly admitted for acute to subacute embolic stroke     Follow-up and recommended labs and tests     -Follow-up in the next 1 week(s) with PCP.  Primary care provider to discuss use of meds for increased LDL levels.  -Primary care to follow up with repeat labs CBC as you were started on blood thinning medication. hgb on discharge 13.6  -Follow-up in 4-6 weeks with local neurologist  -Follow-up with cardiologist for paroxysmal atrial fibrillation  Please call or come if there are any new or worsening symptoms     Activity    Your activity upon discharge: activity as tolerated     Monitor and record    blood pressure daily. Keep record and follow up with primary care provider     Full Code     Diet    Follow this diet upon discharge: Orders Placed This Encounter      Combination Diet low fat diet     Discharge Medications   Current Discharge Medication List      START taking these medications    Details   apixaban ANTICOAGULANT (ELIQUIS) 2.5 MG tablet Take 1 tablet (2.5 mg) by mouth 2 times daily  Qty: 60 tablet, Refills: 0    Associated Diagnoses: Cerebrovascular accident (CVA), unspecified mechanism (H)      cephALEXin (KEFLEX) 500 MG capsule Take 1 capsule (500 mg) by mouth 3 times daily for 3 days  Qty: 28 capsule, Refills: 0    Associated Diagnoses: Acute cystitis without hematuria         CONTINUE these medications which have NOT CHANGED    Details   acetaminophen (TYLENOL) 500 MG tablet Take  500-1,000 mg by mouth every 6 hours as needed for mild pain      albuterol (PROAIR HFA/PROVENTIL HFA/VENTOLIN HFA) 108 (90 Base) MCG/ACT inhaler Inhale 2 puffs into the lungs every 6 hours as needed for shortness of breath / dyspnea or wheezing      aspirin 81 MG EC tablet Take 81 mg by mouth At Bedtime       DiphenhydrAMINE HCl (BENADRYL PO) Take 50 mg by mouth daily as needed      folic acid (FOLVITE) 1 MG tablet Take 1 mg by mouth daily      losartan (COZAAR) 100 MG tablet Take 1 tablet (100 mg) by mouth daily  Qty: 90 tablet, Refills: 4    Associated Diagnoses: Essential hypertension      METHOTREXATE PO Take 5 mg by mouth once a week       metoprolol tartrate (LOPRESSOR) 25 MG tablet TAKE 1 TABLET(25 MG) BY MOUTH TWICE DAILY  Qty: 180 tablet, Refills: 4    Associated Diagnoses: Paroxysmal atrial fibrillation with RVR (H)      Multiple Vitamins-Minerals (CENTRUM SILVER) per tablet Take 1 tablet by mouth daily      Vitamin D, Cholecalciferol, 1000 units CAPS Take 1,000 Units by mouth daily      order for DME Equipment being ordered: Walker Wheels () and Walker ()  Treatment Diagnosis: impaired gait  Qty: 1 each, Refills: 0    Associated Diagnoses: Sepsis, due to unspecified organism           Allergies   Allergies   Allergen Reactions     Hmg-Coa-R Inhibitors      PN: LW Reaction: muscle pain     Lisinopril      PN: LW Reaction: Cough     Sulfa Drugs      Nausea       Data   Most Recent 3 CBC's:  Recent Labs   Lab Test 11/09/19 0633 11/08/19 0722 11/07/19 2106 11/07/19  1606 10/04/19  1012   WBC  --  5.3  --  6.4 8.0   HGB 13.6 12.9  --  14.0 14.1   MCV  --  103*  --  102* 103*   PLT  --  177 182 196 205      Most Recent 3 BMP's:  Recent Labs   Lab Test 11/09/19 0633 11/07/19 2106 11/07/19  1606 10/04/19  1012 10/17/18  0712   NA  --   --  138 137 134   POTASSIUM  --   --  3.9 4.4 4.1   CHLORIDE  --   --  103 106 102   CO2  --   --  29 28 24   BUN  --   --  24 16 13   CR 0.75 0.83 0.90 0.80 0.68    ANIONGAP  --   --  6 3 8   PAULINA  --   --  9.0 9.0 8.1*   GLC  --   --  98 93 89     Most Recent 2 LFT's:  Recent Labs   Lab Test 10/04/19  1012 10/15/18  0602   AST 23 28   ALT 19 33   ALKPHOS 85 109   BILITOTAL 1.1 0.5     Most Recent INR's and Anticoagulation Dosing History:  Anticoagulation Dose History     Recent Dosing and Labs Latest Ref Rng & Units 9/20/2018 9/30/2018 10/2/2018 10/6/2018 10/8/2018 10/15/2018    INR 0.86 - 1.14 1.09 1.21(H) 1.21(H) 1.15(H) 1.22(H) 1.12        Most Recent 3 Troponin's:  Recent Labs   Lab Test 11/07/19  1606   TROPI <0.015     Most Recent Cholesterol Panel:  Recent Labs   Lab Test 11/08/19  0722   CHOL 201*   *   HDL 34*   TRIG 92     Most Recent 6 Bacteria Isolates From Any Culture (See EPIC Reports for Culture Details):  Recent Labs   Lab Test 11/07/19  1558 10/07/18  0820 10/07/18  0815 09/30/18  1217 09/30/18  1140 09/20/18  1159   CULT <10,000 colonies/mL  mixed urogenital jes  Susceptibility testing not routinely done   No growth No growth No growth No growth Light growth  Clostridium species, not perfringens  Susceptibility testing not routinely done  *  Light growth  Anaerobic gram positive rods  No further identification  *  Isolated in the broth only:    Gram positive bacilli resembling diphtheroids  No further identification  *  Moderate growth  Escherichia coli  *  Light growth  Klebsiella pneumoniae  *  Heavy growth  Streptococcus anginosus  *  Heavy growth  Pseudomonas aeruginosa  *     Most Recent TSH, T4 and A1c Labs:  Recent Labs   Lab Test 11/08/19  0722 10/04/19  1012   TSH  --  1.96   A1C 5.2  --      Results for orders placed or performed during the hospital encounter of 11/07/19   MR Brain w/o & w Contrast    Narrative    EXAM: MR BRAIN W/O AND W CONTRAST  LOCATION: [facility]  DATE/TIME: 11/7/2019 4:33 PM    INDICATION: Aphasia.  COMPARISON: MRA [place] from today.  CONTRAST: 10 mL Gadavist.  TECHNIQUE: Routine  multiplanar multisequence head MRI without and with intravenous contrast.    FINDINGS:  INTRACRANIAL CONTENTS: There are numerous acute/subacute infarcts involving the left MCA vascular territory. There is evidence of highly restricted diffusion along the cortex of the left frontal operculum. There is additional cortically restricted   diffusion involving the cortex of the left inferior parietal lobe. There is a focus of cortically restricted diffusion involving the left insula. There is associated FLAIR signal abnormality indicating that these findings are older than 4-6 hours. No   other evidence of highly restricted diffusion. There is no evidence of hemorrhagic transformation or significant mass effect from the above-described left MCA infarcts. No mass, acute hemorrhage, or extra-axial fluid collections. Otherwise there is a   mild to moderate degree of patchy and confluent periventricular and deep white matter T2 FLAIR hyperintensity, likely related to chronic small vessel vascular change. Mild age-appropriate generalized brain volume loss. Old appearing bilateral cerebellar   infarcts. Normal ventricles and sulci for age. Normal position of the cerebellar tonsils. No pathologic contrast enhancement.    SELLA: No abnormality accounting for technique.    OSSEOUS STRUCTURES/SOFT TISSUES: Normal marrow signal. The left vertebral artery appears dominant. There is a diminished flow void in the distal right vertebral.     ORBITS: Bilateral pseudophakia.     SINUSES/MASTOIDS: No paranasal sinus mucosal disease. No middle ear or mastoid effusion.       Impression    IMPRESSION:  1.  Numerous acute/subacute left MCA vascular distribution cortical and subcortical infarcts as detailed above, without evidence of hemorrhagic transformation or significant mass effect.    2.  Otherwise there is age-appropriate generalized brain volume loss and a mild to moderate degree of nonspecific T2 FLAIR hyperintensity, likely related  to chronic small vessel vascular change.       MRA Neck (Carotids) wo & w Contrast    Narrative    EXAM: MRA NECK (CAROTIDS) WO and W CONTRAST  LOCATION: [facility] [place]  DATE/TIME: 11/7/2019 4:34 PM    INDICATION: Aphasia  COMPARISON: Brain MR from today. MRA [place] [facility] [place] from today.  CONTRAST: 10 mL Gadavist  TECHNIQUE: Neck MRA without and with IV contrast. Stenosis measurements made according to NASCET criteria unless otherwise specified.    FINDINGS:  RIGHT CAROTID: No measurable stenosis or dissection.    LEFT CAROTID: No measurable stenosis or dissection.    VERTEBRAL ARTERIES: No focal stenosis or dissection. The left vertebral artery is dominant. Right vertebral artery is small in caliber throughout, particularly in the V4 segment.    AORTIC ARCH: Classic aortic arch anatomy with no significant stenosis at the origin of the great vessels.      Impression    IMPRESSION:  1.  Normal neck MRA.   MRA Brain ([place]) wo Contrast    Narrative    EXAM: MRA BRAIN ([place] [facility] [place]) WO CONTRAST  LOCATION: [facility] [place]  DATE/TIME: 11/7/2019 4:34 PM    INDICATION: Aphasia  COMPARISON: Brain MR from today. MRA neck from today.  TECHNIQUE: 3D time-of-flight head MRA without intravenous contrast.    FINDINGS:  ANTERIOR CIRCULATION: There is no evidence of significant stenosis. Note made of a 3.5 x 3.5 mm right supraclinoid ICA aneurysm projecting superiorly and laterally, see axial image 89 of the axial time-of-flight sequence. Additionally, there is a 2.0 x   2.0 mm left paraophthalmic distal ICA aneurysm, see axial image 87 of the axial time-of-flight series. There is a mild, less than 50% stenosis within the M1 segment of the left middle cerebral artery. Standard [place] [facility] [place] anatomy. There is fetal   origin of the left posterior cerebral artery, a normal variant.    POSTERIOR CIRCULATION: No stenosis/occlusion, aneurysm, or high flow vascular malformation. Balanced vertebral  arteries supply a normal basilar artery.       Impression    IMPRESSION:  1.  No evidence of significant intracranial stenosis. Note made of a mild stenosis, less than 50%, in the M1 segment of left middle cerebral artery.    2.  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       Echocardiogram Complete    Narrative    [id number]  [id number]  [id number]  [id number]^TON^AL^VELASQUEZ           [facility]  Echocardiography Laboratory  [address] [place] [address]        Name: TABITHA ALMANZAR  MRN: [id number]  : 1935  Study Date: 2019 08:23 AM  Age: 83 yrs  Gender: Female  Patient Location: [facility]  Reason For Study: CVA  Ordering Physician: JANETTE CAMILO  Referring Physician: Dionisio  Performed By: Jagjit Thomas RDCS     BSA: 1.5 m2  Height: 61 in  Weight: 125 lb  HR: 57  BP: 165/71 mmHg  _____________________________________________________________________________  __        Procedure  Complete Portable Bubble Echo Adult.  _____________________________________________________________________________  __        Interpretation Summary     Left ventricular systolic function is normal.The visual ejection fraction is  estimated at 60-65%.  The right ventricular systolic function is normal.  The left atrium is moderately dilated.  A contrast injection (Bubble Study) was performed that was negative for flow  across the interatrial septum.  There is mild (1+) mitral regurgitation.There is mild (1+) aortic  regurgitation. Mild aortic regurgitation.  _____________________________________________________________________________  __        Left Ventricle  The left ventricle is normal in size. Proximal septal thickening is noted.  Left ventricular systolic function is normal. The visual ejection fraction is  estimated at 60-65%. Grade I or early diastolic dysfunction. No regional wall  motion abnormalities noted.     Right Ventricle  The right ventricle is normal size. The  right ventricular systolic function is  normal.     Atria  The left atrium is moderately dilated. Right atrial size is normal. There is  no color Doppler evidence of an atrial shunt. A contrast injection (Bubble  Study) was performed that was negative for flow across the interatrial septum.     Mitral Valve  There is mild mitral annular calcification. There is mild (1+) mitral  regurgitation.        Tricuspid Valve  There is trace tricuspid regurgitation. Right ventricular systolic pressure  could not be approximated due to inadequate tricuspid regurgitation.     Aortic Valve  The aortic valve is trileaflet. There is mild aortic sclerosis of the non-  coronary cusp. There is mild (1+) aortic regurgitation. No aortic stenosis is  present.     Pulmonic Valve  There is trace pulmonic valvular regurgitation. There is no pulmonic valvular  stenosis.     Vessels  The aortic root is normal size. Normal size ascending aorta.     Pericardium  There is no pericardial effusion.     _____________________________________________________________________________  __  MMode/2D Measurements & Calculations  IVSd: 1.3 cm  IVSs: 4.5 cm  LVIDd: 4.3 cm  LVIDs: 3.4 cm  LVPWd: 1.1 cm  FS: 22.0 %  LV mass(C)d: 178.8 grams  LV mass(C)dI: 115.6 grams/m2     Ao root diam: 3.1 cm  asc Aorta Diam: 3.6 cm  LVOT diam: 2.1 cm  LVOT area: 3.4 cm2  LA Volume (BP): 62.8 ml  LA Volume Index (BP): 40.5 ml/m2  RWT: 0.49        Doppler Measurements & Calculations  MV E max kwame: 51.8 cm/sec  MV A max kwame: 66.4 cm/sec  MV E/A: 0.78  MV dec slope: 188.6 cm/sec2  MV dec time: 0.27 sec     PA acc time: 0.15 sec  E/E' av.4  Lateral E/e': 9.5  Medial E/e': 17.3           _____________________________________________________________________________  __           Report approved by: Reza Guidry 2019 01:40 PM                Disclaimer: This note consists of symbols derived from keyboarding, dictation and/or voice recognition software. As a  result, there may be errors in the script that have gone undetected. Please consider this when interpreting information found in this chart.

## 2019-11-09 NOTE — PLAN OF CARE
"/52 (BP Location: Left arm)   Pulse 61   Temp 98.2  F (36.8  C) (Oral)   Resp 20   Ht 1.549 m (5' 1\")   Wt 56 kg (123 lb 8 oz)   SpO2 92%   BMI 23.34 kg/m      -VSS  -Patient alert, passing neuro checks however has some slight hesitancy of speech, trouble word finding.  -Colostomy WDL  --was changed by patient tonight  -Rocephin administered  -Patient standby to independent in room  "

## 2019-11-09 NOTE — PLAN OF CARE
SLP - Session canceled this pm as RN is discharging patient.    Speech Language Therapy Discharge Summary    Reason for therapy discharge:    Discharged to home with outpatient therapy.    Progress towards therapy goal(s). See goals on Care Plan in [facility] electronic health record for goal details.  Goals partially met.  Barriers to achieving goals:   discharge from facility.    Therapy recommendation(s):    Continued therapy is recommended.  Rationale/Recommendations:  OP SLP eval and Tx is recommended to maximize communication for complex daily needs/return to baseline.

## 2019-11-11 ENCOUNTER — TELEPHONE (OUTPATIENT)
Dept: INTERNAL MEDICINE | Facility: CLINIC | Age: 84
End: 2019-11-11

## 2019-11-11 ENCOUNTER — OFFICE VISIT (OUTPATIENT)
Dept: INTERNAL MEDICINE | Facility: CLINIC | Age: 84
End: 2019-11-11
Payer: MEDICARE

## 2019-11-11 VITALS
HEART RATE: 63 BPM | BODY MASS INDEX: 23.35 KG/M2 | OXYGEN SATURATION: 97 % | WEIGHT: 123.7 LBS | DIASTOLIC BLOOD PRESSURE: 74 MMHG | RESPIRATION RATE: 12 BRPM | TEMPERATURE: 97.9 F | HEIGHT: 61 IN | SYSTOLIC BLOOD PRESSURE: 132 MMHG

## 2019-11-11 DIAGNOSIS — R47.9 DIFFICULTY WITH SPEECH: ICD-10-CM

## 2019-11-11 DIAGNOSIS — I63.10 CEREBROVASCULAR ACCIDENT (CVA) DUE TO EMBOLISM OF PRECEREBRAL ARTERY (H): ICD-10-CM

## 2019-11-11 DIAGNOSIS — Z09 HOSPITAL DISCHARGE FOLLOW-UP: Primary | ICD-10-CM

## 2019-11-11 DIAGNOSIS — N30.00 ACUTE CYSTITIS WITHOUT HEMATURIA: ICD-10-CM

## 2019-11-11 DIAGNOSIS — I67.1 BRAIN ANEURYSM: ICD-10-CM

## 2019-11-11 DIAGNOSIS — R47.81 SLURRED SPEECH: ICD-10-CM

## 2019-11-11 DIAGNOSIS — I63.9 CEREBROVASCULAR ACCIDENT (CVA), UNSPECIFIED MECHANISM (H): ICD-10-CM

## 2019-11-11 PROCEDURE — 99495 TRANSJ CARE MGMT MOD F2F 14D: CPT | Performed by: INTERNAL MEDICINE

## 2019-11-11 ASSESSMENT — MIFFLIN-ST. JEOR: SCORE: 963.48

## 2019-11-11 NOTE — PROGRESS NOTES
Patient's instructions / PLAN:                                                        Plan:  1. Speech pathology referral - please call [phone]  2. Neurology referral    [facility] [place] [phone] ,  [email or web address]  3. Continue same meds, same doses for now  4. Follow up with dr Nichole in January - February     ASSESSMENT & PLAN:                                                      (Z09) Hospital discharge follow-up  (primary encounter diagnosis)  Comment:   Plan: SPEECH THERAPY REFERRAL          (I67.1) Brain aneurysm - MRI 2019  Comment:   Plan: NEUROLOGY ADULT REFERRAL          (R47.9) Difficulty with speech  Comment: still mildly present   Plan: SPEECH THERAPY REFERRAL, NEUROLOGY ADULT         REFERRAL          (I63.10) Cerebrovascular accident (CVA)  (H) Nov 2019   (I63.9) Cerebrovascular accident (CVA), unspecified mechanism (H)  Comment: stable  Plan: SPEECH THERAPY REFERRAL, NEUROLOGY ADULT         REFERRAL, apixaban ANTICOAGULANT (ELIQUIS) 2.5         MG tablet    (N30.00) Acute cystitis without hematuria  Comment: UC was normal jes    Plan:      Chief Complaint:                                                      hosp f/u     SUBJECTIVE:                                                    History of present illness     Pt was admitted to the ED with a UTI and CVA on 11/07/2019 and presents today for a f/u.    CVA:  -- still some speech problems, but better. She must think before she speaks  -- it was considered embolic and she was started on Eliquis  -- no ref for outpatient speech path or neurologist was done  -- Eliquis is expensive but she prefers to pay for it. She does't want to go on Coumadin   -- Denies palpitations  -- Right side of her face feels different    UTI  -- UC negative    Hyperlipidemia  -- Hx of myopathy w statins    DcDx: Discharge Diagnoses   Acute stroke numerous ischemic infarct in MCA territory likely  "embolic.  UTI uncomplicated cystitis    MRI IMPRESSION:   1.  Numerous acute/subacute left MCA vascular distribution cortical and subcortical infarcts as detailed above, without evidence of hemorrhagic transformation or significant mass effect.  2.  Otherwise there is age-appropriate generalized brain volume loss and a mild to moderate degree of nonspecific T2 FLAIR hyperintensity, likely related to chronic small vessel vascular change.    MRA IMPRESSION:  1.  No evidence of significant intracranial stenosis. Note made of a mild stenosis, less than 50%, in the M1 segment of left middle cerebral artery.  2.  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    Echo: Interpretation Summary  Left ventricular systolic function is normal.The visual ejection fraction is  estimated at 60-65%.  The right ventricular systolic function is normal.  The left atrium is moderately dilated.  A contrast injection (Bubble Study) was performed that was negative for flow  across the interatrial septum.  There is mild (1+) mitral regurgitation.There is mild (1+) aortic  regurgitation. Mild aortic regurgitation.        ROS:                                                      ROS: negative for fever, chills, cough, wheezes, chest pain, shortness of breath, vomiting, abdominal pain, leg swelling      This document serves as a record of the services and decisions personally performed and made by Dr. Vini MD. It was created on their behalf by Ezra Mas, a trained medical scribe. The creation of this document is based on the provider's statements to the medical scribe.  Ezra Mas November 11, 2019 11:19 AM    OBJECTIVE:                                                    Physical Exam :    Blood pressure 132/74, pulse 63, temperature 97.9  F (36.6  C), temperature source Oral, resp. rate 12, height 1.549 m (5' 1\"), weight 56.1 kg (123 lb 11.2 oz), SpO2 97 %, not currently breastfeeding.   NAD, appears " comfortable  Skin: no rashes   HEENT: PERRLA, EOMI, pink conjunctiva, anicteric sclerae, bilateral tympanic membranes are clinically normal, oropharynx is normal color  Neck: supple, no JVD, No thyroidmegaly. Lymph nodes nonpalpable cervical and supraclavicular.  Chest: clear to auscultation bilaterally, good respiratory effort  Heart: S1 S2, RRR, no mgr appreciated  Abdomen: soft, not tender, RLQ - colostoma  Extremities: no edema,   Neurologic: A, Ox3, no focal signs appreciated    PMHx: reviewed  Past Medical History:   Diagnosis Date     Diverticula, colon      Hypertension       PSHx: reviewed  Past Surgical History:   Procedure Laterality Date     LAPAROSCOPIC ASSISTED COLOSTOMY N/A 10/2/2018    Procedure: LAPAROSCOPIC ASSISTED COLOSTOMY;;  Surgeon: Emma Reddy MD;  Location: [facility] OR     LAPAROSCOPIC ASSISTED SIGMOID COLECTOMY N/A 10/2/2018    Procedure: LAPAROSCOPIC ASSISTED SIGMOID COLECTOMY;  Exploratory laparoscopy, open sigmoid colectomy with end colostomy, extensive lysis of adhesions, and takedown of colovesical fistula with drain removal and drain placement;  Surgeon: Emma Reddy MD;  Location: [facility] OR     SPLENECTOMY          Meds: reviewed  Current Outpatient Medications   Medication Sig Dispense Refill     acetaminophen (TYLENOL) 500 MG tablet Take 500-1,000 mg by mouth every 6 hours as needed for mild pain       albuterol (PROAIR HFA/PROVENTIL HFA/VENTOLIN HFA) 108 (90 Base) MCG/ACT inhaler Inhale 2 puffs into the lungs every 6 hours as needed for shortness of breath / dyspnea or wheezing       apixaban ANTICOAGULANT (ELIQUIS) 2.5 MG tablet Take 1 tablet (2.5 mg) by mouth 2 times daily 60 tablet 0     aspirin 81 MG EC tablet Take 81 mg by mouth At Bedtime        cephALEXin (KEFLEX) 500 MG capsule Take 1 capsule (500 mg) by mouth 3 times daily for 3 days 28 capsule 0     DiphenhydrAMINE HCl (BENADRYL PO) Take 50 mg by mouth daily as needed       folic acid (FOLVITE) 1 MG tablet Take 1  mg by mouth daily       losartan (COZAAR) 100 MG tablet Take 1 tablet (100 mg) by mouth daily 90 tablet 4     METHOTREXATE PO Take 5 mg by mouth once a week        metoprolol tartrate (LOPRESSOR) 25 MG tablet TAKE 1 TABLET(25 MG) BY MOUTH TWICE DAILY 180 tablet 4     Multiple Vitamins-Minerals (CENTRUM SILVER) per tablet Take 1 tablet by mouth daily       order for DME Equipment being ordered: Walker Wheels () and Walker ()  Treatment Diagnosis: impaired gait 1 each 0     Vitamin D, Cholecalciferol, 1000 units CAPS Take 1,000 Units by mouth daily         Soc Hx: reviewed  Fam Hx: reviewed    The information in this document, created by the medical scribe for me, accurately reflects the services I personally performed and the decisions made by me. I have reviewed and approved this document for accuracy prior to leaving the patient care area.  November 11, 2019 11:57 AM     Krys Martini MD  Internal Medicine       Hospital Follow-up Visit:    Hospital/Nursing Home/IP Rehab Facility: [facility]  Date of Admission: 11/7/19  Date of Discharge: 11/9/19  Reason(s) for Admission: CVA, UTI             Problems taking medications regularly:  None       Medication changes since discharge: None       Problems adhering to non-medication therapy:  None    Summary of hospitalization:  [facility] discharge summary reviewed  Diagnostic Tests/Treatments reviewed.  Follow up needed: as above   Other Healthcare Providers Involved in Patient s Care:         None  Update since discharge: stable.     Post Discharge Medication Reconciliation: discharge medications reconciled and changed, per note/orders (see AVS).  Plan of care communicated with patient and family     Coding guidelines for this visit:  Type of Medical   Decision Making Face-to-Face Visit       within 7 Days of discharge Face-to-Face Visit        within 14 days of discharge   Moderate Complexity [id number]   High Complexity [phone] [id number]

## 2019-11-11 NOTE — TELEPHONE ENCOUNTER
IP F/U    Date: 11/9/19  Diagnosis: Acute cystitis without hematuria, urinary tract infection without hematuria, site unspecified    Is patient active in care coordination? No  Was patient in TCU? No    Next 5 appointments (look out 90 days)    Nov 11, 2019 11:00 AM CST  SHORT with Krys Flores MD  [facility] [address]  [facility] [id number] [phone]

## 2019-11-11 NOTE — PATIENT INSTRUCTIONS
Plan:  1. Speech pathology referral - please call [phone]  2. Neurology referral    [facility] [phone] ,  [email or web address]  3. Continue same meds, same doses for now  4. Follow up with dr Nichole in January - February

## 2020-01-15 ENCOUNTER — TELEPHONE (OUTPATIENT)
Dept: INTERNAL MEDICINE | Facility: CLINIC | Age: 85
End: 2020-01-15

## 2020-03-18 ENCOUNTER — TELEPHONE (OUTPATIENT)
Dept: INTERNAL MEDICINE | Facility: CLINIC | Age: 85
End: 2020-03-18

## 2020-05-27 DIAGNOSIS — I63.10 CEREBROVASCULAR ACCIDENT (CVA) DUE TO EMBOLISM OF PRECEREBRAL ARTERY (H): ICD-10-CM

## 2020-05-27 NOTE — TELEPHONE ENCOUNTER
Requested Prescriptions   Pending Prescriptions Disp Refills     apixaban ANTICOAGULANT (ELIQUIS) 2.5 MG tablet 180 tablet 1     Sig: Take 1 tablet (2.5 mg) by mouth 2 times daily   Last Written Prescription Date:  11/11/2019  Last Fill Quantity: 180,  # refills: 01   Last office visit: 11/11/2019 with prescribing provider:     Future Office Visit:            Direct Oral Anticoagulant Agents Failed - 5/27/2020  3:40 PM        Failed - Patient is 18-79 years of age        Failed - Weight is greater than 60 kg for the past year     Wt Readings from Last 3 Encounters:   11/11/19 56.1 kg (123 lb 11.2 oz)   11/08/19 56 kg (123 lb 8 oz)   10/04/19 56.7 kg (124 lb 14.4 oz)             Failed - Recent (6 mo) or future (30 days) visit within the authorizing provider's specialty        Passed - Normal Platelets on file in past 12 months     Recent Labs   Lab Test 11/08/19  0722                  Passed - Medication is active on med list        Passed - Serum creatinine less than or equal to 1.4 on file in past 12 mos     Recent Labs   Lab Test 11/09/19  0633  09/30/18  1147   CR 0.75   < >  --    CREAT  --   --  0.7    < > = values in this interval not displayed.       Ok to refill medication if creatinine is low          Passed - No active pregnancy on record        Passed - No positive pregnancy test within past 12 months

## 2020-05-27 NOTE — TELEPHONE ENCOUNTER
Routing refill request to provider for review/approval because:  RN unable to approve due to weight, age and date of last office visit.  Laura Johnson RN

## 2020-07-17 ENCOUNTER — TELEPHONE (OUTPATIENT)
Dept: INTERNAL MEDICINE | Facility: CLINIC | Age: 85
End: 2020-07-17

## 2020-07-17 ENCOUNTER — MEDICAL CORRESPONDENCE (OUTPATIENT)
Dept: HEALTH INFORMATION MANAGEMENT | Facility: CLINIC | Age: 85
End: 2020-07-17

## 2020-07-17 NOTE — TELEPHONE ENCOUNTER
Request to complete a genetic cardiovascular risk assessment test received via fax. Form in your mailbox to be signed.

## 2020-11-05 ENCOUNTER — TELEPHONE (OUTPATIENT)
Dept: INTERNAL MEDICINE | Facility: CLINIC | Age: 85
End: 2020-11-05

## 2020-11-05 NOTE — TELEPHONE ENCOUNTER
[facility]  Orders for stomahesive protective powder, deodorant adapt lubrication 8 oz bottle  Dr Ritter's in basket for Dr Nichole  Fax

## 2020-11-06 ENCOUNTER — ALLIED HEALTH/NURSE VISIT (OUTPATIENT)
Dept: NURSING | Facility: CLINIC | Age: 85
End: 2020-11-06
Payer: MEDICARE

## 2020-11-06 DIAGNOSIS — Z23 ENCOUNTER FOR IMMUNIZATION: Primary | ICD-10-CM

## 2020-11-06 PROCEDURE — G0008 ADMIN INFLUENZA VIRUS VAC: HCPCS

## 2020-11-06 PROCEDURE — 90662 IIV NO PRSV INCREASED AG IM: CPT

## 2020-11-24 DIAGNOSIS — I63.10 CEREBROVASCULAR ACCIDENT (CVA) DUE TO EMBOLISM OF PRECEREBRAL ARTERY (H): ICD-10-CM

## 2020-11-25 NOTE — TELEPHONE ENCOUNTER
Routing refill request to provider for review/approval because:  Drug not on the [facility] refill protocol due to age  Patient needs to be seen because it has been more than 1 year since last office visit.    Chica Ramos RN, BSN

## 2020-12-02 ENCOUNTER — OFFICE VISIT (OUTPATIENT)
Dept: INTERNAL MEDICINE | Facility: CLINIC | Age: 85
End: 2020-12-02
Payer: MEDICARE

## 2020-12-02 VITALS
OXYGEN SATURATION: 96 % | BODY MASS INDEX: 24.17 KG/M2 | SYSTOLIC BLOOD PRESSURE: 146 MMHG | HEIGHT: 61 IN | HEART RATE: 60 BPM | TEMPERATURE: 99 F | RESPIRATION RATE: 12 BRPM | WEIGHT: 128 LBS | DIASTOLIC BLOOD PRESSURE: 72 MMHG

## 2020-12-02 DIAGNOSIS — Z00.00 ROUTINE GENERAL MEDICAL EXAMINATION AT A HEALTH CARE FACILITY: Primary | ICD-10-CM

## 2020-12-02 DIAGNOSIS — I10 ESSENTIAL HYPERTENSION: ICD-10-CM

## 2020-12-02 DIAGNOSIS — I63.10 CEREBROVASCULAR ACCIDENT (CVA) DUE TO EMBOLISM OF PRECEREBRAL ARTERY (H): ICD-10-CM

## 2020-12-02 DIAGNOSIS — Z12.31 ENCOUNTER FOR SCREENING MAMMOGRAM FOR BREAST CANCER: ICD-10-CM

## 2020-12-02 DIAGNOSIS — Z93.3 S/P COLOSTOMY (H): ICD-10-CM

## 2020-12-02 DIAGNOSIS — Z13.220 SCREENING CHOLESTEROL LEVEL: ICD-10-CM

## 2020-12-02 DIAGNOSIS — I48.0 PAROXYSMAL ATRIAL FIBRILLATION WITH RVR (H): ICD-10-CM

## 2020-12-02 PROCEDURE — 99214 OFFICE O/P EST MOD 30 MIN: CPT | Mod: 25 | Performed by: INTERNAL MEDICINE

## 2020-12-02 PROCEDURE — G0439 PPPS, SUBSEQ VISIT: HCPCS | Performed by: INTERNAL MEDICINE

## 2020-12-02 RX ORDER — LOSARTAN POTASSIUM 100 MG/1
100 TABLET ORAL DAILY
Qty: 90 TABLET | Refills: 4 | Status: SHIPPED | OUTPATIENT
Start: 2020-12-02 | End: 2021-09-29

## 2020-12-02 RX ORDER — AMLODIPINE BESYLATE 5 MG/1
5 TABLET ORAL DAILY
Qty: 90 TABLET | Refills: 0 | Status: SHIPPED | OUTPATIENT
Start: 2020-12-02 | End: 2021-02-26

## 2020-12-02 RX ORDER — METOPROLOL TARTRATE 25 MG/1
TABLET, FILM COATED ORAL
Qty: 180 TABLET | Refills: 4 | Status: SHIPPED | OUTPATIENT
Start: 2020-12-02 | End: 2021-09-29

## 2020-12-02 ASSESSMENT — MIFFLIN-ST. JEOR: SCORE: 972.98

## 2020-12-02 ASSESSMENT — ACTIVITIES OF DAILY LIVING (ADL): CURRENT_FUNCTION: NO ASSISTANCE NEEDED

## 2020-12-02 NOTE — PROGRESS NOTES
SUBJECTIVE:   Brea Kerr is a 85 year old female who presents for Preventive Visit.      Patient has been advised of split billing requirements and indicates understanding: Yes   Are you in the first 12 months of your Medicare coverage?  No    Healthy Habits:    In general, how would you rate your overall health?  Good    Frequency of exercise:  None    Duration of exercise:  Less than 15 minutes    Taking medications regularly:  Yes    Barriers to taking medications:  Not applicable    Medication side effects:  Not applicable    Ability to successfully perform activities of daily living:  No assistance needed    Home Safety:  No safety concerns identified    Hearing impairment symptoms: wears aids.    In the past 6 months, have you been bothered by leaking of urine? Yes    In general, how would you rate your overall mental or emotional health?  Good      PHQ-2 Total Score:    Additional concerns today:  Yes    HTN. She has had elevated bp for a couple of months.  She is not exercising regularly.  She is watching her salt.     RA. She sees rheumatology at [facility].    S/p colostomy. She had a h/o diverticulitis.  She changes her ostomy pouch 1-2 times per day.      Do you feel safe in your environment? Yes    Have you ever done Advance Care Planning? (For example, a Health Directive, POLST, or a discussion with a medical provider or your loved ones about your wishes): Yes, advance care planning is on file.    Fall risk  Fallen 2 or more times in the past year?: No  Any fall with injury in the past year?: No    Cognitive Screening   1) Repeat 3 items (Leader, Season, Table)    2) Clock draw: NORMAL  3) 3 item recall: Recalls 3 objects  Results: 3 items recalled: COGNITIVE IMPAIRMENT LESS LIKELY    Mini-CogTM Copyright ANIKA Sullivan. Licensed by the author for use in [employer]; reprinted with permission (sandra@.[email or web address]). All rights reserved.      Do you have sleep apnea, excessive snoring or daytime  "drowsiness?: no    Reviewed and updated as needed this visit by clinical staff  Tobacco  Allergies  Meds   Med Hx  Surg Hx  Fam Hx  Soc Hx        Reviewed and updated as needed this visit by Provider   Allergies  Meds             Social History     Tobacco Use     Smoking status: Never Smoker     Smokeless tobacco: Never Used   Substance Use Topics     Alcohol use: Not Currently     Frequency: Never     If you drink alcohol do you typically have >3 drinks per day or >7 drinks per week? No    Alcohol Use 12/2/2020   Prescreen: >3 drinks/day or >7 drinks/week? No         Current providers sharing in care for this patient include:   Patient Care Team:  Whitley Nichole MD as PCP - General (Internal Medicine)  Whitley Nichole MD as Assigned PCP    The following health maintenance items are reviewed in Epic and correct as of today:  Health Maintenance   Topic Date Due     DEXA  1935     DEPRESSION ACTION PLAN  1935     ZOSTER IMMUNIZATION (2 of 3) 05/07/2009     PHQ-9  04/04/2020     FALL RISK ASSESSMENT  11/11/2020     MEDICARE ANNUAL WELLNESS VISIT  12/02/2021     ADVANCE CARE PLANNING  12/02/2025     DTAP/TDAP/TD IMMUNIZATION (3 - Td) 05/03/2027     INFLUENZA VACCINE  Completed     Pneumococcal Vaccine: 65+ Years  Completed     Pneumococcal Vaccine: Pediatrics (0 to 5 Years) and At-Risk Patients (6 to 64 Years)  Aged Out     IPV IMMUNIZATION  Aged Out     MENINGITIS IMMUNIZATION  Aged Out     HEPATITIS B IMMUNIZATION  Aged Out     Labs reviewed in EPIC      Review of Systems  Constitutional, HEENT, cardiovascular, pulmonary, GI, , musculoskeletal, neuro, skin, endocrine and psych systems are negative, except as otherwise noted.    OBJECTIVE:   BP (!) 146/72   Pulse 60   Temp 99  F (37.2  C) (Oral)   Resp 12   Ht 1.549 m (5' 1\")   Wt 58.1 kg (128 lb)   SpO2 96%   Breastfeeding No   BMI 24.19 kg/m   Estimated body mass index is 24.19 kg/m  as calculated from the following:    " "Height as of this encounter: 1.549 m (5' 1\").    Weight as of this encounter: 58.1 kg (128 lb).  Physical Exam  GENERAL APPEARANCE: healthy, alert and no distress  EYES: Eyes grossly normal to inspection, PERRL and conjunctivae and sclerae normal   HENT: ear canals and TM's normal, nose and mouth without ulcers or lesions, oropharynx clear and oral mucous membranes moist  NECK: no adenopathy, no asymmetry, masses, or scars and thyroid normal to palpation  RESP: lungs clear to auscultation - no rales, rhonchi or wheezes  BREAST: normal without masses, tenderness or nipple discharge and no palpable axillary masses or adenopathy  CV: regular rate and rhythm, normal S1 S2, no S3 or S4, no murmur, click or rub, no peripheral edema and peripheral pulses strong  ABDOMEN: soft, nontender, no hepatosplenomegaly, no masses and bowel sounds normal  MS: no musculoskeletal defects are noted and gait is age appropriate without ataxia  SKIN: no suspicious lesions or rashes  NEURO: Normal strength and tone, sensory exam grossly normal, mentation intact and speech normal  PSYCH: mentation appears normal and affect normal/bright    Diagnostic Test Results:  Labs reviewed in Epic    ASSESSMENT / PLAN:       ICD-10-CM    1. Routine general medical examination at a health care facility  Z00.00    2. Essential hypertension  I10 losartan (COZAAR) 100 MG tablet     **Comprehensive metabolic panel FUTURE anytime     amLODIPine (NORVASC) 5 MG tablet     Albumin Random Urine Quantitative with Creat Ratio   3. Paroxysmal atrial fibrillation with RVR, perioperative  I48.0 metoprolol tartrate (LOPRESSOR) 25 MG tablet   4. Cerebrovascular accident (CVA)  (H) Nov 2019  I63.10 apixaban ANTICOAGULANT (ELIQUIS) 2.5 MG tablet   5. Screening cholesterol level  Z13.220 Lipid panel reflex to direct LDL Fasting   6. Encounter for screening mammogram for breast cancer  Z12.31 MA Screening Digital Bilateral     HTN. Not at goal.  Recommend starting " "norvasc.  Recheck blood pressure in 2 weeks.    Patient has been advised of split billing requirements and indicates understanding: Yes  COUNSELING:  Reviewed preventive health counseling, as reflected in patient instructions       Regular exercise       Healthy diet/nutrition    Estimated body mass index is 24.19 kg/m  as calculated from the following:    Height as of this encounter: 1.549 m (5' 1\").    Weight as of this encounter: 58.1 kg (128 lb).        She reports that she has never smoked. She has never used smokeless tobacco.      Appropriate preventive services were discussed with this patient, including applicable screening as appropriate for cardiovascular disease, diabetes, osteopenia/osteoporosis, and glaucoma.  As appropriate for age/gender, discussed screening for colorectal cancer, prostate cancer, breast cancer, and cervical cancer. Checklist reviewing preventive services available has been given to the patient.    Reviewed patients plan of care and provided an AVS. The Basic Care Plan (routine screening as documented in Health Maintenance) for Brea meets the Care Plan requirement. This Care Plan has been established and reviewed with the Patient.    Counseling Resources:  ATP IV Guidelines  Pooled Cohorts Equation Calculator  Breast Cancer Risk Calculator  Breast Cancer: Medication to Reduce Risk  FRAX Risk Assessment  ICSI Preventive Guidelines  Dietary Guidelines for Americans, 2010  Vpon's MyPlate  ASA Prophylaxis  Lung CA Screening    Whitley Nichole MD  [facility]    Identified Health Risks:  "

## 2020-12-02 NOTE — NURSING NOTE
"BP (!) 146/72   Pulse 60   Temp 99  F (37.2  C) (Oral)   Resp 12   Ht 1.549 m (5' 1\")   Wt 58.1 kg (128 lb)   SpO2 96%   Breastfeeding No   BMI 24.19 kg/m      "

## 2021-01-04 ENCOUNTER — ALLIED HEALTH/NURSE VISIT (OUTPATIENT)
Dept: NURSING | Facility: CLINIC | Age: 86
End: 2021-01-04
Payer: MEDICARE

## 2021-01-04 VITALS — DIASTOLIC BLOOD PRESSURE: 64 MMHG | SYSTOLIC BLOOD PRESSURE: 124 MMHG

## 2021-01-04 DIAGNOSIS — I10 ESSENTIAL HYPERTENSION: ICD-10-CM

## 2021-01-04 DIAGNOSIS — Z13.220 SCREENING CHOLESTEROL LEVEL: ICD-10-CM

## 2021-01-04 DIAGNOSIS — I10 ESSENTIAL HYPERTENSION: Primary | ICD-10-CM

## 2021-01-04 PROCEDURE — 36415 COLL VENOUS BLD VENIPUNCTURE: CPT | Performed by: INTERNAL MEDICINE

## 2021-01-04 PROCEDURE — 80061 LIPID PANEL: CPT | Performed by: INTERNAL MEDICINE

## 2021-01-04 PROCEDURE — 80053 COMPREHEN METABOLIC PANEL: CPT | Performed by: INTERNAL MEDICINE

## 2021-01-04 PROCEDURE — 82043 UR ALBUMIN QUANTITATIVE: CPT | Performed by: INTERNAL MEDICINE

## 2021-01-04 NOTE — NURSING NOTE
Here for B/P =124/64 please advise -- Patient states if provider wants Patient to continue new medication she would need a refill .      Pharm [facility] on [address] / tammi Choi LPN

## 2021-01-05 LAB
ALBUMIN SERPL-MCNC: 3.4 G/DL (ref 3.4–5)
ALP SERPL-CCNC: 85 U/L (ref 40–150)
ALT SERPL W P-5'-P-CCNC: 19 U/L (ref 0–50)
ANION GAP SERPL CALCULATED.3IONS-SCNC: 5 MMOL/L (ref 3–14)
AST SERPL W P-5'-P-CCNC: 18 U/L (ref 0–45)
BILIRUB SERPL-MCNC: 1 MG/DL (ref 0.2–1.3)
BUN SERPL-MCNC: 23 MG/DL (ref 7–30)
CALCIUM SERPL-MCNC: 8.9 MG/DL (ref 8.5–10.1)
CHLORIDE SERPL-SCNC: 108 MMOL/L (ref 94–109)
CHOLEST SERPL-MCNC: 240 MG/DL
CO2 SERPL-SCNC: 25 MMOL/L (ref 20–32)
CREAT SERPL-MCNC: 0.89 MG/DL (ref 0.52–1.04)
CREAT UR-MCNC: 149 MG/DL
GFR SERPL CREATININE-BSD FRML MDRD: 60 ML/MIN/{1.73_M2}
GLUCOSE SERPL-MCNC: 95 MG/DL (ref 70–99)
HDLC SERPL-MCNC: 38 MG/DL
LDLC SERPL CALC-MCNC: 172 MG/DL
MICROALBUMIN UR-MCNC: 14 MG/L
MICROALBUMIN/CREAT UR: 9.73 MG/G CR (ref 0–25)
NONHDLC SERPL-MCNC: 202 MG/DL
POTASSIUM SERPL-SCNC: 4.2 MMOL/L (ref 3.4–5.3)
PROT SERPL-MCNC: 7.3 G/DL (ref 6.8–8.8)
SODIUM SERPL-SCNC: 138 MMOL/L (ref 133–144)
TRIGL SERPL-MCNC: 152 MG/DL

## 2021-02-25 ENCOUNTER — TELEPHONE (OUTPATIENT)
Dept: INTERNAL MEDICINE | Facility: CLINIC | Age: 86
End: 2021-02-25

## 2021-02-26 DIAGNOSIS — I10 ESSENTIAL HYPERTENSION: ICD-10-CM

## 2021-02-26 RX ORDER — AMLODIPINE BESYLATE 5 MG/1
TABLET ORAL
Qty: 90 TABLET | Refills: 2 | Status: SHIPPED | OUTPATIENT
Start: 2021-02-26 | End: 2021-09-29

## 2021-02-26 NOTE — TELEPHONE ENCOUNTER
Pending Prescriptions:                       Disp   Refills    amLODIPine (NORVASC) 5 MG tablet [Pharmac*90 tab*2            Sig: TAKE 1 TABLET(5 MG) BY MOUTH DAILY    Prescription approved per [facility] Refill Protocol.

## 2021-04-06 ENCOUNTER — TELEPHONE (OUTPATIENT)
Dept: INTERNAL MEDICINE | Facility: CLINIC | Age: 86
End: 2021-04-06

## 2021-04-06 NOTE — TELEPHONE ENCOUNTER
Patient's daughter Krystina Onofre calls stating the patient tested positive for covid today and currently has no symptoms. She would like the patient to be treated with antibodies so she doesn't become ill. Call Krystina back to advise at [phone]. (spoke with patient and it is ok for us to speak with Krystina for this question, will update C2C next time in clinic)

## 2021-04-06 NOTE — TELEPHONE ENCOUNTER
They can call [facility] and set up infusion   We cannot order it,but another patient was able to do this   [phone]

## 2021-04-07 NOTE — TELEPHONE ENCOUNTER
Called Krystina. She states that her dad's doctor had directed them to [facility] and they were able to set up appointment for both patient and her spouse today.

## 2021-04-12 ENCOUNTER — TELEPHONE (OUTPATIENT)
Dept: INTERNAL MEDICINE | Facility: CLINIC | Age: 86
End: 2021-04-12

## 2021-04-12 NOTE — TELEPHONE ENCOUNTER
Patients son Krunal calling on C2C and he is concerned about his mother depression. She just wants to lie around and still have not done her taxes even though he has offered his help. Patient is not eating well. Patient doesn't know Krunal is calling for help but he is very worried. Ok to call and  [phone]

## 2021-04-13 NOTE — TELEPHONE ENCOUNTER
Would be good to have her see Dionisio, if possible, as it appears she has seen her for a while   If she wants to see someone here she can as well

## 2021-04-14 NOTE — TELEPHONE ENCOUNTER
Spoke with Krunal. He states family including patient have had Covid but patient is getting better & this is helping her overall. Krunal states he will help his Mom make a video visit to discuss further and has no other needs at this time.

## 2021-04-22 NOTE — PROGRESS NOTES
"[facility] Nurse Inpatient Ostomy Assessment       Assessment of ostomy and needs for:   Colostomy      Data:   History:      Per MD note(s):  S/p 10/3:  fito sigmoidectomy with Colostomy        Type of ostomy:  Colostomy    Stoma assessment: pouch changed 10/12    Size of stoma:  About 1 1/2\",  red, round, moist, edematous and protruberant with red robinsonin  Os    Mucocutaneous Junction (MCJ): intact    Peristomal skin: intact    Abdominal assessment: midline incision intact    Output:  small brown watery                                                Current pouching system:  Coloplast,  one piece and flat on 10/12    Pain:  minimal     Diet:              Active Diet Order      NPO for Medical/Clinical Reasons Except for: Meds  Labs:        Recent Labs   Lab Test  10/09/18   0550  10/08/18   0610   ALBUMIN   --   1.4*   HGB  8.1*  8.2*   INR   --   1.22*   WBC  13.4*  14.6*             Intervention:     Patient's chart evaluated.      Assessments done today:  Stoma assessed and pouch change technique demonstrated for pt and daughter in law Barajas, who is in town to assist patient in hospital and after discharge, she is very supportive. Spend a long time demonstrating and discussing pouching changes, she is very involoved. Pt interested but having difficulty viewing stoma, odor is bothersome and was given a citrus pouch to mask odor.    Education: continued with pt and family.     Pouch change and skin care; empty pouch, cleaning neck, contact WOC after discharge.Prepared for discharge by: Supplies ordered and reviewed in room    Pt registered for ostomy supply samples? On discharge with Coloplast          Assessment:     Stoma assessment: viable,red, protruberant with RRC intubated in Os; small amount brown watery effluent in pouch.    Pt remains NPO  on TPN and lipids.     Learning needs/ comprehension: moving slowly with information due to pt status;Jenn and patient very engaged and " asked appropriate questions    Effectiveness of current pouching/ supply plan: TBD. Pt wanted to keep 1 piece pouch at this time, will show 2 piece pouch next week.           Plan:     Plan:     Current pouching system: Coloplast 1 pc and ring 10/12    Education:    Education continued thru the stay:   Pouch Emptying and Pouch Replacement, How to cuff and clean pouch, How to empty pouch, Removal of pouch, Preparation of new pouch, Cutting out or evaluating a pattern, Applying paste or rings, Applying appliance to abdomen, Peristomal skin care, Diet and hydration / fluid balance, Odor / flatus management and Lifestyle Adjustments     Supply company:                  TBD    Do [facility] Nurse recommend home care ? Possible home care, Jenn will stay with pt at home after discharge to assist.                             Detail Level: Detailed Quality 110: Preventive Care And Screening: Influenza Immunization: Influenza Immunization not Administered for Documented Reasons.

## 2021-05-03 ENCOUNTER — TELEPHONE (OUTPATIENT)
Dept: INTERNAL MEDICINE | Facility: CLINIC | Age: 86
End: 2021-05-03

## 2021-06-07 ENCOUNTER — TELEPHONE (OUTPATIENT)
Dept: INTERNAL MEDICINE | Facility: CLINIC | Age: 86
End: 2021-06-07

## 2021-06-07 NOTE — TELEPHONE ENCOUNTER
Fax received from [employer] - 06/04/21 for review and signature.  Put in Dr. Ritter's in basket on Dr. Nichole's behalf.

## 2021-06-14 ENCOUNTER — TELEPHONE (OUTPATIENT)
Dept: INTERNAL MEDICINE | Facility: CLINIC | Age: 86
End: 2021-06-14

## 2021-06-28 LAB — INTERPRETATION ECG - MUSE: NORMAL

## 2021-09-24 ENCOUNTER — TELEPHONE (OUTPATIENT)
Dept: INTERNAL MEDICINE | Facility: CLINIC | Age: 86
End: 2021-09-24

## 2021-09-29 ENCOUNTER — OFFICE VISIT (OUTPATIENT)
Dept: INTERNAL MEDICINE | Facility: CLINIC | Age: 86
End: 2021-09-29
Payer: MEDICARE

## 2021-09-29 ENCOUNTER — MEDICAL CORRESPONDENCE (OUTPATIENT)
Dept: HEALTH INFORMATION MANAGEMENT | Facility: CLINIC | Age: 86
End: 2021-09-29

## 2021-09-29 VITALS
TEMPERATURE: 96.8 F | OXYGEN SATURATION: 97 % | HEIGHT: 61 IN | DIASTOLIC BLOOD PRESSURE: 70 MMHG | WEIGHT: 126 LBS | BODY MASS INDEX: 23.79 KG/M2 | SYSTOLIC BLOOD PRESSURE: 138 MMHG | HEART RATE: 67 BPM | RESPIRATION RATE: 16 BRPM

## 2021-09-29 DIAGNOSIS — M06.9 RHEUMATOID ARTHRITIS INVOLVING MULTIPLE SITES, UNSPECIFIED WHETHER RHEUMATOID FACTOR PRESENT (H): ICD-10-CM

## 2021-09-29 DIAGNOSIS — Z93.3 S/P COLOSTOMY (H): ICD-10-CM

## 2021-09-29 DIAGNOSIS — I10 ESSENTIAL HYPERTENSION: Primary | ICD-10-CM

## 2021-09-29 DIAGNOSIS — I63.10 CEREBROVASCULAR ACCIDENT (CVA) DUE TO EMBOLISM OF PRECEREBRAL ARTERY (H): ICD-10-CM

## 2021-09-29 DIAGNOSIS — Z12.31 ENCOUNTER FOR SCREENING MAMMOGRAM FOR BREAST CANCER: ICD-10-CM

## 2021-09-29 DIAGNOSIS — I48.0 PAROXYSMAL ATRIAL FIBRILLATION WITH RVR (H): ICD-10-CM

## 2021-09-29 PROBLEM — F32.0 MILD MAJOR DEPRESSION (H): Status: RESOLVED | Noted: 2018-10-22 | Resolved: 2021-09-29

## 2021-09-29 PROCEDURE — 99214 OFFICE O/P EST MOD 30 MIN: CPT | Performed by: INTERNAL MEDICINE

## 2021-09-29 RX ORDER — LOSARTAN POTASSIUM 100 MG/1
100 TABLET ORAL DAILY
Qty: 90 TABLET | Refills: 4 | Status: SHIPPED | OUTPATIENT
Start: 2021-09-29 | End: 2022-09-12

## 2021-09-29 RX ORDER — METOPROLOL TARTRATE 25 MG/1
TABLET, FILM COATED ORAL
Qty: 180 TABLET | Refills: 4 | Status: SHIPPED | OUTPATIENT
Start: 2021-09-29 | End: 2022-09-12

## 2021-09-29 RX ORDER — AMLODIPINE BESYLATE 5 MG/1
TABLET ORAL
Qty: 90 TABLET | Refills: 3 | Status: SHIPPED | OUTPATIENT
Start: 2021-09-29 | End: 2022-09-12

## 2021-09-29 ASSESSMENT — ANXIETY QUESTIONNAIRES
3. WORRYING TOO MUCH ABOUT DIFFERENT THINGS: NOT AT ALL
GAD7 TOTAL SCORE: 0
2. NOT BEING ABLE TO STOP OR CONTROL WORRYING: NOT AT ALL
7. FEELING AFRAID AS IF SOMETHING AWFUL MIGHT HAPPEN: NOT AT ALL
7. FEELING AFRAID AS IF SOMETHING AWFUL MIGHT HAPPEN: NOT AT ALL
4. TROUBLE RELAXING: NOT AT ALL
1. FEELING NERVOUS, ANXIOUS, OR ON EDGE: NOT AT ALL
6. BECOMING EASILY ANNOYED OR IRRITABLE: NOT AT ALL
5. BEING SO RESTLESS THAT IT IS HARD TO SIT STILL: NOT AT ALL

## 2021-09-29 ASSESSMENT — PATIENT HEALTH QUESTIONNAIRE - PHQ9
SUM OF ALL RESPONSES TO PHQ QUESTIONS 1-9: 0
SUM OF ALL RESPONSES TO PHQ QUESTIONS 1-9: 0
10. IF YOU CHECKED OFF ANY PROBLEMS, HOW DIFFICULT HAVE THESE PROBLEMS MADE IT FOR YOU TO DO YOUR WORK, TAKE CARE OF THINGS AT HOME, OR GET ALONG WITH OTHER PEOPLE: NOT DIFFICULT AT ALL

## 2021-09-29 ASSESSMENT — MIFFLIN-ST. JEOR: SCORE: 963.91

## 2021-09-29 NOTE — PROGRESS NOTES
Assessment & Plan     Essential hypertension  under reasonable control Continue current medications. Labs uip to date  - amLODIPine (NORVASC) 5 MG tablet; TAKE 1 TABLET(5 MG) BY MOUTH DAILY  - losartan (COZAAR) 100 MG tablet; Take 1 tablet (100 mg) by mouth daily    Cerebrovascular accident (CVA)  (H) Nov 2019  Continue current medications.   - apixaban ANTICOAGULANT (ELIQUIS) 2.5 MG tablet; Take 1 tablet (2.5 mg) by mouth 2 times daily    Paroxysmal atrial fibrillation with RVR, perioperative  under good control   - metoprolol tartrate (LOPRESSOR) 25 MG tablet; TAKE 1 TABLET(25 MG) BY MOUTH TWICE DAILY    Encounter for screening mammogram for breast cancer     - *MA Screening Digital Bilateral; Future    S/P colostomy (H)       Rheumatoid arthritis involving multiple sites, unspecified whether rheumatoid factor present (H)  She needs bilateral total knee arthroplasty but is reluctant. Will follow clinically for now.                  Return in about 6 months (around 3/29/2022) for Routine preventive.    Kimmy Kennedy MD  [facility] LUCIO Guidry is a 85 year old who presents for the following health issues  accompanied by her daughter:    HPI     Daughter in room.     Establish care. Fasting. Follow up A. Fib. Med refills. Hypertension Follow-up      Do you check your blood pressure regularly outside of the clinic? Yes     Are you following a low salt diet? Yes    Are your blood pressures ever more than 140 on the top number (systolic) OR more   than 90 on the bottom number (diastolic), for example 140/90? Yes      How many servings of fruits and vegetables do you eat daily?  0-1    On average, how many sweetened beverages do you drink each day (Examples: soda, juice, sweet tea, etc.  Do NOT count diet or artificially sweetened beverages)?   0    How many days per week do you exercise enough to make your heart beat faster? 3 or less    How many minutes a day do you exercise  "enough to make your heart beat faster? 9 or less    How many days per week do you miss taking your medication? 0    She has significant b.bl knee pain but her  has health issues and she is worried how they would get by is she had a total knee arthroplasty. But her pain is very limiting.    She has ionly had 1 episode of atrial fibrillation ever. Has no problems with chest pain pressure or shortness of breath. No palpitations or lightheadedness.     She has rheumatoid arthritis but is under good control. She is followed by Rheumatology     She manages her Colostomy without problems.     Review of Systems   Constitutional, HEENT, cardiovascular, pulmonary, GI, , musculoskeletal, neuro, skin, endocrine and psych systems are negative, except as otherwise noted.      Objective    /70   Pulse 67   Temp 96.8  F (36  C) (Oral)   Resp 16   Ht 1.549 m (5' 1\")   Wt 57.2 kg (126 lb)   SpO2 97%   Breastfeeding No   BMI 23.81 kg/m    Body mass index is 23.81 kg/m .  Physical Exam   GENERAL: healthy, alert and no distress  NECK: no adenopathy, no asymmetry, masses, or scars and thyroid normal to palpation  RESP: lungs clear to auscultation - no rales, rhonchi or wheezes  CV: regular rate and rhythm, normal S1 S2, no S3 or S4, no murmur, click or rub, no peripheral edema and peripheral pulses strong  ABDOMEN: soft, nontender, no hepatosplenomegaly, no masses and bowel sounds normal  MS: no gross musculoskeletal defects noted, no edema          "

## 2021-09-30 ENCOUNTER — HOSPITAL ENCOUNTER (OUTPATIENT)
Dept: MAMMOGRAPHY | Facility: CLINIC | Age: 86
Discharge: HOME | End: 2021-09-30
Attending: INTERNAL MEDICINE | Admitting: INTERNAL MEDICINE
Payer: MEDICARE

## 2021-09-30 DIAGNOSIS — Z12.31 ENCOUNTER FOR SCREENING MAMMOGRAM FOR BREAST CANCER: ICD-10-CM

## 2021-09-30 PROCEDURE — 77063 BREAST TOMOSYNTHESIS BI: CPT

## 2021-09-30 ASSESSMENT — PATIENT HEALTH QUESTIONNAIRE - PHQ9: SUM OF ALL RESPONSES TO PHQ QUESTIONS 1-9: 0

## 2021-09-30 ASSESSMENT — ANXIETY QUESTIONNAIRES: GAD7 TOTAL SCORE: 0

## 2021-10-19 PROBLEM — F32.9 MAJOR DEPRESSION: Status: RESOLVED | Noted: 2018-10-22 | Resolved: 2021-09-29

## 2021-12-02 ENCOUNTER — TELEPHONE (OUTPATIENT)
Dept: INTERNAL MEDICINE | Facility: CLINIC | Age: 86
End: 2021-12-02
Payer: MEDICARE

## 2021-12-02 NOTE — TELEPHONE ENCOUNTER
[employer] orders for Colostomy supplies received via fax, form to your in box for signature

## 2021-12-06 ENCOUNTER — MEDICAL CORRESPONDENCE (OUTPATIENT)
Dept: HEALTH INFORMATION MANAGEMENT | Facility: CLINIC | Age: 86
End: 2021-12-06
Payer: MEDICARE

## 2022-07-02 NOTE — ED TRIAGE NOTES
"Pt was noted by her son on Tuesday \"not acting right\" and then episode passed.  Daughter visited today and noted slurred speech at 1100.  She reports pts thoughts are slower than usual and her memory is not as usual.    " Negative

## 2022-09-12 ENCOUNTER — OFFICE VISIT (OUTPATIENT)
Dept: INTERNAL MEDICINE | Facility: CLINIC | Age: 87
End: 2022-09-12
Payer: MEDICARE

## 2022-09-12 VITALS
RESPIRATION RATE: 16 BRPM | SYSTOLIC BLOOD PRESSURE: 142 MMHG | HEART RATE: 66 BPM | BODY MASS INDEX: 25 KG/M2 | DIASTOLIC BLOOD PRESSURE: 80 MMHG | WEIGHT: 124 LBS | OXYGEN SATURATION: 99 % | TEMPERATURE: 97.6 F | HEIGHT: 59 IN

## 2022-09-12 DIAGNOSIS — E55.9 VITAMIN D DEFICIENCY: ICD-10-CM

## 2022-09-12 DIAGNOSIS — E78.5 HYPERLIPIDEMIA LDL GOAL <70: ICD-10-CM

## 2022-09-12 DIAGNOSIS — Z00.00 MEDICARE ANNUAL WELLNESS VISIT, SUBSEQUENT: Primary | ICD-10-CM

## 2022-09-12 DIAGNOSIS — L82.1 SEBORRHEIC KERATOSIS: ICD-10-CM

## 2022-09-12 DIAGNOSIS — Z83.2 FAMILY HISTORY OF PERNICIOUS ANEMIA: ICD-10-CM

## 2022-09-12 DIAGNOSIS — M17.0 PRIMARY OSTEOARTHRITIS OF BOTH KNEES: ICD-10-CM

## 2022-09-12 DIAGNOSIS — I63.10 CEREBROVASCULAR ACCIDENT (CVA) DUE TO EMBOLISM OF PRECEREBRAL ARTERY (H): ICD-10-CM

## 2022-09-12 DIAGNOSIS — I10 ESSENTIAL HYPERTENSION: ICD-10-CM

## 2022-09-12 DIAGNOSIS — R32 URINARY INCONTINENCE, UNSPECIFIED TYPE: ICD-10-CM

## 2022-09-12 DIAGNOSIS — I48.0 PAROXYSMAL ATRIAL FIBRILLATION WITH RVR (H): ICD-10-CM

## 2022-09-12 DIAGNOSIS — M06.9 RHEUMATOID ARTHRITIS INVOLVING MULTIPLE SITES, UNSPECIFIED WHETHER RHEUMATOID FACTOR PRESENT (H): ICD-10-CM

## 2022-09-12 DIAGNOSIS — M81.0 AGE-RELATED OSTEOPOROSIS WITHOUT CURRENT PATHOLOGICAL FRACTURE: ICD-10-CM

## 2022-09-12 DIAGNOSIS — Z23 FLU VACCINE NEED: ICD-10-CM

## 2022-09-12 LAB
ALBUMIN UR-MCNC: NEGATIVE MG/DL
APPEARANCE UR: CLEAR
BACTERIA #/AREA URNS HPF: NORMAL /HPF
BILIRUB UR QL STRIP: NEGATIVE
COLOR UR AUTO: YELLOW
ERYTHROCYTE [DISTWIDTH] IN BLOOD BY AUTOMATED COUNT: 13.1 % (ref 10–15)
GLUCOSE UR STRIP-MCNC: NEGATIVE MG/DL
HCT VFR BLD AUTO: 44.9 % (ref 35–47)
HGB BLD-MCNC: 15.1 G/DL (ref 11.7–15.7)
HGB UR QL STRIP: ABNORMAL
KETONES UR STRIP-MCNC: NEGATIVE MG/DL
LEUKOCYTE ESTERASE UR QL STRIP: NEGATIVE
MCH RBC QN AUTO: 33.9 PG (ref 26.5–33)
MCHC RBC AUTO-ENTMCNC: 33.6 G/DL (ref 31.5–36.5)
MCV RBC AUTO: 101 FL (ref 78–100)
NITRATE UR QL: NEGATIVE
PH UR STRIP: 5.5 [PH] (ref 5–7)
PLATELET # BLD AUTO: 233 10E3/UL (ref 150–450)
RBC # BLD AUTO: 4.45 10E6/UL (ref 3.8–5.2)
RBC #/AREA URNS AUTO: NORMAL /HPF
SP GR UR STRIP: <=1.005 (ref 1–1.03)
UROBILINOGEN UR STRIP-ACNC: 0.2 E.U./DL
VIT B12 SERPL-MCNC: 693 PG/ML (ref 232–1245)
WBC # BLD AUTO: 7 10E3/UL (ref 4–11)
WBC #/AREA URNS AUTO: NORMAL /HPF

## 2022-09-12 PROCEDURE — G0439 PPPS, SUBSEQ VISIT: HCPCS

## 2022-09-12 PROCEDURE — 80061 LIPID PANEL: CPT

## 2022-09-12 PROCEDURE — G0008 ADMIN INFLUENZA VIRUS VAC: HCPCS

## 2022-09-12 PROCEDURE — 82306 VITAMIN D 25 HYDROXY: CPT

## 2022-09-12 PROCEDURE — 90662 IIV NO PRSV INCREASED AG IM: CPT

## 2022-09-12 PROCEDURE — 82607 VITAMIN B-12: CPT

## 2022-09-12 PROCEDURE — 81001 URINALYSIS AUTO W/SCOPE: CPT

## 2022-09-12 PROCEDURE — 36415 COLL VENOUS BLD VENIPUNCTURE: CPT

## 2022-09-12 PROCEDURE — 99214 OFFICE O/P EST MOD 30 MIN: CPT | Mod: 25

## 2022-09-12 PROCEDURE — 80048 BASIC METABOLIC PNL TOTAL CA: CPT

## 2022-09-12 PROCEDURE — 85027 COMPLETE CBC AUTOMATED: CPT

## 2022-09-12 RX ORDER — LOSARTAN POTASSIUM 100 MG/1
100 TABLET ORAL DAILY
Qty: 90 TABLET | Refills: 4 | Status: SHIPPED | OUTPATIENT
Start: 2022-09-12 | End: 2023-10-20

## 2022-09-12 RX ORDER — METOPROLOL TARTRATE 25 MG/1
TABLET, FILM COATED ORAL
Qty: 180 TABLET | Refills: 4 | Status: SHIPPED | OUTPATIENT
Start: 2022-09-12 | End: 2022-10-07

## 2022-09-12 RX ORDER — AMLODIPINE BESYLATE 5 MG/1
TABLET ORAL
Qty: 90 TABLET | Refills: 3 | Status: SHIPPED | OUTPATIENT
Start: 2022-09-12 | End: 2023-08-31

## 2022-09-12 ASSESSMENT — ENCOUNTER SYMPTOMS
EYE PAIN: 0
SHORTNESS OF BREATH: 0
PARESTHESIAS: 0
HEADACHES: 0
NAUSEA: 0
DIZZINESS: 0
ARTHRALGIAS: 1
NERVOUS/ANXIOUS: 0
FREQUENCY: 1
FEVER: 0
DYSURIA: 0
PALPITATIONS: 0
HEMATURIA: 0
DIARRHEA: 0
WEAKNESS: 0
CHILLS: 0
SORE THROAT: 0
HEMATOCHEZIA: 0
COUGH: 1
HEARTBURN: 1
ABDOMINAL PAIN: 0
JOINT SWELLING: 1
CONSTIPATION: 0
MYALGIAS: 0

## 2022-09-12 ASSESSMENT — ACTIVITIES OF DAILY LIVING (ADL): CURRENT_FUNCTION: NO ASSISTANCE NEEDED

## 2022-09-12 ASSESSMENT — PATIENT HEALTH QUESTIONNAIRE - PHQ9
10. IF YOU CHECKED OFF ANY PROBLEMS, HOW DIFFICULT HAVE THESE PROBLEMS MADE IT FOR YOU TO DO YOUR WORK, TAKE CARE OF THINGS AT HOME, OR GET ALONG WITH OTHER PEOPLE: NOT DIFFICULT AT ALL
SUM OF ALL RESPONSES TO PHQ QUESTIONS 1-9: 0
SUM OF ALL RESPONSES TO PHQ QUESTIONS 1-9: 0

## 2022-09-12 NOTE — LETTER
September 14, 2022      Brea Kerr  [id number] [address] [address]        Dear ,    We are writing to inform you of your test results.    The trace amount of blood in your urine is nothing to be concerned about as there are no true red blood cells present in your urine.   Besides your cholesterol (LDL) being elevated, the other labs look good. I would like for cardiology to weigh in on your cholesterol as they may have other ideas to help us lower your LDL since you do not tolerate statin medications. Please make sure you get an appointment scheduled with Cardiology.     Please let me know if you have any questions.       Resulted Orders   UA with Microscopic reflex to Culture - lab collect   Result Value Ref Range    Color Urine Yellow Colorless, Straw, Light Yellow, Yellow    Appearance Urine Clear Clear    Glucose Urine Negative Negative mg/dL    Bilirubin Urine Negative Negative    Ketones Urine Negative Negative mg/dL    Specific Gravity Urine <=1.005 1.003 - 1.035    Blood Urine Trace (A) Negative    pH Urine 5.5 5.0 - 7.0    Protein Albumin Urine Negative Negative mg/dL    Urobilinogen Urine 0.2 0.2, 1.0 E.U./dL    Nitrite Urine Negative Negative    Leukocyte Esterase Urine Negative Negative   Lipid panel reflex to direct LDL Fasting   Result Value Ref Range    Cholesterol 250 (H) <200 mg/dL    Triglycerides 150 (H) <150 mg/dL    Direct Measure HDL 43 (L) >=50 mg/dL    LDL Cholesterol Calculated 177 (H) <=100 mg/dL    Non HDL Cholesterol 207 (H) <130 mg/dL    Patient Fasting > 8hrs? Yes     Narrative    Cholesterol  Desirable:  <200 mg/dL    Triglycerides  Normal:  Less than 150 mg/dL  Borderline High:  150-199 mg/dL  High:  200-499 mg/dL  Very High:  Greater than or equal to 500 mg/dL    Direct Measure HDL  Female:  Greater than or equal to 50 mg/dL   Male:  Greater than or equal to 40 mg/dL    LDL Cholesterol  Desirable:  <100mg/dL  Above Desirable:  100-129 mg/dL   Borderline High:   130-159 mg/dL   High:  160-189 mg/dL   Very High:  >= 190 mg/dL    Non HDL Cholesterol  Desirable:  130 mg/dL  Above Desirable:  130-159 mg/dL  Borderline High:  160-189 mg/dL  High:  190-219 mg/dL  Very High:  Greater than or equal to 220 mg/dL   CBC with platelets   Result Value Ref Range    WBC Count 7.0 4.0 - 11.0 10e3/uL    RBC Count 4.45 3.80 - 5.20 10e6/uL    Hemoglobin 15.1 11.7 - 15.7 g/dL    Hematocrit 44.9 35.0 - 47.0 %     (H) 78 - 100 fL    MCH 33.9 (H) 26.5 - 33.0 pg    MCHC 33.6 31.5 - 36.5 g/dL    RDW 13.1 10.0 - 15.0 %    Platelet Count 233 150 - 450 10e3/uL   Basic metabolic panel  (Ca, Cl, CO2, Creat, Gluc, K, Na, BUN)   Result Value Ref Range    Sodium 140 133 - 144 mmol/L    Potassium 4.1 3.4 - 5.3 mmol/L    Chloride 108 94 - 109 mmol/L    Carbon Dioxide (CO2) 25 20 - 32 mmol/L    Anion Gap 7 3 - 14 mmol/L    Urea Nitrogen 21 7 - 30 mg/dL    Creatinine 0.85 0.52 - 1.04 mg/dL    Calcium 9.5 8.5 - 10.1 mg/dL    Glucose 106 (H) 70 - 99 mg/dL    GFR Estimate 67 >60 mL/min/1.73m2      Comment:      Effective December 21, 2021 eGFRcr in adults is calculated using the 2021 CKD-EPI creatinine equation which includes age and gender (Shelby moralez al., NEJ, DOI: 10.1056/EWHDtc5157577)   Vitamin B12   Result Value Ref Range    Vitamin B12 693 232 - 1,245 pg/mL   Vitamin D Deficiency   Result Value Ref Range    Vitamin D, Total (25-Hydroxy) 90 (H) 20 - 75 ug/L    Narrative    Season, race, dietary intake, and treatment affect the concentration of 25-hydroxy-Vitamin D. Values may decrease during winter months and increase during summer months. Values 20-29 ug/L may indicate Vitamin D insufficiency and values <20 ug/L may indicate Vitamin D deficiency.    Vitamin D determination is routinely performed by an immunoassay specific for 25 hydroxyvitamin D3.  If an individual is on vitamin D2(ergocalciferol) supplementation, please specify 25 OH vitamin D2 and D3 level determination by LCMSMS test VITD23.      Urine Microscopic   Result Value Ref Range    Bacteria Urine None Seen None Seen /HPF    RBC Urine 0-2 0-2 /HPF /HPF    WBC Urine 0-5 0-5 /HPF /HPF    Narrative    Urine Culture not indicated         Sincerely,      YENY Whitney CNP

## 2022-09-12 NOTE — PROGRESS NOTES
"SUBJECTIVE:   Brea Kerr is a 86 year old female who presents for Preventive Visit.      Patient has been advised of split billing requirements and indicates understanding: Yes  Are you in the first 12 months of your Medicare coverage?  No    Healthy Habits:     In general, how would you rate your overall health?  Good    Frequency of exercise:  None    Do you usually eat at least 4 servings of fruit and vegetables a day, include whole grains    & fiber and avoid regularly eating high fat or \"junk\" foods?  Yes    Taking medications regularly:  Yes    Medication side effects:  None    Ability to successfully perform activities of daily living:  No assistance needed    Home Safety:  No safety concerns identified    Hearing Impairment:  No hearing concerns    In the past 6 months, have you been bothered by leaking of urine? Yes    In general, how would you rate your overall mental or emotional health?  Excellent      PHQ-2 Total Score: 0    Additional concerns today:  Yes    Do you feel safe in your environment? Yes    Have you ever done Advance Care Planning? (For example, a Health Directive, POLST, or a discussion with a medical provider or your loved ones about your wishes): No, advance care planning information given to patient to review.  Patient declined advance care planning discussion at this time.    Fall risk  Fallen 2 or more times in the past year?: No  Any fall with injury in the past year?: No    Cognitive Screening    1) Repeat 3 items (Leader, Season, Table)    2) Clock draw: NORMAL  3) 3 item recall: Recalls NO objects   Results: 0 items recalled: PROBABLE COGNITIVE IMPAIRMENT, **INFORM PROVIDER** (Per patient and her daughter, testing wasn't done correctly. Pt has no concerns regarding their memory and daughter is in agreement).     Mini-CogTM Copyright S Nate. Licensed by the author for use in [facility]; reprinted with permission ([email or web address]). All rights reserved.      Do you " have sleep apnea, excessive snoring or daytime drowsiness?: no    Reviewed and updated as needed this visit by clinical staff   Tobacco  Allergies  Meds   Med Hx  Surg Hx  Fam Hx  Soc Hx          Reviewed and updated as needed this visit by Provider                   Social History     Tobacco Use     Smoking status: Never Smoker     Smokeless tobacco: Never Used   Substance Use Topics     Alcohol use: Not Currently     If you drink alcohol do you typically have >3 drinks per day or >7 drinks per week? Yes      Alcohol Use 9/12/2022   Prescreen: >3 drinks/day or >7 drinks/week? No   Prescreen: >3 drinks/day or >7 drinks/week? -         Current providers sharing in care for this patient include:   Patient Care Team:  Kimmy Kennedy MD as PCP - General (Internal Medicine)  Kimmy Kennedy MD as Assigned PCP    The following health maintenance items are reviewed in Epic and correct as of today:  Health Maintenance Due   Topic Date Due     DEXA  Never done     ANNUAL REVIEW OF HM ORDERS  Never done     DEPRESSION ACTION PLAN  Never done     COVID-19 Vaccine (1) Never done     ZOSTER IMMUNIZATION (1 of 2) 05/07/2009     INFLUENZA VACCINE (1) 09/01/2022       Mammogram Screening - Patient over age 75, has elected to continue with screening.  Pertinent mammograms are reviewed under the imaging tab.    Review of Systems   Constitutional: Negative for chills and fever.   HENT: Positive for hearing loss. Negative for congestion, ear pain and sore throat.    Eyes: Negative for pain and visual disturbance.   Respiratory: Positive for cough. Negative for shortness of breath.    Cardiovascular: Negative for chest pain, palpitations and peripheral edema.   Gastrointestinal: Positive for heartburn. Negative for abdominal pain, constipation, diarrhea, hematochezia and nausea.   Genitourinary: Positive for frequency. Negative for dysuria, genital sores, hematuria and urgency.   Musculoskeletal: Positive for arthralgias  "and joint swelling. Negative for myalgias.   Skin: Negative for rash.   Neurological: Negative for dizziness, weakness, headaches and paresthesias.   Psychiatric/Behavioral: Negative for mood changes. The patient is not nervous/anxious.      OBJECTIVE:   BP (!) 140/80   Pulse 66   Temp 97.6  F (36.4  C) (Oral)   Resp 16   Ht 1.492 m (4' 10.75\")   Wt 56.2 kg (124 lb)   SpO2 99%   BMI 25.26 kg/m   Estimated body mass index is 25.26 kg/m  as calculated from the following:    Height as of this encounter: 1.492 m (4' 10.75\").    Weight as of this encounter: 56.2 kg (124 lb).       Physical Exam  Constitutional:       General: She is not in acute distress.     Appearance: Normal appearance. She is not ill-appearing, toxic-appearing or diaphoretic.   HENT:      Head: Normocephalic and atraumatic.   Eyes:      Conjunctiva/sclera: Conjunctivae normal.   Cardiovascular:      Rate and Rhythm: Normal rate and regular rhythm.      Heart sounds: Normal heart sounds.   Pulmonary:      Effort: Pulmonary effort is normal.      Breath sounds: Normal breath sounds.   Skin:     General: Skin is warm and dry.   Neurological:      Mental Status: Sh is alert and oriented to person, place, and time.   Psychiatric:         Mood and Affect: Mood normal.         Behavior: Behavior normal.         Thought Content: Thought content normal.         Judgment: Judgment normal.       Diagnostic Test Results:  Labs reviewed in Epic    ASSESSMENT / PLAN:   (Z00.00) Medicare annual wellness visit, subsequent  (primary encounter diagnosis)  Plan: CBC with platelets, Basic metabolic panel  (Ca,        Cl, CO2, Creat, Gluc, K, Na, BUN)            (R32) Urinary incontinence, unspecified type  Comment: Notes recent history of urinary incontinence. Often is unaware of incontinence. No bowel incontinence as she has an ostomy. Also c/o recent urinary urgency. We will do UA today to check for any UTI.   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 CVA was likely embolic. Does have hx of A fib but has not been seen by Cardiology in past. Would be good for her to be seen by Cardiology and to discuss potential initiation of other lipid lowering medication as she has not tolerated statins in the past and her most recent LDL was 172 in January of 2021. Should keep her LDL below 70 if possible. Will recheck LDL today and will place referral for Cardiology. Continue Metoprolol and Eliquis.   Plan: Adult Cardiology Eval Tasha Referral, metoprolol tartrate (LOPRESSOR) 25 MG tablet, apixaban ANTICOAGULANT (ELIQUIS) 2.5 MG tablet           (I10) Essential hypertension  Comment: BP today is 142/80. Given pt's age, we won't make any changes to medications at this time. Would like Cardiology to weigh in on BP goals but predict they will be okay with where patient is at. I would hesitate to add a diuretic with patient's history of urinary urgency and incontinence and would hesitate to raise amlodipine to 10mg as she would likely develop peripheral edema at that dose.   Plan: amLODIPine (NORVASC) 5 MG tablet, losartan         (COZAAR) 100 MG tablet            (E78.5) Hyperlipidemia LDL goal <70  Plan: Lipid panel reflex to direct LDL Fasting            (Z83.2) Family history of pernicious anemia  Plan: Vitamin B12            (E55.9) Vitamin D deficiency  Plan: Vitamin D Deficiency            (M17.0) Primary osteoarthritis of both knees  Comment: Hx of severe OA in bilateral knees. Following with rheumatology who has recommended knee replacement. Pt is not interested in knee replacement at this time as she is fearful of procedure and hospital/rehab stay. She is  "currently using tylenol and voltaren gel. Doing cortisone shots with Rheum. Can get cortisone injections every 3 months max  Plan: Could consider seeing Ortho to have them weigh in on joint replacement with pt's history, age, etc.     (L82.1) Seborrheic keratosis  Plan: Adult Dermatology Referral          (Z23) Flu vaccine need  Plan: INFLUENZA, QUAD, HD, PF, 65+ (FLUZONE HD)    [M81.0] Age-related osteoporosis without current pathological fracture  Comment: Has been having DXA scans ordered by Rheumatology. DXA scan in 2018 showed osteoporosis with T score of -2.7. Most recent DXA scan was in May of 2022 which showed osteopenia with T score of -2.4. Pt has not received any tx for osteoporosis. She is at very high risk with fragility scores and hx of repeat cortisone injections for OA as well as family hx of hip fracture (Mother had hip fracture when she was 90).   Plan: Daughter is present today who is a NP. She would not like her mom to try Fosamax due to potential side effects. We talked about discussing initiation of Prolia at next visit in 3-4 months.    [M06.9] Rheumatoid arthritis involving multiple sites, unspecified whether rheumatoid factor present (H)  Comment: Follows with Rheumatology for management. Continue Methotrexate.       Patient has been advised of split billing requirements and indicates understanding: Yes    COUNSELING:  Reviewed preventive health counseling, as reflected in patient instructions       Regular exercise       Healthy diet/nutrition       Hearing screening    Estimated body mass index is 25.26 kg/m  as calculated from the following:    Height as of this encounter: 1.492 m (4' 10.75\").    Weight as of this encounter: 56.2 kg (124 lb).      She reports that she has never smoked. She has never used smokeless tobacco.      Appropriate preventive services were discussed with this patient, including applicable screening as appropriate for cardiovascular disease, diabetes, " osteopenia/osteoporosis, and glaucoma.  As appropriate for age/gender, discussed screening for colorectal cancer, prostate cancer, breast cancer, and cervical cancer. Checklist reviewing preventive services available has been given to the patient.    Reviewed patients plan of care and provided an AVS. The Basic Care Plan (routine screening as documented in Health Maintenance) for Brea meets the Care Plan requirement. This Care Plan has been established and reviewed with the Patient and daughter.    Counseling Resources:  ATP IV Guidelines  Pooled Cohorts Equation Calculator  Breast Cancer Risk Calculator  Breast Cancer: Medication to Reduce Risk  FRAX Risk Assessment  ICSI Preventive Guidelines  Dietary Guidelines for Americans, 2010  USDA's MyPlate  ASA Prophylaxis  Lung CA Screening    YENY Gabriel [facility]    Identified Health Risks:

## 2022-09-13 LAB
ANION GAP SERPL CALCULATED.3IONS-SCNC: 7 MMOL/L (ref 3–14)
BUN SERPL-MCNC: 21 MG/DL (ref 7–30)
CALCIUM SERPL-MCNC: 9.5 MG/DL (ref 8.5–10.1)
CHLORIDE BLD-SCNC: 108 MMOL/L (ref 94–109)
CHOLEST SERPL-MCNC: 250 MG/DL
CO2 SERPL-SCNC: 25 MMOL/L (ref 20–32)
CREAT SERPL-MCNC: 0.85 MG/DL (ref 0.52–1.04)
DEPRECATED CALCIDIOL+CALCIFEROL SERPL-MC: 90 UG/L (ref 20–75)
FASTING STATUS PATIENT QL REPORTED: YES
GFR SERPL CREATININE-BSD FRML MDRD: 67 ML/MIN/1.73M2
GLUCOSE BLD-MCNC: 106 MG/DL (ref 70–99)
HDLC SERPL-MCNC: 43 MG/DL
LDLC SERPL CALC-MCNC: 177 MG/DL
NONHDLC SERPL-MCNC: 207 MG/DL
POTASSIUM BLD-SCNC: 4.1 MMOL/L (ref 3.4–5.3)
SODIUM SERPL-SCNC: 140 MMOL/L (ref 133–144)
TRIGL SERPL-MCNC: 150 MG/DL

## 2022-10-05 DIAGNOSIS — I48.0 PAROXYSMAL ATRIAL FIBRILLATION WITH RVR (H): ICD-10-CM

## 2022-10-07 RX ORDER — METOPROLOL TARTRATE 25 MG/1
TABLET, FILM COATED ORAL
Qty: 180 TABLET | Refills: 4 | Status: SHIPPED | OUTPATIENT
Start: 2022-10-07 | End: 2023-10-20

## 2022-10-07 NOTE — TELEPHONE ENCOUNTER
Pending Prescriptions:                       Disp   Refills    metoprolol tartrate (LOPRESSOR) 25 MG tabl*180 ta*4        Sig: TAKE 1 TABLET(25 MG) BY MOUTH TWICE DAILY    Routing refill request to provider for review/approval because:  BP Readings from Last 3 Encounters:   09/12/22 (!) 142/80   09/29/21 138/70   01/04/21 124/64

## 2022-10-12 DIAGNOSIS — I63.10 CEREBROVASCULAR ACCIDENT (CVA) DUE TO EMBOLISM OF PRECEREBRAL ARTERY (H): ICD-10-CM

## 2022-10-13 RX ORDER — APIXABAN 2.5 MG/1
TABLET, FILM COATED ORAL
Qty: 180 TABLET | Refills: 3 | OUTPATIENT
Start: 2022-10-13

## 2022-10-13 NOTE — TELEPHONE ENCOUNTER
Pending Prescriptions:                       Disp   Refills    ELIQUIS ANTICOAGULANT 2.5 MG tablet [Pharm*180 ta*3        Sig: TAKE 1 TABLET(2.5 MG) BY MOUTH TWICE DAILY    Routing refill request to provider for review/approval because:  Needs provider review

## 2022-10-24 ENCOUNTER — OFFICE VISIT (OUTPATIENT)
Dept: CARDIOLOGY | Facility: CLINIC | Age: 87
End: 2022-10-24
Payer: MEDICARE

## 2022-10-24 VITALS
SYSTOLIC BLOOD PRESSURE: 110 MMHG | OXYGEN SATURATION: 95 % | HEART RATE: 65 BPM | HEIGHT: 61 IN | DIASTOLIC BLOOD PRESSURE: 76 MMHG | BODY MASS INDEX: 23.3 KG/M2 | WEIGHT: 123.4 LBS

## 2022-10-24 DIAGNOSIS — I35.1 NONRHEUMATIC AORTIC VALVE INSUFFICIENCY: Primary | ICD-10-CM

## 2022-10-24 PROCEDURE — 99205 OFFICE O/P NEW HI 60 MIN: CPT | Performed by: INTERNAL MEDICINE

## 2022-10-24 NOTE — LETTER
10/24/2022    YENY Gabriel CNP  [address] [facility] [address]    RE: Brea Kerr       Dear Colleague,     I had the pleasure of seeing Brea Kerr in the [facility].      Cardiology Clinic Consultation:    October 24, 2022   Patient Name: Brea Kerr  Patient MRN: [id number]    Consult indication: dyslipidemia, HTN      HPI:    I had the opportunity to see patient Brea Kerr in cardiology clinic for a consultation. Patient is followed by our colleague YENY Gabriel CNP with Primary Care.     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    Patient has a longstanding history of dyslipidemia for which she had initially been on statin therapy.  She is accompanied today by her daughter, who is a nurse practitioner.  Daughter reports that most of the female members of the patient's family developed rheumatoid arthritis after starting statin therapy, and indeed, patient also developed rheumatoid arthritis after initiating statin therapy.  She has tried various statins in the past, however this resulted in significant myalgias, and so is currently not on statin therapy.  Last lipids from 9/12/2022 notable for total cholesterol 250, HDL 43, , triglycerides 150.    Patient has a history of CVA in 2019, she was seen by the Telestroke team, Dr. Castañeda.  Statin therapy was not recommended given history of statin intolerance, and etiology of CVA was presumed cardioembolic related to atrial fibrillation.    Blood pressure today in clinic was 110/76 mmHg, blood pressures on prior clinic visits seems to range from 124 to 146 mmHg systolic.    At baseline, patient reports that overall she  feels quite well.  She is limited by her musculoskeletal pain related to arthritis, primarily with knee and back pain.  However, she is able to care for self independently around her single level home without issue.  She denies any chest pain, chest pressure, abnormal shortness of breath.      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    Since her prior CVA was attributed to cardioembolic etiology related to atrial fibrillation, and she has a clear intolerance to statin therapy, in the context of her advanced age, risks of statin therapy likely outweigh benefits.  Discussed option of starting ezetimibe, however patient reports that she tried this in the past and did not tolerate this either.  Discussed option of PCSK9 inhibitor therapy, patient and daughter would like to hold off for now, which I feel is reasonable.    Regarding hypertension, blood pressure goals, I concur that a more strict blood pressure target would increase her risk of falls, I feel that keeping her blood pressure less than 140 mmHg systolic would be reasonable.    We discussed her history of paroxysmal atrial fibrillation.  Since she is asymptomatic, advised that she check her heart rates periodically to ensure that she is not in rapid atrial fibrillation, fortunately it does not seem that this has been an issue, and she is on metoprolol therapy.    Recommend continuing current regimen of apixaban, losartan, amlodipine, metoprolol tartrate.    Will check a TTE given mild AI on prior TTE 2019.  If there are no significant changes, scheduled follow-up in cardiology clinic is not warranted, however we will be glad to  see her back as needed in the future.    Thank you for allowing our team to participate in the care of Brea Kerr.  Please do not hesitate to call or page me with any questions or concerns.    Sincerely,     Vinay Serrano MD, [facility]  Cardiology  October 24, 2022      YENY Whitney CNP  [address] [place] [address]    Total time spent on this encounter: 65 minutes, providing care in this encounter including, but not limited to, reviewing prior medical records, laboratory data, imaging studies, diagnostic studies, procedure notes, formulating an assessment and plan, recommendations, discussion and counseling with patient face to face, dictation.    Past Medical History:     The ASCVD Risk score (Norris DK, et al., 2019) failed to calculate for the following reasons:    The 2019 ASCVD risk score is only valid for ages 40 to 79    The patient has a prior MI or stroke diagnosis  Patient Active Problem List   Diagnosis     Diverticulitis of colon     Diverticulitis of sigmoid colon     Colovesical fistula     Paroxysmal atrial fibrillation with RVR, perioperative     Abscess of sigmoid colon due to diverticulitis     Fluid-responsive shock due to acute infection with a fib/RVR     S/P colostomy (H)     S/P partial resection of colon     Rheumatoid arthritis involving multiple sites (H)     CVA (cerebral vascular accident) (H)       Past Surgical History:   Past Surgical History:   Procedure Laterality Date     LAPAROSCOPIC ASSISTED COLOSTOMY N/A 10/2/2018    Procedure: LAPAROSCOPIC ASSISTED COLOSTOMY;;  Surgeon: Emma Reddy MD;  Location:  OR     LAPAROSCOPIC ASSISTED SIGMOID COLECTOMY N/A 10/2/2018    Procedure: LAPAROSCOPIC ASSISTED SIGMOID COLECTOMY;  Exploratory laparoscopy, open sigmoid colectomy with end colostomy, extensive lysis of adhesions, and takedown of colovesical fistula with drain removal and drain placement;  Surgeon: Emma Reddy MD;  Location:   OR     SPLENECTOMY         Medications (outpatient):  Current Outpatient Medications   Medication Sig Dispense Refill     acetaminophen (TYLENOL) 500 MG tablet Take 500-1,000 mg by mouth every 6 hours as needed for mild pain       amLODIPine (NORVASC) 5 MG tablet TAKE 1 TABLET(5 MG) BY MOUTH DAILY 90 tablet 3     apixaban ANTICOAGULANT (ELIQUIS) 2.5 MG tablet Take 1 tablet (2.5 mg) by mouth 2 times daily 180 tablet 3     folic acid (FOLVITE) 1 MG tablet Take 1 mg by mouth daily       losartan (COZAAR) 100 MG tablet Take 1 tablet (100 mg) by mouth daily 90 tablet 4     METHOTREXATE PO Take 2.5 mg by mouth once a week       metoprolol tartrate (LOPRESSOR) 25 MG tablet TAKE 1 TABLET(25 MG) BY MOUTH TWICE DAILY 180 tablet 4     Multiple Vitamins-Minerals (CENTRUM SILVER) per tablet Take 1 tablet by mouth daily       Vitamin D, Cholecalciferol, 1000 units CAPS Take 1,000 Units by mouth daily       albuterol (PROAIR HFA/PROVENTIL HFA/VENTOLIN HFA) 108 (90 Base) MCG/ACT inhaler Inhale 2 puffs into the lungs every 6 hours as needed for shortness of breath / dyspnea or wheezing (Patient not taking: Reported on 10/24/2022)       order for DME Equipment being ordered: Walker Wheels () and Walker ()  Treatment Diagnosis: impaired gait 1 each 0       Allergies:  Allergies   Allergen Reactions     Hmg-Coa-R Inhibitors      PN: LW Reaction: muscle pain     Lisinopril      PN: LW Reaction: Cough     Sulfa Drugs      Nausea         Social History:   History   Drug Use Not on file      History   Smoking Status     Never   Smokeless Tobacco     Never     Social History    Substance and Sexual Activity      Alcohol use: Not Currently       Family History:  Family History   Problem Relation Age of Onset     Coronary Artery Disease Mother      Cerebrovascular Disease Mother      Coronary Artery Disease Father      Cerebrovascular Disease Father      Diabetes Brother        Review of Systems:   A comprehensive 12 system review  "of systems was carried out.  Pertinent positives and negatives are noted above. Otherwise negative for contributory information.    Objective & Physical Exam:  /76 (BP Location: Left arm, Patient Position: Sitting, Cuff Size: Adult Regular)   Pulse 65   Ht 1.549 m (5' 1\")   Wt 56 kg (123 lb 6.4 oz)   SpO2 95%   BMI 23.32 kg/m    Wt Readings from Last 2 Encounters:   10/24/22 56 kg (123 lb 6.4 oz)   09/12/22 56.2 kg (124 lb)     Body mass index is 23.32 kg/m .   Body surface area is 1.55 meters squared.    Constitutional: appears stated age, in no apparent distress, appears to be well nourished  Head: normocephalic, atraumatic  Neck: supple, trachea midline, no bruit bilaterally   Pulmonary: clear to auscultation bilaterally, no wheezes, no rales, no increased work of breathing  Cardiovascular: JVP normal, regular rate, regular rhythm, 2/6 CHICHI at the RUSB, no lower extremity edema  Gastrointestinal: no guarding, non-rigid   Neurologic: awake, alert, moves all extremities  Skin: no jaundice, warm on limited exam  Psychiatric: affect is normal, answers questions appropriately, oriented to self and place    Data reviewed:  Lab Results   Component Value Date    WBC 7.0 09/12/2022    WBC 5.3 11/08/2019    RBC 4.45 09/12/2022    RBC 3.85 11/08/2019    HGB 15.1 09/12/2022    HGB 13.6 11/09/2019    HCT 44.9 09/12/2022    HCT 39.6 11/08/2019     (H) 09/12/2022     (H) 11/08/2019    MCH 33.9 (H) 09/12/2022    MCH 33.5 (H) 11/08/2019    MCHC 33.6 09/12/2022    MCHC 32.6 11/08/2019    RDW 13.1 09/12/2022    RDW 13.2 11/08/2019     09/12/2022     11/08/2019     Sodium   Date Value Ref Range Status   09/12/2022 140 133 - 144 mmol/L Final   01/04/2021 138 133 - 144 mmol/L Final     Potassium   Date Value Ref Range Status   09/12/2022 4.1 3.4 - 5.3 mmol/L Final   01/04/2021 4.2 3.4 - 5.3 mmol/L Final     Chloride   Date Value Ref Range Status   09/12/2022 108 94 - 109 mmol/L Final   01/04/2021 " 108 94 - 109 mmol/L Final     Carbon Dioxide   Date Value Ref Range Status   01/04/2021 25 20 - 32 mmol/L Final     Carbon Dioxide (CO2)   Date Value Ref Range Status   09/12/2022 25 20 - 32 mmol/L Final     Anion Gap   Date Value Ref Range Status   09/12/2022 7 3 - 14 mmol/L Final   01/04/2021 5 3 - 14 mmol/L Final     Glucose   Date Value Ref Range Status   09/12/2022 106 (H) 70 - 99 mg/dL Final   01/04/2021 95 70 - 99 mg/dL Final     Comment:     Fasting specimen     Urea Nitrogen   Date Value Ref Range Status   09/12/2022 21 7 - 30 mg/dL Final   01/04/2021 23 7 - 30 mg/dL Final     Creatinine   Date Value Ref Range Status   09/12/2022 0.85 0.52 - 1.04 mg/dL Final   01/04/2021 0.89 0.52 - 1.04 mg/dL Final     GFR Estimate   Date Value Ref Range Status   09/12/2022 67 >60 mL/min/1.73m2 Final     Comment:     Effective December 21, 2021 eGFRcr in adults is calculated using the 2021 CKD-EPI creatinine equation which includes age and gender (Shelby et al., NEJ, DOI: 10.1056/YNPQgf5394450)   01/04/2021 60 (L) >60 mL/min/[1.73_m2] Final     Comment:     Non  GFR Calc  Starting 12/18/2018, serum creatinine based estimated GFR (eGFR) will be   calculated using the Chronic Kidney Disease Epidemiology Collaboration   (CKD-EPI) equation.       Calcium   Date Value Ref Range Status   09/12/2022 9.5 8.5 - 10.1 mg/dL Final   01/04/2021 8.9 8.5 - 10.1 mg/dL Final     Bilirubin Total   Date Value Ref Range Status   01/04/2021 1.0 0.2 - 1.3 mg/dL Final     Alkaline Phosphatase   Date Value Ref Range Status   01/04/2021 85 40 - 150 U/L Final     ALT   Date Value Ref Range Status   01/04/2021 19 0 - 50 U/L Final     AST   Date Value Ref Range Status   01/04/2021 18 0 - 45 U/L Final     Recent Labs   Lab Test 09/12/22  1421 01/04/21  1029   CHOL 250* 240*   HDL 43* 38*   * 172*   TRIG 150* 152*      Lab Results   Component Value Date    A1C 5.2 11/08/2019        Thank you for allowing me to participate in the  care of your patient.    Sincerely,   Vinay Serrano MD   [facility]  cc: YENY Whitney CNP  [address] [place] [address]

## 2022-10-24 NOTE — PATIENT INSTRUCTIONS
October 24, 2022    Thank you for allowing our Cardiology team to participate in your care.     Please note the following changes to your heart treatment plan:     Medication changes:   - none    Tests to be done:  - echocardiogram     Follow up:  - Follow up as needed.      For scheduling, please call [phone].    Please contact our team at [phone] (Emma BRADLEY) or [phone] for any questions or concerns.     If you are having a medical emergency, please call [phone].     Sincerely,    Vinay Serrano MD, FACC  Cardiology    [facility] - [facility] - [facility] - [place]

## 2022-10-24 NOTE — PROGRESS NOTES
Cardiology Clinic Consultation:    October 24, 2022   Patient Name: Brea Kerr  Patient MRN: [id number]    Consult indication: dyslipidemia, HTN      HPI:    I had the opportunity to see patient Brea Kerr in cardiology clinic for a consultation. Patient is followed by our colleague YENY Gabriel CNP with Primary Care.     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    Patient has a longstanding history of dyslipidemia for which she had initially been on statin therapy.  She is accompanied today by her daughter, who is a nurse practitioner.  Daughter reports that most of the female members of the patient's family developed rheumatoid arthritis after starting statin therapy, and indeed, patient also developed rheumatoid arthritis after initiating statin therapy.  She has tried various statins in the past, however this resulted in significant myalgias, and so is currently not on statin therapy.  Last lipids from 9/12/2022 notable for total cholesterol 250, HDL 43, , triglycerides 150.    Patient has a history of CVA in 2019, she was seen by the Telestroke team, Dr. Castañeda.  Statin therapy was not recommended given history of statin intolerance, and etiology of CVA was presumed cardioembolic related to atrial fibrillation.    Blood pressure today in clinic was 110/76 mmHg, blood pressures on prior clinic visits seems to range from 124 to 146 mmHg systolic.    At baseline, patient reports that overall she feels quite well.  She is limited by her musculoskeletal pain related to arthritis, primarily with knee and back pain.  However, she is able to care for self independently around her single level home  without issue.  She denies any chest pain, chest pressure, abnormal shortness of breath.      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    Since her prior CVA was attributed to cardioembolic etiology related to atrial fibrillation, and she has a clear intolerance to statin therapy, in the context of her advanced age, risks of statin therapy likely outweigh benefits.  Discussed option of starting ezetimibe, however patient reports that she tried this in the past and did not tolerate this either.  Discussed option of PCSK9 inhibitor therapy, patient and daughter would like to hold off for now, which I feel is reasonable.    Regarding hypertension, blood pressure goals, I concur that a more strict blood pressure target would increase her risk of falls, I feel that keeping her blood pressure less than 140 mmHg systolic would be reasonable.    We discussed her history of paroxysmal atrial fibrillation.  Since she is asymptomatic, advised that she check her heart rates periodically to ensure that she is not in rapid atrial fibrillation, fortunately it does not seem that this has been an issue, and she is on metoprolol therapy.    Recommend continuing current regimen of apixaban, losartan, amlodipine, metoprolol tartrate.    Will check a TTE given mild AI on prior TTE 2019.  If there are no significant changes, scheduled follow-up in cardiology clinic is not warranted, however we will be glad to see her back as needed in the future.    Thank you for allowing our team to participate in the care of Brea Kerr.  Please do not hesitate to call or page me with any questions or  concerns.    Sincerely,     Vinay Serrano MD, [facility]  Cardiology  October 24, 2022      Doretha Soriano, APRN CNP  [address] [place] [address]    Total time spent on this encounter: 65 minutes, providing care in this encounter including, but not limited to, reviewing prior medical records, laboratory data, imaging studies, diagnostic studies, procedure notes, formulating an assessment and plan, recommendations, discussion and counseling with patient face to face, dictation.    Past Medical History:     The ASCVD Risk score (Norris DK, et al., 2019) failed to calculate for the following reasons:    The 2019 ASCVD risk score is only valid for ages 40 to 79    The patient has a prior MI or stroke diagnosis  Patient Active Problem List   Diagnosis     Diverticulitis of colon     Diverticulitis of sigmoid colon     Colovesical fistula     Paroxysmal atrial fibrillation with RVR, perioperative     Abscess of sigmoid colon due to diverticulitis     Fluid-responsive shock due to acute infection with a fib/RVR     S/P colostomy (H)     S/P partial resection of colon     Rheumatoid arthritis involving multiple sites (H)     CVA (cerebral vascular accident) (H)       Past Surgical History:   Past Surgical History:   Procedure Laterality Date     LAPAROSCOPIC ASSISTED COLOSTOMY N/A 10/2/2018    Procedure: LAPAROSCOPIC ASSISTED COLOSTOMY;;  Surgeon: Emma Reddy MD;  Location: [facility] OR     LAPAROSCOPIC ASSISTED SIGMOID COLECTOMY N/A 10/2/2018    Procedure: LAPAROSCOPIC ASSISTED SIGMOID COLECTOMY;  Exploratory laparoscopy, open sigmoid colectomy with end colostomy, extensive lysis of adhesions, and takedown of colovesical fistula with drain removal and drain placement;  Surgeon: Emma Reddy MD;  Location: [facility] OR     SPLENECTOMY         Medications (outpatient):  Current Outpatient Medications   Medication Sig Dispense Refill     acetaminophen (TYLENOL) 500 MG tablet Take 500-1,000 mg by  mouth every 6 hours as needed for mild pain       amLODIPine (NORVASC) 5 MG tablet TAKE 1 TABLET(5 MG) BY MOUTH DAILY 90 tablet 3     apixaban ANTICOAGULANT (ELIQUIS) 2.5 MG tablet Take 1 tablet (2.5 mg) by mouth 2 times daily 180 tablet 3     folic acid (FOLVITE) 1 MG tablet Take 1 mg by mouth daily       losartan (COZAAR) 100 MG tablet Take 1 tablet (100 mg) by mouth daily 90 tablet 4     METHOTREXATE PO Take 2.5 mg by mouth once a week       metoprolol tartrate (LOPRESSOR) 25 MG tablet TAKE 1 TABLET(25 MG) BY MOUTH TWICE DAILY 180 tablet 4     Multiple Vitamins-Minerals (CENTRUM SILVER) per tablet Take 1 tablet by mouth daily       Vitamin D, Cholecalciferol, 1000 units CAPS Take 1,000 Units by mouth daily       albuterol (PROAIR HFA/PROVENTIL HFA/VENTOLIN HFA) 108 (90 Base) MCG/ACT inhaler Inhale 2 puffs into the lungs every 6 hours as needed for shortness of breath / dyspnea or wheezing (Patient not taking: Reported on 10/24/2022)       order for DME Equipment being ordered: Walker Wheels () and Walker ()  Treatment Diagnosis: impaired gait 1 each 0       Allergies:  Allergies   Allergen Reactions     Hmg-Coa-R Inhibitors      PN: LW Reaction: muscle pain     Lisinopril      PN: LW Reaction: Cough     Sulfa Drugs      Nausea         Social History:   History   Drug Use Not on file      History   Smoking Status     Never   Smokeless Tobacco     Never     Social History    Substance and Sexual Activity      Alcohol use: Not Currently       Family History:  Family History   Problem Relation Age of Onset     Coronary Artery Disease Mother      Cerebrovascular Disease Mother      Coronary Artery Disease Father      Cerebrovascular Disease Father      Diabetes Brother        Review of Systems:   A comprehensive 12 system review of systems was carried out.  Pertinent positives and negatives are noted above. Otherwise negative for contributory information.    Objective & Physical Exam:  /76 (BP  "Location: Left arm, Patient Position: Sitting, Cuff Size: Adult Regular)   Pulse 65   Ht 1.549 m (5' 1\")   Wt 56 kg (123 lb 6.4 oz)   SpO2 95%   BMI 23.32 kg/m    Wt Readings from Last 2 Encounters:   10/24/22 56 kg (123 lb 6.4 oz)   09/12/22 56.2 kg (124 lb)     Body mass index is 23.32 kg/m .   Body surface area is 1.55 meters squared.    Constitutional: appears stated age, in no apparent distress, appears to be well nourished  Head: normocephalic, atraumatic  Neck: supple, trachea midline, no bruit bilaterally   Pulmonary: clear to auscultation bilaterally, no wheezes, no rales, no increased work of breathing  Cardiovascular: JVP normal, regular rate, regular rhythm, 2/6 CHICHI at the RUSB, no lower extremity edema  Gastrointestinal: no guarding, non-rigid   Neurologic: awake, alert, moves all extremities  Skin: no jaundice, warm on limited exam  Psychiatric: affect is normal, answers questions appropriately, oriented to self and place    Data reviewed:  Lab Results   Component Value Date    WBC 7.0 09/12/2022    WBC 5.3 11/08/2019    RBC 4.45 09/12/2022    RBC 3.85 11/08/2019    HGB 15.1 09/12/2022    HGB 13.6 11/09/2019    HCT 44.9 09/12/2022    HCT 39.6 11/08/2019     (H) 09/12/2022     (H) 11/08/2019    MCH 33.9 (H) 09/12/2022    MCH 33.5 (H) 11/08/2019    MCHC 33.6 09/12/2022    MCHC 32.6 11/08/2019    RDW 13.1 09/12/2022    RDW 13.2 11/08/2019     09/12/2022     11/08/2019     Sodium   Date Value Ref Range Status   09/12/2022 140 133 - 144 mmol/L Final   01/04/2021 138 133 - 144 mmol/L Final     Potassium   Date Value Ref Range Status   09/12/2022 4.1 3.4 - 5.3 mmol/L Final   01/04/2021 4.2 3.4 - 5.3 mmol/L Final     Chloride   Date Value Ref Range Status   09/12/2022 108 94 - 109 mmol/L Final   01/04/2021 108 94 - 109 mmol/L Final     Carbon Dioxide   Date Value Ref Range Status   01/04/2021 25 20 - 32 mmol/L Final     Carbon Dioxide (CO2)   Date Value Ref Range Status "   09/12/2022 25 20 - 32 mmol/L Final     Anion Gap   Date Value Ref Range Status   09/12/2022 7 3 - 14 mmol/L Final   01/04/2021 5 3 - 14 mmol/L Final     Glucose   Date Value Ref Range Status   09/12/2022 106 (H) 70 - 99 mg/dL Final   01/04/2021 95 70 - 99 mg/dL Final     Comment:     Fasting specimen     Urea Nitrogen   Date Value Ref Range Status   09/12/2022 21 7 - 30 mg/dL Final   01/04/2021 23 7 - 30 mg/dL Final     Creatinine   Date Value Ref Range Status   09/12/2022 0.85 0.52 - 1.04 mg/dL Final   01/04/2021 0.89 0.52 - 1.04 mg/dL Final     GFR Estimate   Date Value Ref Range Status   09/12/2022 67 >60 mL/min/1.73m2 Final     Comment:     Effective December 21, 2021 eGFRcr in adults is calculated using the 2021 CKD-EPI creatinine equation which includes age and gender (Shelby et al., NE, DOI: 10.1056/TNMVpw2414311)   01/04/2021 60 (L) >60 mL/min/[1.73_m2] Final     Comment:     Non  GFR Calc  Starting 12/18/2018, serum creatinine based estimated GFR (eGFR) will be   calculated using the Chronic Kidney Disease Epidemiology Collaboration   (CKD-EPI) equation.       Calcium   Date Value Ref Range Status   09/12/2022 9.5 8.5 - 10.1 mg/dL Final   01/04/2021 8.9 8.5 - 10.1 mg/dL Final     Bilirubin Total   Date Value Ref Range Status   01/04/2021 1.0 0.2 - 1.3 mg/dL Final     Alkaline Phosphatase   Date Value Ref Range Status   01/04/2021 85 40 - 150 U/L Final     ALT   Date Value Ref Range Status   01/04/2021 19 0 - 50 U/L Final     AST   Date Value Ref Range Status   01/04/2021 18 0 - 45 U/L Final     Recent Labs   Lab Test 09/12/22  1421 01/04/21  1029   CHOL 250* 240*   HDL 43* 38*   * 172*   TRIG 150* 152*      Lab Results   Component Value Date    A1C 5.2 11/08/2019

## 2022-11-02 ENCOUNTER — HOSPITAL ENCOUNTER (OUTPATIENT)
Dept: CARDIOLOGY | Facility: CLINIC | Age: 87
Discharge: HOME | End: 2022-11-02
Attending: INTERNAL MEDICINE | Admitting: INTERNAL MEDICINE
Payer: MEDICARE

## 2022-11-02 DIAGNOSIS — I35.1 NONRHEUMATIC AORTIC VALVE INSUFFICIENCY: ICD-10-CM

## 2022-11-02 LAB — LVEF ECHO: NORMAL

## 2022-11-02 PROCEDURE — 93306 TTE W/DOPPLER COMPLETE: CPT | Mod: 26 | Performed by: INTERNAL MEDICINE

## 2022-11-02 PROCEDURE — 93306 TTE W/DOPPLER COMPLETE: CPT

## 2022-11-04 NOTE — RESULT ENCOUNTER NOTE
Results reviewed, please let the patient know that overall findings demonstrate mild progression of aortic insufficiency which is expected after 3 years of change, recommend repeat in 2 years with cardiology IAN follow up at that time thanks!

## 2022-11-15 ENCOUNTER — TELEPHONE (OUTPATIENT)
Dept: CARDIOLOGY | Facility: CLINIC | Age: 87
End: 2022-11-15

## 2022-11-15 NOTE — TELEPHONE ENCOUNTER
Call placed to pt to review results and recommendations:    Interpretation Summary     The visual ejection fraction is 55-60%.  Left ventricular systolic function is normal.  There is mild to moderate (1-2+) aortic regurgitation.  The ascending aorta is Borderline dilated     Vinay Serrano MD   11/4/2022  7:40 AM CDT       Results reviewed, please let the patient know that overall findings demonstrate mild progression of aortic insufficiency which is expected after 3 years of change, recommend repeat in 2 years with cardiology IAN follow up at that time thanks!     No answer - Let VM with details. Call back number left. Will mail results as well.   SAMREEN Olvera RN, BSN.

## 2022-12-26 DIAGNOSIS — I10 ESSENTIAL HYPERTENSION: ICD-10-CM

## 2022-12-29 RX ORDER — LOSARTAN POTASSIUM 100 MG/1
100 TABLET ORAL DAILY
Qty: 90 TABLET | Refills: 4 | OUTPATIENT
Start: 2022-12-29

## 2022-12-29 NOTE — TELEPHONE ENCOUNTER
Should have refills on file  E-Prescribing Status: Receipt confirmed by pharmacy (9/12/2022  1:38 PM CDT)  Chica ORDONEZ RN, BSN

## 2023-08-14 ENCOUNTER — PATIENT OUTREACH (OUTPATIENT)
Dept: CARE COORDINATION | Facility: CLINIC | Age: 88
End: 2023-08-14
Payer: MEDICARE

## 2023-08-31 DIAGNOSIS — I10 ESSENTIAL HYPERTENSION: ICD-10-CM

## 2023-08-31 RX ORDER — AMLODIPINE BESYLATE 5 MG/1
TABLET ORAL
Qty: 90 TABLET | Refills: 0 | Status: SHIPPED | OUTPATIENT
Start: 2023-08-31 | End: 2023-10-20

## 2023-09-08 NOTE — PROGRESS NOTES
RADIOLOGY PROCEDURE NOTE  Patient name: Brea Kerr  MRN: [id number]  : 1935    Pre-procedure diagnosis: Dysphagia, malnutrition  Post-procedure diagnosis: Same    Procedure Date/Time: 2018  4:07 PM  Procedure: NJ feeding tube placement  Estimated blood loss: None  Specimen(s) collected with description: none  The patient tolerated the procedure well with no immediate complications.  Significant findings: Tube tip is post pyloric, some coiling of tube in stomach, f/u abd film in 1 hour to check tube tip location prior to feeding through tube    See imaging dictation for procedural details.    Provider name: Wilber Aquino  Assistant(s):None       Vaccine status unknown

## 2023-10-11 DIAGNOSIS — I63.10 CEREBROVASCULAR ACCIDENT (CVA) DUE TO EMBOLISM OF PRECEREBRAL ARTERY (H): ICD-10-CM

## 2023-10-20 ENCOUNTER — OFFICE VISIT (OUTPATIENT)
Dept: INTERNAL MEDICINE | Facility: CLINIC | Age: 88
End: 2023-10-20
Payer: MEDICARE

## 2023-10-20 VITALS
SYSTOLIC BLOOD PRESSURE: 122 MMHG | BODY MASS INDEX: 24.19 KG/M2 | DIASTOLIC BLOOD PRESSURE: 67 MMHG | RESPIRATION RATE: 16 BRPM | OXYGEN SATURATION: 96 % | TEMPERATURE: 97.2 F | HEIGHT: 59 IN | HEART RATE: 75 BPM | WEIGHT: 120 LBS

## 2023-10-20 DIAGNOSIS — I10 ESSENTIAL HYPERTENSION: ICD-10-CM

## 2023-10-20 DIAGNOSIS — E55.9 VITAMIN D DEFICIENCY: ICD-10-CM

## 2023-10-20 DIAGNOSIS — I63.10 CEREBROVASCULAR ACCIDENT (CVA) DUE TO EMBOLISM OF PRECEREBRAL ARTERY (H): ICD-10-CM

## 2023-10-20 DIAGNOSIS — Z00.00 ENCOUNTER FOR MEDICARE ANNUAL WELLNESS EXAM: Primary | ICD-10-CM

## 2023-10-20 DIAGNOSIS — Z93.3 S/P COLOSTOMY (H): ICD-10-CM

## 2023-10-20 DIAGNOSIS — M06.9 RHEUMATOID ARTHRITIS INVOLVING MULTIPLE SITES, UNSPECIFIED WHETHER RHEUMATOID FACTOR PRESENT (H): ICD-10-CM

## 2023-10-20 DIAGNOSIS — I48.0 PAROXYSMAL ATRIAL FIBRILLATION WITH RVR (H): ICD-10-CM

## 2023-10-20 LAB
ANION GAP SERPL CALCULATED.3IONS-SCNC: 10 MMOL/L (ref 7–15)
BUN SERPL-MCNC: 27.5 MG/DL (ref 8–23)
CALCIUM SERPL-MCNC: 10 MG/DL (ref 8.8–10.2)
CHLORIDE SERPL-SCNC: 105 MMOL/L (ref 98–107)
CREAT SERPL-MCNC: 0.96 MG/DL (ref 0.51–0.95)
DEPRECATED HCO3 PLAS-SCNC: 25 MMOL/L (ref 22–29)
EGFRCR SERPLBLD CKD-EPI 2021: 58 ML/MIN/1.73M2
ERYTHROCYTE [DISTWIDTH] IN BLOOD BY AUTOMATED COUNT: 13 % (ref 10–15)
GLUCOSE SERPL-MCNC: 95 MG/DL (ref 70–99)
HCT VFR BLD AUTO: 43.9 % (ref 35–47)
HGB BLD-MCNC: 14.6 G/DL (ref 11.7–15.7)
MCH RBC QN AUTO: 32.4 PG (ref 26.5–33)
MCHC RBC AUTO-ENTMCNC: 33.3 G/DL (ref 31.5–36.5)
MCV RBC AUTO: 97 FL (ref 78–100)
PLATELET # BLD AUTO: 213 10E3/UL (ref 150–450)
POTASSIUM SERPL-SCNC: 5.3 MMOL/L (ref 3.4–5.3)
RBC # BLD AUTO: 4.51 10E6/UL (ref 3.8–5.2)
SODIUM SERPL-SCNC: 140 MMOL/L (ref 135–145)
VIT D+METAB SERPL-MCNC: 65 NG/ML (ref 20–50)
WBC # BLD AUTO: 7.7 10E3/UL (ref 4–11)

## 2023-10-20 PROCEDURE — 99214 OFFICE O/P EST MOD 30 MIN: CPT | Mod: 25

## 2023-10-20 PROCEDURE — 82306 VITAMIN D 25 HYDROXY: CPT

## 2023-10-20 PROCEDURE — 80048 BASIC METABOLIC PNL TOTAL CA: CPT

## 2023-10-20 PROCEDURE — G0439 PPPS, SUBSEQ VISIT: HCPCS

## 2023-10-20 PROCEDURE — 85027 COMPLETE CBC AUTOMATED: CPT

## 2023-10-20 PROCEDURE — 36415 COLL VENOUS BLD VENIPUNCTURE: CPT

## 2023-10-20 RX ORDER — LOSARTAN POTASSIUM 100 MG/1
100 TABLET ORAL DAILY
Qty: 90 TABLET | Refills: 4 | Status: SHIPPED | OUTPATIENT
Start: 2023-10-20 | End: 2023-10-20

## 2023-10-20 RX ORDER — METOPROLOL TARTRATE 25 MG/1
TABLET, FILM COATED ORAL
Qty: 180 TABLET | Refills: 4 | Status: SHIPPED | OUTPATIENT
Start: 2023-10-20 | End: 2023-10-20

## 2023-10-20 RX ORDER — AMLODIPINE BESYLATE 5 MG/1
TABLET ORAL
Qty: 90 TABLET | Refills: 3 | Status: SHIPPED | OUTPATIENT
Start: 2023-10-20 | End: 2023-10-20

## 2023-10-20 RX ORDER — LOSARTAN POTASSIUM 100 MG/1
100 TABLET ORAL DAILY
Qty: 90 TABLET | Refills: 3 | Status: SHIPPED | OUTPATIENT
Start: 2023-10-20

## 2023-10-20 RX ORDER — METOPROLOL TARTRATE 25 MG/1
TABLET, FILM COATED ORAL
Qty: 180 TABLET | Refills: 3 | Status: SHIPPED | OUTPATIENT
Start: 2023-10-20

## 2023-10-20 RX ORDER — ALENDRONATE SODIUM 70 MG/1
70 TABLET ORAL
COMMUNITY

## 2023-10-20 RX ORDER — AMLODIPINE BESYLATE 5 MG/1
TABLET ORAL
Qty: 90 TABLET | Refills: 3 | Status: SHIPPED | OUTPATIENT
Start: 2023-10-20

## 2023-10-20 ASSESSMENT — ENCOUNTER SYMPTOMS
HEADACHES: 0
MYALGIAS: 0
CONSTIPATION: 0
NERVOUS/ANXIOUS: 1
SORE THROAT: 0
DIZZINESS: 0
DYSURIA: 0
WEAKNESS: 0
DIARRHEA: 0
JOINT SWELLING: 1
ABDOMINAL PAIN: 0
EYE PAIN: 0
PARESTHESIAS: 0
FREQUENCY: 0
FEVER: 0
HEMATURIA: 0
NAUSEA: 0
PALPITATIONS: 0
COUGH: 0
HEMATOCHEZIA: 0
CHILLS: 0
HEARTBURN: 0
ARTHRALGIAS: 1
SHORTNESS OF BREATH: 0

## 2023-10-20 ASSESSMENT — ACTIVITIES OF DAILY LIVING (ADL)
CURRENT_FUNCTION: SHOPPING REQUIRES ASSISTANCE
CURRENT_FUNCTION: MONEY MANAGEMENT REQUIRES ASSISTANCE

## 2023-10-20 NOTE — LETTER
October 23, 2023      Brea Kerr  [address]   [place] [address]      Brea,     Your labs are stable. No concern in very minimally increased creatinine (this measures your kidney function). At times this is minimally elevated due to a lack of fluid intake on the day of lab draw.   The blood counts are normal. Vitamin D level is still a little bit high, indicating you may be getting more Vitamin D than you need. I would recommend decreasing the amount of Vitamin D you are taking. There may be minor variations from normal in a few tests reported. These are not significant.     Please let me know if you have any questions.       Resulted Orders   Basic metabolic panel  (Ca, Cl, CO2, Creat, Gluc, K, Na, BUN)   Result Value Ref Range    Sodium 140 135 - 145 mmol/L      Comment:      Reference intervals for this test were updated on 09/26/2023 to more accurately reflect our healthy population. There may be differences in the flagging of prior results with similar values performed with this method. Interpretation of those prior results can be made in the context of the updated reference intervals.     Potassium 5.3 3.4 - 5.3 mmol/L    Chloride 105 98 - 107 mmol/L    Carbon Dioxide (CO2) 25 22 - 29 mmol/L    Anion Gap 10 7 - 15 mmol/L    Urea Nitrogen 27.5 (H) 8.0 - 23.0 mg/dL    Creatinine 0.96 (H) 0.51 - 0.95 mg/dL    GFR Estimate 58 (L) >60 mL/min/1.73m2    Calcium 10.0 8.8 - 10.2 mg/dL    Glucose 95 70 - 99 mg/dL   CBC with platelets   Result Value Ref Range    WBC Count 7.7 4.0 - 11.0 10e3/uL    RBC Count 4.51 3.80 - 5.20 10e6/uL    Hemoglobin 14.6 11.7 - 15.7 g/dL    Hematocrit 43.9 35.0 - 47.0 %    MCV 97 78 - 100 fL    MCH 32.4 26.5 - 33.0 pg    MCHC 33.3 31.5 - 36.5 g/dL    RDW 13.0 10.0 - 15.0 %    Platelet Count 213 150 - 450 10e3/uL   Vitamin D Deficiency   Result Value Ref Range    Vitamin D, Total (25-Hydroxy) 65 (H) 20 - 50 ng/mL      Comment:      indicates supplementation, with increased  risk of hypercalciuria    Narrative    Season, race, dietary intake, and treatment affect the concentration of 25-hydroxy-Vitamin D. Values may decrease during winter months and increase during summer months.    Vitamin D determination is routinely performed by an immunoassay specific for 25 hydroxyvitamin D3.  If an individual is on vitamin D2(ergocalciferol) supplementation, please specify 25 OH vitamin D2 and D3 level determination by LCMSMS test VITD23.         If you have any questions or concerns, please call the clinic at the number listed above.       Sincerely,      YENY Whitney CNP

## 2023-10-20 NOTE — PROGRESS NOTES
"SUBJECTIVE:   Brea is a 87 year old who presents for Preventive Visit.      Are you in the first 12 months of your Medicare coverage?  No    Healthy Habits:     In general, how would you rate your overall health?  Excellent    Frequency of exercise:  None    Do you usually eat at least 4 servings of fruit and vegetables a day, include whole grains    & fiber and avoid regularly eating high fat or \"junk\" foods?  Yes    Taking medications regularly:  Yes    Medication side effects:  None    Ability to successfully perform activities of daily living:  Shopping requires assistance and money management requires assistance    Home Safety:  No safety concerns identified    Hearing Impairment:  Difficulty understanding soft or whispered speech    In the past 6 months, have you been bothered by leaking of urine?  No    In general, how would you rate your overall mental or emotional health?  Good    Additional concerns today:  No      Today's PHQ-2 Score:       10/20/2023     9:59 AM   PHQ-2 ( 1999 [employer])   Q1: Little interest or pleasure in doing things 0   Q2: Feeling down, depressed or hopeless 0   PHQ-2 Score 0   Q1: Little interest or pleasure in doing things Not at all   Q2: Feeling down, depressed or hopeless Not at all   PHQ-2 Score 0           Have you ever done Advance Care Planning? (For example, a Health Directive, POLST, or a discussion with a medical provider or your loved ones about your wishes): No, advance care planning information given to patient to review.  Patient declined advance care planning discussion at this time.       Fall risk  Fallen 2 or more times in the past year?: No  Any fall with injury in the past year?: No  click delete button to remove this line now  Cognitive Screening   1) Repeat 3 items (Leader, Season, Table)    2) Clock draw:   NORMAL  3) 3 item recall: Recalls 1 object   Results: NORMAL clock, 1-2 items recalled: COGNITIVE IMPAIRMENT LESS LIKELY    Mini-CogTM Copyright S Nate. " Licensed by the author for use in [facility]; reprinted with permission (sandra@[email or web address]). All rights reserved.      Do you have sleep apnea, excessive snoring or daytime drowsiness? : no    Reviewed and updated as needed this visit by clinical staff   Tobacco  Allergies  Meds   Med Hx  Surg Hx  Fam Hx  Soc Hx        Reviewed and updated as needed this visit by Provider                 Social History     Tobacco Use    Smoking status: Never    Smokeless tobacco: Never   Substance Use Topics    Alcohol use: Not Currently             10/20/2023     9:58 AM   Alcohol Use   Prescreen: >3 drinks/day or >7 drinks/week? No     Do you have a current opioid prescription? No  Do you use any other controlled substances or medications that are not prescribed by a provider? None          Current providers sharing in care for this patient include:   Patient Care Team:  Doretha Soriano APRN CNP as PCP - General (Internal Medicine)  Doretha Soriano APRN CNP as Assigned PCP  Vinay Serrano MD as MD (Cardiovascular Disease)  Vinay Serrano MD as Assigned Heart and Vascular Provider    The following health maintenance items are reviewed in Epic and correct as of today:  Health Maintenance   Topic Date Due    ANNUAL REVIEW OF HM ORDERS  Never done    COVID-19 Vaccine (1) Never done    RSV VACCINE 60+ (1 - 1-dose 60+ series) Never done    ZOSTER IMMUNIZATION (1 of 2) 05/07/2009    INFLUENZA VACCINE (1) 09/01/2023    MEDICARE ANNUAL WELLNESS VISIT  09/12/2023    FALL RISK ASSESSMENT  10/20/2024    DTAP/TDAP/TD IMMUNIZATION (3 - Td or Tdap) 05/03/2027    ADVANCE CARE PLANNING  10/20/2028    PHQ-2 (once per calendar year)  Completed    Pneumococcal Vaccine: 65+ Years  Completed    IPV IMMUNIZATION  Aged Out    HPV IMMUNIZATION  Aged Out    MENINGITIS IMMUNIZATION  Aged Out           Pertinent mammograms are reviewed under the imaging tab.    Review of Systems   Constitutional:  Negative for chills and fever.   HENT:  Positive for  "hearing loss. Negative for congestion, ear pain and sore throat.    Eyes:  Negative for pain and visual disturbance.   Respiratory:  Negative for cough and shortness of breath.    Cardiovascular:  Negative for chest pain, palpitations and peripheral edema.   Gastrointestinal:  Negative for abdominal pain, constipation, diarrhea, heartburn, hematochezia and nausea.   Genitourinary:  Negative for dysuria, frequency, genital sores, hematuria and urgency.   Musculoskeletal:  Positive for arthralgias and joint swelling. Negative for myalgias.   Skin:  Negative for rash.   Neurological:  Negative for dizziness, weakness, headaches and paresthesias.   Psychiatric/Behavioral:  Negative for mood changes. The patient is nervous/anxious.        OBJECTIVE:   /67   Pulse 75   Temp 97.2  F (36.2  C) (Oral)   Resp 16   Ht 1.492 m (4' 10.75\")   Wt 54.4 kg (120 lb)   SpO2 96%   BMI 24.44 kg/m   Estimated body mass index is 24.44 kg/m  as calculated from the following:    Height as of this encounter: 1.492 m (4' 10.75\").    Weight as of this encounter: 54.4 kg (120 lb).      Physical Exam  Constitutional:       General: She is not in acute distress.     Appearance: Normal appearance. She is not ill-appearing, toxic-appearing or diaphoretic.   HENT:      Head: Normocephalic and atraumatic.   Eyes:      Conjunctiva/sclera: Conjunctivae normal.   Cardiovascular:      Rate and Rhythm: Normal rate and regular rhythm.      Heart sounds: Normal heart sounds.   Pulmonary:      Effort: Pulmonary effort is normal.      Breath sounds: Normal breath sounds.   Skin:     General: Skin is warm and dry.   Neurological:      Mental Status: She is alert and oriented to person, place, and time.   Psychiatric:         Mood and Affect: Mood normal.         Behavior: Behavior normal.         Thought Content: Thought content normal.         Judgment: Judgment normal.       Diagnostic Test Results:  Labs reviewed in Epic    ASSESSMENT / PLAN: "   (Z00.00) Encounter for Medicare annual wellness exam  (primary encounter diagnosis)  Comment: Pt presents for annual visit  Plan:       (I63.10) Cerebrovascular accident (CVA)  (H) Nov 2019  Comment:   Plan: apixaban ANTICOAGULANT (ELIQUIS) 2.5 MG tablet,              (I10) Essential hypertension  Comment: BP at goal today, continue current medication regimen  Plan: losartan (COZAAR) 100 MG tablet, amLODIPine         (NORVASC) 5 MG tablet, Basic metabolic panel          (Ca, Cl, CO2, Creat, Gluc, K, Na, BUN),             (I48.0) Paroxysmal atrial fibrillation with RVR, perioperative  Comment:   She saw cardiology in November, 2022. They performed an ECHO which showed mild progression of aortic insufficiency. They recommended repeat ECHO in November of 2024. They discussed cholesterol medication treatment but pt prefers to hold off at this time. We will stop checking lipid panels going forward. It is unclear if she still has recurrent A-fib, cardiology decided to forego ordering a cardiac monitor and continue to just monitor symptoms clinically. Pt denies any palpitations.  Plan: metoprolol tartrate (LOPRESSOR) 25 MG tablet,         CBC with platelets,             (E55.9) Vitamin D deficiency  Comment:   Plan: Vitamin D Deficiency            (Z93.3) S/P colostomy (H)  Comment: Colostomy in place.   Plan:     (M06.9) Rheumatoid arthritis involving multiple sites, unspecified whether rheumatoid factor present (H)  Comment: Follows with Rheumatology, due for follow up. Rheumatology is treating her osteoporosis with fosamax.  Plan:           Takes Vitamin d gummy daily. Last year it was 90, we will recheck.      Patient has been advised of split billing requirements and indicates understanding: Yes      COUNSELING:  Reviewed preventive health counseling, as reflected in patient instructions       Regular exercise       Healthy diet/nutrition       Osteoporosis prevention/bone health        She reports that she has  never smoked. She has never used smokeless tobacco.      Appropriate preventive services were discussed with this patient, including applicable screening as appropriate for fall prevention, nutrition, physical activity, Tobacco-use cessation, weight loss and cognition.  Checklist reviewing preventive services available has been given to the patient.    Reviewed patients plan of care and provided an AVS. The Basic Care Plan (routine screening as documented in Health Maintenance) for Brea meets the Care Plan requirement. This Care Plan has been established and reviewed with the Patient.          YENY [facility]    Identified Health Risks:  I have reviewed Opioid Use Disorder and Substance Use Disorder risk factors and made any needed referrals.

## 2023-11-28 ENCOUNTER — TELEPHONE (OUTPATIENT)
Dept: INTERNAL MEDICINE | Facility: CLINIC | Age: 88
End: 2023-11-28
Payer: MEDICARE

## 2023-11-28 ENCOUNTER — NURSE TRIAGE (OUTPATIENT)
Dept: INTERNAL MEDICINE | Facility: CLINIC | Age: 88
End: 2023-11-28
Payer: MEDICARE

## 2023-11-28 NOTE — TELEPHONE ENCOUNTER
Call to Daughter. They are going to call ambulance for pt. She O2 level is 91-94%. She is not getting out of bed and feels horrible.   She is in [place] and dtr states the health care down there is horrible.

## 2023-11-28 NOTE — TELEPHONE ENCOUNTER
General Call    Contacts         Type Contact Phone/Fax    11/28/2023 02:13 PM CST Phone (Incoming) Krystina Onofre (Emergency Contact) [phone]          Reason for Call:  Covid treatment    What are your questions or concerns:  patients daughter spoke with triage and was told to do a virtual visit to discuss treatment options as she had a positive Covid test. They are in [place] so we are unable to schedule a virtual visit. She states she is very sick and has no insurance. They are wondering what she should do and if Doretha could help    Date of last appointment with provider: 10-20-23    Could we send this information to you in [place] or would you prefer to receive a phone call?:   Patient would prefer a phone call   Okay to leave a detailed message?: Yes at Other phone number:  Krystina daughter     [phone]

## 2023-11-28 NOTE — TELEPHONE ENCOUNTER
Nurse called patient's daughter Krystina to discuss treatment options for covid. Patient's daughter confirmed that the patient is currently in [place] on vacation. Nurse informed her that it is against policy to send prescriptions out of state and advised that the patient should go to the nearest urgent care to receive covid treatment. Patient's daughter requested to be transferred to schedule a virtual visit with a provider. Nurse assisted with transferring her to the . Patient's daughter reported no red flag symptoms and patient could not be reached via phone call.     Marcel Mendiola RN BSN  [facility]

## 2023-11-28 NOTE — TELEPHONE ENCOUNTER
Patient and daughter Krystina calling  Patient woke up this morning sick with cough, fever, tired, headache and sinus congestion. She tested positive for covid with a home test. Patient is in [place] on vacation. She would like to start medication    Patient would like to use [employer]  [facility]  [phone]    Ok to call and  [phone]  If patient doesn't answer call Krystina at [phone]

## 2024-12-22 ENCOUNTER — HEALTH MAINTENANCE LETTER (OUTPATIENT)
Age: 89
End: 2024-12-22

## 2025-06-09 ENCOUNTER — PATIENT OUTREACH (OUTPATIENT)
Dept: INTERNAL MEDICINE | Facility: CLINIC | Age: 89
End: 2025-06-09
Payer: MEDICARE

## 2025-06-09 PROBLEM — M19.042 OSTEOARTHRITIS OF FINGERS OF HANDS, BILATERAL: Status: ACTIVE | Noted: 2020-06-10

## 2025-06-09 PROBLEM — M81.0 OSTEOPOROSIS: Status: ACTIVE | Noted: 2021-11-22

## 2025-06-09 PROBLEM — R32 URINARY INCONTINENCE, UNSPECIFIED TYPE: Status: ACTIVE | Noted: 2025-06-09

## 2025-06-09 PROBLEM — Z93.3 COLOSTOMY PRESENT (H): Status: ACTIVE | Noted: 2025-06-09

## 2025-06-09 PROBLEM — Z86.0100 HISTORY OF COLONIC POLYPS: Status: RESOLVED | Noted: 2025-06-09 | Resolved: 2025-06-09

## 2025-06-09 PROBLEM — M19.041 OSTEOARTHRITIS OF FINGERS OF HANDS, BILATERAL: Status: ACTIVE | Noted: 2020-06-10

## 2025-06-09 NOTE — LETTER
June 9, 2025    Brea Kerr    [address]   [place] [address]    Hello,     Your care team at [facility] values your health and well-being. After reviewing your chart, we have identified recommendation(s) to help you better manage your health.    It's time for your Medicare AWV. During your visit, we'll discuss your health, well-being, and any questions you may have related to preventive care. We'll also review any recommended tests, exams, or screenings you might need. To schedule please call your clinic at [phone] or use your [facility] account.    If you recently had or are having any of these services soon, please contact the clinic via phone or [facility] so that your care team can update your records.    We look forward to seeing you at your upcoming visit.    If you have any questions or concerns, please contact our clinic. Thank you for continuing to trust us with your care.    Sincerely,    Your [facility] Care Team           Electronically signed

## 2025-06-09 NOTE — TELEPHONE ENCOUNTER
Patient Quality Outreach    Patient is due for the following:   Asthma  -  ACT needed and AAP  Depression  -  PHQ-A needed  Physical Annual Wellness Visit      Topic Date Due    COVID-19 Vaccine (1) Never done    Zoster (Shingles) Vaccine (1 of 2) 05/07/2009     Lipid panel, BMP, Fall Risk assess    Action(s) Taken:   Schedule a Annual Wellness Visit    Type of outreach:    Sent letter.    Questions for provider review:    None         Hope L Rule, LPN  Chart routed to self.

## (undated) DEVICE — DRAIN JACKSON PRATT RESERVOIR 100ML SU130-1305

## (undated) DEVICE — Device

## (undated) DEVICE — ENDO TROCAR FIRST ENTRY KII FIOS Z-THRD 05X100MM CTF03

## (undated) DEVICE — ENDO TROCAR BLUNT TIP KII BALLOON 12X100MM C0R47

## (undated) DEVICE — ESU PENCIL W/HOLSTER E2350H

## (undated) DEVICE — GLOVE PROTEXIS W/NEU-THERA 7.0  2D73TE70

## (undated) DEVICE — PREP SKIN SCRUB TRAY 4461A

## (undated) DEVICE — PREP CHLORAPREP 26ML TINTED ORANGE  260815

## (undated) DEVICE — SU PROLENE 2-0 SHDA 48" 8533H

## (undated) DEVICE — SU VICRYL 0 UR-6 27" J603H

## (undated) DEVICE — LINEN DRAPE 54X72" 5467

## (undated) DEVICE — SU MONOCRYL 4-0 PS-2 27" UND Y426H

## (undated) DEVICE — SYSTEM CLEARIFY VISUALIZATION 21-345

## (undated) DEVICE — KIT SIGMOIDOSCOPE W/OBTURATOR 18FR SUCTION KI521/10

## (undated) DEVICE — GOWN XLG DISP 9545

## (undated) DEVICE — DRAIN JACKSON PRATT 19FR ROUND SU130-1325

## (undated) DEVICE — SU VICRYL 3-0 SH 27" J316H

## (undated) DEVICE — SUCTION TIP YANKAUER W/O VENT K86

## (undated) DEVICE — LABEL MEDICATION SYSTEM 3303-P

## (undated) DEVICE — SYR 10ML FINGER CONTROL W/O NDL 309695

## (undated) DEVICE — SU VICRYL 3-0 SH CR 8X18" J774

## (undated) DEVICE — SU VICRYL 2-0 SH 27" J317H

## (undated) DEVICE — LINEN HALF SHEET 5512

## (undated) DEVICE — SU PDS II 1 CTX 36" Z371T

## (undated) DEVICE — DRAPE SLEEVE 599

## (undated) DEVICE — DRAPE MAYO STAND 23X54 8337

## (undated) DEVICE — SOL NACL 0.9% IRRIG 1000ML BOTTLE 2F7124

## (undated) DEVICE — SPONGE RAY-TEC 4X8" 7318

## (undated) DEVICE — LINEN POUCH DBL 5427

## (undated) DEVICE — TAPE CLOTH ADHESIVE 3" LATEX FREE

## (undated) DEVICE — DRSG TEGADERM 2 3/8X2 3/4" 1624W

## (undated) DEVICE — SU PDS II 0 ENDOLOOP EZ10G

## (undated) DEVICE — CATH TRAY FOLEY SURESTEP 16FR DRAIN BAG STATOCK A899916

## (undated) DEVICE — PROTECTOR ARM ONE-STEP TRENDELENBURG 40418

## (undated) DEVICE — ESU CORD MONOPOLAR 10'  E0510

## (undated) DEVICE — SU PDS II 1 CT MONOFIL Z353H

## (undated) DEVICE — STPL RELOAD CONTOUR CUT CVD THICK  CR40G

## (undated) DEVICE — OSTOMY BAG CLAMP 8770

## (undated) DEVICE — SYR BULB IRRIG 50ML LATEX FREE 0035280

## (undated) DEVICE — SU DERMABOND ADVANCED .7ML DNX12

## (undated) DEVICE — SU VICRYL 2-0 TIE 12X18" J905T

## (undated) DEVICE — LINEN FULL SHEET 5511

## (undated) DEVICE — BAG CLEAR TRASH 1.3M 39X33" P4040C

## (undated) DEVICE — SPONGE LAP 18X18" X8435

## (undated) DEVICE — SUCTION TIP POOLE K770

## (undated) DEVICE — COVER FOOTSWITCH URO

## (undated) DEVICE — TUBING SUCTION 12"X1/4" N612

## (undated) DEVICE — SU PROLENE 1 CT-1 30" 8425H

## (undated) DEVICE — SU MONOCRYL 4-0 PS-2 18" UND Y496G

## (undated) DEVICE — LINEN TOWEL PACK X10 5473

## (undated) DEVICE — LINEN TOWEL PACK X5 5464

## (undated) DEVICE — DRAPE IOBAN INCISE 23X17" 6650EZ

## (undated) DEVICE — ESU GROUND PAD ADULT W/CORD E7507

## (undated) DEVICE — SOL NACL 0.9% IRRIG 3000ML BAG 2B7477

## (undated) DEVICE — SUCTION MANIFOLD NEPTUNE 2 SYS 4 PORT 0702-020-000

## (undated) DEVICE — SU DERMABOND MINI DHVM12

## (undated) DEVICE — DRAPE LEGGINGS 8421

## (undated) DEVICE — STPL CONTOUR CUT CVD GREEN CS40G

## (undated) DEVICE — NDL COUNTER 20CT 31142493

## (undated) DEVICE — BLADE CLIPPER 3M 9670

## (undated) DEVICE — ENDO CATH ESOPH/PYL/COLONIC BALLOON 18-19-20MMX240CM CRE

## (undated) DEVICE — BLADE KNIFE SURG 10 371110

## (undated) RX ORDER — FENTANYL CITRATE 50 UG/ML
INJECTION, SOLUTION INTRAMUSCULAR; INTRAVENOUS
Status: DISPENSED
Start: 2018-10-02

## (undated) RX ORDER — HEPARIN SODIUM 5000 [USP'U]/.5ML
INJECTION, SOLUTION INTRAVENOUS; SUBCUTANEOUS
Status: DISPENSED
Start: 2018-10-02

## (undated) RX ORDER — VASOPRESSIN 20 U/ML
INJECTION PARENTERAL
Status: DISPENSED
Start: 2018-10-02

## (undated) RX ORDER — PROPOFOL 10 MG/ML
INJECTION, EMULSION INTRAVENOUS
Status: DISPENSED
Start: 2018-10-02

## (undated) RX ORDER — BUPIVACAINE HYDROCHLORIDE AND EPINEPHRINE 2.5; 5 MG/ML; UG/ML
INJECTION, SOLUTION EPIDURAL; INFILTRATION; INTRACAUDAL; PERINEURAL
Status: DISPENSED
Start: 2018-10-02

## (undated) RX ORDER — ONDANSETRON 2 MG/ML
INJECTION INTRAMUSCULAR; INTRAVENOUS
Status: DISPENSED
Start: 2018-10-02

## (undated) RX ORDER — LIDOCAINE HYDROCHLORIDE 10 MG/ML
INJECTION, SOLUTION EPIDURAL; INFILTRATION; INTRACAUDAL; PERINEURAL
Status: DISPENSED
Start: 2018-10-02